# Patient Record
Sex: FEMALE | Race: WHITE | Employment: OTHER | URBAN - METROPOLITAN AREA
[De-identification: names, ages, dates, MRNs, and addresses within clinical notes are randomized per-mention and may not be internally consistent; named-entity substitution may affect disease eponyms.]

---

## 2017-03-10 ENCOUNTER — OFFICE VISIT (OUTPATIENT)
Dept: ORTHOPEDIC SURGERY | Age: 72
End: 2017-03-10

## 2017-03-10 VITALS
SYSTOLIC BLOOD PRESSURE: 158 MMHG | HEART RATE: 80 BPM | HEIGHT: 68 IN | DIASTOLIC BLOOD PRESSURE: 81 MMHG | WEIGHT: 200 LBS | BODY MASS INDEX: 30.31 KG/M2

## 2017-03-10 DIAGNOSIS — M47.816 SPONDYLOSIS OF LUMBAR REGION WITHOUT MYELOPATHY OR RADICULOPATHY: Primary | ICD-10-CM

## 2017-03-10 PROCEDURE — G8400 PT W/DXA NO RESULTS DOC: HCPCS | Performed by: PHYSICAL MEDICINE & REHABILITATION

## 2017-03-10 PROCEDURE — G8484 FLU IMMUNIZE NO ADMIN: HCPCS | Performed by: PHYSICAL MEDICINE & REHABILITATION

## 2017-03-10 PROCEDURE — 3014F SCREEN MAMMO DOC REV: CPT | Performed by: PHYSICAL MEDICINE & REHABILITATION

## 2017-03-10 PROCEDURE — 72100 X-RAY EXAM L-S SPINE 2/3 VWS: CPT | Performed by: PHYSICAL MEDICINE & REHABILITATION

## 2017-03-10 PROCEDURE — G8427 DOCREV CUR MEDS BY ELIG CLIN: HCPCS | Performed by: PHYSICAL MEDICINE & REHABILITATION

## 2017-03-10 PROCEDURE — 4040F PNEUMOC VAC/ADMIN/RCVD: CPT | Performed by: PHYSICAL MEDICINE & REHABILITATION

## 2017-03-10 PROCEDURE — 99214 OFFICE O/P EST MOD 30 MIN: CPT | Performed by: PHYSICAL MEDICINE & REHABILITATION

## 2017-03-10 PROCEDURE — 1090F PRES/ABSN URINE INCON ASSESS: CPT | Performed by: PHYSICAL MEDICINE & REHABILITATION

## 2017-03-10 PROCEDURE — 1036F TOBACCO NON-USER: CPT | Performed by: PHYSICAL MEDICINE & REHABILITATION

## 2017-03-10 PROCEDURE — 3017F COLORECTAL CA SCREEN DOC REV: CPT | Performed by: PHYSICAL MEDICINE & REHABILITATION

## 2017-03-10 PROCEDURE — G8419 CALC BMI OUT NRM PARAM NOF/U: HCPCS | Performed by: PHYSICAL MEDICINE & REHABILITATION

## 2017-03-10 PROCEDURE — 1123F ACP DISCUSS/DSCN MKR DOCD: CPT | Performed by: PHYSICAL MEDICINE & REHABILITATION

## 2017-03-10 RX ORDER — METHYLPREDNISOLONE 4 MG/1
TABLET ORAL
Qty: 1 KIT | Refills: 0 | Status: SHIPPED | OUTPATIENT
Start: 2017-03-10 | End: 2017-03-16

## 2017-04-06 ENCOUNTER — HOSPITAL ENCOUNTER (OUTPATIENT)
Dept: SURGERY | Age: 72
Discharge: HOME | End: 2017-04-06
Attending: OPHTHALMOLOGY | Admitting: OPHTHALMOLOGY

## 2017-04-06 VITALS
HEIGHT: 68 IN | DIASTOLIC BLOOD PRESSURE: 86 MMHG | OXYGEN SATURATION: 95 % | TEMPERATURE: 97.2 F | HEART RATE: 75 BPM | WEIGHT: 200 LBS | BODY MASS INDEX: 30.31 KG/M2 | RESPIRATION RATE: 16 BRPM | SYSTOLIC BLOOD PRESSURE: 162 MMHG

## 2017-04-06 RX ORDER — SODIUM CHLORIDE, SODIUM LACTATE, POTASSIUM CHLORIDE, CALCIUM CHLORIDE 600; 310; 30; 20 MG/100ML; MG/100ML; MG/100ML; MG/100ML
INJECTION, SOLUTION INTRAVENOUS
Status: COMPLETED
Start: 2017-04-06 | End: 2017-04-06

## 2017-04-06 RX ORDER — HYDROMORPHONE HCL 110MG/55ML
0.25 PATIENT CONTROLLED ANALGESIA SYRINGE INTRAVENOUS EVERY 5 MIN PRN
Status: DISCONTINUED | OUTPATIENT
Start: 2017-04-06 | End: 2017-04-07 | Stop reason: HOSPADM

## 2017-04-06 RX ORDER — LIDOCAINE HYDROCHLORIDE 10 MG/ML
1 INJECTION, SOLUTION EPIDURAL; INFILTRATION; INTRACAUDAL; PERINEURAL
Status: COMPLETED | OUTPATIENT
Start: 2017-04-06 | End: 2017-04-06

## 2017-04-06 RX ORDER — PROMETHAZINE HYDROCHLORIDE 25 MG/ML
6.25 INJECTION, SOLUTION INTRAMUSCULAR; INTRAVENOUS
Status: DISCONTINUED | OUTPATIENT
Start: 2017-04-06 | End: 2017-04-07 | Stop reason: HOSPADM

## 2017-04-06 RX ORDER — DIPHENHYDRAMINE HYDROCHLORIDE 50 MG/ML
12.5 INJECTION INTRAMUSCULAR; INTRAVENOUS
Status: ACTIVE | OUTPATIENT
Start: 2017-04-06 | End: 2017-04-06

## 2017-04-06 RX ORDER — LABETALOL HYDROCHLORIDE 5 MG/ML
5 INJECTION, SOLUTION INTRAVENOUS EVERY 10 MIN PRN
Status: DISCONTINUED | OUTPATIENT
Start: 2017-04-06 | End: 2017-04-07 | Stop reason: HOSPADM

## 2017-04-06 RX ORDER — TOBRAMYCIN AND DEXAMETHASONE 3; 1 MG/ML; MG/ML
1 SUSPENSION/ DROPS OPHTHALMIC ONCE
Status: COMPLETED | OUTPATIENT
Start: 2017-04-06 | End: 2017-04-06

## 2017-04-06 RX ORDER — SODIUM CHLORIDE, SODIUM LACTATE, POTASSIUM CHLORIDE, CALCIUM CHLORIDE 600; 310; 30; 20 MG/100ML; MG/100ML; MG/100ML; MG/100ML
INJECTION, SOLUTION INTRAVENOUS CONTINUOUS
Status: DISCONTINUED | OUTPATIENT
Start: 2017-04-06 | End: 2017-04-07 | Stop reason: HOSPADM

## 2017-04-06 RX ORDER — LIDOCAINE HYDROCHLORIDE 10 MG/ML
INJECTION, SOLUTION EPIDURAL; INFILTRATION; INTRACAUDAL; PERINEURAL
Status: COMPLETED
Start: 2017-04-06 | End: 2017-04-06

## 2017-04-06 RX ORDER — SODIUM CHLORIDE 0.9 % (FLUSH) 0.9 %
10 SYRINGE (ML) INJECTION EVERY 12 HOURS SCHEDULED
Status: DISCONTINUED | OUTPATIENT
Start: 2017-04-06 | End: 2017-04-07 | Stop reason: HOSPADM

## 2017-04-06 RX ORDER — ONDANSETRON 2 MG/ML
4 INJECTION INTRAMUSCULAR; INTRAVENOUS PRN
Status: DISCONTINUED | OUTPATIENT
Start: 2017-04-06 | End: 2017-04-07 | Stop reason: HOSPADM

## 2017-04-06 RX ORDER — HYDRALAZINE HYDROCHLORIDE 20 MG/ML
5 INJECTION INTRAMUSCULAR; INTRAVENOUS EVERY 10 MIN PRN
Status: DISCONTINUED | OUTPATIENT
Start: 2017-04-06 | End: 2017-04-07 | Stop reason: HOSPADM

## 2017-04-06 RX ORDER — TOBRAMYCIN 3 MG/ML
1 SOLUTION/ DROPS OPHTHALMIC ONCE
Status: DISCONTINUED | OUTPATIENT
Start: 2017-04-06 | End: 2017-04-06

## 2017-04-06 RX ORDER — SODIUM CHLORIDE 0.9 % (FLUSH) 0.9 %
10 SYRINGE (ML) INJECTION PRN
Status: DISCONTINUED | OUTPATIENT
Start: 2017-04-06 | End: 2017-04-07 | Stop reason: HOSPADM

## 2017-04-06 RX ORDER — HYDROMORPHONE HCL 110MG/55ML
0.5 PATIENT CONTROLLED ANALGESIA SYRINGE INTRAVENOUS EVERY 5 MIN PRN
Status: DISCONTINUED | OUTPATIENT
Start: 2017-04-06 | End: 2017-04-07 | Stop reason: HOSPADM

## 2017-04-06 RX ORDER — MORPHINE SULFATE 4 MG/ML
2 INJECTION, SOLUTION INTRAMUSCULAR; INTRAVENOUS EVERY 5 MIN PRN
Status: DISCONTINUED | OUTPATIENT
Start: 2017-04-06 | End: 2017-04-07 | Stop reason: HOSPADM

## 2017-04-06 RX ORDER — MEPERIDINE HYDROCHLORIDE 25 MG/ML
12.5 INJECTION INTRAMUSCULAR; INTRAVENOUS; SUBCUTANEOUS EVERY 5 MIN PRN
Status: DISCONTINUED | OUTPATIENT
Start: 2017-04-06 | End: 2017-04-07 | Stop reason: HOSPADM

## 2017-04-06 RX ORDER — PREDNISOLONE ACETATE 10 MG/ML
1 SUSPENSION/ DROPS OPHTHALMIC CONTINUOUS
Status: DISCONTINUED | OUTPATIENT
Start: 2017-04-06 | End: 2017-04-07 | Stop reason: HOSPADM

## 2017-04-06 RX ORDER — MORPHINE SULFATE 4 MG/ML
1 INJECTION, SOLUTION INTRAMUSCULAR; INTRAVENOUS EVERY 5 MIN PRN
Status: DISCONTINUED | OUTPATIENT
Start: 2017-04-06 | End: 2017-04-07 | Stop reason: HOSPADM

## 2017-04-06 RX ADMIN — SODIUM CHLORIDE, SODIUM LACTATE, POTASSIUM CHLORIDE, CALCIUM CHLORIDE: 600; 310; 30; 20 INJECTION, SOLUTION INTRAVENOUS at 07:10

## 2017-04-06 RX ADMIN — LIDOCAINE HYDROCHLORIDE 1 ML: 10 INJECTION, SOLUTION EPIDURAL; INFILTRATION; INTRACAUDAL; PERINEURAL at 07:11

## 2017-04-06 RX ADMIN — TOBRAMYCIN AND DEXAMETHASONE 1 DROP: 3; 1 SUSPENSION/ DROPS OPHTHALMIC at 09:11

## 2017-04-06 ASSESSMENT — PAIN SCALES - GENERAL
PAINLEVEL_OUTOF10: 0
PAINLEVEL_OUTOF10: 0

## 2017-04-06 ASSESSMENT — PAIN - FUNCTIONAL ASSESSMENT: PAIN_FUNCTIONAL_ASSESSMENT: 0-10

## 2017-05-04 ENCOUNTER — HOSPITAL ENCOUNTER (OUTPATIENT)
Dept: SURGERY | Age: 72
Discharge: HOME | End: 2017-05-04
Attending: OPHTHALMOLOGY | Admitting: OPHTHALMOLOGY

## 2017-05-04 VITALS
DIASTOLIC BLOOD PRESSURE: 78 MMHG | OXYGEN SATURATION: 94 % | WEIGHT: 200 LBS | SYSTOLIC BLOOD PRESSURE: 157 MMHG | BODY MASS INDEX: 30.31 KG/M2 | TEMPERATURE: 98.4 F | HEART RATE: 71 BPM | RESPIRATION RATE: 16 BRPM | HEIGHT: 68 IN

## 2017-05-04 RX ORDER — LIDOCAINE HYDROCHLORIDE 10 MG/ML
INJECTION, SOLUTION EPIDURAL; INFILTRATION; INTRACAUDAL; PERINEURAL
Status: DISPENSED
Start: 2017-05-04 | End: 2017-05-04

## 2017-05-04 RX ORDER — LIDOCAINE HYDROCHLORIDE 10 MG/ML
2 INJECTION, SOLUTION INFILTRATION; PERINEURAL
Status: COMPLETED | OUTPATIENT
Start: 2017-05-04 | End: 2017-05-04

## 2017-05-04 RX ORDER — TOBRAMYCIN AND DEXAMETHASONE 3; 1 MG/ML; MG/ML
1 SUSPENSION/ DROPS OPHTHALMIC SEE ADMIN INSTRUCTIONS
Status: DISCONTINUED | OUTPATIENT
Start: 2017-05-04 | End: 2017-05-05 | Stop reason: HOSPADM

## 2017-05-04 RX ORDER — PREDNISOLONE ACETATE 10 MG/ML
1 SUSPENSION/ DROPS OPHTHALMIC SEE ADMIN INSTRUCTIONS
Status: DISCONTINUED | OUTPATIENT
Start: 2017-05-04 | End: 2017-05-05 | Stop reason: HOSPADM

## 2017-05-04 RX ORDER — SODIUM CHLORIDE, SODIUM LACTATE, POTASSIUM CHLORIDE, CALCIUM CHLORIDE 600; 310; 30; 20 MG/100ML; MG/100ML; MG/100ML; MG/100ML
INJECTION, SOLUTION INTRAVENOUS CONTINUOUS
Status: DISCONTINUED | OUTPATIENT
Start: 2017-05-04 | End: 2017-05-05 | Stop reason: HOSPADM

## 2017-05-04 RX ADMIN — LIDOCAINE HYDROCHLORIDE 2 ML: 10 INJECTION, SOLUTION INFILTRATION; PERINEURAL at 10:07

## 2017-05-04 RX ADMIN — SODIUM CHLORIDE, SODIUM LACTATE, POTASSIUM CHLORIDE, CALCIUM CHLORIDE: 600; 310; 30; 20 INJECTION, SOLUTION INTRAVENOUS at 10:07

## 2017-05-04 ASSESSMENT — PAIN SCALES - GENERAL
PAINLEVEL_OUTOF10: 0
PAINLEVEL_OUTOF10: 0

## 2018-05-10 ENCOUNTER — OFFICE VISIT (OUTPATIENT)
Dept: ORTHOPEDIC SURGERY | Age: 73
End: 2018-05-10

## 2018-05-10 VITALS
WEIGHT: 199.96 LBS | DIASTOLIC BLOOD PRESSURE: 82 MMHG | HEIGHT: 68 IN | SYSTOLIC BLOOD PRESSURE: 179 MMHG | HEART RATE: 72 BPM | BODY MASS INDEX: 30.31 KG/M2

## 2018-05-10 DIAGNOSIS — R52 PAIN: ICD-10-CM

## 2018-05-10 DIAGNOSIS — M17.11 PRIMARY OSTEOARTHRITIS OF RIGHT KNEE: Primary | ICD-10-CM

## 2018-05-10 PROCEDURE — G8427 DOCREV CUR MEDS BY ELIG CLIN: HCPCS | Performed by: ORTHOPAEDIC SURGERY

## 2018-05-10 PROCEDURE — 20611 DRAIN/INJ JOINT/BURSA W/US: CPT | Performed by: ORTHOPAEDIC SURGERY

## 2018-05-10 PROCEDURE — G8400 PT W/DXA NO RESULTS DOC: HCPCS | Performed by: ORTHOPAEDIC SURGERY

## 2018-05-10 PROCEDURE — 1123F ACP DISCUSS/DSCN MKR DOCD: CPT | Performed by: ORTHOPAEDIC SURGERY

## 2018-05-10 PROCEDURE — 3017F COLORECTAL CA SCREEN DOC REV: CPT | Performed by: ORTHOPAEDIC SURGERY

## 2018-05-10 PROCEDURE — G8417 CALC BMI ABV UP PARAM F/U: HCPCS | Performed by: ORTHOPAEDIC SURGERY

## 2018-05-10 PROCEDURE — 1036F TOBACCO NON-USER: CPT | Performed by: ORTHOPAEDIC SURGERY

## 2018-05-10 PROCEDURE — 1090F PRES/ABSN URINE INCON ASSESS: CPT | Performed by: ORTHOPAEDIC SURGERY

## 2018-05-10 PROCEDURE — 4040F PNEUMOC VAC/ADMIN/RCVD: CPT | Performed by: ORTHOPAEDIC SURGERY

## 2018-05-10 PROCEDURE — 99213 OFFICE O/P EST LOW 20 MIN: CPT | Performed by: ORTHOPAEDIC SURGERY

## 2018-05-10 RX ORDER — HYDROCHLOROTHIAZIDE 12.5 MG/1
TABLET ORAL
Refills: 0 | COMMUNITY
Start: 2018-04-21 | End: 2018-07-04 | Stop reason: ALTCHOICE

## 2018-06-08 ENCOUNTER — HOSPITAL ENCOUNTER (OUTPATIENT)
Dept: OTHER | Age: 73
Discharge: HOME | End: 2018-06-08
Attending: INTERNAL MEDICINE | Admitting: INTERNAL MEDICINE

## 2018-06-08 DIAGNOSIS — M25.50 PAIN IN JOINT, MULTIPLE SITES: ICD-10-CM

## 2018-07-12 ENCOUNTER — OFFICE VISIT (OUTPATIENT)
Dept: ORTHOPEDIC SURGERY | Age: 73
End: 2018-07-12

## 2018-07-12 VITALS — HEIGHT: 68 IN | WEIGHT: 199.96 LBS | BODY MASS INDEX: 30.31 KG/M2

## 2018-07-12 DIAGNOSIS — S63.501A RIGHT WRIST SPRAIN, INITIAL ENCOUNTER: ICD-10-CM

## 2018-07-12 DIAGNOSIS — M25.531 RIGHT WRIST PAIN: Primary | ICD-10-CM

## 2018-07-12 DIAGNOSIS — M18.11 ARTHRITIS OF CARPOMETACARPAL (CMC) JOINT OF RIGHT THUMB: ICD-10-CM

## 2018-07-12 PROCEDURE — 1090F PRES/ABSN URINE INCON ASSESS: CPT | Performed by: ORTHOPAEDIC SURGERY

## 2018-07-12 PROCEDURE — 3017F COLORECTAL CA SCREEN DOC REV: CPT | Performed by: ORTHOPAEDIC SURGERY

## 2018-07-12 PROCEDURE — 99213 OFFICE O/P EST LOW 20 MIN: CPT | Performed by: ORTHOPAEDIC SURGERY

## 2018-07-12 PROCEDURE — G8417 CALC BMI ABV UP PARAM F/U: HCPCS | Performed by: ORTHOPAEDIC SURGERY

## 2018-07-12 PROCEDURE — 4040F PNEUMOC VAC/ADMIN/RCVD: CPT | Performed by: ORTHOPAEDIC SURGERY

## 2018-07-12 PROCEDURE — 1036F TOBACCO NON-USER: CPT | Performed by: ORTHOPAEDIC SURGERY

## 2018-07-12 PROCEDURE — 1123F ACP DISCUSS/DSCN MKR DOCD: CPT | Performed by: ORTHOPAEDIC SURGERY

## 2018-07-12 PROCEDURE — G8400 PT W/DXA NO RESULTS DOC: HCPCS | Performed by: ORTHOPAEDIC SURGERY

## 2018-07-12 PROCEDURE — L3908 WHO COCK-UP NONMOLDE PRE OTS: HCPCS | Performed by: ORTHOPAEDIC SURGERY

## 2018-07-12 PROCEDURE — G8427 DOCREV CUR MEDS BY ELIG CLIN: HCPCS | Performed by: ORTHOPAEDIC SURGERY

## 2018-07-12 PROCEDURE — 1101F PT FALLS ASSESS-DOCD LE1/YR: CPT | Performed by: ORTHOPAEDIC SURGERY

## 2018-07-12 NOTE — PROGRESS NOTES
Chief Complaint  Wrist Pain (RIGHT WRIST INJURY 7/4/18 AFTER FALL)      History of Present Illness:  Alexia Allen is a 74 y.o. female. She presents today for a new hand surgery specialty evaluation regarding her right hand and wrist.  On 7/4/2018 she fell on concrete and had a laceration to her right forehead region but also landed on bilateral outstretched hands. Her original pain was on both wrists but now she has pain relegated to the right wrist area. There is some question on the initial x-rays of a possible small avulsion fracture. She did not find the initial wrist immobilization to be comfortable and took it off. She has been walking with a walker. Medical History:  Patient's medications, allergies, past medical, surgical, social and family histories were reviewed and updated as appropriate. Review of Systems  Pertinent items are noted in HPI  Review of systems reviewed from Patient History Form dated on 5/10/2018 and available in the patient's chart under the Media tab. Vital Signs  There were no vitals filed for this visit. General Exam:   Constitutional: Patient is adequately groomed with no evidence of malnutrition  Mental Status: The patient is oriented to time, place and person. The patient's mood and affect are appropriate. Wrist Examination    Inspection:  There is mild fullness over the right wrist and a small superficial abrasion along the right forearm. There is no sign of cellulitis    Palpation:  There is diffuse tenderness over the radiocarpal joint along the right wrist and both over the base of the thumb and over the triquetrum. There is no palmar tenderness. There is no discomfort at the elbow. Range of Motion:  The patient is able to straight almost a full range of motion of fingers and thumb with mild generalized stiffness.   There is no obvious tendon dysfunction    Strength:  Normal    Special Tests:  Compartments of the hand and wrist remain

## 2018-07-26 ENCOUNTER — OFFICE VISIT (OUTPATIENT)
Dept: ORTHOPEDIC SURGERY | Age: 73
End: 2018-07-26

## 2018-07-26 VITALS — WEIGHT: 199 LBS | HEIGHT: 68 IN | BODY MASS INDEX: 30.16 KG/M2

## 2018-07-26 DIAGNOSIS — S63.501A RIGHT WRIST SPRAIN, INITIAL ENCOUNTER: ICD-10-CM

## 2018-07-26 DIAGNOSIS — M25.531 RIGHT WRIST PAIN: Primary | ICD-10-CM

## 2018-07-26 PROCEDURE — 1123F ACP DISCUSS/DSCN MKR DOCD: CPT | Performed by: ORTHOPAEDIC SURGERY

## 2018-07-26 PROCEDURE — 3017F COLORECTAL CA SCREEN DOC REV: CPT | Performed by: ORTHOPAEDIC SURGERY

## 2018-07-26 PROCEDURE — G8400 PT W/DXA NO RESULTS DOC: HCPCS | Performed by: ORTHOPAEDIC SURGERY

## 2018-07-26 PROCEDURE — 99213 OFFICE O/P EST LOW 20 MIN: CPT | Performed by: ORTHOPAEDIC SURGERY

## 2018-07-26 PROCEDURE — G8427 DOCREV CUR MEDS BY ELIG CLIN: HCPCS | Performed by: ORTHOPAEDIC SURGERY

## 2018-07-26 PROCEDURE — 1090F PRES/ABSN URINE INCON ASSESS: CPT | Performed by: ORTHOPAEDIC SURGERY

## 2018-07-26 PROCEDURE — 1036F TOBACCO NON-USER: CPT | Performed by: ORTHOPAEDIC SURGERY

## 2018-07-26 PROCEDURE — G8417 CALC BMI ABV UP PARAM F/U: HCPCS | Performed by: ORTHOPAEDIC SURGERY

## 2018-07-26 PROCEDURE — 4040F PNEUMOC VAC/ADMIN/RCVD: CPT | Performed by: ORTHOPAEDIC SURGERY

## 2018-07-26 PROCEDURE — 1101F PT FALLS ASSESS-DOCD LE1/YR: CPT | Performed by: ORTHOPAEDIC SURGERY

## 2018-07-26 NOTE — PROGRESS NOTES
fall prevention strategies, and I will be happy to see her back on an as-needed basis moving forward. Please note that this transcription was created using voice recognition software. Any errors are unintentional and may be due to voice recognition transcription.

## 2018-10-10 ENCOUNTER — HOSPITAL ENCOUNTER (OUTPATIENT)
Age: 73
Discharge: HOME | End: 2018-10-10
Payer: MEDICARE

## 2018-10-10 ENCOUNTER — OFFICE VISIT (OUTPATIENT)
Dept: CARDIOLOGY CLINIC | Age: 73
End: 2018-10-10
Payer: MEDICARE

## 2018-10-10 ENCOUNTER — HOSPITAL ENCOUNTER (OUTPATIENT)
Dept: GENERAL RADIOLOGY | Age: 73
Discharge: HOME | End: 2018-10-10
Payer: MEDICARE

## 2018-10-10 VITALS
OXYGEN SATURATION: 97 % | BODY MASS INDEX: 31.52 KG/M2 | DIASTOLIC BLOOD PRESSURE: 86 MMHG | WEIGHT: 208 LBS | SYSTOLIC BLOOD PRESSURE: 190 MMHG | HEIGHT: 68 IN | HEART RATE: 77 BPM

## 2018-10-10 DIAGNOSIS — R07.89 OTHER CHEST PAIN: ICD-10-CM

## 2018-10-10 DIAGNOSIS — R06.02 SHORTNESS OF BREATH: ICD-10-CM

## 2018-10-10 DIAGNOSIS — I10 ESSENTIAL HYPERTENSION: Primary | ICD-10-CM

## 2018-10-10 PROCEDURE — 1036F TOBACCO NON-USER: CPT | Performed by: INTERNAL MEDICINE

## 2018-10-10 PROCEDURE — 71046 X-RAY EXAM CHEST 2 VIEWS: CPT

## 2018-10-10 PROCEDURE — 4040F PNEUMOC VAC/ADMIN/RCVD: CPT | Performed by: INTERNAL MEDICINE

## 2018-10-10 PROCEDURE — 3017F COLORECTAL CA SCREEN DOC REV: CPT | Performed by: INTERNAL MEDICINE

## 2018-10-10 PROCEDURE — 93000 ELECTROCARDIOGRAM COMPLETE: CPT | Performed by: INTERNAL MEDICINE

## 2018-10-10 PROCEDURE — G8427 DOCREV CUR MEDS BY ELIG CLIN: HCPCS | Performed by: INTERNAL MEDICINE

## 2018-10-10 PROCEDURE — 1090F PRES/ABSN URINE INCON ASSESS: CPT | Performed by: INTERNAL MEDICINE

## 2018-10-10 PROCEDURE — G8484 FLU IMMUNIZE NO ADMIN: HCPCS | Performed by: INTERNAL MEDICINE

## 2018-10-10 PROCEDURE — 99204 OFFICE O/P NEW MOD 45 MIN: CPT | Performed by: INTERNAL MEDICINE

## 2018-10-10 PROCEDURE — 1101F PT FALLS ASSESS-DOCD LE1/YR: CPT | Performed by: INTERNAL MEDICINE

## 2018-10-10 PROCEDURE — G8400 PT W/DXA NO RESULTS DOC: HCPCS | Performed by: INTERNAL MEDICINE

## 2018-10-10 PROCEDURE — 1123F ACP DISCUSS/DSCN MKR DOCD: CPT | Performed by: INTERNAL MEDICINE

## 2018-10-10 PROCEDURE — G8417 CALC BMI ABV UP PARAM F/U: HCPCS | Performed by: INTERNAL MEDICINE

## 2018-10-10 RX ORDER — FUROSEMIDE 40 MG/1
40 TABLET ORAL DAILY
Qty: 30 TABLET | Refills: 3 | Status: SHIPPED | OUTPATIENT
Start: 2018-10-10 | End: 2018-12-03 | Stop reason: SDUPTHER

## 2018-10-10 RX ORDER — TRAMADOL HYDROCHLORIDE 50 MG/1
50 TABLET ORAL PRN
COMMUNITY
End: 2021-03-23

## 2018-10-10 NOTE — COMMUNICATION BODY
asymmetry, or inflammation, nasal mucosa normal, septum midline with no perforation or bleeding  Back:  no pain to palpation  Joint:  no active joint inflammation  Musculoskeletal:  negative  Skin:  Warm and dry  Neck:  + JVD and  No Carotid Bruits. Chest:  Clear to auscultation, respiration easy  Cardiovascular:  RRR, S1S2 normal, 3/6 MIRELLA  Transmitted to carotids consistent with aortic stenosis, no diastolic murmur, no rub or thrill. Abdomen:  Soft normal liver and spleen  Extremities:   4+ BLE edema  no clubbing, cyanosis,  Pulses: Femoral and pedal pulses are normal.  Neuro: intact    Medications:   Outpatient Encounter Prescriptions as of 10/10/2018   Medication Sig Dispense Refill    Acetaminophen (TYLENOL PO) Take by mouth as needed      traMADol (ULTRAM) 50 MG tablet Take 50 mg by mouth as needed for Pain. [employer]eds amLODIPine (NORVASC) 5 MG tablet Take 5 mg by mouth daily       meloxicam (MOBIC) 15 MG tablet Take 15 mg by mouth daily       pantoprazole (PROTONIX) 40 MG tablet Take 40 mg by mouth daily       citalopram (CELEXA) 20 MG tablet Take 1 tablet by mouth daily. (Patient taking differently: Take by mouth daily ) 90 tablet 3    Naproxen Sodium (ALEVE PO) Take by mouth       No facility-administered encounter medications on file as of 10/10/2018. Lab Data:  CBC: No results for input(s): WBC, HGB, HCT, MCV, PLT in the last 72 hours. BMP: No results for input(s): NA, K, CL, CO2, PHOS, BUN, CREATININE in the last 72 hours. Invalid input(s): CA  LIVER PROFILE: No results for input(s): AST, ALT, LIPASE, BILIDIR, BILITOT, ALKPHOS in the last 72 hours. Invalid input(s):   AMYLASE,  ALB  LIPID:   Lab Results   Component Value Date    CHOL 133 10/28/2016    CHOL 177 09/03/2013    CHOL 191 04/10/2010     Lab Results   Component Value Date    TRIG 75 10/28/2016    TRIG 145 09/03/2013    TRIG 125 04/10/2010     Lab Results   Component Value Date    HDL 39 (L) 10/28/2016    HDL 45 09/03/2013

## 2018-10-12 ENCOUNTER — TELEPHONE (OUTPATIENT)
Dept: CARDIOLOGY CLINIC | Age: 73
End: 2018-10-12

## 2018-10-19 ENCOUNTER — HOSPITAL ENCOUNTER (OUTPATIENT)
Dept: NON INVASIVE DIAGNOSTICS | Age: 73
Discharge: HOME | End: 2018-10-19
Payer: MEDICARE

## 2018-10-19 ENCOUNTER — TELEPHONE (OUTPATIENT)
Dept: CARDIOLOGY CLINIC | Age: 73
End: 2018-10-19

## 2018-10-19 DIAGNOSIS — R07.89 OTHER CHEST PAIN: ICD-10-CM

## 2018-10-19 DIAGNOSIS — R06.02 SHORTNESS OF BREATH: ICD-10-CM

## 2018-10-19 LAB
LV EF: 58 %
LVEF MODALITY: NORMAL

## 2018-10-19 PROCEDURE — 93306 TTE W/DOPPLER COMPLETE: CPT

## 2018-10-25 LAB
ALBUMIN SERPL-MCNC: 3.6 G/DL
ALP BLD-CCNC: 88 U/L
ALT SERPL-CCNC: 21 U/L
ANION GAP SERPL CALCULATED.3IONS-SCNC: 1.1 MMOL/L
AST SERPL-CCNC: 41 U/L
BASOPHILS ABSOLUTE: 0.1 /ΜL
BASOPHILS RELATIVE PERCENT: 1 %
BILIRUB SERPL-MCNC: 1.4 MG/DL (ref 0.1–1.4)
BUN BLDV-MCNC: 13 MG/DL
CALCIUM SERPL-MCNC: 8.8 MG/DL
CHLORIDE BLD-SCNC: 99 MMOL/L
CHOLESTEROL, TOTAL: 176 MG/DL
CHOLESTEROL/HDL RATIO: NORMAL
CO2: 24 MMOL/L
CREAT SERPL-MCNC: 0.86 MG/DL
EOSINOPHILS ABSOLUTE: 0.2 /ΜL
EOSINOPHILS RELATIVE PERCENT: 3 %
GFR CALCULATED: NORMAL
GLUCOSE BLD-MCNC: 92 MG/DL
HCT VFR BLD CALC: 36.1 % (ref 36–46)
HDLC SERPL-MCNC: 70 MG/DL (ref 35–70)
HEMOGLOBIN: 12.2 G/DL (ref 12–16)
LDL CHOLESTEROL CALCULATED: 90 MG/DL (ref 0–160)
LYMPHOCYTES ABSOLUTE: 1.4 /ΜL
LYMPHOCYTES RELATIVE PERCENT: 27 %
MCH RBC QN AUTO: 30.2 PG
MCHC RBC AUTO-ENTMCNC: 33.8 G/DL
MCV RBC AUTO: 89 FL
MONOCYTES ABSOLUTE: 0.4 /ΜL
MONOCYTES RELATIVE PERCENT: 7 %
NEUTROPHILS ABSOLUTE: 3.4 /ΜL
NEUTROPHILS RELATIVE PERCENT: 62 %
PLATELET # BLD: 142 K/ΜL
PMV BLD AUTO: NORMAL FL
POTASSIUM SERPL-SCNC: 3.9 MMOL/L
RBC # BLD: 4.04 10^6/ΜL
SODIUM BLD-SCNC: 142 MMOL/L
TOTAL PROTEIN: 7
TRIGL SERPL-MCNC: 79 MG/DL
VLDLC SERPL CALC-MCNC: 16 MG/DL
WBC # BLD: 5.4 10^3/ML

## 2018-11-13 PROBLEM — I35.0 NONRHEUMATIC AORTIC VALVE STENOSIS: Status: ACTIVE | Noted: 2018-11-13

## 2018-11-13 PROBLEM — R06.02 SOB (SHORTNESS OF BREATH) ON EXERTION: Status: ACTIVE | Noted: 2018-11-13

## 2018-11-14 ENCOUNTER — OFFICE VISIT (OUTPATIENT)
Dept: CARDIOLOGY CLINIC | Age: 73
End: 2018-11-14
Payer: MEDICARE

## 2018-11-14 VITALS
HEART RATE: 74 BPM | OXYGEN SATURATION: 98 % | HEIGHT: 68 IN | WEIGHT: 198 LBS | BODY MASS INDEX: 30.01 KG/M2 | DIASTOLIC BLOOD PRESSURE: 62 MMHG | SYSTOLIC BLOOD PRESSURE: 154 MMHG

## 2018-11-14 DIAGNOSIS — R06.02 SOB (SHORTNESS OF BREATH) ON EXERTION: ICD-10-CM

## 2018-11-14 DIAGNOSIS — I10 ESSENTIAL HYPERTENSION: ICD-10-CM

## 2018-11-14 DIAGNOSIS — I50.31 ACUTE DIASTOLIC CONGESTIVE HEART FAILURE (HCC): ICD-10-CM

## 2018-11-14 DIAGNOSIS — I35.0 NONRHEUMATIC AORTIC VALVE STENOSIS: Primary | ICD-10-CM

## 2018-11-14 PROCEDURE — 1123F ACP DISCUSS/DSCN MKR DOCD: CPT | Performed by: INTERNAL MEDICINE

## 2018-11-14 PROCEDURE — G8484 FLU IMMUNIZE NO ADMIN: HCPCS | Performed by: INTERNAL MEDICINE

## 2018-11-14 PROCEDURE — 4040F PNEUMOC VAC/ADMIN/RCVD: CPT | Performed by: INTERNAL MEDICINE

## 2018-11-14 PROCEDURE — 1036F TOBACCO NON-USER: CPT | Performed by: INTERNAL MEDICINE

## 2018-11-14 PROCEDURE — G8427 DOCREV CUR MEDS BY ELIG CLIN: HCPCS | Performed by: INTERNAL MEDICINE

## 2018-11-14 PROCEDURE — 99214 OFFICE O/P EST MOD 30 MIN: CPT | Performed by: INTERNAL MEDICINE

## 2018-11-14 PROCEDURE — 1090F PRES/ABSN URINE INCON ASSESS: CPT | Performed by: INTERNAL MEDICINE

## 2018-11-14 PROCEDURE — G8400 PT W/DXA NO RESULTS DOC: HCPCS | Performed by: INTERNAL MEDICINE

## 2018-11-14 PROCEDURE — 3017F COLORECTAL CA SCREEN DOC REV: CPT | Performed by: INTERNAL MEDICINE

## 2018-11-14 PROCEDURE — G8417 CALC BMI ABV UP PARAM F/U: HCPCS | Performed by: INTERNAL MEDICINE

## 2018-11-14 PROCEDURE — 1101F PT FALLS ASSESS-DOCD LE1/YR: CPT | Performed by: INTERNAL MEDICINE

## 2018-11-14 RX ORDER — LISINOPRIL 10 MG/1
10 TABLET ORAL DAILY
Qty: 30 TABLET | Refills: 1 | Status: SHIPPED | OUTPATIENT
Start: 2018-11-14 | End: 2018-11-14 | Stop reason: SDUPTHER

## 2018-11-14 RX ORDER — LISINOPRIL 10 MG/1
10 TABLET ORAL DAILY
Qty: 90 TABLET | Refills: 3 | Status: SHIPPED | OUTPATIENT
Start: 2018-11-14 | End: 2019-03-15 | Stop reason: SDUPTHER

## 2018-11-14 NOTE — COMMUNICATION BODY
[address] Office Note  11/14/2018     Subjective:  Ms. Gonsalo Watson is here for cardiology follow up of Aortic Stenosis, HTN, shortness of breath      Today she reports to feeling a lot better but continues to have BLE edema, SOB on exertion but overall has imporved. She denies any chest pain, palpitations    [place]:  Short of breath last couple of months. Keeps head end raised. Usual activity makes her short of breath. Grocery shopping makes her short of breath. Tightness of chest is there if she thinks about it. She states if she is distracted she doesn't notice it. Tired x 6 months or longer  She states she has fallen 3 times since April. Her knee gives out and she falls. She states she has had swelling in her legs and feet. 2+BLE edema today    Review of Systems:         12 point ROS negative in all areas as listed below except as in [place]  Constitutional, EENT, Cardiovascular, pulmonary, GI, , Musculoskeletal, skin, neurological, hematological, endocrine, Psychiatric    Reviewed past medical history, social, and family history.    Does not smoke  No history of MI   MOM BP high  DAD does not know    Past Medical History:   Diagnosis Date    Acid reflux     Generalized anxiety disorder     Hypertension     Osteoarthritis     of hips, knees, back    Screening mammogram 3/26/96     Past Surgical History:   Procedure Laterality Date    CARPAL TUNNEL RELEASE      CATARACT REMOVAL WITH IMPLANT Left 04/06/2017    Left cataract removal. Dr. Edis Polanco Right 05/04/2017    PHACO EMULSIFICATION OF CATARACT WITH INTRAOCULAR LENS IMPLANT RIGHT EYE    COLONOSCOPY  3/24/10    FOOT SURGERY      GALLBLADDER SURGERY      HYSTERECTOMY      NECK SURGERY         Objective:   BP (!) 154/62   Pulse 74   Ht 5' 8\" (1.727 m)   Wt 198 lb (89.8 kg)   SpO2 98%   BMI 30.11 kg/m²      Wt Readings from Last 3 Encounters:   11/14/18 198 lb (89.8 kg)   10/10/18 208 lb (94.3 kg) ALB  LIPID:   Lab Results   Component Value Date    CHOL 176 10/25/2018    CHOL 133 10/28/2016    CHOL 177 09/03/2013     Lab Results   Component Value Date    TRIG 79 10/25/2018    TRIG 75 10/28/2016    TRIG 145 09/03/2013     Lab Results   Component Value Date    HDL 70 10/25/2018    HDL 39 (L) 10/28/2016    HDL 45 09/03/2013     Lab Results   Component Value Date    LDLCALC 90 10/25/2018    LDLCALC 79 10/28/2016    LDLCALC 103 (H) 09/03/2013     Lab Results   Component Value Date    LABVLDL 15 10/28/2016    LABVLDL 29 09/03/2013    LABVLDL 25 04/10/2010    VLDL 16 10/25/2018     No results found for: CHOLHDLRATIO  PT/INR: No results for input(s): PROTIME, INR in the last 72 hours. A1C: No results found for: LABA1C  BNP:  No results for input(s): BNP in the last 72 hours. IMAGING:   ECHO 10/19/18  Summary   Global left ventricular systolic function is normal with ejection fraction   estimated from 55 % to 60 %.   No regional wall motion abnormalities are noted.   Left ventricular size is moderately increased.   There is mild concentric left ventricular hypertrophy.   Normal left ventricular diastolic filling pressure.   The left atrium is mildly dilated.   The aortic valve is thickened/calcified with decreased leaflet mobility   consistent with mild aortic Doppler exam is c/w mild aortic stenosis. No   aortic regurgitation.   The right atrium is mildly dilated. CXR 10/10/18  There is mild pulmonary vascular congestion. The cardiac silhouette is top-normal in size. There is no pneumothorax or pleural effusion. Status post cholecystectomy and anterior discectomy of the lower cervical spine. EKG 10.10.18  NSR within normal  Assessment:  1. Nonrheumatic aortic valve stenosis    2. Essential hypertension    3. SOB (shortness of breath) on exertion     CHF acute diastolic    labs reviewed in epic satisfactory  She no longer having chest pain so no need for stress test at this time      Plan:  1.  Will add

## 2018-11-14 NOTE — LETTER
taking differently: Take 40 mg by mouth daily ) 90 tablet 3    Naproxen Sodium (ALEVE PO) Take by mouth       No facility-administered encounter medications on file as of 11/14/2018. Lab Data:  CBC: No results for input(s): WBC, HGB, HCT, MCV, PLT in the last 72 hours. BMP: No results for input(s): NA, K, CL, CO2, PHOS, BUN, CREATININE in the last 72 hours. Invalid input(s): CA  LIVER PROFILE: No results for input(s): AST, ALT, LIPASE, BILIDIR, BILITOT, ALKPHOS in the last 72 hours. Invalid input(s): AMYLASE,  ALB  LIPID:   Lab Results   Component Value Date    CHOL 176 10/25/2018    CHOL 133 10/28/2016    CHOL 177 09/03/2013     Lab Results   Component Value Date    TRIG 79 10/25/2018    TRIG 75 10/28/2016    TRIG 145 09/03/2013     Lab Results   Component Value Date    HDL 70 10/25/2018    HDL 39 (L) 10/28/2016    HDL 45 09/03/2013     Lab Results   Component Value Date    LDLCALC 90 10/25/2018    LDLCALC 79 10/28/2016    LDLCALC 103 (H) 09/03/2013     Lab Results   Component Value Date    LABVLDL 15 10/28/2016    LABVLDL 29 09/03/2013    LABVLDL 25 04/10/2010    VLDL 16 10/25/2018     No results found for: CHOLHDLRATIO  PT/INR: No results for input(s): PROTIME, INR in the last 72 hours. A1C: No results found for: LABA1C  BNP:  No results for input(s): BNP in the last 72 hours. IMAGING:   ECHO 10/19/18  Summary   Global left ventricular systolic function is normal with ejection fraction   estimated from 55 % to 60 %.   No regional wall motion abnormalities are noted.   Left ventricular size is moderately increased.   There is mild concentric left ventricular hypertrophy.   Normal left ventricular diastolic filling pressure.   The left atrium is mildly dilated.   The aortic valve is thickened/calcified with decreased leaflet mobility   consistent with mild aortic Doppler exam is c/w mild aortic stenosis. No   aortic regurgitation.   The right atrium is mildly dilated.   CXR 10/10/18

## 2018-11-14 NOTE — PROGRESS NOTES
07/26/18 199 lb (90.3 kg)       Physical Exam:  General: No Respiratory distress, appears well developed and well nourished. Eyes:  Sclera nonicteric  Nose/Sinuses:  negative findings: nose shows no deformity, asymmetry, or inflammation, nasal mucosa normal, septum midline with no perforation or bleeding  Back:  no pain to palpation  Joint:  no active joint inflammation  Musculoskeletal:  negative  Skin:  Warm and dry  Neck:  + JVD and  No Carotid Bruits. Chest:  Clear to auscultation, respiration easy  Cardiovascular:  RRR, S1S2 normal, 3/6 MIRELLA  Transmitted to carotids consistent with aortic stenosis, no diastolic murmur, no rub or thrill. Abdomen:  Soft normal liver and spleen  Extremities:   2+ BLE edema  no clubbing, cyanosis,  Pulses: Femoral and pedal pulses are normal.  Neuro: intact    Medications:   Outpatient Encounter Prescriptions as of 11/14/2018   Medication Sig Dispense Refill    Acetaminophen (TYLENOL PO) Take by mouth as needed      traMADol (ULTRAM) 50 MG tablet Take 50 mg by mouth as needed for Pain. Renetta Southern furosemide (LASIX) 40 MG tablet Take 1 tablet by mouth daily 30 tablet 3    amLODIPine (NORVASC) 5 MG tablet Take 5 mg by mouth daily       meloxicam (MOBIC) 15 MG tablet Take 7.5 mg by mouth daily       pantoprazole (PROTONIX) 40 MG tablet Take 20 mg by mouth daily 20 mg daily      citalopram (CELEXA) 20 MG tablet Take 1 tablet by mouth daily. (Patient taking differently: Take 40 mg by mouth daily ) 90 tablet 3    Naproxen Sodium (ALEVE PO) Take by mouth       No facility-administered encounter medications on file as of 11/14/2018. Lab Data:  CBC: No results for input(s): WBC, HGB, HCT, MCV, PLT in the last 72 hours. BMP: No results for input(s): NA, K, CL, CO2, PHOS, BUN, CREATININE in the last 72 hours. Invalid input(s): CA  LIVER PROFILE: No results for input(s): AST, ALT, LIPASE, BILIDIR, BILITOT, ALKPHOS in the last 72 hours. Invalid input(s):   AMYLASE, Lisinopril 10 mg 1 daily for better BP control  2   meds reviewed and refilled as warranted  3   Follow up in 2 month with me  4. Watch sodium/salt intake (potato chips, salted nuts, pickles, pretzels)   5. Healthy lifestyle education reviewed including nutrition, exercise and activity    QUALITY MEASURES  1. Tobacco Cessation Counseling: NA  2. Retake of BP if >140/90:   Yes  3. Documentation to PCP/referring for new patient:  Sent to PCP at close of office visit  4. CAD patient on anti-platelet: NA  5. CAD patient on STATIN therapy:  NA  6.  Patient with CHF and aFib on anticoagulation:  NA     [address] [place], MD 11/14/2018 2:00 PM

## 2018-12-04 RX ORDER — FUROSEMIDE 40 MG/1
40 TABLET ORAL DAILY
Qty: 90 TABLET | Refills: 3 | Status: SHIPPED | OUTPATIENT
Start: 2018-12-04 | End: 2019-09-25 | Stop reason: SDUPTHER

## 2018-12-05 ENCOUNTER — NURSE ONLY (OUTPATIENT)
Dept: ORTHOPEDIC SURGERY | Age: 73
End: 2018-12-05
Payer: MEDICARE

## 2018-12-05 DIAGNOSIS — M17.11 PRIMARY OSTEOARTHRITIS OF RIGHT KNEE: Primary | ICD-10-CM

## 2018-12-05 PROCEDURE — 99999 PR OFFICE/OUTPT VISIT,PROCEDURE ONLY: CPT | Performed by: PHYSICIAN ASSISTANT

## 2018-12-11 ENCOUNTER — HOSPITAL ENCOUNTER (EMERGENCY)
Age: 73
Discharge: HOME | End: 2018-12-12
Attending: EMERGENCY MEDICINE
Payer: MEDICARE

## 2018-12-11 ENCOUNTER — APPOINTMENT (OUTPATIENT)
Dept: CT IMAGING | Age: 73
End: 2018-12-11
Payer: MEDICARE

## 2018-12-11 ENCOUNTER — APPOINTMENT (OUTPATIENT)
Dept: GENERAL RADIOLOGY | Age: 73
End: 2018-12-11
Payer: MEDICARE

## 2018-12-11 VITALS
HEART RATE: 84 BPM | BODY MASS INDEX: 29.25 KG/M2 | OXYGEN SATURATION: 99 % | DIASTOLIC BLOOD PRESSURE: 62 MMHG | HEIGHT: 68 IN | WEIGHT: 193 LBS | SYSTOLIC BLOOD PRESSURE: 140 MMHG | RESPIRATION RATE: 16 BRPM | TEMPERATURE: 98.6 F
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
## 2018-12-11 DIAGNOSIS — M25.552 PAIN OF LEFT HIP JOINT: Primary | ICD-10-CM

## 2018-12-11 LAB
A/G RATIO: 1 (ref 1.1–2.2)
ALBUMIN SERPL-MCNC: 3.3 G/DL (ref 3.4–5)
ALP BLD-CCNC: 77 U/L (ref 40–129)
ALT SERPL-CCNC: 32 U/L (ref 10–40)
ANION GAP SERPL CALCULATED.3IONS-SCNC: 16 MMOL/L (ref 3–16)
AST SERPL-CCNC: 57 U/L (ref 15–37)
BASOPHILS ABSOLUTE: 0 K/UL (ref 0–0.2)
BASOPHILS RELATIVE PERCENT: 0.4 %
BILIRUB SERPL-MCNC: 1.3 MG/DL (ref 0–1)
BUN BLDV-MCNC: 34 MG/DL (ref 7–20)
CALCIUM SERPL-MCNC: 9.4 MG/DL (ref 8.3–10.6)
CHLORIDE BLD-SCNC: 102 MMOL/L (ref 99–110)
CO2: 24 MMOL/L (ref 21–32)
CREAT SERPL-MCNC: 1.2 MG/DL (ref 0.6–1.2)
EOSINOPHILS ABSOLUTE: 0.1 K/UL (ref 0–0.6)
EOSINOPHILS RELATIVE PERCENT: 0.8 %
GFR AFRICAN AMERICAN: 53
GFR NON-AFRICAN AMERICAN: 44
GLOBULIN: 3.4 G/DL
GLUCOSE BLD-MCNC: 113 MG/DL (ref 70–99)
HCT VFR BLD CALC: 35.7 % (ref 36–48)
HEMOGLOBIN: 12.1 G/DL (ref 12–16)
LYMPHOCYTES ABSOLUTE: 0.7 K/UL (ref 1–5.1)
LYMPHOCYTES RELATIVE PERCENT: 8.7 %
MCH RBC QN AUTO: 30.7 PG (ref 26–34)
MCHC RBC AUTO-ENTMCNC: 33.9 G/DL (ref 31–36)
MCV RBC AUTO: 90.6 FL (ref 80–100)
MONOCYTES ABSOLUTE: 0.5 K/UL (ref 0–1.3)
MONOCYTES RELATIVE PERCENT: 6.9 %
NEUTROPHILS ABSOLUTE: 6.3 K/UL (ref 1.7–7.7)
NEUTROPHILS RELATIVE PERCENT: 83.2 %
PDW BLD-RTO: 14.9 % (ref 12.4–15.4)
PLATELET # BLD: 110 K/UL (ref 135–450)
PMV BLD AUTO: 9.5 FL (ref 5–10.5)
POTASSIUM SERPL-SCNC: 4 MMOL/L (ref 3.5–5.1)
RBC # BLD: 3.94 M/UL (ref 4–5.2)
SODIUM BLD-SCNC: 142 MMOL/L (ref 136–145)
TOTAL PROTEIN: 6.7 G/DL (ref 6.4–8.2)
WBC # BLD: 7.5 K/UL (ref 4–11)

## 2018-12-11 PROCEDURE — 96375 TX/PRO/DX INJ NEW DRUG ADDON: CPT

## 2018-12-11 PROCEDURE — 36415 COLL VENOUS BLD VENIPUNCTURE: CPT

## 2018-12-11 PROCEDURE — 70450 CT HEAD/BRAIN W/O DYE: CPT

## 2018-12-11 PROCEDURE — 93005 ELECTROCARDIOGRAM TRACING: CPT | Performed by: EMERGENCY MEDICINE

## 2018-12-11 PROCEDURE — 6360000002 HC RX W HCPCS: Performed by: EMERGENCY MEDICINE

## 2018-12-11 PROCEDURE — 73502 X-RAY EXAM HIP UNI 2-3 VIEWS: CPT

## 2018-12-11 PROCEDURE — 85025 COMPLETE CBC W/AUTO DIFF WBC: CPT

## 2018-12-11 PROCEDURE — 96374 THER/PROPH/DIAG INJ IV PUSH: CPT

## 2018-12-11 PROCEDURE — 72125 CT NECK SPINE W/O DYE: CPT

## 2018-12-11 PROCEDURE — 99284 EMERGENCY DEPT VISIT MOD MDM: CPT

## 2018-12-11 PROCEDURE — 80053 COMPREHEN METABOLIC PANEL: CPT

## 2018-12-11 RX ORDER — MORPHINE SULFATE 10 MG/ML
INJECTION, SOLUTION INTRAMUSCULAR; INTRAVENOUS
Status: DISCONTINUED
Start: 2018-12-11 | End: 2018-12-12 | Stop reason: HOSPADM

## 2018-12-11 RX ORDER — ONDANSETRON 2 MG/ML
4 INJECTION INTRAMUSCULAR; INTRAVENOUS ONCE
Status: COMPLETED | OUTPATIENT
Start: 2018-12-11 | End: 2018-12-11

## 2018-12-11 RX ORDER — MORPHINE SULFATE 4 MG/ML
4 INJECTION, SOLUTION INTRAMUSCULAR; INTRAVENOUS ONCE
Status: COMPLETED | OUTPATIENT
Start: 2018-12-11 | End: 2018-12-11

## 2018-12-11 RX ADMIN — ONDANSETRON HYDROCHLORIDE 4 MG: 2 INJECTION, SOLUTION INTRAMUSCULAR; INTRAVENOUS at 22:45

## 2018-12-11 RX ADMIN — MORPHINE SULFATE 4 MG: 4 INJECTION, SOLUTION INTRAMUSCULAR; INTRAVENOUS at 22:45

## 2018-12-11 ASSESSMENT — PAIN SCALES - WONG BAKER: WONGBAKER_NUMERICALRESPONSE: 4

## 2018-12-11 ASSESSMENT — PAIN SCALES - GENERAL
PAINLEVEL_OUTOF10: 4
PAINLEVEL_OUTOF10: 4

## 2018-12-12 LAB
EKG ATRIAL RATE: 85 BPM
EKG DIAGNOSIS: NORMAL
EKG P AXIS: 71 DEGREES
EKG P-R INTERVAL: 146 MS
EKG Q-T INTERVAL: 452 MS
EKG QRS DURATION: 152 MS
EKG QTC CALCULATION (BAZETT): 537 MS
EKG R AXIS: -48 DEGREES
EKG T AXIS: 58 DEGREES
EKG VENTRICULAR RATE: 85 BPM

## 2018-12-12 PROCEDURE — 93010 ELECTROCARDIOGRAM REPORT: CPT | Performed by: INTERNAL MEDICINE

## 2018-12-12 RX ORDER — HYDROCODONE BITARTRATE AND ACETAMINOPHEN 5; 325 MG/1; MG/1
1 TABLET ORAL EVERY 6 HOURS PRN
Qty: 12 TABLET | Refills: 0 | Status: SHIPPED | OUTPATIENT
Start: 2018-12-12 | End: 2018-12-17

## 2018-12-12 NOTE — ED PROVIDER NOTES
HEAD: Normocephalic, atraumatic  EYES: Sclera anicteric.no conjunctival injection,   NECK: Supple, no meningismus, mild stiffness but good range of motion without radicular symptoms  HEART: RRR with a 2/6 systolic ejection murmur but no rubs or gallops  LUNGS:  Clear good air movement no wheezing no retraction or accessory muscle use,  ABDOMEN: Soft, non-tender to palpation, no guarding or rebound. , no mass or distention and no hepatosplenomegaly. No evidence of an acute abdomen or peritoneal signs at time of exam     BACK: No pain to palpation of the midline   EXTREMITIES: No acute deformities, no peripheral edema, pain with flexion extension internal/external rotation of the hip but no shortness or external rotation   SKIN: Warm and dry. Normal color, no rash,  capillary refill less than 2 seconds  MENTAL STATUS: Alert, oriented, interactive,   NEUROLOGICAL:  No facial drooping. moves all 4 extremities, sensation intact, no focal findings     Nursing note and vital signs reviewed     I have reviewed and interpreted all of the currently available lab results from this visit (if applicable):       Radiographs (if obtained):  [] Radiologist's Report Reviewed:  CT Cervical Spine WO Contrast   Final Result   No acute intracranial abnormality. No acute cervical spine fracture. Multilevel cervical spondylosis. Postsurgical changes of C6-C7 ACDF. CT Head WO Contrast   Final Result   No acute intracranial abnormality. No acute cervical spine fracture. Multilevel cervical spondylosis. Postsurgical changes of C6-C7 ACDF. XR HIP 2-3 VW W PELVIS LEFT   Final Result   No radiographic evidence of fracture or dislocation identified.              [] Discussed with Radiologist:     [] The following radiograph was interpreted by myself in the absence of a radiologist:     EKG (if obtained): (All EKG's are interpreted by myself in the absence of a cardiologist)  EKG demonstrates a normal sinus rhythm

## 2019-03-15 RX ORDER — LISINOPRIL 10 MG/1
TABLET ORAL
Qty: 90 TABLET | Refills: 3 | Status: SHIPPED | OUTPATIENT
Start: 2019-03-15 | End: 2019-09-25 | Stop reason: SDUPTHER

## 2019-03-20 ENCOUNTER — OFFICE VISIT (OUTPATIENT)
Dept: CARDIOLOGY CLINIC | Age: 74
End: 2019-03-20
Payer: MEDICARE

## 2019-03-20 VITALS
OXYGEN SATURATION: 97 % | WEIGHT: 196.04 LBS | SYSTOLIC BLOOD PRESSURE: 114 MMHG | BODY MASS INDEX: 29.71 KG/M2 | HEART RATE: 81 BPM | HEIGHT: 68 IN | DIASTOLIC BLOOD PRESSURE: 62 MMHG

## 2019-03-20 DIAGNOSIS — I50.31 ACUTE DIASTOLIC CONGESTIVE HEART FAILURE (HCC): ICD-10-CM

## 2019-03-20 DIAGNOSIS — I35.0 NONRHEUMATIC AORTIC VALVE STENOSIS: Primary | ICD-10-CM

## 2019-03-20 DIAGNOSIS — I10 ESSENTIAL HYPERTENSION: ICD-10-CM

## 2019-03-20 DIAGNOSIS — I73.9 PVD (PERIPHERAL VASCULAR DISEASE) (HCC): ICD-10-CM

## 2019-03-20 PROCEDURE — G8400 PT W/DXA NO RESULTS DOC: HCPCS | Performed by: INTERNAL MEDICINE

## 2019-03-20 PROCEDURE — 1036F TOBACCO NON-USER: CPT | Performed by: INTERNAL MEDICINE

## 2019-03-20 PROCEDURE — 1123F ACP DISCUSS/DSCN MKR DOCD: CPT | Performed by: INTERNAL MEDICINE

## 2019-03-20 PROCEDURE — G8427 DOCREV CUR MEDS BY ELIG CLIN: HCPCS | Performed by: INTERNAL MEDICINE

## 2019-03-20 PROCEDURE — 4040F PNEUMOC VAC/ADMIN/RCVD: CPT | Performed by: INTERNAL MEDICINE

## 2019-03-20 PROCEDURE — 99214 OFFICE O/P EST MOD 30 MIN: CPT | Performed by: INTERNAL MEDICINE

## 2019-03-20 PROCEDURE — G8484 FLU IMMUNIZE NO ADMIN: HCPCS | Performed by: INTERNAL MEDICINE

## 2019-03-20 PROCEDURE — 1101F PT FALLS ASSESS-DOCD LE1/YR: CPT | Performed by: INTERNAL MEDICINE

## 2019-03-20 PROCEDURE — 3017F COLORECTAL CA SCREEN DOC REV: CPT | Performed by: INTERNAL MEDICINE

## 2019-03-20 PROCEDURE — G8417 CALC BMI ABV UP PARAM F/U: HCPCS | Performed by: INTERNAL MEDICINE

## 2019-03-20 PROCEDURE — 1090F PRES/ABSN URINE INCON ASSESS: CPT | Performed by: INTERNAL MEDICINE

## 2019-03-20 RX ORDER — AMLODIPINE BESYLATE 5 MG/1
2.5 TABLET ORAL DAILY
Qty: 30 TABLET | Refills: 0 | Status: SHIPPED | OUTPATIENT
Start: 2019-03-20 | End: 2019-09-25 | Stop reason: SDUPTHER

## 2019-04-02 ENCOUNTER — HOSPITAL ENCOUNTER (OUTPATIENT)
Dept: VASCULAR LAB | Age: 74
Discharge: HOME | End: 2019-04-02
Payer: MEDICARE

## 2019-04-02 DIAGNOSIS — I73.9 PVD (PERIPHERAL VASCULAR DISEASE) (HCC): ICD-10-CM

## 2019-04-02 PROCEDURE — 93922 UPR/L XTREMITY ART 2 LEVELS: CPT

## 2019-07-15 ENCOUNTER — NURSE ONLY (OUTPATIENT)
Dept: ORTHOPEDIC SURGERY | Age: 74
End: 2019-07-15
Payer: MEDICARE

## 2019-07-15 DIAGNOSIS — M17.11 PRIMARY OSTEOARTHRITIS OF RIGHT KNEE: Primary | ICD-10-CM

## 2019-07-15 PROCEDURE — 99999 PR OFFICE/OUTPT VISIT,PROCEDURE ONLY: CPT | Performed by: PHYSICIAN ASSISTANT

## 2019-07-15 NOTE — PROGRESS NOTES
Diagnosis: Right knee osteoarthritis. Procedure: Right knee cortisone injection    I discussed in detail the risks, benefits and complications of aninjection which included but are not limited to infection, skin reactions, hot swollen joint, and anaphylaxis with the patient. The patient verbalized understanding and gave informed consent for the right knee injection. The patient's knee was slightly flexed with a bolster underneath the knee and the skin prepped using Betadine or sterile alcohol solution. A sterile 22-gauge needle was inserted into the knee and the mixture of 2ml Beta-Beta, 3 mL of 0.5% bupivacaine was injected under sterile technique. The needle was withdrawn and the puncture site sealed with a Band-Aid. Technique: Under sterile conditions a SonVsnap ultrasound unit with a variable frequency (6.0-15.0 MHz) linear transducer was used to localize the placement of a 22-gauge needle into the knee joint. Findings: Successful needle placement for knee injection. Final images were taken and saved for permanent record. The patient tolerated the injection well. The patient was instructed to call the office immediately if there is any pain, redness, warmth, fever, or chills.

## 2019-07-22 ENCOUNTER — HOSPITAL ENCOUNTER (OUTPATIENT)
Age: 74
Discharge: HOME | End: 2019-07-22
Payer: MEDICARE

## 2019-07-22 ENCOUNTER — HOSPITAL ENCOUNTER (OUTPATIENT)
Dept: GENERAL RADIOLOGY | Age: 74
Discharge: HOME | End: 2019-07-22
Payer: MEDICARE

## 2019-07-22 DIAGNOSIS — R06.02 SOB (SHORTNESS OF BREATH): ICD-10-CM

## 2019-07-22 PROCEDURE — 71046 X-RAY EXAM CHEST 2 VIEWS: CPT

## 2019-09-24 NOTE — PROGRESS NOTES
citalopram (CELEXA) 20 MG tablet Take 1 tablet by mouth daily. (Patient not taking: Reported on 9/25/2019) 90 tablet 3     No facility-administered encounter medications on file as of 9/25/2019. Lab Data:  CBC: No results for input(s): WBC, HGB, HCT, MCV, PLT in the last 72 hours. BMP: No results for input(s): NA, K, CL, CO2, PHOS, BUN, CREATININE in the last 72 hours. Invalid input(s): CA  LIVER PROFILE: No results for input(s): AST, ALT, LIPASE, BILIDIR, BILITOT, ALKPHOS in the last 72 hours. Invalid input(s): AMYLASE,  ALB  LIPID:   Lab Results   Component Value Date    CHOL 176 10/25/2018    CHOL 133 10/28/2016    CHOL 177 09/03/2013     Lab Results   Component Value Date    TRIG 79 10/25/2018    TRIG 75 10/28/2016    TRIG 145 09/03/2013     Lab Results   Component Value Date    HDL 70 10/25/2018    HDL 39 (L) 10/28/2016    HDL 45 09/03/2013     Lab Results   Component Value Date    LDLCALC 90 10/25/2018    LDLCALC 79 10/28/2016    LDLCALC 103 (H) 09/03/2013     Lab Results   Component Value Date    LABVLDL 15 10/28/2016    LABVLDL 29 09/03/2013    LABVLDL 25 04/10/2010    VLDL 16 10/25/2018     No results found for: CHOLHDLRATIO  PT/INR: No results for input(s): PROTIME, INR in the last 72 hours. A1C: No results found for: LABA1C  BNP:  No results for input(s): BNP in the last 72 hours. IMAGING:     CXR 7/11/19  FINDINGS: The lungs are clear. The cardiac silhouette is within normal limits. There is no pneumothorax or pleural effusion. Status post cholecystectomy and anterior discectomy of the lower cervical spine. Bilateral YOKASTA 4/2/19  There is no arterial insufficiency at rest in the lower extremities  bilaterally.      EKG 12/11/18  Normal sinus rhythmRight bundle branch blockleft axisLeft anterior fascicular block Bifascicular block   voltage criteria for LVH, may be normal variantAbnormal ECGNo previous ECGs availableConfirmed by NICOLE Abbasi MD ([id number]) on 12/12/2018 9:20:03

## 2019-09-25 ENCOUNTER — OFFICE VISIT (OUTPATIENT)
Dept: CARDIOLOGY CLINIC | Age: 74
End: 2019-09-25
Payer: MEDICARE

## 2019-09-25 VITALS
WEIGHT: 198 LBS | SYSTOLIC BLOOD PRESSURE: 138 MMHG | DIASTOLIC BLOOD PRESSURE: 70 MMHG | HEIGHT: 68 IN | OXYGEN SATURATION: 97 % | BODY MASS INDEX: 30.01 KG/M2 | HEART RATE: 83 BPM

## 2019-09-25 DIAGNOSIS — R06.09 DOE (DYSPNEA ON EXERTION): Primary | ICD-10-CM

## 2019-09-25 DIAGNOSIS — I50.31 ACUTE DIASTOLIC CONGESTIVE HEART FAILURE (HCC): ICD-10-CM

## 2019-09-25 DIAGNOSIS — I35.0 NONRHEUMATIC AORTIC VALVE STENOSIS: ICD-10-CM

## 2019-09-25 DIAGNOSIS — I10 ESSENTIAL HYPERTENSION: ICD-10-CM

## 2019-09-25 PROCEDURE — G8417 CALC BMI ABV UP PARAM F/U: HCPCS | Performed by: INTERNAL MEDICINE

## 2019-09-25 PROCEDURE — 1123F ACP DISCUSS/DSCN MKR DOCD: CPT | Performed by: INTERNAL MEDICINE

## 2019-09-25 PROCEDURE — 4040F PNEUMOC VAC/ADMIN/RCVD: CPT | Performed by: INTERNAL MEDICINE

## 2019-09-25 PROCEDURE — 1036F TOBACCO NON-USER: CPT | Performed by: INTERNAL MEDICINE

## 2019-09-25 PROCEDURE — G8400 PT W/DXA NO RESULTS DOC: HCPCS | Performed by: INTERNAL MEDICINE

## 2019-09-25 PROCEDURE — G8427 DOCREV CUR MEDS BY ELIG CLIN: HCPCS | Performed by: INTERNAL MEDICINE

## 2019-09-25 PROCEDURE — 99214 OFFICE O/P EST MOD 30 MIN: CPT | Performed by: INTERNAL MEDICINE

## 2019-09-25 PROCEDURE — 1090F PRES/ABSN URINE INCON ASSESS: CPT | Performed by: INTERNAL MEDICINE

## 2019-09-25 RX ORDER — LISINOPRIL 10 MG/1
10 TABLET ORAL DAILY
Qty: 90 TABLET | Refills: 3 | Status: SHIPPED | OUTPATIENT
Start: 2019-09-25 | End: 2020-04-29 | Stop reason: ALTCHOICE

## 2019-09-25 RX ORDER — AMLODIPINE BESYLATE 5 MG/1
2.5 TABLET ORAL DAILY
Qty: 45 TABLET | Refills: 3 | Status: SHIPPED | OUTPATIENT
Start: 2019-09-25 | End: 2020-04-29 | Stop reason: ALTCHOICE

## 2019-09-25 RX ORDER — FUROSEMIDE 40 MG/1
40 TABLET ORAL DAILY
Qty: 90 TABLET | Refills: 3 | Status: SHIPPED | OUTPATIENT
Start: 2019-09-25 | End: 2020-01-23 | Stop reason: SDUPTHER

## 2019-09-25 RX ORDER — ASPIRIN 81 MG/1
81 TABLET ORAL DAILY
Qty: 30 TABLET | Refills: 0 | Status: ON HOLD | COMMUNITY
Start: 2019-09-25 | End: 2021-03-27 | Stop reason: HOSPADM

## 2019-09-25 NOTE — PATIENT INSTRUCTIONS
Plan:  1.   lexiscan stress test  2  No refilled as warranted  3   Follow up in  6 month with me  4.  Healthy lifestyle education reviewed including nutrition, exercise and activity   5. meds refilled as warranted

## 2019-09-25 NOTE — LETTER
I, Dr. Tricia Conte, personally performed the services described in this documentation, as scribed by the above signed scribe in my presence. It is both accurate and complete to my knowledge. I agree with the details independently gathered by the clinical support staff, while the remaining scribed note accurately describes my personal service to the patient.         [facility] [place], MD 9/25/2019 2:26 PM

## 2019-10-04 ENCOUNTER — HOSPITAL ENCOUNTER (OUTPATIENT)
Dept: NUCLEAR MEDICINE | Age: 74
Discharge: HOME | End: 2019-10-04
Payer: MEDICARE

## 2019-10-04 ENCOUNTER — TELEPHONE (OUTPATIENT)
Dept: CARDIOLOGY CLINIC | Age: 74
End: 2019-10-04

## 2019-10-04 ENCOUNTER — HOSPITAL ENCOUNTER (OUTPATIENT)
Dept: NON INVASIVE DIAGNOSTICS | Age: 74
Discharge: HOME | End: 2019-10-04
Payer: MEDICARE

## 2019-10-04 DIAGNOSIS — R06.09 DOE (DYSPNEA ON EXERTION): ICD-10-CM

## 2019-10-04 DIAGNOSIS — I35.0 NONRHEUMATIC AORTIC VALVE STENOSIS: ICD-10-CM

## 2019-10-04 LAB
LV EF: 60 %
LVEF MODALITY: NORMAL

## 2019-10-04 PROCEDURE — 78452 HT MUSCLE IMAGE SPECT MULT: CPT

## 2019-10-04 PROCEDURE — A9502 TC99M TETROFOSMIN: HCPCS | Performed by: INTERNAL MEDICINE

## 2019-10-04 PROCEDURE — 6360000002 HC RX W HCPCS: Performed by: INTERNAL MEDICINE

## 2019-10-04 PROCEDURE — 93017 CV STRESS TEST TRACING ONLY: CPT

## 2019-10-04 PROCEDURE — 3430000000 HC RX DIAGNOSTIC RADIOPHARMACEUTICAL: Performed by: INTERNAL MEDICINE

## 2019-10-04 RX ADMIN — TETROFOSMIN 11 MILLICURIE: 1.38 INJECTION, POWDER, LYOPHILIZED, FOR SOLUTION INTRAVENOUS at 09:15

## 2019-10-04 RX ADMIN — REGADENOSON 0.4 MG: 0.08 INJECTION, SOLUTION INTRAVENOUS at 10:30

## 2019-10-04 RX ADMIN — TETROFOSMIN 32.1 MILLICURIE: 1.38 INJECTION, POWDER, LYOPHILIZED, FOR SOLUTION INTRAVENOUS at 10:30

## 2019-10-10 ENCOUNTER — OFFICE VISIT (OUTPATIENT)
Dept: ORTHOPEDIC SURGERY | Age: 74
End: 2019-10-10
Payer: MEDICARE

## 2019-10-10 VITALS — BODY MASS INDEX: 29.55 KG/M2 | HEIGHT: 68 IN | WEIGHT: 195 LBS

## 2019-10-10 DIAGNOSIS — M70.62 GREATER TROCHANTERIC BURSITIS OF BOTH HIPS: ICD-10-CM

## 2019-10-10 DIAGNOSIS — M70.61 GREATER TROCHANTERIC BURSITIS OF BOTH HIPS: ICD-10-CM

## 2019-10-10 DIAGNOSIS — M25.551 PAIN OF BOTH HIP JOINTS: Primary | ICD-10-CM

## 2019-10-10 DIAGNOSIS — M25.552 PAIN OF BOTH HIP JOINTS: Primary | ICD-10-CM

## 2019-10-10 PROCEDURE — 4040F PNEUMOC VAC/ADMIN/RCVD: CPT | Performed by: ORTHOPAEDIC SURGERY

## 2019-10-10 PROCEDURE — G8484 FLU IMMUNIZE NO ADMIN: HCPCS | Performed by: ORTHOPAEDIC SURGERY

## 2019-10-10 PROCEDURE — G8417 CALC BMI ABV UP PARAM F/U: HCPCS | Performed by: ORTHOPAEDIC SURGERY

## 2019-10-10 PROCEDURE — G8400 PT W/DXA NO RESULTS DOC: HCPCS | Performed by: ORTHOPAEDIC SURGERY

## 2019-10-10 PROCEDURE — 1036F TOBACCO NON-USER: CPT | Performed by: ORTHOPAEDIC SURGERY

## 2019-10-10 PROCEDURE — 99213 OFFICE O/P EST LOW 20 MIN: CPT | Performed by: ORTHOPAEDIC SURGERY

## 2019-10-10 PROCEDURE — 1123F ACP DISCUSS/DSCN MKR DOCD: CPT | Performed by: ORTHOPAEDIC SURGERY

## 2019-10-10 PROCEDURE — 1090F PRES/ABSN URINE INCON ASSESS: CPT | Performed by: ORTHOPAEDIC SURGERY

## 2019-10-10 PROCEDURE — G8427 DOCREV CUR MEDS BY ELIG CLIN: HCPCS | Performed by: ORTHOPAEDIC SURGERY

## 2019-10-10 RX ORDER — BETAMETHASONE SODIUM PHOSPHATE AND BETAMETHASONE ACETATE 3; 3 MG/ML; MG/ML
24 INJECTION, SUSPENSION INTRA-ARTICULAR; INTRALESIONAL; INTRAMUSCULAR; SOFT TISSUE ONCE
Status: COMPLETED | OUTPATIENT
Start: 2019-10-10 | End: 2019-10-10

## 2019-10-10 RX ADMIN — BETAMETHASONE SODIUM PHOSPHATE AND BETAMETHASONE ACETATE 24 MG: 3; 3 INJECTION, SUSPENSION INTRA-ARTICULAR; INTRALESIONAL; INTRAMUSCULAR; SOFT TISSUE at 14:19

## 2019-11-21 ENCOUNTER — OFFICE VISIT (OUTPATIENT)
Dept: ORTHOPEDIC SURGERY | Age: 74
End: 2019-11-21
Payer: MEDICARE

## 2019-11-21 DIAGNOSIS — M70.61 GREATER TROCHANTERIC BURSITIS OF BOTH HIPS: Primary | ICD-10-CM

## 2019-11-21 DIAGNOSIS — M70.62 GREATER TROCHANTERIC BURSITIS OF BOTH HIPS: Primary | ICD-10-CM

## 2019-11-21 PROCEDURE — 1123F ACP DISCUSS/DSCN MKR DOCD: CPT | Performed by: ORTHOPAEDIC SURGERY

## 2019-11-21 PROCEDURE — 1090F PRES/ABSN URINE INCON ASSESS: CPT | Performed by: ORTHOPAEDIC SURGERY

## 2019-11-21 PROCEDURE — 1036F TOBACCO NON-USER: CPT | Performed by: ORTHOPAEDIC SURGERY

## 2019-11-21 PROCEDURE — 99213 OFFICE O/P EST LOW 20 MIN: CPT | Performed by: ORTHOPAEDIC SURGERY

## 2019-11-21 PROCEDURE — G8428 CUR MEDS NOT DOCUMENT: HCPCS | Performed by: ORTHOPAEDIC SURGERY

## 2019-11-21 PROCEDURE — 4040F PNEUMOC VAC/ADMIN/RCVD: CPT | Performed by: ORTHOPAEDIC SURGERY

## 2019-11-21 PROCEDURE — G8400 PT W/DXA NO RESULTS DOC: HCPCS | Performed by: ORTHOPAEDIC SURGERY

## 2019-11-21 PROCEDURE — G8417 CALC BMI ABV UP PARAM F/U: HCPCS | Performed by: ORTHOPAEDIC SURGERY

## 2019-11-21 PROCEDURE — G8484 FLU IMMUNIZE NO ADMIN: HCPCS | Performed by: ORTHOPAEDIC SURGERY

## 2019-12-20 ENCOUNTER — OFFICE VISIT (OUTPATIENT)
Dept: ORTHOPEDIC SURGERY | Age: 74
End: 2019-12-20
Payer: MEDICARE

## 2019-12-20 VITALS — BODY MASS INDEX: 29.57 KG/M2 | WEIGHT: 195.11 LBS | HEIGHT: 68 IN

## 2019-12-20 DIAGNOSIS — M51.36 DDD (DEGENERATIVE DISC DISEASE), LUMBAR: ICD-10-CM

## 2019-12-20 DIAGNOSIS — M54.42 ACUTE LOW BACK PAIN WITH BILATERAL SCIATICA, UNSPECIFIED BACK PAIN LATERALITY: Primary | ICD-10-CM

## 2019-12-20 DIAGNOSIS — M47.816 LUMBAR SPONDYLOSIS: ICD-10-CM

## 2019-12-20 DIAGNOSIS — M54.16 LUMBAR RADICULOPATHY: ICD-10-CM

## 2019-12-20 DIAGNOSIS — M54.41 ACUTE LOW BACK PAIN WITH BILATERAL SCIATICA, UNSPECIFIED BACK PAIN LATERALITY: Primary | ICD-10-CM

## 2019-12-20 PROCEDURE — 1123F ACP DISCUSS/DSCN MKR DOCD: CPT | Performed by: PHYSICAL MEDICINE & REHABILITATION

## 2019-12-20 PROCEDURE — 1090F PRES/ABSN URINE INCON ASSESS: CPT | Performed by: PHYSICAL MEDICINE & REHABILITATION

## 2019-12-20 PROCEDURE — 1036F TOBACCO NON-USER: CPT | Performed by: PHYSICAL MEDICINE & REHABILITATION

## 2019-12-20 PROCEDURE — 99214 OFFICE O/P EST MOD 30 MIN: CPT | Performed by: PHYSICAL MEDICINE & REHABILITATION

## 2019-12-20 PROCEDURE — 4040F PNEUMOC VAC/ADMIN/RCVD: CPT | Performed by: PHYSICAL MEDICINE & REHABILITATION

## 2019-12-20 PROCEDURE — G8484 FLU IMMUNIZE NO ADMIN: HCPCS | Performed by: PHYSICAL MEDICINE & REHABILITATION

## 2019-12-20 PROCEDURE — G8427 DOCREV CUR MEDS BY ELIG CLIN: HCPCS | Performed by: PHYSICAL MEDICINE & REHABILITATION

## 2019-12-20 PROCEDURE — G8417 CALC BMI ABV UP PARAM F/U: HCPCS | Performed by: PHYSICAL MEDICINE & REHABILITATION

## 2019-12-20 PROCEDURE — G8400 PT W/DXA NO RESULTS DOC: HCPCS | Performed by: PHYSICAL MEDICINE & REHABILITATION

## 2019-12-20 RX ORDER — TRIAMCINOLONE ACETONIDE 1 MG/G
CREAM TOPICAL
Refills: 0 | COMMUNITY
Start: 2019-09-30 | End: 2020-10-13

## 2019-12-20 RX ORDER — METHYLPREDNISOLONE 4 MG/1
TABLET ORAL
Qty: 1 KIT | Refills: 0 | Status: SHIPPED | OUTPATIENT
Start: 2019-12-20 | End: 2019-12-26

## 2019-12-20 RX ORDER — CITALOPRAM 20 MG/1
1 TABLET ORAL
COMMUNITY
End: 2020-02-21 | Stop reason: ALTCHOICE

## 2019-12-23 ENCOUNTER — HOSPITAL ENCOUNTER (OUTPATIENT)
Dept: MRI IMAGING | Age: 74
Discharge: HOME | End: 2019-12-23
Payer: MEDICARE

## 2019-12-23 DIAGNOSIS — M54.16 LUMBAR RADICULOPATHY: ICD-10-CM

## 2019-12-23 DIAGNOSIS — M47.816 LUMBAR SPONDYLOSIS: ICD-10-CM

## 2019-12-23 DIAGNOSIS — M54.42 ACUTE LOW BACK PAIN WITH BILATERAL SCIATICA, UNSPECIFIED BACK PAIN LATERALITY: ICD-10-CM

## 2019-12-23 DIAGNOSIS — M51.36 DDD (DEGENERATIVE DISC DISEASE), LUMBAR: ICD-10-CM

## 2019-12-23 DIAGNOSIS — M54.41 ACUTE LOW BACK PAIN WITH BILATERAL SCIATICA, UNSPECIFIED BACK PAIN LATERALITY: ICD-10-CM

## 2019-12-23 PROCEDURE — 72148 MRI LUMBAR SPINE W/O DYE: CPT

## 2020-01-07 ENCOUNTER — TELEPHONE (OUTPATIENT)
Dept: ORTHOPEDIC SURGERY | Age: 75
End: 2020-01-07

## 2020-01-14 ENCOUNTER — TELEPHONE (OUTPATIENT)
Dept: ORTHOPEDIC SURGERY | Age: 75
End: 2020-01-14

## 2020-01-15 ENCOUNTER — TELEPHONE (OUTPATIENT)
Dept: CARDIOLOGY CLINIC | Age: 75
End: 2020-01-15

## 2020-01-20 ENCOUNTER — HOSPITAL ENCOUNTER (OUTPATIENT)
Age: 75
Setting detail: OUTPATIENT SURGERY
Discharge: HOME | End: 2020-01-20
Attending: PHYSICAL MEDICINE & REHABILITATION | Admitting: PHYSICAL MEDICINE & REHABILITATION
Payer: MEDICARE

## 2020-01-20 VITALS
DIASTOLIC BLOOD PRESSURE: 86 MMHG | SYSTOLIC BLOOD PRESSURE: 188 MMHG | BODY MASS INDEX: 29.4 KG/M2 | OXYGEN SATURATION: 97 % | TEMPERATURE: 97 F | WEIGHT: 194 LBS | RESPIRATION RATE: 16 BRPM | HEART RATE: 77 BPM | HEIGHT: 68 IN

## 2020-01-20 PROCEDURE — 2709999900 HC NON-CHARGEABLE SUPPLY: Performed by: PHYSICAL MEDICINE & REHABILITATION

## 2020-01-20 PROCEDURE — 6360000002 HC RX W HCPCS: Performed by: PHYSICAL MEDICINE & REHABILITATION

## 2020-01-20 PROCEDURE — 2500000003 HC RX 250 WO HCPCS: Performed by: PHYSICAL MEDICINE & REHABILITATION

## 2020-01-20 PROCEDURE — 3600000012 HC SURGERY LEVEL 2 ADDTL 15MIN: Performed by: PHYSICAL MEDICINE & REHABILITATION

## 2020-01-20 PROCEDURE — 7100000011 HC PHASE II RECOVERY - ADDTL 15 MIN: Performed by: PHYSICAL MEDICINE & REHABILITATION

## 2020-01-20 PROCEDURE — 7100000010 HC PHASE II RECOVERY - FIRST 15 MIN: Performed by: PHYSICAL MEDICINE & REHABILITATION

## 2020-01-20 PROCEDURE — 6360000004 HC RX CONTRAST MEDICATION: Performed by: PHYSICAL MEDICINE & REHABILITATION

## 2020-01-20 PROCEDURE — 3600000002 HC SURGERY LEVEL 2 BASE: Performed by: PHYSICAL MEDICINE & REHABILITATION

## 2020-01-20 RX ORDER — LIDOCAINE HYDROCHLORIDE 10 MG/ML
INJECTION, SOLUTION EPIDURAL; INFILTRATION; INTRACAUDAL; PERINEURAL PRN
Status: DISCONTINUED | OUTPATIENT
Start: 2020-01-20 | End: 2020-01-20 | Stop reason: ALTCHOICE

## 2020-01-20 ASSESSMENT — PAIN - FUNCTIONAL ASSESSMENT
PAIN_FUNCTIONAL_ASSESSMENT: 0-10
PAIN_FUNCTIONAL_ASSESSMENT: PREVENTS OR INTERFERES SOME ACTIVE ACTIVITIES AND ADLS

## 2020-01-20 ASSESSMENT — PAIN DESCRIPTION - DESCRIPTORS: DESCRIPTORS: ACHING

## 2020-01-20 NOTE — H&P
Katerina Mccray MD   aspirin EC 81 MG EC tablet Take 1 tablet by mouth daily 19   Katerina Mccray MD   Acetaminophen (TYLENOL PO) Take by mouth as needed    Historical Provider, MD   traMADol (ULTRAM) 50 MG tablet Take 50 mg by mouth as needed for Pain. Cyn Calvert Historical Provider, MD   meloxicam (MOBIC) 15 MG tablet Take 7.5 mg by mouth daily  3/2/14   Historical Provider, MD   pantoprazole (PROTONIX) 40 MG tablet Take 20 mg by mouth daily 20 mg daily 3/30/14   Historical Provider, MD       Vitals: noted in chart    PHYSICAL EXAM:  HENT: Airway patent and reviewed  Cardiovascular: Normal rate, regular rhythm, normal heart sounds. Pulmonary/Chest: No wheezes. No rhonchi. No rales. Abdominal: Soft. Bowel sounds are normal. No distension. MALLAMPATI:           []   I. Complete visualization of the soft palate           [x]   II. Complete visualization of the uvula            []   III. Visualization of only the base of the uvula           []   IV. Soft palate is not visible     ASA CLASS:         []   I. Normal, healthy adult           [x]   II.  Mild systemic disease            []   III. Severe systemic disease    Assessment:   1) Neck pain, cervical radiculitis      Plan:   1) Cervical epidural injection with IV sedation      Sedation plan:   [x]  Local              [x]  Minimal                  []  General anesthesia    Patient's condition acceptable for planned procedure/sedation. Post Procedure Plan              Return to same level of care              ______________________                The risks and benefits as well as alternatives to the procedure have been discussed with the patient and or family. The patient and or next of kin understands and agrees to proceed. Angela Posada M.D. HISTORY AND PHYSICAL/PRE-SEDATION ASSESSMENT    Patient:  Baldemar Yanez   :  1945  Medical Record No.:  [id number]   Date:  2020  Physician:  Angela Posada M.D.   Facility: Sedation plan:   [x]  Local              []  Minimal                  []  General anesthesia    Patient's condition acceptable for planned procedure/sedation. Post Procedure Plan   Return to same level of care   ______________________     The risks and benefits as well as alternatives to the procedure have been discussed with the patient and or family. The patient and or next of kin understands and agrees to proceed.     Angela Posada M.D.

## 2020-01-23 RX ORDER — FUROSEMIDE 40 MG/1
40 TABLET ORAL DAILY
Qty: 90 TABLET | Refills: 2 | Status: SHIPPED | OUTPATIENT
Start: 2020-01-23 | End: 2020-04-29 | Stop reason: SINTOL

## 2020-02-07 ENCOUNTER — OFFICE VISIT (OUTPATIENT)
Dept: ORTHOPEDIC SURGERY | Age: 75
End: 2020-02-07
Payer: MEDICARE

## 2020-02-07 VITALS — BODY MASS INDEX: 29.4 KG/M2 | WEIGHT: 194 LBS | HEIGHT: 68 IN

## 2020-02-07 PROCEDURE — G8427 DOCREV CUR MEDS BY ELIG CLIN: HCPCS | Performed by: PHYSICAL MEDICINE & REHABILITATION

## 2020-02-07 PROCEDURE — 4040F PNEUMOC VAC/ADMIN/RCVD: CPT | Performed by: PHYSICAL MEDICINE & REHABILITATION

## 2020-02-07 PROCEDURE — 1036F TOBACCO NON-USER: CPT | Performed by: PHYSICAL MEDICINE & REHABILITATION

## 2020-02-07 PROCEDURE — 1090F PRES/ABSN URINE INCON ASSESS: CPT | Performed by: PHYSICAL MEDICINE & REHABILITATION

## 2020-02-07 PROCEDURE — 1123F ACP DISCUSS/DSCN MKR DOCD: CPT | Performed by: PHYSICAL MEDICINE & REHABILITATION

## 2020-02-07 PROCEDURE — 99212 OFFICE O/P EST SF 10 MIN: CPT | Performed by: PHYSICAL MEDICINE & REHABILITATION

## 2020-02-07 PROCEDURE — G8417 CALC BMI ABV UP PARAM F/U: HCPCS | Performed by: PHYSICAL MEDICINE & REHABILITATION

## 2020-02-07 PROCEDURE — G8484 FLU IMMUNIZE NO ADMIN: HCPCS | Performed by: PHYSICAL MEDICINE & REHABILITATION

## 2020-02-07 PROCEDURE — G8400 PT W/DXA NO RESULTS DOC: HCPCS | Performed by: PHYSICAL MEDICINE & REHABILITATION

## 2020-02-07 NOTE — PROGRESS NOTES
Lumbar follow-up: SPINE    CHIEF COMPLAINT:    Chief Complaint   Patient presents with    Back Pain     F/U LESI #1  90% relief       HISTORY OF PRESENT ILLNESS:                The patient is a 66 y.o. female history of back and leg pain. She reports she was a prior patient of Dr. Albina Bauman and we had met many years ago. She reports aching pain in her back hips numbness in her legs. She had an MRI and followed up after an epidural injection. Her MRI verified lumbar stenosis. She is now 90% improved. She has underlying right knee OA and some pain walks with a cane    Postinjection form is reviewed    Past Medical History:   Diagnosis Date    Acid reflux     Generalized anxiety disorder     Hypertension     Osteoarthritis     of hips, knees, back    Screening mammogram 3/26/96          Pain Assessment  Location of Pain: Back  Severity of Pain: 2  Quality of Pain: Aching  Duration of Pain: Persistent  Frequency of Pain: Intermittent  Aggravating Factors: Standing, Walking, Other (Comment)  Limiting Behavior: Yes  Relieving Factors: Rest  Result of Injury: No  Work-Related Injury: No  Are there other pain locations you wish to document?: No    The pain assessment was noted & reviewed in the medical record today.      Current/Past Treatment:   · Physical Therapy:   · Chiropractic:     · Injection:   Past injections in her back/remote, recent hip inject; midline L5-S1 interlaminar epidural January 20, 2020  Medications:            NSAIDS: Walks a Cam            Muscle relaxer:              Steriods:              Neuropathic medications:              Opioids:            Other:   · Surgery/Consult:    Work Status/Functionality:     Past Medical History: Medical history form was reviewed today & scanned into the media tab  Past Medical History:   Diagnosis Date    Acid reflux     Generalized anxiety disorder     Hypertension     Osteoarthritis     of hips, knees, back    Screening mammogram 3/26/96 Past Surgical History:     Past Surgical History:   Procedure Laterality Date    CARPAL TUNNEL RELEASE      CATARACT REMOVAL WITH IMPLANT Left 04/06/2017    Left cataract removal. Dr. Levi Villegas Right 05/04/2017    PHACO EMULSIFICATION OF CATARACT WITH INTRAOCULAR LENS IMPLANT RIGHT EYE    COLONOSCOPY  3/24/10    FOOT SURGERY      GALLBLADDER SURGERY      HYSTERECTOMY      NECK SURGERY      PAIN MANAGEMENT PROCEDURE N/A 1/20/2020    MIDLINE LUMBAR FIVE SACRAL ONE EPIDURAL STEROID INJECTION SITE CONFIRMED BY FLUOROSCOPY performed by Alicia Suarez MD at [place] [id number]     Current Medications:     Current Outpatient Medications:     furosemide (LASIX) 40 MG tablet, Take 1 tablet by mouth daily, Disp: 90 tablet, Rfl: 2    triamcinolone (KENALOG) 0.1 % cream, APPLY TO AFFECTED AREA EVERY DAY, Disp: , Rfl: 0    citalopram (CELEXA) 20 MG tablet, Take 1 tablet by mouth, Disp: , Rfl:     amLODIPine (NORVASC) 5 MG tablet, Take 0.5 tablets by mouth daily, Disp: 45 tablet, Rfl: 3    lisinopril (PRINIVIL;ZESTRIL) 10 MG tablet, Take 1 tablet by mouth daily, Disp: 90 tablet, Rfl: 3    aspirin EC 81 MG EC tablet, Take 1 tablet by mouth daily, Disp: 30 tablet, Rfl: 0    Acetaminophen (TYLENOL PO), Take by mouth as needed, Disp: , Rfl:     traMADol (ULTRAM) 50 MG tablet, Take 50 mg by mouth as needed for Pain. ., Disp: , Rfl:     meloxicam (MOBIC) 15 MG tablet, Take 7.5 mg by mouth daily , Disp: , Rfl:     pantoprazole (PROTONIX) 40 MG tablet, Take 20 mg by mouth daily 20 mg daily, Disp: , Rfl:   Allergies:  Streptomycin; Sulfa antibiotics; Tylenol [acetaminophen]; and Verapamil  Social History:    reports that she has never smoked. She has never used smokeless tobacco. She reports that she does not drink alcohol or use drugs.   Family History:   Family History   Problem Relation Age of Onset    High Blood Pressure Mother        REVIEW OF SYSTEMS: Full ROS noted & scanned CONSTITUTIONAL: Denies unexplained weight loss, fevers, chills or fatigue  NEUROLOGICAL: Denies unsteady gait or progressive weakness  MUSCULOSKELETAL: Denies joint swelling or redness  PSYCHOLOGICAL: Denies anxiety, depression   SKIN: Denies skin changes, delayed healing, rash, itching   HEMATOLOGIC: Denies easy bleeding or bruising  ENDOCRINE: Denies excessive thirst, urination, heat/cold  RESPIRATORY: Denies current dyspnea, cough  GI: Denies nausea, vomiting, diarrhea   : Denies bowel or bladder issues       PHYSICAL EXAM:    Vitals: Height 5' 7.99\" (1.727 m), weight 194 lb 0.1 oz (88 kg). GENERAL EXAM:  · General Apparence: Patient is adequately groomed with no evidence of malnutrition. · Orientation: The patient is oriented to time, place and person. · Mood & Affect:The patient's mood and affect are appropriate   · Vascular: Examination reveals no swelling tenderness in upper or lower extremities. Good capillary refill  · Lymphatic: The lymphatic examination bilaterally reveals all areas to be without enlargement or induration  · Sensation: Sensation is intact without deficit  · Coordination/Balance: Good coordination     CERVICAL EXAMINATION:  · Inspection: Local inspection shows no step-off or bruising. Cervical alignment is normal.     · Palpation: No evidence of tenderness at the midline, and trapezius. Paraspinal tenderness is present. There is no step-off or paraspinal spasm. · Range of Motion: Loss of flexion extension  · Strength: 5/5 bilateral upper extremities   · Special Tests:    ·   Spurling's, L'Hermitte's & Oates's negative bilaterally. ·   Roland and Impingement tests are negative bilaterally. ·  Cubital and Carpal tunnel Tinel's negative bilaterally. · Skin:There are no rashes, ulcerations or lesions in right & left upper extremities. · Reflexes: Bilaterally triceps, biceps and brachioradialis are trace clonus absent bilaterally at the feet.     · Additional lumbar stenosis L4-5    2018 CBC and comprehensive panels were reviewed; GFR 44    X-rays 12/20/2019 Ap and lat 12/20/19 advanced diffuse discogenic spondylosis without acute fracture    Impression: Local course not improved    Clinical lumbar stenosis with chronic bilateral leg pain   lumbar stenosis        Plan:     Improved.   Call for repeat epidural if needed    Celena Guzman MD

## 2020-02-15 ENCOUNTER — APPOINTMENT (OUTPATIENT)
Dept: GENERAL RADIOLOGY | Age: 75
End: 2020-02-15
Payer: MEDICARE

## 2020-02-15 ENCOUNTER — HOSPITAL ENCOUNTER (EMERGENCY)
Age: 75
Discharge: HOME | End: 2020-02-15
Attending: EMERGENCY MEDICINE
Payer: MEDICARE

## 2020-02-15 VITALS
RESPIRATION RATE: 18 BRPM | BODY MASS INDEX: 29.4 KG/M2 | TEMPERATURE: 98.2 F | SYSTOLIC BLOOD PRESSURE: 156 MMHG | DIASTOLIC BLOOD PRESSURE: 58 MMHG | WEIGHT: 194 LBS | HEIGHT: 68 IN | OXYGEN SATURATION: 96 % | HEART RATE: 76 BPM

## 2020-02-15 LAB
A/G RATIO: 1.1 (ref 1.1–2.2)
ALBUMIN SERPL-MCNC: 3.7 G/DL (ref 3.4–5)
ALP BLD-CCNC: 96 U/L (ref 40–129)
ALT SERPL-CCNC: 19 U/L (ref 10–40)
ANION GAP SERPL CALCULATED.3IONS-SCNC: 10 MMOL/L (ref 3–16)
AST SERPL-CCNC: 36 U/L (ref 15–37)
BASOPHILS ABSOLUTE: 0.1 K/UL (ref 0–0.2)
BASOPHILS RELATIVE PERCENT: 1.3 %
BILIRUB SERPL-MCNC: 0.8 MG/DL (ref 0–1)
BUN BLDV-MCNC: 10 MG/DL (ref 7–20)
CALCIUM SERPL-MCNC: 9.5 MG/DL (ref 8.3–10.6)
CHLORIDE BLD-SCNC: 106 MMOL/L (ref 99–110)
CO2: 27 MMOL/L (ref 21–32)
CREAT SERPL-MCNC: 0.7 MG/DL (ref 0.6–1.2)
EOSINOPHILS ABSOLUTE: 0.3 K/UL (ref 0–0.6)
EOSINOPHILS RELATIVE PERCENT: 6.4 %
GFR AFRICAN AMERICAN: >60
GFR NON-AFRICAN AMERICAN: >60
GLOBULIN: 3.4 G/DL
GLUCOSE BLD-MCNC: 104 MG/DL (ref 70–99)
HCT VFR BLD CALC: 37.3 % (ref 36–48)
HEMOGLOBIN: 12.7 G/DL (ref 12–16)
LYMPHOCYTES ABSOLUTE: 1.3 K/UL (ref 1–5.1)
LYMPHOCYTES RELATIVE PERCENT: 25.1 %
MCH RBC QN AUTO: 30.8 PG (ref 26–34)
MCHC RBC AUTO-ENTMCNC: 34 G/DL (ref 31–36)
MCV RBC AUTO: 90.6 FL (ref 80–100)
MONOCYTES ABSOLUTE: 0.4 K/UL (ref 0–1.3)
MONOCYTES RELATIVE PERCENT: 8.5 %
NEUTROPHILS ABSOLUTE: 2.9 K/UL (ref 1.7–7.7)
NEUTROPHILS RELATIVE PERCENT: 58.7 %
PDW BLD-RTO: 14.8 % (ref 12.4–15.4)
PLATELET # BLD: 121 K/UL (ref 135–450)
PMV BLD AUTO: 9.1 FL (ref 5–10.5)
POTASSIUM REFLEX MAGNESIUM: 3.7 MMOL/L (ref 3.5–5.1)
PRO-BNP: 168 PG/ML (ref 0–449)
RBC # BLD: 4.12 M/UL (ref 4–5.2)
SODIUM BLD-SCNC: 143 MMOL/L (ref 136–145)
TOTAL PROTEIN: 7.1 G/DL (ref 6.4–8.2)
TROPONIN: <0.01 NG/ML
WBC # BLD: 5 K/UL (ref 4–11)

## 2020-02-15 PROCEDURE — 80053 COMPREHEN METABOLIC PANEL: CPT

## 2020-02-15 PROCEDURE — 85025 COMPLETE CBC W/AUTO DIFF WBC: CPT

## 2020-02-15 PROCEDURE — 93005 ELECTROCARDIOGRAM TRACING: CPT | Performed by: EMERGENCY MEDICINE

## 2020-02-15 PROCEDURE — 36415 COLL VENOUS BLD VENIPUNCTURE: CPT

## 2020-02-15 PROCEDURE — 84484 ASSAY OF TROPONIN QUANT: CPT

## 2020-02-15 PROCEDURE — 99285 EMERGENCY DEPT VISIT HI MDM: CPT

## 2020-02-15 PROCEDURE — 71045 X-RAY EXAM CHEST 1 VIEW: CPT

## 2020-02-15 PROCEDURE — 73502 X-RAY EXAM HIP UNI 2-3 VIEWS: CPT

## 2020-02-15 PROCEDURE — 6370000000 HC RX 637 (ALT 250 FOR IP): Performed by: EMERGENCY MEDICINE

## 2020-02-15 PROCEDURE — 83880 ASSAY OF NATRIURETIC PEPTIDE: CPT

## 2020-02-15 RX ORDER — PREDNISONE 20 MG/1
60 TABLET ORAL ONCE
Status: COMPLETED | OUTPATIENT
Start: 2020-02-15 | End: 2020-02-15

## 2020-02-15 RX ORDER — PREDNISONE 50 MG/1
50 TABLET ORAL DAILY
Qty: 5 TABLET | Refills: 0 | Status: SHIPPED | OUTPATIENT
Start: 2020-02-15 | End: 2020-02-20

## 2020-02-15 RX ORDER — AZITHROMYCIN 250 MG/1
500 TABLET, FILM COATED ORAL ONCE
Status: COMPLETED | OUTPATIENT
Start: 2020-02-15 | End: 2020-02-15

## 2020-02-15 RX ORDER — AZITHROMYCIN 250 MG/1
250 TABLET, FILM COATED ORAL DAILY
Qty: 4 TABLET | Refills: 0 | Status: SHIPPED | OUTPATIENT
Start: 2020-02-15 | End: 2020-02-19

## 2020-02-15 RX ORDER — OXYCODONE HYDROCHLORIDE 5 MG/1
5 TABLET ORAL EVERY 4 HOURS PRN
Status: DISCONTINUED | OUTPATIENT
Start: 2020-02-15 | End: 2020-02-16 | Stop reason: HOSPADM

## 2020-02-15 RX ORDER — OXYCODONE HYDROCHLORIDE AND ACETAMINOPHEN 5; 325 MG/1; MG/1
1 TABLET ORAL EVERY 6 HOURS PRN
Qty: 12 TABLET | Refills: 0 | Status: SHIPPED | OUTPATIENT
Start: 2020-02-15 | End: 2020-02-18

## 2020-02-15 RX ORDER — IPRATROPIUM BROMIDE AND ALBUTEROL SULFATE 2.5; .5 MG/3ML; MG/3ML
1 SOLUTION RESPIRATORY (INHALATION) ONCE
Status: COMPLETED | OUTPATIENT
Start: 2020-02-15 | End: 2020-02-15

## 2020-02-15 RX ORDER — ALBUTEROL SULFATE 90 UG/1
2 AEROSOL, METERED RESPIRATORY (INHALATION) 4 TIMES DAILY PRN
Qty: 1 INHALER | Refills: 0 | Status: SHIPPED | OUTPATIENT
Start: 2020-02-15 | End: 2022-07-06 | Stop reason: SDUPTHER

## 2020-02-15 RX ADMIN — PREDNISONE 60 MG: 20 TABLET ORAL at 22:46

## 2020-02-15 RX ADMIN — AZITHROMYCIN MONOHYDRATE 500 MG: 250 TABLET ORAL at 20:21

## 2020-02-15 RX ADMIN — IPRATROPIUM BROMIDE AND ALBUTEROL SULFATE 1 AMPULE: .5; 3 SOLUTION RESPIRATORY (INHALATION) at 21:50

## 2020-02-15 RX ADMIN — OXYCODONE HYDROCHLORIDE 5 MG: 5 TABLET ORAL at 21:51

## 2020-02-15 ASSESSMENT — PAIN DESCRIPTION - LOCATION: LOCATION: HIP

## 2020-02-15 ASSESSMENT — PAIN SCALES - GENERAL
PAINLEVEL_OUTOF10: 4
PAINLEVEL_OUTOF10: 8

## 2020-02-15 ASSESSMENT — PAIN DESCRIPTION - PAIN TYPE: TYPE: ACUTE PAIN

## 2020-02-15 ASSESSMENT — PAIN DESCRIPTION - ORIENTATION: ORIENTATION: RIGHT

## 2020-02-16 LAB
EKG ATRIAL RATE: 76 BPM
EKG DIAGNOSIS: NORMAL
EKG P AXIS: 53 DEGREES
EKG P-R INTERVAL: 154 MS
EKG Q-T INTERVAL: 432 MS
EKG QRS DURATION: 144 MS
EKG QTC CALCULATION (BAZETT): 486 MS
EKG R AXIS: -34 DEGREES
EKG T AXIS: 28 DEGREES
EKG VENTRICULAR RATE: 76 BPM

## 2020-02-16 PROCEDURE — 93010 ELECTROCARDIOGRAM REPORT: CPT | Performed by: INTERNAL MEDICINE

## 2020-02-16 NOTE — ED NOTES
[facility] Radiology per Dr Ramy Aviles.      Teri Ambrosio  02/15/20 1353

## 2020-02-16 NOTE — ED TRIAGE NOTES
Pt states her right hip popped yesterday and since then she has had episodes of SOB. Saw chiropractor, had adjustment with some relief but pain returned, worsening today.

## 2020-02-16 NOTE — ED PROVIDER NOTES
Emergency Department Attending Note    Zelalem Motta MD    Date of ED VIsit: 2/15/2020    CHIEF COMPLAINT  Shortness of Breath      HISTORY OF PRESENT ILLNESS  Sd Madrigal is a 66 y.o. female  With Vital signs of BP (!) 201/76   Pulse 75   Temp 98.2 °F (36.8 °C) (Oral)   Resp 20   Ht 5' 8\" (1.727 m)   Wt 194 lb (88 kg)   SpO2 99%   BMI 29.50 kg/m²  who presents to the ED with a complaint of right hip pain and shortness of breath. Patient seen and evaluated in room 1. Patient states she was seen by a chiropractor earlier this week and he felt that her hip was out and apparently placed it back in but discharged her and I walker which she usually does not use but she was able to walk. It appears as though the pain is made the patient mildly short of breath making her more exertional.  But she also complains of the shortness of breath intermittently for possibly from her CHF history. She states that it is definitely worse with exertion. She denies any chest pain or fever with this. She is afebrile here in the emergency department. She is speaking in 4-5 word sentences so she is mildly short of breath. No other symptoms including any  symptoms no GI symptoms. No neurologic symptoms as well. .  No other complaints, modifying factors or associated symptoms.     Patients Past medical history reviewed and listed below  Past Medical History:   Diagnosis Date    Acid reflux     Generalized anxiety disorder     Hypertension     Osteoarthritis     of hips, knees, back    Screening mammogram 3/26/96     Past Surgical History:   Procedure Laterality Date    CARPAL TUNNEL RELEASE      CATARACT REMOVAL WITH IMPLANT Left 04/06/2017    Left cataract removal. Dr. Fe Ramírez Right 05/04/2017    PHACO EMULSIFICATION OF CATARACT WITH INTRAOCULAR LENS IMPLANT RIGHT EYE    COLONOSCOPY  3/24/10    FOOT SURGERY      GALLBLADDER SURGERY      HYSTERECTOMY      NECK SURGERY      APPLY TO AFFECTED AREA EVERY DAY  0    citalopram (CELEXA) 20 MG tablet Take 1 tablet by mouth      amLODIPine (NORVASC) 5 MG tablet Take 0.5 tablets by mouth daily 45 tablet 3    lisinopril (PRINIVIL;ZESTRIL) 10 MG tablet Take 1 tablet by mouth daily 90 tablet 3    aspirin EC 81 MG EC tablet Take 1 tablet by mouth daily 30 tablet 0    Acetaminophen (TYLENOL PO) Take by mouth as needed      traMADol (ULTRAM) 50 MG tablet Take 50 mg by mouth as needed for Pain. Nando Comber meloxicam (MOBIC) 15 MG tablet Take 7.5 mg by mouth daily       pantoprazole (PROTONIX) 40 MG tablet Take 20 mg by mouth daily 20 mg daily       Allergies   Allergen Reactions    Streptomycin     Sulfa Antibiotics Other (See Comments)     States change in mental status    Tylenol [Acetaminophen]      #3    Verapamil Rash       REVIEW OF SYSTEMS  10 systems reviewed, pertinent positives per HPI otherwise noted to be negative     PHYSICAL EXAM  BP (!) 201/76   Pulse 75   Temp 98.2 °F (36.8 °C) (Oral)   Resp 20   Ht 5' 8\" (1.727 m)   Wt 194 lb (88 kg)   SpO2 99%   BMI 29.50 kg/m²   GENERAL APPEARANCE: Awake and alert. Cooperative. In mild respiratory distress. HEAD: Normocephalic. Atraumatic. EYES: PERRL. EOM's grossly intact. ENT: Mucous membranes are pink and moist.   NECK: Supple. HEART: RRR. No murmurs. LUNGS: Respirations unlabored. CTAB. Good air exchange. ABDOMEN: Soft. Non-distended. Non-tender. No masses. No organomegaly. No guarding or rebound. EXTREMITIES: No peripheral edema. Moves all extremities equally. All extremities neurovascularly intact. Unable to logroll the right leg without any pain in the hip distally she is neurovascularly intact  SKIN: Warm and dry. No acute rashes. NEUROLOGICAL: Alert and oriented. Strength 5/5, sensation intact. Gait normal.   PSYCHIATRIC: Normal mood and affect. No HI or SI expressed to me.     RADIOLOGY    If acquired see below     EKG:     If acquired see below       ED COURSE/MDM    Patient felt better after the DuoNeb treatment. She episodically seems to want to become short of breath and I am unable to write that off to anything since she satting 100%. Her BNP is normal.  It seems to be related to the pain that she is and so I will give her something for pain as well. On top of that she had does have seem to have a potential pneumonia so we will be treating that as well. ED Course as of Feb 15 2242   Sat Feb 15, 2020   1908 EKG: Indication: Shortness of breath, it shows a rhythm of normal sinus rhythm at a rate of 76, with no acute ST-Twave changes to indicate ischemia.   Intervals are normal with the exception of a right bundle branch block and the axis is normal. This  interpreted by me without a cardiologist.         [DL]   2015 Patient's troponin was less than 0.01   Troponin:    Troponin <0.01 [DL]   2015 Comprehensive metabolic panel was normal except for glucose of 104   Comprehensive Metabolic Panel w/ Reflex to MG(!):    Sodium 143   Potassium 3.7   Chloride 106   CO2 27   Anion Gap 10   Glucose 104(!)   BUN 10   Creatinine 0.7   GFR Non- >60   GFR African American >60   Calcium 9.5   Total Protein 7.1   Albumin 3.7   Albumin/Globulin Ratio 1.1   Bilirubin 0.8   Alk Phos 96   ALT 19   AST 36   Globulin 3.4 [DL]   2015 CBC with a differential and was normal with a white count of 5 H&H of 12 and 37 with a platelet count of 543   CBC Auto Differential(!):    WBC 5.0   RBC 4.12   Hemoglobin Quant 12.7   Hematocrit 37.3   MCV 90.6   MCH 30.8   MCHC 34.0   RDW 14.8   Platelet Count 124(!)   MPV 9.1   Neutrophils % 58.7   Lymphocyte % 25.1   Monocytes % 8.5   Eosinophils % 6.4   Basophils % 1.3   Neutrophils Absolute 2.9   Lymphocytes Absolute 1.3   Monocytes Absolute 0.4   Eosinophils Absolute 0.3   Basophils Absolute 0.1 [DL]   2015 Patient's BNP was 168   Pro-BNP: 168 [DL]   2016 IMPRESSION:  Probable subsegmental atelectasis or scarring in the left lung base which is where I heard the adventitious sounds on Matthew Blade treat this atelectasis as a pneumonia with community-acquired antibiotics   XR CHEST PORTABLE [DL]   2110 IMPRESSION:  1. Acute fracture identified. 2. Moderate osteoarthritis of the bilateral hips. 3. Osteopenia. XR HIP RIGHT (2-3 VIEWS) [DL]   2115 I spoke with Dr. Do Tavarez at [place] who admits that there was a discrepancy in the dictation for her hip radiograph. Apparently and the findings necessitates no fracture or acute fracture. And then in the impression it says acute fracture noted so he is going to dictate an addendum to fix that to read no apparent fractures. [DL]      ED Course User Index  [DL] Gini Molina MD       The ED course and plan were reviewed and results discussed with the patient and family    The patient understood and agreed with the Discharge/transfer planning.     CLINICAL IMPRESSION and DISPOSITION    Radha Muñoz was stable and diagnosed with pneumonia and right hip pain    Patient was treated with DuoNeb prednisone oxycodone  \     Gini Molina MD  02/15/20 [id number]

## 2020-02-19 ENCOUNTER — OFFICE VISIT (OUTPATIENT)
Dept: ORTHOPEDIC SURGERY | Age: 75
End: 2020-02-19
Payer: MEDICARE

## 2020-02-19 ENCOUNTER — HOSPITAL ENCOUNTER (OUTPATIENT)
Dept: CT IMAGING | Age: 75
Discharge: HOME | End: 2020-02-19
Payer: MEDICARE

## 2020-02-19 PROCEDURE — 4040F PNEUMOC VAC/ADMIN/RCVD: CPT | Performed by: PHYSICIAN ASSISTANT

## 2020-02-19 PROCEDURE — G8428 CUR MEDS NOT DOCUMENT: HCPCS | Performed by: PHYSICIAN ASSISTANT

## 2020-02-19 PROCEDURE — G8400 PT W/DXA NO RESULTS DOC: HCPCS | Performed by: PHYSICIAN ASSISTANT

## 2020-02-19 PROCEDURE — G8484 FLU IMMUNIZE NO ADMIN: HCPCS | Performed by: PHYSICIAN ASSISTANT

## 2020-02-19 PROCEDURE — 99213 OFFICE O/P EST LOW 20 MIN: CPT | Performed by: PHYSICIAN ASSISTANT

## 2020-02-19 PROCEDURE — G8417 CALC BMI ABV UP PARAM F/U: HCPCS | Performed by: PHYSICIAN ASSISTANT

## 2020-02-19 PROCEDURE — 1123F ACP DISCUSS/DSCN MKR DOCD: CPT | Performed by: PHYSICIAN ASSISTANT

## 2020-02-19 PROCEDURE — 71250 CT THORAX DX C-: CPT

## 2020-02-19 PROCEDURE — 1090F PRES/ABSN URINE INCON ASSESS: CPT | Performed by: PHYSICIAN ASSISTANT

## 2020-02-19 PROCEDURE — 1036F TOBACCO NON-USER: CPT | Performed by: PHYSICIAN ASSISTANT

## 2020-02-19 RX ORDER — BUPIVACAINE HYDROCHLORIDE 5 MG/ML
30 INJECTION, SOLUTION PERINEURAL ONCE
Status: COMPLETED | OUTPATIENT
Start: 2020-02-19 | End: 2020-02-19

## 2020-02-19 RX ORDER — BETAMETHASONE SODIUM PHOSPHATE AND BETAMETHASONE ACETATE 3; 3 MG/ML; MG/ML
12 INJECTION, SUSPENSION INTRA-ARTICULAR; INTRALESIONAL; INTRAMUSCULAR; SOFT TISSUE ONCE
Status: COMPLETED | OUTPATIENT
Start: 2020-02-19 | End: 2020-02-19

## 2020-02-19 RX ADMIN — BUPIVACAINE HYDROCHLORIDE 150 MG: 5 INJECTION, SOLUTION PERINEURAL at 11:52

## 2020-02-19 RX ADMIN — BETAMETHASONE SODIUM PHOSPHATE AND BETAMETHASONE ACETATE 12 MG: 3; 3 INJECTION, SUSPENSION INTRA-ARTICULAR; INTRALESIONAL; INTRAMUSCULAR; SOFT TISSUE at 11:52

## 2020-02-19 NOTE — PROGRESS NOTES
Chief Complaint    Knee Pain (rt knee wants to discuss cortisone injection)      History of Present Illness:  Abdulaziz Vazquez is a 66 y.o. female who comes in today for evaluation and treatment regarding right knee pain. She is having quite a bit of complaints of her pain in her right knee and her right hip. She complains of pain diffusely throughout the right knee and difficulty with standing and walking. She is leaving for [place] in the next week or so and would like to do a cortisone injection before her trip.     Pain Assessment  Location of Pain: Knee  Location Modifiers: Right  Severity of Pain: 5  Frequency of Pain: Intermittent  Aggravating Factors: Standing, Walking  Limiting Behavior: Some  Result of Injury: No  Work-Related Injury: No  Are there other pain locations you wish to document?: No]  Medical History:  Past Medical History:   Diagnosis Date    Acid reflux     Generalized anxiety disorder     Hypertension     Osteoarthritis     of hips, knees, back    Screening mammogram 3/26/96     Patient Active Problem List    Diagnosis Date Noted    Acute diastolic congestive heart failure ([facility] [address] 11/14/2018    Nonrheumatic aortic valve stenosis 11/13/2018    SOB (shortness of breath) on exertion 11/13/2018    Right wrist sprain, initial encounter 07/12/2018    Arthritis of carpometacarpal [place]) joint of right thumb 07/12/2018    Primary osteoarthritis of right knee 06/13/2016    Generalized anxiety disorder 10/03/2011    Acid reflux 10/03/2011    Osteoarthritis hips, knees, back 10/03/2011    HTN (hypertension) 09/19/2011     Current Outpatient Medications on File Prior to Visit   Medication Sig Dispense Refill    azithromycin (ZITHROMAX Z-DESEAN) 250 MG tablet Take 1 tablet by mouth daily for 4 days 4 tablet 0    predniSONE (DELTASONE) 50 MG tablet Take 1 tablet by mouth daily for 5 days 5 tablet 0    albuterol sulfate  (90 Base) MCG/ACT inhaler Inhale 2 puffs into the lungs 4 times line.    Range of Motion:  5 extension to 115 of active flexion. Strength:  4+/5 quadriceps and hamstrings    Special Tests: The knee is stable to varus valgus stressing/anterior posterior drawer. Negative Homans test.                                 Skin: There are no rashes, ulcerations or lesions. Gait: mildly antalgic favoring the right side    Reflex 2+ patellar    Examination of the left knee reveals intact skin. There is no focal tenderness. The patient demonstrates full painless range of motion with regard to flexion and extension. Strength is 5/5 throughout all planes. Ligamentous stability is grossly intact. Radiology:   X-rays obtained and reviewed in office:  Views 4 views including AP weightbearing, PA flexed weightbearing, lateral, and skyline  Location right knee:    Impression:   There is advanced tricompartmental osteoarthritis with sclerosis and osteophyte formation present. No fractures are identified. Impression:  Encounter Diagnosis   Name Primary?  Primary osteoarthritis of right knee Yes       Office Procedures:  Orders Placed This Encounter   Procedures    XR KNEE RIGHT (MIN 4 VIEWS)     Standing Status:   Future     Number of Occurrences:   1     Standing Expiration Date:   2/13/2021    US ARTHR/ASP/INJ MAJOR JNT/BURSA RIGHT     Standing Status:   Future     Number of Occurrences:   1     Standing Expiration Date:   2/19/2021     Procedure: Right knee cortisone injection  I discussed in detail the risks, benefits and complications of aninjection which included but are not limited to infection, skin reactions, hot swollen joint, and anaphylaxis with the patient. The patient verbalized understanding and gave informed consent for the right knee injection. The patient's knee was slightly flexed with a bolster underneath the knee and the skin prepped using Betadine or sterile alcohol solution.  A sterile 22-gauge needle was inserted into the knee and the mixture of 2ml

## 2020-02-21 ENCOUNTER — HOSPITAL ENCOUNTER (OUTPATIENT)
Age: 75
Setting detail: OBSERVATION
Discharge: HOME | End: 2020-02-25
Attending: EMERGENCY MEDICINE | Admitting: INTERNAL MEDICINE
Payer: MEDICARE

## 2020-02-21 ENCOUNTER — APPOINTMENT (OUTPATIENT)
Dept: GENERAL RADIOLOGY | Age: 75
End: 2020-02-21
Payer: MEDICARE

## 2020-02-21 PROBLEM — R06.09 EXERTIONAL DYSPNEA: Status: ACTIVE | Noted: 2020-02-21

## 2020-02-21 LAB
A/G RATIO: 1.1 (ref 1.1–2.2)
ALBUMIN SERPL-MCNC: 3.4 G/DL (ref 3.4–5)
ALP BLD-CCNC: 88 U/L (ref 40–129)
ALT SERPL-CCNC: 42 U/L (ref 10–40)
ANION GAP SERPL CALCULATED.3IONS-SCNC: 12 MMOL/L (ref 3–16)
AST SERPL-CCNC: 53 U/L (ref 15–37)
BASE EXCESS VENOUS: 0.6 MMOL/L (ref -3–3)
BASOPHILS ABSOLUTE: 0 K/UL (ref 0–0.2)
BASOPHILS RELATIVE PERCENT: 0.2 %
BILIRUB SERPL-MCNC: 0.8 MG/DL (ref 0–1)
BUN BLDV-MCNC: 19 MG/DL (ref 7–20)
CALCIUM SERPL-MCNC: 8.7 MG/DL (ref 8.3–10.6)
CARBOXYHEMOGLOBIN: 1.2 % (ref 0–1.5)
CHLORIDE BLD-SCNC: 104 MMOL/L (ref 99–110)
CO2: 25 MMOL/L (ref 21–32)
CREAT SERPL-MCNC: 0.9 MG/DL (ref 0.6–1.2)
EOSINOPHILS ABSOLUTE: 0 K/UL (ref 0–0.6)
EOSINOPHILS RELATIVE PERCENT: 0.1 %
GFR AFRICAN AMERICAN: >60
GFR NON-AFRICAN AMERICAN: >60
GLOBULIN: 3 G/DL
GLUCOSE BLD-MCNC: 114 MG/DL (ref 70–99)
HCO3 VENOUS: 23.9 MMOL/L (ref 23–29)
HCT VFR BLD CALC: 36 % (ref 36–48)
HEMOGLOBIN: 12.1 G/DL (ref 12–16)
LYMPHOCYTES ABSOLUTE: 1.4 K/UL (ref 1–5.1)
LYMPHOCYTES RELATIVE PERCENT: 12.1 %
MCH RBC QN AUTO: 30.2 PG (ref 26–34)
MCHC RBC AUTO-ENTMCNC: 33.6 G/DL (ref 31–36)
MCV RBC AUTO: 89.7 FL (ref 80–100)
METHEMOGLOBIN VENOUS: 0.6 %
MONOCYTES ABSOLUTE: 1.2 K/UL (ref 0–1.3)
MONOCYTES RELATIVE PERCENT: 10.3 %
NEUTROPHILS ABSOLUTE: 9.2 K/UL (ref 1.7–7.7)
NEUTROPHILS RELATIVE PERCENT: 77.3 %
O2 CONTENT, VEN: 17 VOL %
O2 SAT, VEN: 98 %
O2 THERAPY: ABNORMAL
PCO2, VEN: 34.3 MMHG (ref 40–50)
PDW BLD-RTO: 14.7 % (ref 12.4–15.4)
PH VENOUS: 7.46 (ref 7.35–7.45)
PLATELET # BLD: 182 K/UL (ref 135–450)
PMV BLD AUTO: 9 FL (ref 5–10.5)
PO2, VEN: 96.6 MMHG (ref 25–40)
POTASSIUM SERPL-SCNC: 3.5 MMOL/L (ref 3.5–5.1)
PRO-BNP: 225 PG/ML (ref 0–449)
RBC # BLD: 4.01 M/UL (ref 4–5.2)
SODIUM BLD-SCNC: 141 MMOL/L (ref 136–145)
TCO2 CALC VENOUS: 25 MMOL/L
TOTAL PROTEIN: 6.4 G/DL (ref 6.4–8.2)
TROPONIN: <0.01 NG/ML
WBC # BLD: 11.9 K/UL (ref 4–11)

## 2020-02-21 PROCEDURE — 93005 ELECTROCARDIOGRAM TRACING: CPT | Performed by: NURSE PRACTITIONER

## 2020-02-21 PROCEDURE — 84484 ASSAY OF TROPONIN QUANT: CPT

## 2020-02-21 PROCEDURE — G0378 HOSPITAL OBSERVATION PER HR: HCPCS

## 2020-02-21 PROCEDURE — 82803 BLOOD GASES ANY COMBINATION: CPT

## 2020-02-21 PROCEDURE — 6370000000 HC RX 637 (ALT 250 FOR IP): Performed by: NURSE PRACTITIONER

## 2020-02-21 PROCEDURE — 71046 X-RAY EXAM CHEST 2 VIEWS: CPT

## 2020-02-21 PROCEDURE — 96375 TX/PRO/DX INJ NEW DRUG ADDON: CPT

## 2020-02-21 PROCEDURE — 85025 COMPLETE CBC W/AUTO DIFF WBC: CPT

## 2020-02-21 PROCEDURE — 99285 EMERGENCY DEPT VISIT HI MDM: CPT

## 2020-02-21 PROCEDURE — 83880 ASSAY OF NATRIURETIC PEPTIDE: CPT

## 2020-02-21 PROCEDURE — 6360000002 HC RX W HCPCS: Performed by: NURSE PRACTITIONER

## 2020-02-21 PROCEDURE — 96374 THER/PROPH/DIAG INJ IV PUSH: CPT

## 2020-02-21 PROCEDURE — 94640 AIRWAY INHALATION TREATMENT: CPT

## 2020-02-21 PROCEDURE — 80053 COMPREHEN METABOLIC PANEL: CPT

## 2020-02-21 RX ORDER — AMLODIPINE BESYLATE 2.5 MG/1
2.5 TABLET ORAL DAILY
Status: DISCONTINUED | OUTPATIENT
Start: 2020-02-22 | End: 2020-02-22

## 2020-02-21 RX ORDER — ONDANSETRON 4 MG/1
4 TABLET, ORALLY DISINTEGRATING ORAL EVERY 8 HOURS PRN
Status: DISCONTINUED | OUTPATIENT
Start: 2020-02-21 | End: 2020-02-25 | Stop reason: HOSPADM

## 2020-02-21 RX ORDER — TRAMADOL HYDROCHLORIDE 50 MG/1
50 TABLET ORAL 3 TIMES DAILY PRN
Status: DISCONTINUED | OUTPATIENT
Start: 2020-02-21 | End: 2020-02-25 | Stop reason: HOSPADM

## 2020-02-21 RX ORDER — ASPIRIN 81 MG/1
81 TABLET ORAL DAILY
Status: DISCONTINUED | OUTPATIENT
Start: 2020-02-22 | End: 2020-02-25 | Stop reason: HOSPADM

## 2020-02-21 RX ORDER — METHYLPREDNISOLONE SODIUM SUCCINATE 125 MG/2ML
125 INJECTION, POWDER, LYOPHILIZED, FOR SOLUTION INTRAMUSCULAR; INTRAVENOUS ONCE
Status: COMPLETED | OUTPATIENT
Start: 2020-02-21 | End: 2020-02-21

## 2020-02-21 RX ORDER — MAGNESIUM SULFATE 1 G/100ML
1 INJECTION INTRAVENOUS PRN
Status: DISCONTINUED | OUTPATIENT
Start: 2020-02-21 | End: 2020-02-25 | Stop reason: HOSPADM

## 2020-02-21 RX ORDER — POTASSIUM CHLORIDE 20 MEQ/1
40 TABLET, EXTENDED RELEASE ORAL PRN
Status: DISCONTINUED | OUTPATIENT
Start: 2020-02-21 | End: 2020-02-25 | Stop reason: HOSPADM

## 2020-02-21 RX ORDER — POTASSIUM CHLORIDE 7.45 MG/ML
10 INJECTION INTRAVENOUS PRN
Status: DISCONTINUED | OUTPATIENT
Start: 2020-02-21 | End: 2020-02-25 | Stop reason: HOSPADM

## 2020-02-21 RX ORDER — CHOLECALCIFEROL (VITAMIN D3) 125 MCG
5 CAPSULE ORAL NIGHTLY PRN
Status: DISCONTINUED | OUTPATIENT
Start: 2020-02-21 | End: 2020-02-25 | Stop reason: HOSPADM

## 2020-02-21 RX ORDER — PREDNISONE 10 MG/1
60 TABLET ORAL DAILY
Qty: 30 TABLET | Refills: 0 | Status: SHIPPED | OUTPATIENT
Start: 2020-02-21 | End: 2020-02-26

## 2020-02-21 RX ORDER — PANTOPRAZOLE SODIUM 20 MG/1
20 TABLET, DELAYED RELEASE ORAL DAILY
Status: DISCONTINUED | OUTPATIENT
Start: 2020-02-22 | End: 2020-02-25 | Stop reason: HOSPADM

## 2020-02-21 RX ORDER — ACETAMINOPHEN 650 MG/1
650 SUPPOSITORY RECTAL EVERY 6 HOURS PRN
Status: DISCONTINUED | OUTPATIENT
Start: 2020-02-21 | End: 2020-02-25 | Stop reason: HOSPADM

## 2020-02-21 RX ORDER — IPRATROPIUM BROMIDE AND ALBUTEROL SULFATE 2.5; .5 MG/3ML; MG/3ML
1 SOLUTION RESPIRATORY (INHALATION) ONCE
Status: COMPLETED | OUTPATIENT
Start: 2020-02-21 | End: 2020-02-21

## 2020-02-21 RX ORDER — SODIUM CHLORIDE 0.9 % (FLUSH) 0.9 %
10 SYRINGE (ML) INJECTION PRN
Status: DISCONTINUED | OUTPATIENT
Start: 2020-02-21 | End: 2020-02-25 | Stop reason: HOSPADM

## 2020-02-21 RX ORDER — ALBUTEROL SULFATE 90 UG/1
2 AEROSOL, METERED RESPIRATORY (INHALATION) 4 TIMES DAILY PRN
Qty: 1 INHALER | Refills: 0 | Status: SHIPPED | OUTPATIENT
Start: 2020-02-21 | End: 2020-08-25

## 2020-02-21 RX ORDER — ACETAMINOPHEN 325 MG/1
650 TABLET ORAL EVERY 6 HOURS PRN
Status: DISCONTINUED | OUTPATIENT
Start: 2020-02-21 | End: 2020-02-25 | Stop reason: HOSPADM

## 2020-02-21 RX ORDER — ONDANSETRON 2 MG/ML
4 INJECTION INTRAMUSCULAR; INTRAVENOUS EVERY 6 HOURS PRN
Status: DISCONTINUED | OUTPATIENT
Start: 2020-02-21 | End: 2020-02-25 | Stop reason: HOSPADM

## 2020-02-21 RX ADMIN — IPRATROPIUM BROMIDE AND ALBUTEROL SULFATE 1 AMPULE: .5; 3 SOLUTION RESPIRATORY (INHALATION) at 20:14

## 2020-02-21 RX ADMIN — METHYLPREDNISOLONE SODIUM SUCCINATE 125 MG: 125 INJECTION, POWDER, FOR SOLUTION INTRAMUSCULAR; INTRAVENOUS at 20:23

## 2020-02-21 ASSESSMENT — PAIN SCALES - GENERAL
PAINLEVEL_OUTOF10: 1
PAINLEVEL_OUTOF10: 3

## 2020-02-21 ASSESSMENT — PAIN DESCRIPTION - DESCRIPTORS: DESCRIPTORS: DISCOMFORT

## 2020-02-21 ASSESSMENT — PAIN DESCRIPTION - LOCATION
LOCATION: RIB CAGE
LOCATION: HIP

## 2020-02-21 ASSESSMENT — PAIN DESCRIPTION - ORIENTATION: ORIENTATION: ANTERIOR;LOWER

## 2020-02-21 ASSESSMENT — PAIN DESCRIPTION - PAIN TYPE: TYPE: CHRONIC PAIN

## 2020-02-22 ENCOUNTER — APPOINTMENT (OUTPATIENT)
Dept: ULTRASOUND IMAGING | Age: 75
End: 2020-02-22
Payer: MEDICARE

## 2020-02-22 PROBLEM — K74.60 CIRRHOSIS, NONALCOHOLIC (HCC): Status: ACTIVE | Noted: 2020-02-22

## 2020-02-22 PROBLEM — I35.0 MILD AORTIC STENOSIS: Chronic | Status: ACTIVE | Noted: 2018-11-13

## 2020-02-22 LAB
A/G RATIO: 1.1 (ref 1.1–2.2)
ALBUMIN SERPL-MCNC: 3.6 G/DL (ref 3.4–5)
ALP BLD-CCNC: 87 U/L (ref 40–129)
ALT SERPL-CCNC: 44 U/L (ref 10–40)
ANION GAP SERPL CALCULATED.3IONS-SCNC: 17 MMOL/L (ref 3–16)
AST SERPL-CCNC: 52 U/L (ref 15–37)
BASOPHILS ABSOLUTE: 0 K/UL (ref 0–0.2)
BASOPHILS RELATIVE PERCENT: 0.1 %
BILIRUB SERPL-MCNC: 0.8 MG/DL (ref 0–1)
BUN BLDV-MCNC: 17 MG/DL (ref 7–20)
CALCIUM SERPL-MCNC: 9 MG/DL (ref 8.3–10.6)
CHLORIDE BLD-SCNC: 98 MMOL/L (ref 99–110)
CO2: 23 MMOL/L (ref 21–32)
CREAT SERPL-MCNC: 0.9 MG/DL (ref 0.6–1.2)
EKG ATRIAL RATE: 92 BPM
EKG DIAGNOSIS: NORMAL
EKG P AXIS: 42 DEGREES
EKG P-R INTERVAL: 132 MS
EKG Q-T INTERVAL: 420 MS
EKG QRS DURATION: 142 MS
EKG QTC CALCULATION (BAZETT): 519 MS
EKG R AXIS: -36 DEGREES
EKG T AXIS: 21 DEGREES
EKG VENTRICULAR RATE: 92 BPM
EOSINOPHILS ABSOLUTE: 0 K/UL (ref 0–0.6)
EOSINOPHILS RELATIVE PERCENT: 0 %
GFR AFRICAN AMERICAN: >60
GFR NON-AFRICAN AMERICAN: >60
GLOBULIN: 3.4 G/DL
GLUCOSE BLD-MCNC: 251 MG/DL (ref 70–99)
HCT VFR BLD CALC: 40 % (ref 36–48)
HEMOGLOBIN: 13.4 G/DL (ref 12–16)
INR BLD: 1.26 (ref 0.86–1.14)
LYMPHOCYTES ABSOLUTE: 0.7 K/UL (ref 1–5.1)
LYMPHOCYTES RELATIVE PERCENT: 7.5 %
MAGNESIUM: 1.6 MG/DL (ref 1.8–2.4)
MCH RBC QN AUTO: 30.5 PG (ref 26–34)
MCHC RBC AUTO-ENTMCNC: 33.4 G/DL (ref 31–36)
MCV RBC AUTO: 91.3 FL (ref 80–100)
MONOCYTES ABSOLUTE: 0.3 K/UL (ref 0–1.3)
MONOCYTES RELATIVE PERCENT: 3 %
NEUTROPHILS ABSOLUTE: 8.7 K/UL (ref 1.7–7.7)
NEUTROPHILS RELATIVE PERCENT: 89.4 %
PDW BLD-RTO: 15.2 % (ref 12.4–15.4)
PLATELET # BLD: 162 K/UL (ref 135–450)
PMV BLD AUTO: 9.3 FL (ref 5–10.5)
POTASSIUM SERPL-SCNC: 3.6 MMOL/L (ref 3.5–5.1)
PROTHROMBIN TIME: 14.7 SEC (ref 10–13.2)
RBC # BLD: 4.38 M/UL (ref 4–5.2)
SODIUM BLD-SCNC: 138 MMOL/L (ref 136–145)
TOTAL PROTEIN: 7 G/DL (ref 6.4–8.2)
WBC # BLD: 9.8 K/UL (ref 4–11)

## 2020-02-22 PROCEDURE — 76705 ECHO EXAM OF ABDOMEN: CPT

## 2020-02-22 PROCEDURE — 93010 ELECTROCARDIOGRAM REPORT: CPT | Performed by: INTERNAL MEDICINE

## 2020-02-22 PROCEDURE — 6370000000 HC RX 637 (ALT 250 FOR IP): Performed by: INTERNAL MEDICINE

## 2020-02-22 PROCEDURE — 36415 COLL VENOUS BLD VENIPUNCTURE: CPT

## 2020-02-22 PROCEDURE — 83735 ASSAY OF MAGNESIUM: CPT

## 2020-02-22 PROCEDURE — 96365 THER/PROPH/DIAG IV INF INIT: CPT

## 2020-02-22 PROCEDURE — 85025 COMPLETE CBC W/AUTO DIFF WBC: CPT

## 2020-02-22 PROCEDURE — 85610 PROTHROMBIN TIME: CPT

## 2020-02-22 PROCEDURE — 6360000002 HC RX W HCPCS: Performed by: INTERNAL MEDICINE

## 2020-02-22 PROCEDURE — 96372 THER/PROPH/DIAG INJ SC/IM: CPT

## 2020-02-22 PROCEDURE — G0378 HOSPITAL OBSERVATION PER HR: HCPCS

## 2020-02-22 PROCEDURE — 96375 TX/PRO/DX INJ NEW DRUG ADDON: CPT

## 2020-02-22 PROCEDURE — 96366 THER/PROPH/DIAG IV INF ADDON: CPT

## 2020-02-22 PROCEDURE — 80053 COMPREHEN METABOLIC PANEL: CPT

## 2020-02-22 RX ORDER — AMLODIPINE BESYLATE 5 MG/1
5 TABLET ORAL DAILY
Status: DISCONTINUED | OUTPATIENT
Start: 2020-02-22 | End: 2020-02-25 | Stop reason: HOSPADM

## 2020-02-22 RX ORDER — FUROSEMIDE 10 MG/ML
40 INJECTION INTRAMUSCULAR; INTRAVENOUS ONCE
Status: COMPLETED | OUTPATIENT
Start: 2020-02-22 | End: 2020-02-22

## 2020-02-22 RX ADMIN — FUROSEMIDE 40 MG: 10 INJECTION, SOLUTION INTRAMUSCULAR; INTRAVENOUS at 08:49

## 2020-02-22 RX ADMIN — MAGNESIUM SULFATE HEPTAHYDRATE 1 G: 1 INJECTION, SOLUTION INTRAVENOUS at 11:27

## 2020-02-22 RX ADMIN — AMLODIPINE BESYLATE 5 MG: 5 TABLET ORAL at 08:49

## 2020-02-22 RX ADMIN — ASPIRIN 81 MG: 81 TABLET, COATED ORAL at 08:49

## 2020-02-22 RX ADMIN — ACETAMINOPHEN 650 MG: 325 TABLET ORAL at 00:12

## 2020-02-22 RX ADMIN — PANTOPRAZOLE SODIUM 20 MG: 20 TABLET, DELAYED RELEASE ORAL at 08:49

## 2020-02-22 RX ADMIN — ENOXAPARIN SODIUM 40 MG: 40 INJECTION SUBCUTANEOUS at 08:48

## 2020-02-22 RX ADMIN — MAGNESIUM SULFATE HEPTAHYDRATE 1 G: 1 INJECTION, SOLUTION INTRAVENOUS at 10:10

## 2020-02-22 ASSESSMENT — PAIN DESCRIPTION - PAIN TYPE: TYPE: CHRONIC PAIN

## 2020-02-22 ASSESSMENT — PAIN SCALES - GENERAL
PAINLEVEL_OUTOF10: 0
PAINLEVEL_OUTOF10: 1

## 2020-02-22 NOTE — PROGRESS NOTES
Consult has been called to [address] [facility] at [address] [facility] [address]  Tino Brantley  2/22/2020

## 2020-02-22 NOTE — PROGRESS NOTES
[facility] Progress Notes    Patient:  Almita Nation  :   1945  MRN:   [id number]  Date of Service: 20    CHIEF COMPLAINT:  dyspnea    HISTORY OF PRESENT ILLNESS:    Almita Nation is a 66 y.o. female. She presents for dyspnea which has been coming and going but generally worsening over about the past week. She feels ok at rest with the head of her bed elevated, but quickly becomes dyspneic with minimal exertion. She also describes orthopnea and sleeps on an adjustable bed at home that allows her to keep her upper body elevated. She sought care at [place] last Saturday. She was diagnosed with pneumonia and started on a Zpak and prednisone regimen. She followed up with her PCP on Tuesday. CT chest was performed the following day. and ct of chest done on . No evidence of pneumonia was discovered. Findings were most consistent with atelectasis and trace pleural effusions. Incidental findings included cirrhotic liver morphology and splenomegaly. Patient takes furosemide on a prn basis. She bases dosing on symptoms and weight which she checks every few days. She had not taken any most of the week. This morning she checked her weight, found that it had increased by about 3 lbs, and took a 40mg dose of lasix. Subjective:  Pt returned from ultrasound. Family, multiple, present bedside. She has increased respirations, but odd phonation with pressured breathing, in the absence of any wheezes on ausculation. This disappears immediately when she is at rest. No changed in Spo2      Review of Systems:  All pertinent positives and negatives are as noted in the HPI section. All other systems were reviewed and are negative.     Past Medical History:   Diagnosis Date    Acid reflux     CHF (congestive heart failure) (HCC)     Generalized anxiety disorder     Hypertension     Osteoarthritis     of hips, knees, back    Screening mammogram 3/26/96 Historical Provider, MD   meloxicam (MOBIC) 15 MG tablet Take 7.5 mg by mouth daily  3/2/14   Historical Provider, MD   pantoprazole (PROTONIX) 40 MG tablet Take 20 mg by mouth daily 20 mg daily 3/30/14   Historical Provider, MD       Allergies:   Streptomycin; Sulfa antibiotics; Tylenol [acetaminophen]; and Verapamil    Social:   reports that she has never smoked. She has never used smokeless tobacco.   reports no history of alcohol use. Social History     Substance and Sexual Activity   Drug Use No       Family History   Problem Relation Age of Onset    High Blood Pressure Mother        PHYSICAL EXAM:  I performed this physical examination. Vitals:  Patient Vitals for the past 24 hrs:   BP Temp Temp src Pulse Resp SpO2 Height Weight   02/22/20 1235 (!) 161/70 97.7 °F (36.5 °C) Oral 91 -- 95 % -- --   02/22/20 0845 (!) 157/78 97.6 °F (36.4 °C) Oral 87 18 94 % -- --   02/22/20 0422 (!) 147/70 97.7 °F (36.5 °C) Oral 77 16 95 % -- 193 lb 6.4 oz (87.7 kg)   02/22/20 0005 (!) 173/70 97.9 °F (36.6 °C) Oral 80 20 95 % -- --   02/21/20 2252 (!) 169/70 -- -- 84 16 96 % -- --   02/21/20 2220 (!) 207/81 -- -- 101 -- 95 % -- --   02/21/20 2106 (!) 168/69 -- -- 92 16 92 % -- --   02/21/20 2017 -- -- -- -- -- 94 % -- --   02/21/20 1957 (!) 160/69 -- -- 80 16 94 % -- --   02/21/20 1850 (!) 172/82 98 °F (36.7 °C) Oral 93 14 94 % 5' 8\" (1.727 m) 195 lb (88.5 kg)       Intake/Output Summary (Last 24 hours) at 2/22/2020 1525  Last data filed at 2/22/2020 0845  Gross per 24 hour   Intake 240 ml   Output 800 ml   Net -560 ml       Vent Settings:     / / /     GEN:  Appearance:  Pleasant age appropriate female . Level of Consciousness:  Alert . Orientation:  Full  No conversational dyspnea. HEENT: Sclera anicteric.  no conjunctival chemosis. moist mucus membranes. no specific or diagnostic oral lesions. NECK:  no signs of meningismus. Jugular veins are distended. Carotid pulses  Are bounding 3+.   no cervical mediastinal nodes are noted Lungs/pleura: No obstructing endobronchial lesions are seen. Bandlike opacity seen in the lingula, left lower lobe and right lower lobe. .  Trace pleural fluid is seen bilaterally. Denae Silk No pneumothorax. Upper Abdomen: Mild thickening of the adrenal glands is seen. Lobular contour liver is seen, compatible with cirrhosis. There is splenomegaly. No obvious ascites. Soft Tissues/Bones: Spurring is seen in the spine. There is evidence of prior spinal fusion     Trace pleural fluid is seen bilaterally with bandlike opacities at the lung bases. Bandlike morphology favors atelectasis or scarring Cirrhosis and splenomegaly     Xr Chest Portable    Result Date: 2/15/2020  EXAMINATION: ONE XRAY VIEW OF THE CHEST 2/15/2020 8:01 pm COMPARISON: 07/22/2019 HISTORY: ORDERING SYSTEM PROVIDED HISTORY: sob TECHNOLOGIST PROVIDED HISTORY: Reason for exam:->sob Reason for Exam: sob Acuity: Acute Type of Exam: Initial Relevant Medical/Surgical History: htn, osteoarthritis FINDINGS: The cardiomediastinal silhouette is unchanged in appearance. Shallow inflation with probable subsegmental atelectasis or scarring in the left lung base. There is no pneumothorax or evidence of edema. No effusion is appreciated. Status post anterior cervical fusion. The osseous structures are unchanged in appearance. Probable subsegmental atelectasis or scarring in the left lung base. Us Arthr/asp/inj Major Jnt/bursa Right    Result Date: 2/19/2020  Radiology result is complete; follow up with provider / physician office for radiology results     EKG:  New and pertinent prior tracings were directly reviewed.   My interpretation is as follows:  pending    Active Hospital Problems    Diagnosis Date Noted    Cirrhosis, nonalcoholic ([facility] [address] [G88.53] 61/84/2485    Exertional dyspnea [R06.09] 02/21/2020    Mild aortic stenosis [I35.0] 11/13/2018    HTN (hypertension) [I10] 09/19/2011       ASSESSMENT & PLAN  Symptoms seem primarily volume status related which in turn seems most attributable to cirrhosis and portal hypertension.  -Continue Furosemide 40mg IV   -Duplex ultrasound liver / spleen requested to verify cirrhotic liver, portal vein flow direction, and presence of ascites. -Transthoracic echocardiogram also requested primarily to re-evaluate patient's aortic valve. When last studies in 2018 the valve was sclerotic and mildly stenotic.  -Continue amlodipine but increase to 5mg daily. May require further increases. Lisinopril held pending evaluation for cirrhosis. DVT prophylaxis: SCDs, lovenox  Code Status:  Full  Disposition:  Observation. D/C to home anticipated in 1-2 days. Patient lives in a home with her niece.       Eliz Garibay MD

## 2020-02-22 NOTE — ED NOTES
Patient ambulated 15 feet O2 saturation at 90% on room air and HR at 106. Patient having expiratory wheezes when ambulating. [address]  NP aware.      Casey Ruano RN  02/21/20 3327

## 2020-02-22 NOTE — CONSULTS
(PRINIVIL;ZESTRIL) 10 MG tablet Take 1 tablet by mouth daily 9/25/19   Romeo Cardenas MD   aspirin EC 81 MG EC tablet Take 1 tablet by mouth daily 9/25/19   Romeo Cardenas MD   Acetaminophen (TYLENOL PO) Take by mouth as needed    Historical Provider, MD   traMADol (ULTRAM) 50 MG tablet Take 50 mg by mouth as needed for Pain. Fabián Magana Historical Provider, MD   meloxicam (MOBIC) 15 MG tablet Take 7.5 mg by mouth daily  3/2/14   Historical Provider, MD   pantoprazole (PROTONIX) 40 MG tablet Take 20 mg by mouth daily 20 mg daily 3/30/14   Historical Provider, MD      Scheduled Medications:    amLODIPine  5 mg Oral Daily    aspirin EC  81 mg Oral Daily    pantoprazole  20 mg Oral Daily    enoxaparin  40 mg Subcutaneous Daily     Infusions:   PRN Medications: perflutren lipid microspheres, traMADol, sodium chloride flush, potassium chloride **OR** potassium alternative oral replacement **OR** potassium chloride, magnesium sulfate, acetaminophen, acetaminophen, ondansetron, ondansetron, melatonin  Allergies:    Allergies   Allergen Reactions    Streptomycin     Sulfa Antibiotics Other (See Comments)     States change in mental status    Tylenol [Acetaminophen]      #3    Verapamil Rash       Past Medical History:   Diagnosis Date    Acid reflux     CHF (congestive heart failure) (HCC)     Generalized anxiety disorder     Hypertension     Osteoarthritis     of hips, knees, back    Screening mammogram 3/26/96     Past Surgical History:   Procedure Laterality Date    ACHILLES TENDON SURGERY      CARPAL TUNNEL RELEASE      CATARACT REMOVAL WITH IMPLANT Left 04/06/2017    Left cataract removal. Dr. Ryan Murray Right 05/04/2017    PHACO EMULSIFICATION OF CATARACT WITH INTRAOCULAR LENS IMPLANT RIGHT EYE    COLONOSCOPY  3/24/10    FOOT SURGERY      GALLBLADDER SURGERY      HYSTERECTOMY      NECK SURGERY      PAIN MANAGEMENT PROCEDURE N/A 1/20/2020    MIDLINE LUMBAR FIVE SACRAL ONE EPIDURAL STEROID INJECTION SITE CONFIRMED BY FLUOROSCOPY performed by Natasha Kinney MD at [place] [id number]       Social:   Social History     Tobacco Use    Smoking status: Never Smoker    Smokeless tobacco: Never Used   Substance Use Topics    Alcohol use: No     Family:   Family History   Problem Relation Age of Onset    High Blood Pressure Mother        ROS: Pertinent items are noted in HPI.     Objective:   Vital Signs:  Temp (24hrs), Av.8 °F (36.6 °C), Min:97.6 °F (36.4 °C), Max:98 °F (42.7 °C)     Systolic (66RTG), XHD:164 , Min:147 , ZTS:712      Diastolic (77RLK), AOX:88, Min:69, Max:82     Pulse  Av.8  Min: 77  Max: 101  BP (!) 157/78   Pulse 87   Temp 97.6 °F (36.4 °C) (Oral)   Resp 18   Ht 5' 8\" (1.727 m)   Wt 193 lb 6.4 oz (87.7 kg)   SpO2 94%   BMI 29.41 kg/m²      Physical Exam:   BP (!) 157/78   Pulse 87   Temp 97.6 °F (36.4 °C) (Oral)   Resp 18   Ht 5' 8\" (1.727 m)   Wt 193 lb 6.4 oz (87.7 kg)   SpO2 94%   BMI 29.41 kg/m²   General appearance: alert, appears stated age and cooperative  Lungs: clear to auscultation bilaterally  Chest wall: no tenderness  Heart: regular rate and rhythm, S1, S2 normal, no murmur, click, rub or gallop  Abdomen: soft, non-tender; bowel sounds normal; no masses,  no organomegaly  Extremities: extremities normal, atraumatic, no cyanosis or edema  Skin: Skin color, texture, turgor normal. No rashes or lesions  Neurologic: Grossly normal    Lab and Imaging Review   Recent Labs     20  1934 20  0928   WBC 11.9* 9.8   HGB 12.1 13.4   MCV 89.7 91.3    162   INR  --  1.26*    138   K 3.5 3.6    98*   CO2 25 23   BUN 19 17   CREATININE 0.9 0.9   GLUCOSE 114* 251*   CALCIUM 8.7 9.0   PROT 6.4 7.0   LABALBU 3.4 3.6   AST 53* 52*   ALT 42* 44*   ALKPHOS 88 87   BILITOT 0.8 0.8   MG  --  1.60*       Assessment:     Patient Active Problem List    Diagnosis Date Noted    Cirrhosis, nonalcoholic ([facility]    

## 2020-02-22 NOTE — ED PROVIDER NOTES
[facility] Emergency Department    CHIEF COMPLAINT  Shortness of Breath (started last friday, seen at [facility] on sat treated for pneumonia-z-pack and prednisone. saw fmd on tue and ct of chest done on wed. sob continues, was getting better but worse again last couple days)      HISTORY OF PRESENT ILLNESS  Julianna John is a 66 y.o. female who presents to the ED complaining of shortness of breath on exertion. Patient reports that she was seen in the emergency department last Saturday and diagnosed with \"pneumonia\" azithromycin and steroids. Patient followed up with her primary care doctor who performed a CT of the chest without contrast reports that it was \"normal.\"  Patient reports shortness of breath on exertion has worsened. Patient reports that she has a nebulizer and a inhaler that she has been using with no relief. Patient denies fever, chills, body aches, dizziness, hemoptysis, cough, nasal congestion, chest pain, abdominal pain, nausea, vomiting, diarrhea constipation dysuria hematuria, urinary frequency urgency, leg swelling of the Ankle palpitations. Patient has no history of DVT or pulmonary embolism. Patient is not on estrogen therapy. Patient does have a notable history of congestive heart failure and takes her Lasix \"when she needs it. \"  Patient did take 40 mg of Lasix this morning because she states that she had a 3 pound weight gain. Patient had a stress test in October of last year that was \"normal\" her cardiologist Dr. Millicent Mcintosh had low suspicion for blockage. Patient does not smoke cigarettes, but reports her father smoked cigarettes while she was growing up in her home. No other complaints, modifying factors or associated symptoms. Nursing notes reviewed.    Past Medical History:   Diagnosis Date    Acid reflux     CHF (congestive heart failure) (HCC)     Generalized anxiety disorder     Hypertension     Osteoarthritis     of hips, knees, back    Screening mammogram 3/26/96     Past Surgical History:   Procedure Laterality Date    ACHILLES TENDON SURGERY      CARPAL TUNNEL RELEASE      CATARACT REMOVAL WITH IMPLANT Left 04/06/2017    Left cataract removal. Dr. Ericka Mata Right 05/04/2017    PHACO EMULSIFICATION OF CATARACT WITH INTRAOCULAR LENS IMPLANT RIGHT EYE    COLONOSCOPY  3/24/10    FOOT SURGERY      GALLBLADDER SURGERY      HYSTERECTOMY      NECK SURGERY      PAIN MANAGEMENT PROCEDURE N/A 1/20/2020    MIDLINE LUMBAR FIVE SACRAL ONE EPIDURAL STEROID INJECTION SITE CONFIRMED BY FLUOROSCOPY performed by Oliver Fernandes MD at [facility]     Family History   Problem Relation Age of Onset    High Blood Pressure Mother      Social History     Socioeconomic History    Marital status:       Spouse name: Not on file    Number of children: Not on file    Years of education: Not on file    Highest education level: Not on file   Occupational History    Not on file   Social Needs    Financial resource strain: Not on file    Food insecurity:     Worry: Not on file     Inability: Not on file    Transportation needs:     Medical: Not on file     Non-medical: Not on file   Tobacco Use    Smoking status: Never Smoker    Smokeless tobacco: Never Used   Substance and Sexual Activity    Alcohol use: No    Drug use: No    Sexual activity: Yes     Partners: Male   Lifestyle    Physical activity:     Days per week: Not on file     Minutes per session: Not on file    Stress: Not on file   Relationships    Social connections:     Talks on phone: Not on file     Gets together: Not on file     Attends [employer] service: Not on file     Active member of club or organization: Not on file     Attends meetings of clubs or organizations: Not on file     Relationship status: Not on file    Intimate partner violence:     Fear of current or ex partner: Not on file     Emotionally abused: Not on file     Physically abused: Not on file     Forced sexual activity: Not on file   Other Topics Concern    Not on file   Social History Narrative    Not on file     No current facility-administered medications for this encounter. Current Outpatient Medications   Medication Sig Dispense Refill    predniSONE (DELTASONE) 10 MG tablet Take 6 tablets by mouth daily for 5 doses 30 tablet 0    albuterol sulfate  (90 Base) MCG/ACT inhaler Inhale 2 puffs into the lungs 4 times daily as needed for Wheezing 1 Inhaler 0    albuterol sulfate  (90 Base) MCG/ACT inhaler Inhale 2 puffs into the lungs 4 times daily as needed for Wheezing 1 Inhaler 0    Spacer/Aero-Holding Chambers DONYA 1 Device by Does not apply route daily as needed (Shortness of breath) 1 Device 0    furosemide (LASIX) 40 MG tablet Take 1 tablet by mouth daily 90 tablet 2    triamcinolone (KENALOG) 0.1 % cream APPLY TO AFFECTED AREA EVERY DAY  0    amLODIPine (NORVASC) 5 MG tablet Take 0.5 tablets by mouth daily 45 tablet 3    lisinopril (PRINIVIL;ZESTRIL) 10 MG tablet Take 1 tablet by mouth daily 90 tablet 3    aspirin EC 81 MG EC tablet Take 1 tablet by mouth daily 30 tablet 0    Acetaminophen (TYLENOL PO) Take by mouth as needed      traMADol (ULTRAM) 50 MG tablet Take 50 mg by mouth as needed for Pain. Nilesh Fling meloxicam (MOBIC) 15 MG tablet Take 7.5 mg by mouth daily       pantoprazole (PROTONIX) 40 MG tablet Take 20 mg by mouth daily 20 mg daily       Allergies   Allergen Reactions    Streptomycin     Sulfa Antibiotics Other (See Comments)     States change in mental status    Tylenol [Acetaminophen]      #3    Verapamil Rash       REVIEW OF SYSTEMS  10 systems reviewed, pertinent positives per HPI otherwise noted to be negative    PHYSICAL EXAM  BP (!) 168/69   Pulse 92   Temp 98 °F (36.7 °C) (Oral)   Resp 16   Ht 5' 8\" (1.727 m)   Wt 195 lb (88.5 kg)   SpO2 92%   BMI 29.65 kg/m²   GENERAL APPEARANCE: Awake and alert. Cooperative.  No acute distress. Vital signs are stable. Well appearing and non toxic. HEAD: Normocephalic. Atraumatic. EYES: PERRL. EOM's grossly intact. ENT: Mucous membranes are moist.   NECK: Supple. Normal ROM. HEART: RRR. No murmurs. Distal pulses are equal and intact. Cap refill less than 2 seconds. LUNGS: Respirations unlabored. Good air exchange. Speaking comfortably in full sentences. Expiratory wheezing on exam no rhonchi or rales. No stridor. On room air. ABDOMEN: Soft. Non-distended. Non-tender. No guarding or rebound. No masses. No organomegaly. No rigidity. Bowel sounds are present in all 4 quadrants. Negative dobson's. Negative McBurney's point. Negative CVA tenderness. EXTREMITIES: No peripheral edema. Moves all extremities equally. All extremities neurovascularly intact. SKIN: Warm and dry. No acute rashes. NEUROLOGICAL: Alert and oriented. CN's 2-12 intact. No gross facial drooping. Strength 5/5, sensation intact. No dysarthria. No dysmetria. No ataxia. PSYCHIATRIC: Normal mood and affect. SCREENINGS       RADIOLOGY    Xr Chest Standard (2 Vw)    Result Date: 2/21/2020  EXAMINATION: TWO XRAY VIEWS OF THE CHEST 2/21/2020 7:48 pm COMPARISON: February 15, 2020 HISTORY: ORDERING SYSTEM PROVIDED HISTORY: shortness of breath TECHNOLOGIST PROVIDED HISTORY: Reason for exam:->shortness of breath FINDINGS: No evidence of pneumonia, edema or other acute pulmonary process. No evidence of acute process of the cardiac or mediastinal structures. No evidence of pneumothorax or pleural effusion. No evidence of acute cardiopulmonary disease. CONSULTS  IP CONSULT TO HOSPITALIST    ED COURSE/MDM  Patient seen and evaluated. Old records reviewed. Diagnostic testing reviewed and results discussed. I have seen this patient in collaboration with supervising physician Dr. Joyce Bai. We thoroughly discussed the history, physical exam, diagnostic testing and emergency department course.      Lorrie Krabbe Tati Chau presented to the ED today with above noted complaints. CBC and CMP unremarkable no leukocytosis, bandemia, anemia, electrolyte abnormality. Troponin less than 0.01. . ABG shows a pH of 7.4, CO2 34.3, PO2 of 96.6. Chest x-ray shows no evidence of acute cardiopulmonary disease. No pneumothorax or pleural effusion. Initially recommended a CT of the chest to rule out a pulmonary embolism and patient was concerned that her insurance would not cover it given that she had a CT without contrast 2 days ago. Patient declined CT and stated \"I will send you the bill. \"    Expiratory wheezing notable upon examination upon arrival to the emergency department. Patient given a DuoNeb with complete resolution of expiratory wheezing. Patient still has dyspnea on exertion with an oxygen saturation above 90% on room air. Family requesting that the patient admitted to the hospital given the patient lives at home by herself.     While in ED patient received   Medications   ipratropium-albuterol (DUONEB) nebulizer solution 1 ampule (1 ampule Inhalation Given 2/21/20 2014)   methylPREDNISolone sodium (SOLU-MEDROL) injection 125 mg (125 mg Intravenous Given 2/21/20 2023)       Results for orders placed or performed during the hospital encounter of 02/21/20   CBC auto differential   Result Value Ref Range    WBC 11.9 (H) 4.0 - 11.0 K/uL    RBC 4.01 4.00 - 5.20 M/uL    Hemoglobin 12.1 12.0 - 16.0 g/dL    Hematocrit 36.0 36.0 - 48.0 %    MCV 89.7 80.0 - 100.0 fL    MCH 30.2 26.0 - 34.0 pg    MCHC 33.6 31.0 - 36.0 g/dL    RDW 14.7 12.4 - 15.4 %    Platelets 475 324 - 531 K/uL    MPV 9.0 5.0 - 10.5 fL    Neutrophils % 77.3 %    Lymphocytes % 12.1 %    Monocytes % 10.3 %    Eosinophils % 0.1 %    Basophils % 0.2 %    Neutrophils Absolute 9.2 (H) 1.7 - 7.7 K/uL    Lymphocytes Absolute 1.4 1.0 - 5.1 K/uL    Monocytes Absolute 1.2 0.0 - 1.3 K/uL    Eosinophils Absolute 0.0 0.0 - 0.6 K/uL    Basophils Absolute 0.0 0.0 - 0.2 K/uL   Comprehensive metabolic panel   Result Value Ref Range    Sodium 141 136 - 145 mmol/L    Potassium 3.5 3.5 - 5.1 mmol/L    Chloride 104 99 - 110 mmol/L    CO2 25 21 - 32 mmol/L    Anion Gap 12 3 - 16    Glucose 114 (H) 70 - 99 mg/dL    BUN 19 7 - 20 mg/dL    CREATININE 0.9 0.6 - 1.2 mg/dL    GFR Non-African American >60 >60    GFR African American >60 >60    Calcium 8.7 8.3 - 10.6 mg/dL    Total Protein 6.4 6.4 - 8.2 g/dL    Alb 3.4 3.4 - 5.0 g/dL    Albumin/Globulin Ratio 1.1 1.1 - 2.2    Total Bilirubin 0.8 0.0 - 1.0 mg/dL    Alkaline Phosphatase 88 40 - 129 U/L    ALT 42 (H) 10 - 40 U/L    AST 53 (H) 15 - 37 U/L    Globulin 3.0 g/dL   Troponin   Result Value Ref Range    Troponin <0.01 <0.01 ng/mL   Brain Natriuretic Peptide   Result Value Ref Range    Pro- 0 - 449 pg/mL   Blood gas, venous   Result Value Ref Range    pH, Waqas 7.461 (H) 7.350 - 7.450    pCO2, Waqas 34.3 (L) 40.0 - 50.0 mmHg    pO2, Waqas 96.6 (H) 25.0 - 40.0 mmHg    HCO3, Venous 23.9 23.0 - 29.0 mmol/L    Base Excess, Waqas 0.6 -3.0 - 3.0 mmol/L    O2 Sat, Waqas 98 Not Established %    Carboxyhemoglobin 1.2 0.0 - 1.5 %    MetHgb, Waqas 0.6 <1.5 %    TC02 (Calc), Waqas 25 Not Established mmol/L    O2 Content, Waqas 17 Not Established VOL %    O2 Therapy Unknown        I spoke with Dr. Adilene Singleton. We thoroughly discussed the history, physical exam, laboratory and imaging studies, as well as, emergency department course. Based upon that discussion, we've decided to admit Jaswinder Dudley for further observation and evaluation of Rachele Eaton's dyspnea. As I have deemed necessary from their history, physical, and studies, I have considered and evaluated Jaswinder Dudley for the following diagnoses:  ACUTE CORONARY SYNDROME, CHRONIC OBSTRUCTIVE PULMONARY DISEASE, CONGESTIVE HEART FAILURE, PERICARDIAL TAMPONADE, PNEUMONIA, PNEUMOTHORAX, PULMONARY EMBOLISM, SEPSIS, and THORACIC DISSECTION.     FINAL IMPRESSION  1. COPD exacerbation ([facility] [address] Vitals:  Blood pressure (!) 168/69, pulse 92, temperature 98 °F (36.7 °C), temperature source Oral, resp. rate 16, height 5' 8\" (1.727 m), weight 195 lb (88.5 kg), SpO2 92 %. Comment: Please note this report has been produced using speech recognition software and may contain errors related to that system including errors in grammar, punctuation, and spelling, as well as words and phrases that may be inappropriate. If there are any questions or concerns please feel free to contact the dictating provider for clarification.             11[address] Joni Godinez, APRN - CNP  02/21/20 2134       Monica Cullen, APRN - CNP  02/21/20 2141

## 2020-02-22 NOTE — H&P
[facility] Medicine History & Physical      Patient:  Poncho Fierro  :   1945  MRN:   [id number]  Date of Service: 20    CHIEF COMPLAINT:  dyspnea    HISTORY OF PRESENT ILLNESS:    Poncho Fierro is a 66 y.o. female. She presents for dyspnea which has been coming and going but generally worsening over about the past week. She feels ok at rest with the head of her bed elevated, but quickly becomes dyspneic with minimal exertion. She also describes orthopnea and sleeps on an adjustable bed at home that allows her to keep her upper body elevated. She sought care at [place] ER last Saturday. She was diagnosed with pneumonia and started on a Zpak and prednisone regimen. She followed up with her PCP on Tuesday. CT chest was performed the following day. and ct of chest done on . No evidence of pneumonia was discovered. Findings were most consistent with atelectasis and trace pleural effusions. Incidental findings included cirrhotic liver morphology and splenomegaly. Patient takes furosemide on a prn basis. She bases dosing on symptoms and weight which she checks every few days. She had not taken any most of the week. This morning she checked her weight, found that it had increased by about 3 lbs, and took a 40mg dose of lasix. Review of Systems:  All pertinent positives and negatives are as noted in the HPI section. All other systems were reviewed and are negative.     Past Medical History:   Diagnosis Date    Acid reflux     CHF (congestive heart failure) (HCC)     Generalized anxiety disorder     Hypertension     Osteoarthritis     of hips, knees, back    Screening mammogram 3/26/96       Past Surgical History:   Procedure Laterality Date    ACHILLES TENDON SURGERY      CARPAL TUNNEL RELEASE      CATARACT REMOVAL WITH IMPLANT Left 2017    Left cataract removal. Dr. Sonja Salazar Right 2017    PHACO EMULSIFICATION OF CATARACT WITH INTRAOCULAR LENS IMPLANT RIGHT EYE    COLONOSCOPY  3/24/10    FOOT SURGERY      GALLBLADDER SURGERY      HYSTERECTOMY      NECK SURGERY      PAIN MANAGEMENT PROCEDURE N/A 1/20/2020    MIDLINE LUMBAR FIVE SACRAL ONE EPIDURAL STEROID INJECTION SITE CONFIRMED BY FLUOROSCOPY performed by Lenka Pulliam MD at [place] [address]         Prior to Admission medications    Medication Sig Start Date End Date Taking? Authorizing Provider   predniSONE (DELTASONE) 10 MG tablet Take 6 tablets by mouth daily for 5 doses 2/21/20 2/26/20 Yes ALIVIA Lind - CNP   albuterol sulfate  (90 Base) MCG/ACT inhaler Inhale 2 puffs into the lungs 4 times daily as needed for Wheezing 2/21/20  Yes Brianna Clark APRN - CNP   albuterol sulfate  (90 Base) MCG/ACT inhaler Inhale 2 puffs into the lungs 4 times daily as needed for Wheezing 2/15/20   Duy Rogers MD   Spacer/Aero-Holding Mathieu Metro 1 Device by Does not apply route daily as needed (Shortness of breath) 2/15/20   Duy Rogers MD   furosemide (LASIX) 40 MG tablet Take 1 tablet by mouth daily 1/23/20   Dione Escamilla MD   triamcinolone (KENALOG) 0.1 % cream APPLY TO AFFECTED AREA EVERY DAY 9/30/19   Historical Provider, MD   amLODIPine (NORVASC) 5 MG tablet Take 0.5 tablets by mouth daily 9/25/19   Dione Escamilla MD   lisinopril (PRINIVIL;ZESTRIL) 10 MG tablet Take 1 tablet by mouth daily 9/25/19   Dione Escamilla MD   aspirin EC 81 MG EC tablet Take 1 tablet by mouth daily 9/25/19   Dione Escamilla MD   Acetaminophen (TYLENOL PO) Take by mouth as needed    Historical Provider, MD   traMADol (ULTRAM) 50 MG tablet Take 50 mg by mouth as needed for Pain. Tamiko Pandya     Historical Provider, MD   meloxicam (MOBIC) 15 MG tablet Take 7.5 mg by mouth daily  3/2/14   Historical Provider, MD   pantoprazole (PROTONIX) 40 MG tablet Take 20 mg by mouth daily 20 mg daily 3/30/14   Historical Provider, MD       Allergies:   Streptomycin; Sulfa changes of the bilateral hips. Enthesophytes at the bilateral iliac wings and involving the bilateral greater trochanters and pubic bones. Alignment of the hips is anatomic. Surgical clips project over the lower abdomen and pelvis. 1. Acute fracture identified. 2. Moderate osteoarthritis of the bilateral hips. 3. Osteopenia. Xr Knee Right (min 4 Views)    Result Date: 2/19/2020  Radiology exam is complete. No Radiologist dictation. Please follow up with ordering provider. Ct Chest Wo Contrast    Result Date: 2/19/2020  EXAMINATION: CT OF THE CHEST WITHOUT CONTRAST 2/19/2020 4:28 pm TECHNIQUE: CT of the chest was performed without the administration of intravenous contrast. Multiplanar reformatted images are provided for review. Dose modulation, iterative reconstruction, and/or weight based adjustment of the mA/kV was utilized to reduce the radiation dose to as low as reasonably achievable. COMPARISON: None. HISTORY: ORDERING SYSTEM PROVIDED HISTORY: Dyspnea on exertion TECHNOLOGIST PROVIDED HISTORY: Reason for Exam: Dyspnea on exertion Acuity: Unknown Type of Exam: Initial FINDINGS: Mediastinum: Thyroid gland appears normal.  Atherosclerotic change seen in aorta. Aortic valve calcification is seen. Coronary artery calcification is seen. Small hiatal hernia seen. There is nonspecific thickening at the GE junction. Small mediastinal nodes are noted Lungs/pleura: No obstructing endobronchial lesions are seen. Bandlike opacity seen in the lingula, left lower lobe and right lower lobe. .  Trace pleural fluid is seen bilaterally. Darlean Cork No pneumothorax. Upper Abdomen: Mild thickening of the adrenal glands is seen. Lobular contour liver is seen, compatible with cirrhosis. There is splenomegaly. No obvious ascites. Soft Tissues/Bones: Spurring is seen in the spine. There is evidence of prior spinal fusion     Trace pleural fluid is seen bilaterally with bandlike opacities at the lung bases.   Bandlike morphology favors atelectasis or scarring Cirrhosis and splenomegaly     Xr Chest Portable    Result Date: 2/15/2020  EXAMINATION: ONE XRAY VIEW OF THE CHEST 2/15/2020 8:01 pm COMPARISON: 07/22/2019 HISTORY: ORDERING SYSTEM PROVIDED HISTORY: sob TECHNOLOGIST PROVIDED HISTORY: Reason for exam:->sob Reason for Exam: sob Acuity: Acute Type of Exam: Initial Relevant Medical/Surgical History: htn, osteoarthritis FINDINGS: The cardiomediastinal silhouette is unchanged in appearance. Shallow inflation with probable subsegmental atelectasis or scarring in the left lung base. There is no pneumothorax or evidence of edema. No effusion is appreciated. Status post anterior cervical fusion. The osseous structures are unchanged in appearance. Probable subsegmental atelectasis or scarring in the left lung base. Us Arthr/asp/inj Major Jnt/bursa Right    Result Date: 2/19/2020  Radiology result is complete; follow up with provider / physician office for radiology results     EKG:  New and pertinent prior tracings were directly reviewed. My interpretation is as follows:  pending    Active Hospital Problems    Diagnosis Date Noted    Cirrhosis, nonalcoholic ([facility] [address] [W92.57] 39/85/0512    Exertional dyspnea [R06.09] 02/21/2020    Mild aortic stenosis [I35.0] 11/13/2018    HTN (hypertension) [I10] 09/19/2011       ASSESSMENT & PLAN  Symptoms seem primarily volume status related which in turn seems most attributable to cirrhosis and portal hypertension. Furosemide 40mg IV once in the morning for symptomatic relief. Duplex ultrasound liver / spleen requested to verify cirrhotic liver, portal vein flow direction, and presence of ascites. Transthoracic echocardiogram also requested primarily to re-evaluate patient's aortic valve. When last studies in 2018 the valve was sclerotic and mildly stenotic. Continue amlodipine but increase to 5mg daily. May require further increases.   Lisinopril held pending evaluation for

## 2020-02-23 LAB
A/G RATIO: 1.1 (ref 1.1–2.2)
ALBUMIN SERPL-MCNC: 3.4 G/DL (ref 3.4–5)
ALP BLD-CCNC: 85 U/L (ref 40–129)
ALT SERPL-CCNC: 44 U/L (ref 10–40)
ANION GAP SERPL CALCULATED.3IONS-SCNC: 10 MMOL/L (ref 3–16)
AST SERPL-CCNC: 47 U/L (ref 15–37)
BASOPHILS ABSOLUTE: 0 K/UL (ref 0–0.2)
BASOPHILS RELATIVE PERCENT: 0.2 %
BILIRUB SERPL-MCNC: 0.9 MG/DL (ref 0–1)
BUN BLDV-MCNC: 18 MG/DL (ref 7–20)
CALCIUM SERPL-MCNC: 9 MG/DL (ref 8.3–10.6)
CHLORIDE BLD-SCNC: 100 MMOL/L (ref 99–110)
CO2: 30 MMOL/L (ref 21–32)
CREAT SERPL-MCNC: 0.9 MG/DL (ref 0.6–1.2)
EOSINOPHILS ABSOLUTE: 0.1 K/UL (ref 0–0.6)
EOSINOPHILS RELATIVE PERCENT: 0.8 %
FERRITIN: 73.8 NG/ML (ref 15–150)
GFR AFRICAN AMERICAN: >60
GFR NON-AFRICAN AMERICAN: >60
GLOBULIN: 3 G/DL
GLUCOSE BLD-MCNC: 108 MG/DL (ref 70–99)
HAV IGM SER IA-ACNC: NORMAL
HCT VFR BLD CALC: 38.6 % (ref 36–48)
HEMOGLOBIN: 13.1 G/DL (ref 12–16)
HEPATITIS B CORE IGM ANTIBODY: NORMAL
HEPATITIS B SURFACE ANTIGEN INTERPRETATION: NORMAL
HEPATITIS C ANTIBODY INTERPRETATION: NORMAL
INR BLD: 1.26 (ref 0.86–1.14)
IRON SATURATION: 17 % (ref 15–50)
IRON: 51 UG/DL (ref 37–145)
LYMPHOCYTES ABSOLUTE: 1.4 K/UL (ref 1–5.1)
LYMPHOCYTES RELATIVE PERCENT: 16.7 %
MAGNESIUM: 2 MG/DL (ref 1.8–2.4)
MCH RBC QN AUTO: 30.3 PG (ref 26–34)
MCHC RBC AUTO-ENTMCNC: 33.9 G/DL (ref 31–36)
MCV RBC AUTO: 89.4 FL (ref 80–100)
MONOCYTES ABSOLUTE: 0.7 K/UL (ref 0–1.3)
MONOCYTES RELATIVE PERCENT: 8.4 %
NEUTROPHILS ABSOLUTE: 6.3 K/UL (ref 1.7–7.7)
NEUTROPHILS RELATIVE PERCENT: 73.9 %
PDW BLD-RTO: 15.1 % (ref 12.4–15.4)
PLATELET # BLD: 131 K/UL (ref 135–450)
PMV BLD AUTO: 9 FL (ref 5–10.5)
POTASSIUM SERPL-SCNC: 3.4 MMOL/L (ref 3.5–5.1)
PROTHROMBIN TIME: 14.6 SEC (ref 10–13.2)
RBC # BLD: 4.31 M/UL (ref 4–5.2)
SODIUM BLD-SCNC: 140 MMOL/L (ref 136–145)
TOTAL IRON BINDING CAPACITY: 299 UG/DL (ref 260–445)
TOTAL PROTEIN: 6.4 G/DL (ref 6.4–8.2)
WBC # BLD: 8.5 K/UL (ref 4–11)

## 2020-02-23 PROCEDURE — 6370000000 HC RX 637 (ALT 250 FOR IP): Performed by: INTERNAL MEDICINE

## 2020-02-23 PROCEDURE — 83516 IMMUNOASSAY NONANTIBODY: CPT

## 2020-02-23 PROCEDURE — G0378 HOSPITAL OBSERVATION PER HR: HCPCS

## 2020-02-23 PROCEDURE — 85610 PROTHROMBIN TIME: CPT

## 2020-02-23 PROCEDURE — 36415 COLL VENOUS BLD VENIPUNCTURE: CPT

## 2020-02-23 PROCEDURE — 96372 THER/PROPH/DIAG INJ SC/IM: CPT

## 2020-02-23 PROCEDURE — 82104 ALPHA-1-ANTITRYPSIN PHENO: CPT

## 2020-02-23 PROCEDURE — 82390 ASSAY OF CERULOPLASMIN: CPT

## 2020-02-23 PROCEDURE — 6370000000 HC RX 637 (ALT 250 FOR IP): Performed by: NURSE PRACTITIONER

## 2020-02-23 PROCEDURE — 82103 ALPHA-1-ANTITRYPSIN TOTAL: CPT

## 2020-02-23 PROCEDURE — 83735 ASSAY OF MAGNESIUM: CPT

## 2020-02-23 PROCEDURE — 80053 COMPREHEN METABOLIC PANEL: CPT

## 2020-02-23 PROCEDURE — 86376 MICROSOMAL ANTIBODY EACH: CPT

## 2020-02-23 PROCEDURE — 82728 ASSAY OF FERRITIN: CPT

## 2020-02-23 PROCEDURE — 6360000002 HC RX W HCPCS: Performed by: INTERNAL MEDICINE

## 2020-02-23 PROCEDURE — 80074 ACUTE HEPATITIS PANEL: CPT

## 2020-02-23 PROCEDURE — 83540 ASSAY OF IRON: CPT

## 2020-02-23 PROCEDURE — 94761 N-INVAS EAR/PLS OXIMETRY MLT: CPT

## 2020-02-23 PROCEDURE — 85025 COMPLETE CBC W/AUTO DIFF WBC: CPT

## 2020-02-23 PROCEDURE — 83550 IRON BINDING TEST: CPT

## 2020-02-23 PROCEDURE — 2580000003 HC RX 258: Performed by: INTERNAL MEDICINE

## 2020-02-23 PROCEDURE — 82105 ALPHA-FETOPROTEIN SERUM: CPT

## 2020-02-23 PROCEDURE — 94640 AIRWAY INHALATION TREATMENT: CPT

## 2020-02-23 RX ORDER — ALBUTEROL SULFATE 90 UG/1
2 AEROSOL, METERED RESPIRATORY (INHALATION) 4 TIMES DAILY
Status: DISCONTINUED | OUTPATIENT
Start: 2020-02-23 | End: 2020-02-25 | Stop reason: HOSPADM

## 2020-02-23 RX ORDER — ALBUTEROL SULFATE 90 UG/1
2 AEROSOL, METERED RESPIRATORY (INHALATION) EVERY 6 HOURS PRN
Status: DISCONTINUED | OUTPATIENT
Start: 2020-02-23 | End: 2020-02-23

## 2020-02-23 RX ADMIN — MELATONIN TAB 5 MG 5 MG: 5 TAB at 21:52

## 2020-02-23 RX ADMIN — Medication 2 PUFF: at 07:59

## 2020-02-23 RX ADMIN — ACETAMINOPHEN 650 MG: 325 TABLET ORAL at 18:36

## 2020-02-23 RX ADMIN — ASPIRIN 81 MG: 81 TABLET, COATED ORAL at 10:01

## 2020-02-23 RX ADMIN — AMLODIPINE BESYLATE 5 MG: 5 TABLET ORAL at 10:01

## 2020-02-23 RX ADMIN — MELATONIN TAB 5 MG 5 MG: 5 TAB at 00:09

## 2020-02-23 RX ADMIN — Medication 2 PUFF: at 15:50

## 2020-02-23 RX ADMIN — Medication 2 PUFF: at 19:49

## 2020-02-23 RX ADMIN — Medication 10 ML: at 10:01

## 2020-02-23 RX ADMIN — ACETAMINOPHEN 650 MG: 325 TABLET ORAL at 00:09

## 2020-02-23 RX ADMIN — ENOXAPARIN SODIUM 40 MG: 40 INJECTION SUBCUTANEOUS at 10:01

## 2020-02-23 RX ADMIN — Medication 2 PUFF: at 12:04

## 2020-02-23 RX ADMIN — PANTOPRAZOLE SODIUM 20 MG: 20 TABLET, DELAYED RELEASE ORAL at 10:01

## 2020-02-23 ASSESSMENT — PAIN SCALES - GENERAL
PAINLEVEL_OUTOF10: 0
PAINLEVEL_OUTOF10: 3
PAINLEVEL_OUTOF10: 0
PAINLEVEL_OUTOF10: 3
PAINLEVEL_OUTOF10: 3

## 2020-02-23 ASSESSMENT — PAIN DESCRIPTION - PAIN TYPE: TYPE: CHRONIC PAIN

## 2020-02-23 ASSESSMENT — PAIN DESCRIPTION - LOCATION: LOCATION: HIP

## 2020-02-23 ASSESSMENT — PAIN DESCRIPTION - ORIENTATION: ORIENTATION: RIGHT

## 2020-02-23 ASSESSMENT — PAIN DESCRIPTION - PROGRESSION: CLINICAL_PROGRESSION: NOT CHANGED

## 2020-02-23 ASSESSMENT — PAIN DESCRIPTION - FREQUENCY: FREQUENCY: INTERMITTENT

## 2020-02-23 ASSESSMENT — PAIN - FUNCTIONAL ASSESSMENT: PAIN_FUNCTIONAL_ASSESSMENT: ACTIVITIES ARE NOT PREVENTED

## 2020-02-23 ASSESSMENT — PAIN DESCRIPTION - ONSET: ONSET: ON-GOING

## 2020-02-23 ASSESSMENT — PAIN DESCRIPTION - DESCRIPTORS: DESCRIPTORS: ACHING;DISCOMFORT

## 2020-02-23 NOTE — PROGRESS NOTES
[facility] Medicine Progress Notes    Patient:  Jose Rafael Noyola  :   1945  MRN:   [id number]  Date of Service: 20    CHIEF COMPLAINT:  dyspnea    HISTORY OF PRESENT ILLNESS:    Jose Rafael Noyola is a 66 y.o. female. She presents for dyspnea which has been coming and going but generally worsening over about the past week. She feels ok at rest with the head of her bed elevated, but quickly becomes dyspneic with minimal exertion. She also describes orthopnea and sleeps on an adjustable bed at home that allows her to keep her upper body elevated. She sought care at [place] ER last Saturday. She was diagnosed with pneumonia and started on a Zpak and prednisone regimen. She followed up with her PCP on Tuesday. CT chest was performed the following day. and ct of chest done on . No evidence of pneumonia was discovered. Findings were most consistent with atelectasis and trace pleural effusions. Incidental findings included cirrhotic liver morphology and splenomegaly. Patient takes furosemide on a prn basis. She bases dosing on symptoms and weight which she checks every few days. She had not taken any most of the week. This morning she checked her weight, found that it had increased by about 3 lbs, and took a 40mg dose of lasix. Subjective:  Pt sitting in bed. Pending EGD in AM      Review of Systems:  All pertinent positives and negatives are as noted in the HPI section. All other systems were reviewed and are negative.     Past Medical History:   Diagnosis Date    Acid reflux     CHF (congestive heart failure) (HCC)     Generalized anxiety disorder     Hypertension     Osteoarthritis     of hips, knees, back    Screening mammogram 3/26/96       Past Surgical History:   Procedure Laterality Date    ACHILLES TENDON SURGERY      CARPAL TUNNEL RELEASE      CATARACT REMOVAL WITH IMPLANT Left 2017    Left cataract removal. Dr. Paula Sharif IMPLANT Right 05/04/2017    PHACO EMULSIFICATION OF CATARACT WITH INTRAOCULAR LENS IMPLANT RIGHT EYE    COLONOSCOPY  3/24/10    FOOT SURGERY      GALLBLADDER SURGERY      HYSTERECTOMY      NECK SURGERY      PAIN MANAGEMENT PROCEDURE N/A 1/20/2020    MIDLINE LUMBAR FIVE SACRAL ONE EPIDURAL STEROID INJECTION SITE CONFIRMED BY FLUOROSCOPY performed by Bouchra Bush MD at [place] [address]         Prior to Admission medications    Medication Sig Start Date End Date Taking? Authorizing Provider   predniSONE (DELTASONE) 10 MG tablet Take 6 tablets by mouth daily for 5 doses 2/21/20 2/26/20 Yes ALIVIA Lind CNP   albuterol sulfate  (90 Base) MCG/ACT inhaler Inhale 2 puffs into the lungs 4 times daily as needed for Wheezing 2/21/20  Yes ALIVIA Lind CNP   albuterol sulfate  (90 Base) MCG/ACT inhaler Inhale 2 puffs into the lungs 4 times daily as needed for Wheezing 2/15/20   Stefan Patel MD   Spacer/Aero-Holding Estephania Force 1 Device by Does not apply route daily as needed (Shortness of breath) 2/15/20   Stefan Patel MD   furosemide (LASIX) 40 MG tablet Take 1 tablet by mouth daily 1/23/20   Abel Alvarado MD   triamcinolone (KENALOG) 0.1 % cream APPLY TO AFFECTED AREA EVERY DAY 9/30/19   Historical Provider, MD   amLODIPine (NORVASC) 5 MG tablet Take 0.5 tablets by mouth daily 9/25/19   Abel Alvarado MD   lisinopril (PRINIVIL;ZESTRIL) 10 MG tablet Take 1 tablet by mouth daily 9/25/19   Abel Alvarado MD   aspirin EC 81 MG EC tablet Take 1 tablet by mouth daily 9/25/19   Abel Alvarado MD   Acetaminophen (TYLENOL PO) Take by mouth as needed    Historical Provider, MD   traMADol (ULTRAM) 50 MG tablet Take 50 mg by mouth as needed for Pain. [facility]     Historical Provider, MD   meloxicam (MOBIC) 15 MG tablet Take 7.5 mg by mouth daily  3/2/14   Historical Provider, MD   pantoprazole (PROTONIX) 40 MG tablet Take 20 mg by mouth daily 20 mg daily 3/30/14   Historical Provider, present. no involuntary guarding. no rebound guarding. EXTR:  Skin is warm. Capillary refill brisk. no specific or pathognomic rash. no clubbing. no pitting edema. no active wound or ulcer. NEURO: No asterixis    LABS:  Lab Results   Component Value Date    WBC 8.5 02/23/2020    HGB 13.1 02/23/2020    HCT 38.6 02/23/2020    MCV 89.4 02/23/2020     (L) 02/23/2020     Lab Results   Component Value Date    CREATININE 0.9 02/23/2020    BUN 18 02/23/2020     02/23/2020    K 3.4 (L) 02/23/2020     02/23/2020    CO2 30 02/23/2020     Lab Results   Component Value Date    ALT 44 (H) 02/23/2020    AST 47 (H) 02/23/2020    ALKPHOS 85 02/23/2020    BILITOT 0.9 02/23/2020     Lab Results   Component Value Date    TROPONINI <0.01 02/21/2020     No results for input(s): PHART, HYE3ISI, PO2ART in the last 72 hours. IMAGING:  Xr Chest Standard (2 Vw)    Result Date: 2/21/2020  EXAMINATION: TWO XRAY VIEWS OF THE CHEST 2/21/2020 7:48 pm COMPARISON: February 15, 2020 HISTORY: ORDERING SYSTEM PROVIDED HISTORY: shortness of breath TECHNOLOGIST PROVIDED HISTORY: Reason for exam:->shortness of breath FINDINGS: No evidence of pneumonia, edema or other acute pulmonary process. No evidence of acute process of the cardiac or mediastinal structures. No evidence of pneumothorax or pleural effusion. No evidence of acute cardiopulmonary disease. Xr Hip Right (2-3 Views)    Addendum Date: 2/15/2020    ADDENDUM: The initial impression incorrectly states an acute fracture identified. NO acute fracture is evident.      Result Date: 2/15/2020  EXAMINATION: TWO XRAY VIEWS OF THE RIGHT HIP 2/15/2020 8:01 pm COMPARISON: Pelvis and bilateral hip radiograph October 10, 2019 HISTORY: ORDERING SYSTEM PROVIDED HISTORY: pain TECHNOLOGIST PROVIDED HISTORY: Reason for exam:->pain Reason for Exam: rt hip pain Acuity: Acute Type of Exam: Initial Relevant Medical/Surgical History: osteoarthritis FINDINGS: Two views evidence of prior spinal fusion     Trace pleural fluid is seen bilaterally with bandlike opacities at the lung bases. Bandlike morphology favors atelectasis or scarring Cirrhosis and splenomegaly     Xr Chest Portable    Result Date: 2/15/2020  EXAMINATION: ONE XRAY VIEW OF THE CHEST 2/15/2020 8:01 pm COMPARISON: 07/22/2019 HISTORY: ORDERING SYSTEM PROVIDED HISTORY: sob TECHNOLOGIST PROVIDED HISTORY: Reason for exam:->sob Reason for Exam: sob Acuity: Acute Type of Exam: Initial Relevant Medical/Surgical History: htn, osteoarthritis FINDINGS: The cardiomediastinal silhouette is unchanged in appearance. Shallow inflation with probable subsegmental atelectasis or scarring in the left lung base. There is no pneumothorax or evidence of edema. No effusion is appreciated. Status post anterior cervical fusion. The osseous structures are unchanged in appearance. Probable subsegmental atelectasis or scarring in the left lung base. Us Arthr/asp/inj Major Jnt/bursa Right    Result Date: 2/19/2020  Radiology result is complete; follow up with provider / physician office for radiology results     EKG:  New and pertinent prior tracings were directly reviewed. My interpretation is as follows:  pending    Active Hospital Problems    Diagnosis Date Noted    Cirrhosis, nonalcoholic ([facility] [address] [D71.69] 65/71/0837    Exertional dyspnea [R06.09] 02/21/2020    Mild aortic stenosis [I35.0] 11/13/2018    HTN (hypertension) [I10] 09/19/2011       ASSESSMENT & PLAN  Symptoms seem primarily volume status related which in turn seems most attributable to cirrhosis and portal hypertension.  -Continue Furosemide 40mg IV   -Duplex ultrasound liver / spleen requested to verify cirrhotic liver, portal vein flow direction, and presence of ascites. -Transthoracic echocardiogram also requested primarily to re-evaluate patient's aortic valve.   When last studies in 2018 the valve was sclerotic and mildly stenotic.  -Continue amlodipine but

## 2020-02-23 NOTE — PROGRESS NOTES
Per pt, uses albuterol inhaler at home up to 6x a day as educated by PCP. Pt requesting. Paged cross cover for order. SPO2 currently 94% on RA.

## 2020-02-23 NOTE — PROGRESS NOTES
Patient has home inhaler in room. RN aware. Patient knows not to use inhaler and to call for treatment.

## 2020-02-23 NOTE — PROGRESS NOTES
is not using inhaled anti-inflammatory medication at home, and lacks wheezing by examination or by history for at least 24 hours, contact physician for possible discontinuation.

## 2020-02-23 NOTE — PROGRESS NOTES
Jaswinder Dudley is a 66 y.o. female patient.     Current Facility-Administered Medications   Medication Dose Route Frequency Provider Last Rate Last Dose    albuterol sulfate  (90 Base) MCG/ACT inhaler 2 puff  2 puff Inhalation 4x daily Kathy Watts MD   2 puff at 02/23/20 0759    perflutren lipid microspheres (DEFINITY) injection 1.65 mg  1.5 mL Intravenous ONCE PRN Lore Brower MD        amLODIPine (NORVASC) tablet 5 mg  5 mg Oral Daily Lore Brower MD   5 mg at 02/23/20 1001    aspirin EC tablet 81 mg  81 mg Oral Daily Lore Brower MD   81 mg at 02/23/20 1001    pantoprazole (PROTONIX) tablet 20 mg  20 mg Oral Daily Lore Brower MD   20 mg at 02/23/20 1001    traMADol (ULTRAM) tablet 50 mg  50 mg Oral TID PRN Lore Brower MD        sodium chloride flush 0.9 % injection 10 mL  10 mL Intravenous PRN Lore Brower MD   10 mL at 02/23/20 1001    potassium chloride (KLOR-CON M) extended release tablet 40 mEq  40 mEq Oral PRN Lore Brower MD        Or    potassium bicarb-citric acid (EFFER-K) effervescent tablet 40 mEq  40 mEq Oral PRN Lore Brower MD        Or    potassium chloride 10 mEq/100 mL IVPB (Peripheral Line)  10 mEq Intravenous PRN Lore Brower MD        magnesium sulfate 1 g in dextrose 5% 100 mL IVPB  1 g Intravenous PRN Lore Brower MD   Stopped at 02/22/20 1527    acetaminophen (TYLENOL) tablet 650 mg  650 mg Oral Q6H PRN Lore Brower MD   650 mg at 02/23/20 0009    acetaminophen (TYLENOL) suppository 650 mg  650 mg Rectal Q6H PRN Lore Brower MD        ondansetron (ZOFRAN-ODT) disintegrating tablet 4 mg  4 mg Oral Q8H PRN Lore Brower MD        ondansetron [facility]) injection 4 mg  4 mg Intravenous Q6H PRN Lore Brower MD        enoxaparin (LOVENOX) injection 40 mg  40 mg Subcutaneous Daily Lore Brower MD   40 mg at 02/23/20 1001    melatonin tablet 5 mg  5 mg Oral Nightly PRN Angela Pan, APRN - CNP 5 mg at 02/23/20 0009     Allergies   Allergen Reactions    Streptomycin     Sulfa Antibiotics Other (See Comments)     States change in mental status    Tylenol [Acetaminophen]      #3    Verapamil Rash     Active Problems:    HTN (hypertension)    Mild aortic stenosis    Exertional dyspnea    Cirrhosis, nonalcoholic (HCC)  Resolved Problems:    * No resolved hospital problems. *    Blood pressure (!) 179/87, pulse 83, temperature 97.8 °F (36.6 °C), temperature source Axillary, resp. rate 20, height 5' 8\" (1.727 m), weight 193 lb 6.4 oz (87.7 kg), SpO2 95 %. Subjective:  Symptoms:  Stable. Pain:  She reports no pain. Objective:  General Appearance: In no acute distress and not in pain. Vital signs: (most recent): Blood pressure (!) 179/87, pulse 83, temperature 97.8 °F (36.6 °C), temperature source Axillary, resp. rate 20, height 5' 8\" (1.727 m), weight 193 lb 6.4 oz (87.7 kg), SpO2 95 %. Heart: Normal rate. Regular rhythm. S1 normal and S2 normal.    Abdomen: Abdomen is soft. Bowel sounds are normal.   There is no abdominal tenderness. Assessment & Plan  65 yo w CHF, HTN admitted w SOB/LR. CTand USG revealed mild splenomegaly and lobular liver suggestive of cirrhosis. AST/ALT 52/44, Plt 160, INR 1.26. She denies ETOH. Has GERD on Protonix and last colonoscopy was reportedly yrs ago.     Plan:   1. Awaiting Hepatitis profile, GALEN, Fe studies, AAT, Ceruloplasmin and AFP  2. Needs outpatient Fibroscan  3. Will also need an EGD in am and possible outpatient colonoscopy  4.  Will follow     Jonnathan Valderrama MD       O) [phone]    Jonnathan Valderrama MD  2/23/2020

## 2020-02-24 LAB
A/G RATIO: 1 (ref 1.1–2.2)
ALBUMIN SERPL-MCNC: 3.1 G/DL (ref 3.4–5)
ALP BLD-CCNC: 86 U/L (ref 40–129)
ALT SERPL-CCNC: 39 U/L (ref 10–40)
ANION GAP SERPL CALCULATED.3IONS-SCNC: 13 MMOL/L (ref 3–16)
ANION GAP SERPL CALCULATED.3IONS-SCNC: 14 MMOL/L (ref 3–16)
ANION GAP SERPL CALCULATED.3IONS-SCNC: 9 MMOL/L (ref 3–16)
AST SERPL-CCNC: 43 U/L (ref 15–37)
BASOPHILS ABSOLUTE: 0 K/UL (ref 0–0.2)
BASOPHILS RELATIVE PERCENT: 0.1 %
BILIRUB SERPL-MCNC: 1.1 MG/DL (ref 0–1)
BUN BLDV-MCNC: 14 MG/DL (ref 7–20)
BUN BLDV-MCNC: 16 MG/DL (ref 7–20)
BUN BLDV-MCNC: 16 MG/DL (ref 7–20)
CALCIUM SERPL-MCNC: 8.5 MG/DL (ref 8.3–10.6)
CALCIUM SERPL-MCNC: 8.6 MG/DL (ref 8.3–10.6)
CALCIUM SERPL-MCNC: 8.7 MG/DL (ref 8.3–10.6)
CHLORIDE BLD-SCNC: 100 MMOL/L (ref 99–110)
CHLORIDE BLD-SCNC: 101 MMOL/L (ref 99–110)
CHLORIDE BLD-SCNC: 95 MMOL/L (ref 99–110)
CO2: 26 MMOL/L (ref 21–32)
CO2: 27 MMOL/L (ref 21–32)
CO2: 28 MMOL/L (ref 21–32)
CREAT SERPL-MCNC: 0.8 MG/DL (ref 0.6–1.2)
CREAT SERPL-MCNC: 0.8 MG/DL (ref 0.6–1.2)
CREAT SERPL-MCNC: 0.9 MG/DL (ref 0.6–1.2)
EOSINOPHILS ABSOLUTE: 0.1 K/UL (ref 0–0.6)
EOSINOPHILS RELATIVE PERCENT: 1.5 %
GFR AFRICAN AMERICAN: >60
GFR NON-AFRICAN AMERICAN: >60
GLOBULIN: 3.2 G/DL
GLUCOSE BLD-MCNC: 104 MG/DL (ref 70–99)
GLUCOSE BLD-MCNC: 108 MG/DL (ref 70–99)
GLUCOSE BLD-MCNC: 158 MG/DL (ref 70–99)
HCT VFR BLD CALC: 38.6 % (ref 36–48)
HEMOGLOBIN: 13 G/DL (ref 12–16)
INR BLD: 1.27 (ref 0.86–1.14)
LV EF: 65 %
LVEF MODALITY: NORMAL
LYMPHOCYTES ABSOLUTE: 1.5 K/UL (ref 1–5.1)
LYMPHOCYTES RELATIVE PERCENT: 17.5 %
MAGNESIUM: 1.8 MG/DL (ref 1.8–2.4)
MCH RBC QN AUTO: 30.4 PG (ref 26–34)
MCHC RBC AUTO-ENTMCNC: 33.6 G/DL (ref 31–36)
MCV RBC AUTO: 90.4 FL (ref 80–100)
MONOCYTES ABSOLUTE: 0.6 K/UL (ref 0–1.3)
MONOCYTES RELATIVE PERCENT: 7.1 %
NEUTROPHILS ABSOLUTE: 6.3 K/UL (ref 1.7–7.7)
NEUTROPHILS RELATIVE PERCENT: 73.8 %
PDW BLD-RTO: 14.9 % (ref 12.4–15.4)
PLATELET # BLD: 141 K/UL (ref 135–450)
PMV BLD AUTO: 9.9 FL (ref 5–10.5)
POTASSIUM SERPL-SCNC: 2.3 MMOL/L (ref 3.5–5.1)
POTASSIUM SERPL-SCNC: 3.2 MMOL/L (ref 3.5–5.1)
POTASSIUM SERPL-SCNC: 4.3 MMOL/L (ref 3.5–5.1)
PROTHROMBIN TIME: 14.8 SEC (ref 10–13.2)
RBC # BLD: 4.27 M/UL (ref 4–5.2)
SODIUM BLD-SCNC: 136 MMOL/L (ref 136–145)
SODIUM BLD-SCNC: 138 MMOL/L (ref 136–145)
SODIUM BLD-SCNC: 139 MMOL/L (ref 136–145)
TOTAL PROTEIN: 6.3 G/DL (ref 6.4–8.2)
WBC # BLD: 8.5 K/UL (ref 4–11)

## 2020-02-24 PROCEDURE — 2709999900 HC NON-CHARGEABLE SUPPLY: Performed by: INTERNAL MEDICINE

## 2020-02-24 PROCEDURE — 99152 MOD SED SAME PHYS/QHP 5/>YRS: CPT | Performed by: INTERNAL MEDICINE

## 2020-02-24 PROCEDURE — 7100000010 HC PHASE II RECOVERY - FIRST 15 MIN: Performed by: INTERNAL MEDICINE

## 2020-02-24 PROCEDURE — 3609017100 HC EGD: Performed by: INTERNAL MEDICINE

## 2020-02-24 PROCEDURE — 6370000000 HC RX 637 (ALT 250 FOR IP): Performed by: INTERNAL MEDICINE

## 2020-02-24 PROCEDURE — 80053 COMPREHEN METABOLIC PANEL: CPT

## 2020-02-24 PROCEDURE — G0378 HOSPITAL OBSERVATION PER HR: HCPCS

## 2020-02-24 PROCEDURE — 6360000002 HC RX W HCPCS: Performed by: INTERNAL MEDICINE

## 2020-02-24 PROCEDURE — 7100000011 HC PHASE II RECOVERY - ADDTL 15 MIN: Performed by: INTERNAL MEDICINE

## 2020-02-24 PROCEDURE — 36415 COLL VENOUS BLD VENIPUNCTURE: CPT

## 2020-02-24 PROCEDURE — 6370000000 HC RX 637 (ALT 250 FOR IP): Performed by: NURSE PRACTITIONER

## 2020-02-24 PROCEDURE — 6360000002 HC RX W HCPCS: Performed by: NURSE PRACTITIONER

## 2020-02-24 PROCEDURE — 85025 COMPLETE CBC W/AUTO DIFF WBC: CPT

## 2020-02-24 PROCEDURE — 96372 THER/PROPH/DIAG INJ SC/IM: CPT

## 2020-02-24 PROCEDURE — 2580000003 HC RX 258: Performed by: INTERNAL MEDICINE

## 2020-02-24 PROCEDURE — 93306 TTE W/DOPPLER COMPLETE: CPT

## 2020-02-24 PROCEDURE — 85610 PROTHROMBIN TIME: CPT

## 2020-02-24 PROCEDURE — 83735 ASSAY OF MAGNESIUM: CPT

## 2020-02-24 PROCEDURE — 94640 AIRWAY INHALATION TREATMENT: CPT

## 2020-02-24 RX ORDER — POTASSIUM CHLORIDE 7.45 MG/ML
10 INJECTION INTRAVENOUS
Status: COMPLETED | OUTPATIENT
Start: 2020-02-24 | End: 2020-02-24

## 2020-02-24 RX ORDER — SODIUM CHLORIDE, SODIUM LACTATE, POTASSIUM CHLORIDE, CALCIUM CHLORIDE 600; 310; 30; 20 MG/100ML; MG/100ML; MG/100ML; MG/100ML
INJECTION, SOLUTION INTRAVENOUS CONTINUOUS
Status: DISCONTINUED | OUTPATIENT
Start: 2020-02-24 | End: 2020-02-25 | Stop reason: HOSPADM

## 2020-02-24 RX ORDER — POTASSIUM CHLORIDE 20 MEQ/1
40 TABLET, EXTENDED RELEASE ORAL 3 TIMES DAILY
Status: COMPLETED | OUTPATIENT
Start: 2020-02-24 | End: 2020-02-25

## 2020-02-24 RX ORDER — FENTANYL CITRATE 50 UG/ML
INJECTION, SOLUTION INTRAMUSCULAR; INTRAVENOUS PRN
Status: DISCONTINUED | OUTPATIENT
Start: 2020-02-24 | End: 2020-02-24 | Stop reason: ALTCHOICE

## 2020-02-24 RX ORDER — MIDAZOLAM HYDROCHLORIDE 5 MG/ML
INJECTION INTRAMUSCULAR; INTRAVENOUS PRN
Status: DISCONTINUED | OUTPATIENT
Start: 2020-02-24 | End: 2020-02-24 | Stop reason: ALTCHOICE

## 2020-02-24 RX ADMIN — POTASSIUM CHLORIDE 40 MEQ: 20 TABLET, EXTENDED RELEASE ORAL at 21:57

## 2020-02-24 RX ADMIN — POTASSIUM CHLORIDE 10 MEQ: 7.46 INJECTION, SOLUTION INTRAVENOUS at 16:39

## 2020-02-24 RX ADMIN — Medication 2 PUFF: at 15:21

## 2020-02-24 RX ADMIN — Medication 10 ML: at 21:57

## 2020-02-24 RX ADMIN — ENOXAPARIN SODIUM 40 MG: 40 INJECTION SUBCUTANEOUS at 08:38

## 2020-02-24 RX ADMIN — Medication 2 PUFF: at 09:08

## 2020-02-24 RX ADMIN — SODIUM CHLORIDE, POTASSIUM CHLORIDE, SODIUM LACTATE AND CALCIUM CHLORIDE: 600; 310; 30; 20 INJECTION, SOLUTION INTRAVENOUS at 07:15

## 2020-02-24 RX ADMIN — Medication 2 PUFF: at 11:30

## 2020-02-24 RX ADMIN — PANTOPRAZOLE SODIUM 20 MG: 20 TABLET, DELAYED RELEASE ORAL at 08:38

## 2020-02-24 RX ADMIN — Medication 2 PUFF: at 19:52

## 2020-02-24 RX ADMIN — POTASSIUM CHLORIDE 10 MEQ: 7.46 INJECTION, SOLUTION INTRAVENOUS at 12:14

## 2020-02-24 RX ADMIN — POTASSIUM CHLORIDE 10 MEQ: 7.46 INJECTION, SOLUTION INTRAVENOUS at 14:44

## 2020-02-24 RX ADMIN — ASPIRIN 81 MG: 81 TABLET, COATED ORAL at 08:38

## 2020-02-24 RX ADMIN — POTASSIUM CHLORIDE 40 MEQ: 20 TABLET, EXTENDED RELEASE ORAL at 13:43

## 2020-02-24 RX ADMIN — POTASSIUM CHLORIDE 10 MEQ: 7.46 INJECTION, SOLUTION INTRAVENOUS at 13:43

## 2020-02-24 RX ADMIN — Medication 10 ML: at 08:38

## 2020-02-24 RX ADMIN — AMLODIPINE BESYLATE 5 MG: 5 TABLET ORAL at 08:38

## 2020-02-24 ASSESSMENT — PAIN SCALES - GENERAL
PAINLEVEL_OUTOF10: 0

## 2020-02-24 ASSESSMENT — PAIN - FUNCTIONAL ASSESSMENT: PAIN_FUNCTIONAL_ASSESSMENT: 0-10

## 2020-02-24 NOTE — PROGRESS NOTES
[facility] Medicine Progress Notes    Patient:  Agus Preston  :   1945  MRN:   [id number]  Date of Service: 20    CHIEF COMPLAINT:  dyspnea    HISTORY OF PRESENT ILLNESS:    Agus Preston is a 66 y.o. female. She presents for dyspnea which has been coming and going but generally worsening over about the past week. She feels ok at rest with the head of her bed elevated, but quickly becomes dyspneic with minimal exertion. She also describes orthopnea and sleeps on an adjustable bed at home that allows her to keep her upper body elevated. She sought care at [address] ER last Saturday. She was diagnosed with pneumonia and started on a Zpak and prednisone regimen. She followed up with her PCP on Tuesday. CT chest was performed the following day. and ct of chest done on . No evidence of pneumonia was discovered. Findings were most consistent with atelectasis and trace pleural effusions. Incidental findings included cirrhotic liver morphology and splenomegaly. Patient takes furosemide on a prn basis. She bases dosing on symptoms and weight which she checks every few days. She had not taken any most of the week. This morning she checked her weight, found that it had increased by about 3 lbs, and took a 40mg dose of lasix. Subjective:  S/P EGD  K is low, 2.3      Review of Systems:  All pertinent positives and negatives are as noted in the HPI section. All other systems were reviewed and are negative.     Past Medical History:   Diagnosis Date    Acid reflux     CHF (congestive heart failure) (HCC)     Generalized anxiety disorder     Hypertension     Osteoarthritis     of hips, knees, back    Screening mammogram 3/26/96       Past Surgical History:   Procedure Laterality Date    ACHILLES TENDON SURGERY      CARPAL TUNNEL RELEASE      CATARACT REMOVAL WITH IMPLANT Left 2017    Left cataract removal. Dr. Bhavana Ambrocio Right 3/30/14   Historical Provider, MD       Allergies:   Streptomycin; Sulfa antibiotics; Tylenol [acetaminophen]; and Verapamil    Social:   reports that she has never smoked. She has never used smokeless tobacco.   reports no history of alcohol use. Social History     Substance and Sexual Activity   Drug Use No       Family History   Problem Relation Age of Onset    High Blood Pressure Mother        PHYSICAL EXAM:  I performed this physical examination. Vitals:  Patient Vitals for the past 24 hrs:   BP Temp Temp src Pulse Resp SpO2 Weight   02/24/20 1153 (!) 148/69 98.2 °F (36.8 °C) Oral 100 16 98 % --   02/24/20 1131 -- -- -- -- -- 92 % --   02/24/20 0909 -- -- -- -- -- 95 % --   02/24/20 0836 (!) 149/74 97.6 °F (36.4 °C) Axillary 82 16 94 % --   02/24/20 0803 (!) 134/54 -- -- 69 18 92 % --   02/24/20 0748 (!) 142/61 -- -- 74 18 93 % --   02/24/20 0732 (!) 145/61 -- -- 71 18 92 % --   02/24/20 0729 (!) 146/60 -- -- 72 -- 98 % --   02/24/20 0728 -- -- -- 80 -- 99 % --   02/24/20 0727 -- -- -- 80 -- 98 % --   02/24/20 0726 (!) 159/64 -- -- 80 -- 98 % --   02/24/20 0725 -- -- -- 77 -- 100 % --   02/24/20 0724 -- -- -- 77 -- 100 % --   02/24/20 0723 (!) 167/68 -- -- 77 -- 100 % --   02/24/20 0722 -- -- -- -- -- 97 % --   02/24/20 0721 -- -- -- -- -- 96 % --   02/24/20 0720 -- -- -- -- -- 95 % --   02/24/20 0719 -- -- -- -- -- 94 % --   02/24/20 0459 (!) 164/77 98.1 °F (36.7 °C) Oral 73 19 95 % --   02/24/20 0432 -- -- -- -- -- -- 188 lb 12.8 oz (85.6 kg)   02/23/20 2145 (!) 149/71 97.3 °F (36.3 °C) Oral 85 16 95 % --   02/23/20 1700 (!) 145/62 97.5 °F (36.4 °C) Oral 84 18 94 % --   02/23/20 1328 (!) 141/58 97.7 °F (36.5 °C) Oral 83 -- 98 % --       Intake/Output Summary (Last 24 hours) at 2/24/2020 1304  Last data filed at 2/24/2020 6959  Gross per 24 hour   Intake 240 ml   Output 1000 ml   Net -760 ml       Vent Settings:     / / /     GEN:  Appearance:  Pleasant age appropriate female .     Level of Consciousness: pulmonary process. No evidence of acute process of the cardiac or mediastinal structures. No evidence of pneumothorax or pleural effusion. No evidence of acute cardiopulmonary disease. Xr Hip Right (2-3 Views)    Addendum Date: 2/15/2020    ADDENDUM: The initial impression incorrectly states an acute fracture identified. NO acute fracture is evident. Result Date: 2/15/2020  EXAMINATION: TWO XRAY VIEWS OF THE RIGHT HIP 2/15/2020 8:01 pm COMPARISON: Pelvis and bilateral hip radiograph October 10, 2019 HISTORY: ORDERING SYSTEM PROVIDED HISTORY: pain TECHNOLOGIST PROVIDED HISTORY: Reason for exam:->pain Reason for Exam: rt hip pain Acuity: Acute Type of Exam: Initial Relevant Medical/Surgical History: osteoarthritis FINDINGS: Two views of the pelvis and right hip were reviewed. The bones are osteopenic. No acute fracture identified. Moderate osteoarthritic changes of the bilateral hips. Enthesophytes at the bilateral iliac wings and involving the bilateral greater trochanters and pubic bones. Alignment of the hips is anatomic. Surgical clips project over the lower abdomen and pelvis. 1. Acute fracture identified. 2. Moderate osteoarthritis of the bilateral hips. 3. Osteopenia. Xr Knee Right (min 4 Views)    Result Date: 2/19/2020  Radiology exam is complete. No Radiologist dictation. Please follow up with ordering provider. Ct Chest Wo Contrast    Result Date: 2/19/2020  EXAMINATION: CT OF THE CHEST WITHOUT CONTRAST 2/19/2020 4:28 pm TECHNIQUE: CT of the chest was performed without the administration of intravenous contrast. Multiplanar reformatted images are provided for review. Dose modulation, iterative reconstruction, and/or weight based adjustment of the mA/kV was utilized to reduce the radiation dose to as low as reasonably achievable. COMPARISON: None.  HISTORY: ORDERING SYSTEM PROVIDED HISTORY: Dyspnea on exertion TECHNOLOGIST PROVIDED HISTORY: Reason for Exam: Dyspnea on exertion as follows:  pending    Active Hospital Problems    Diagnosis Date Noted    Cirrhosis, nonalcoholic ([facility] [address] [J41.95] 05/89/7435    Exertional dyspnea [R06.09] 02/21/2020    Mild aortic stenosis [I35.0] 11/13/2018    HTN (hypertension) [I10] 09/19/2011       ASSESSMENT & PLAN  Symptoms seem primarily volume status related which in turn seems most attributable to cirrhosis and portal hypertension.  -Laix stopped, IV x 1 in ED. -Duplex ultrasound liver / spleen requested to verify cirrhotic liver, portal vein flow direction, and presence of ascites. -Transthoracic echocardiogram also requested primarily to re-evaluate patient's aortic valve. When last studies in 2018 the valve was sclerotic and mildly stenotic.  -Continue amlodipine but increase to 5mg daily. May require further increases. Lisinopril held pending evaluation for cirrhosis. -EGD this AM Was WNL, she will need outpt colonoscopy and fibroscan    Hypokalemia  -2.3, replete today. Repeat BMP at 1700  -place tele monitor. DVT prophylaxis: SCDs, lovenox  Code Status:  Full  Disposition:  Observation. D/C to home anticipated later today or tomorrow. Patient lives in a home with her niece.       Eliz Garibay CNP

## 2020-02-24 NOTE — PLAN OF CARE
Problem: Pain:  Goal: Pain level will decrease  Description  Pain level will decrease  2/24/2020 0120 by Tomasa Dee RN  Outcome: Ongoing  2/23/2020 1821 by Jyothi Singh RN  Outcome: Met This Shift  Goal: Control of acute pain  Description  Control of acute pain  2/24/2020 0120 by Tomasa Dee RN  Outcome: Ongoing  2/23/2020 1821 by Jyothi Singh RN  Outcome: Met This Shift  Goal: Control of chronic pain  Description  Control of chronic pain  2/24/2020 0120 by Tomasa Dee RN  Outcome: Ongoing  2/23/2020 1821 by Jyothi Singh RN  Outcome: Met This Shift

## 2020-02-24 NOTE — H&P
Pre-sedation Assessment    History and Physical / Pre-Sedation Assessment  Patient:  Tanvi Damon   :   1945     Intended Procedure: EGD      HPI: 65 yo w CHF, HTN admitted w SOB/LR. CTand USG revealed mild splenomegaly and lobular liver suggestive of cirrhosis. AST/ALT 52/44, Plt 160, INR 1.26. She denies ETOH. Has GERD on Protonix and last colonoscopy was reportedly yrs ago.     Plan:   1. Awaiting Hepatitis profile, GALEN, Fe studies, AAT, Ceruloplasmin and AFP  2. Needs outpatient Fibroscan  3.  Will also need an EGD in am and possible outpatient colonoscopy    Current Facility-Administered Medications   Medication Dose Route Frequency Provider Last Rate Last Dose    lactated ringers infusion   Intravenous Continuous Sabrina Handy  mL/hr at 20 0715      albuterol sulfate  (90 Base) MCG/ACT inhaler 2 puff  2 puff Inhalation 4x daily Audra Jaramillo MD   2 puff at 20    perflutren lipid microspheres (DEFINITY) injection 1.65 mg  1.5 mL Intravenous ONCE PRN Indu Minor MD        amLODIPine (NORVASC) tablet 5 mg  5 mg Oral Daily Indu Minor MD   5 mg at 20 1001    aspirin EC tablet 81 mg  81 mg Oral Daily Indu Minor MD   81 mg at 20 1001    pantoprazole (PROTONIX) tablet 20 mg  20 mg Oral Daily Indu Minor MD   20 mg at 20 1001    traMADol (ULTRAM) tablet 50 mg  50 mg Oral TID PRN Indu Minor MD        sodium chloride flush 0.9 % injection 10 mL  10 mL Intravenous PRN Indu Minor MD   10 mL at 20 1001    potassium chloride (KLOR-CON M) extended release tablet 40 mEq  40 mEq Oral PRN Indu Minor MD        Or    potassium bicarb-citric acid (EFFER-K) effervescent tablet 40 mEq  40 mEq Oral PRN Indu Minor MD        Or    potassium chloride 10 mEq/100 mL IVPB (Peripheral Line)  10 mEq Intravenous PRN Indu Minor MD        magnesium sulfate 1 g in dextrose 5% 100 mL IVPB  1 g Intravenous PRN Izzy Martinez MD   Stopped at 02/22/20 1527    acetaminophen (TYLENOL) tablet 650 mg  650 mg Oral Q6H PRN Izzy Martinez MD   650 mg at 02/23/20 1836    acetaminophen (TYLENOL) suppository 650 mg  650 mg Rectal Q6H PRN Izzy Martinez MD        ondansetron (ZOFRAN-ODT) disintegrating tablet 4 mg  4 mg Oral Q8H PRN Izzy Martinez MD        ondansetron [facility]) injection 4 mg  4 mg Intravenous Q6H PRN Izzy Martinez MD        enoxaparin (LOVENOX) injection 40 mg  40 mg Subcutaneous Daily Izzy Martinez MD   40 mg at 02/23/20 1001    melatonin tablet 5 mg  5 mg Oral Nightly PRN ALIVIA Leavitt - CNP   5 mg at 02/23/20 2152     Past Medical History:   Diagnosis Date    Acid reflux     [facility]     Generalized anxiety disorder     Hypertension     Osteoarthritis     of hips, knees, back    Screening mammogram 3/26/96     Past Surgical History:   Procedure Laterality Date    ACHILLES TENDON SURGERY      CARPAL TUNNEL RELEASE      CATARACT REMOVAL WITH IMPLANT Left 04/06/2017    Left cataract removal. Dr. Brandon Pappas Right 05/04/2017    PHACO EMULSIFICATION OF CATARACT WITH INTRAOCULAR LENS IMPLANT RIGHT EYE    COLONOSCOPY  3/24/10    FOOT SURGERY      GALLBLADDER SURGERY      HYSTERECTOMY      NECK SURGERY      PAIN MANAGEMENT PROCEDURE N/A 1/20/2020    MIDLINE LUMBAR FIVE SACRAL ONE EPIDURAL STEROID INJECTION SITE CONFIRMED BY FLUOROSCOPY performed by Dory Sullivan MD at [address]         Nurses notes reviewed and agreed. Medications reviewed  Allergies:    Allergies   Allergen Reactions    Streptomycin     Sulfa Antibiotics Other (See Comments)     States change in mental status    Tylenol [Acetaminophen]      #3    Verapamil Rash           Physical Exam:  Vital Signs: BP (!) 164/77   Pulse 73   Temp 98.1 °F (36.7 °C) (Oral)   Resp 19   Ht 5' 8\" (1.727 m)   Wt 188

## 2020-02-25 VITALS
OXYGEN SATURATION: 93 % | HEART RATE: 85 BPM | BODY MASS INDEX: 28.76 KG/M2 | HEIGHT: 68 IN | DIASTOLIC BLOOD PRESSURE: 71 MMHG | TEMPERATURE: 98.7 F | RESPIRATION RATE: 16 BRPM | WEIGHT: 189.8 LBS | SYSTOLIC BLOOD PRESSURE: 153 MMHG

## 2020-02-25 LAB
A/G RATIO: 0.9 (ref 1.1–2.2)
AFP: 3.8 UG/L
ALBUMIN SERPL-MCNC: 3 G/DL (ref 3.4–5)
ALP BLD-CCNC: 84 U/L (ref 40–129)
ALPHA-1 ANTITRYPSIN PHENOTYPE: NORMAL
ALPHA-1 ANTITRYPSIN: 135 MG/DL (ref 90–200)
ALT SERPL-CCNC: 36 U/L (ref 10–40)
ANION GAP SERPL CALCULATED.3IONS-SCNC: 9 MMOL/L (ref 3–16)
AST SERPL-CCNC: 35 U/L (ref 15–37)
BASOPHILS ABSOLUTE: 0 K/UL (ref 0–0.2)
BASOPHILS RELATIVE PERCENT: 0.3 %
BILIRUB SERPL-MCNC: 1.2 MG/DL (ref 0–1)
BUN BLDV-MCNC: 13 MG/DL (ref 7–20)
CALCIUM SERPL-MCNC: 8.7 MG/DL (ref 8.3–10.6)
CERULOPLASMIN: 20 MG/DL (ref 16–45)
CHLORIDE BLD-SCNC: 97 MMOL/L (ref 99–110)
CO2: 29 MMOL/L (ref 21–32)
CREAT SERPL-MCNC: 0.8 MG/DL (ref 0.6–1.2)
EOSINOPHILS ABSOLUTE: 0.2 K/UL (ref 0–0.6)
EOSINOPHILS RELATIVE PERCENT: 2.8 %
F-ACTIN AB IGG: 10 UNITS (ref 0–19)
GFR AFRICAN AMERICAN: >60
GFR NON-AFRICAN AMERICAN: >60
GLOBULIN: 3.2 G/DL
GLUCOSE BLD-MCNC: 100 MG/DL (ref 70–99)
HCT VFR BLD CALC: 36.6 % (ref 36–48)
HEMOGLOBIN: 12.7 G/DL (ref 12–16)
INR BLD: 1.25 (ref 0.86–1.14)
LIVER-KIDNEY MICROSOME-1 AB IGG: 0.8 U (ref 0–24.9)
LYMPHOCYTES ABSOLUTE: 1.5 K/UL (ref 1–5.1)
LYMPHOCYTES RELATIVE PERCENT: 21.9 %
MAGNESIUM: 1.9 MG/DL (ref 1.8–2.4)
MCH RBC QN AUTO: 31 PG (ref 26–34)
MCHC RBC AUTO-ENTMCNC: 34.6 G/DL (ref 31–36)
MCV RBC AUTO: 89.5 FL (ref 80–100)
MONOCYTES ABSOLUTE: 0.7 K/UL (ref 0–1.3)
MONOCYTES RELATIVE PERCENT: 10.1 %
NEUTROPHILS ABSOLUTE: 4.4 K/UL (ref 1.7–7.7)
NEUTROPHILS RELATIVE PERCENT: 64.9 %
PDW BLD-RTO: 14.9 % (ref 12.4–15.4)
PLATELET # BLD: 124 K/UL (ref 135–450)
PMV BLD AUTO: 9.4 FL (ref 5–10.5)
POTASSIUM SERPL-SCNC: 3.9 MMOL/L (ref 3.5–5.1)
PROTHROMBIN TIME: 14.5 SEC (ref 10–13.2)
RBC # BLD: 4.09 M/UL (ref 4–5.2)
SODIUM BLD-SCNC: 135 MMOL/L (ref 136–145)
TOTAL PROTEIN: 6.2 G/DL (ref 6.4–8.2)
WBC # BLD: 6.8 K/UL (ref 4–11)

## 2020-02-25 PROCEDURE — 6370000000 HC RX 637 (ALT 250 FOR IP): Performed by: INTERNAL MEDICINE

## 2020-02-25 PROCEDURE — 94761 N-INVAS EAR/PLS OXIMETRY MLT: CPT

## 2020-02-25 PROCEDURE — G0378 HOSPITAL OBSERVATION PER HR: HCPCS

## 2020-02-25 PROCEDURE — 6370000000 HC RX 637 (ALT 250 FOR IP): Performed by: NURSE PRACTITIONER

## 2020-02-25 PROCEDURE — 83735 ASSAY OF MAGNESIUM: CPT

## 2020-02-25 PROCEDURE — 85610 PROTHROMBIN TIME: CPT

## 2020-02-25 PROCEDURE — 36415 COLL VENOUS BLD VENIPUNCTURE: CPT

## 2020-02-25 PROCEDURE — 85025 COMPLETE CBC W/AUTO DIFF WBC: CPT

## 2020-02-25 PROCEDURE — 80053 COMPREHEN METABOLIC PANEL: CPT

## 2020-02-25 PROCEDURE — 94640 AIRWAY INHALATION TREATMENT: CPT

## 2020-02-25 PROCEDURE — 99214 OFFICE O/P EST MOD 30 MIN: CPT | Performed by: INTERNAL MEDICINE

## 2020-02-25 RX ADMIN — Medication 2 PUFF: at 09:15

## 2020-02-25 RX ADMIN — POTASSIUM CHLORIDE 40 MEQ: 20 TABLET, EXTENDED RELEASE ORAL at 08:45

## 2020-02-25 RX ADMIN — ASPIRIN 81 MG: 81 TABLET, COATED ORAL at 08:45

## 2020-02-25 RX ADMIN — AMLODIPINE BESYLATE 5 MG: 5 TABLET ORAL at 08:45

## 2020-02-25 RX ADMIN — PANTOPRAZOLE SODIUM 20 MG: 20 TABLET, DELAYED RELEASE ORAL at 08:46

## 2020-02-25 RX ADMIN — Medication 2 PUFF: at 11:33

## 2020-02-25 ASSESSMENT — PAIN SCALES - GENERAL: PAINLEVEL_OUTOF10: 0

## 2020-02-25 NOTE — PLAN OF CARE
Patient will remain free from falls this shift. Call light within reach, bedside table within reach, bed in lowest position, bed alarm on, 2/4 rails raised, bed in locked position, phone within patient's reach, non-skid socks on, fall risk wristband applied. Patient instructed to call out if she needs to get up or needs any assistance, RN instructed patient not to ambulate without assistance. Patient verbalized understanding. RN will continue to monitor.

## 2020-02-25 NOTE — PROGRESS NOTES
Christie Patel is a 66 y.o. female patient.     Current Facility-Administered Medications   Medication Dose Route Frequency Provider Last Rate Last Dose    lactated ringers infusion   Intravenous Continuous Godwin Pan  mL/hr at 02/24/20 0715      albuterol sulfate  (90 Base) MCG/ACT inhaler 2 puff  2 puff Inhalation 4x daily Godwin Pan MD   2 puff at 02/24/20 1952    perflutren lipid microspheres (DEFINITY) injection 1.65 mg  1.5 mL Intravenous ONCE PRN Godwin Pan MD        amLODIPine (NORVASC) tablet 5 mg  5 mg Oral Daily Godwin Pan MD   5 mg at 02/25/20 0845    aspirin EC tablet 81 mg  81 mg Oral Daily Godwin Pan MD   81 mg at 02/25/20 0845    pantoprazole (PROTONIX) tablet 20 mg  20 mg Oral Daily Godwin Pan MD   20 mg at 02/25/20 0846    traMADol (ULTRAM) tablet 50 mg  50 mg Oral TID PRN Godwin Pan MD        sodium chloride flush 0.9 % injection 10 mL  10 mL Intravenous PRN Godwin Pan MD   10 mL at 02/24/20 2157    potassium chloride (KLOR-CON M) extended release tablet 40 mEq  40 mEq Oral PRN Godwin Pan MD        Or    potassium bicarb-citric acid (EFFER-K) effervescent tablet 40 mEq  40 mEq Oral PRN Godwin Pan MD        Or    potassium chloride 10 mEq/100 mL IVPB (Peripheral Line)  10 mEq Intravenous PRN Godwin Pan  mL/hr at 02/24/20 1639 10 mEq at 02/24/20 1639    magnesium sulfate 1 g in dextrose 5% 100 mL IVPB  1 g Intravenous PRN Godwin Pan MD   Stopped at 02/22/20 1527    acetaminophen (TYLENOL) tablet 650 mg  650 mg Oral Q6H PRN Godwin Pan MD   650 mg at 02/23/20 1836    acetaminophen (TYLENOL) suppository 650 mg  650 mg Rectal Q6H PRN Godwin Pan MD        ondansetron (ZOFRAN-ODT) disintegrating tablet 4 mg  4 mg Oral Q8H PRN Godwin Pan MD        ondansetron [facility]) injection 4 mg  4 mg Intravenous Q6H PRN Godwin Pan MD        enoxaparin (LOVENOX) injection 40 mg  40 mg Subcutaneous Daily Godwin Pan MD   40 mg at 02/24/20 5479   

## 2020-02-25 NOTE — PROGRESS NOTES
RN paged MD Darren Estrada with Cardiology signed off on patient. Discharge? :) Will watch for orders. Thank you!

## 2020-02-25 NOTE — CONSULTS
on prescribed medical therapy for mild nonrheumatic aortic valve stenosis, chronic diastolic heart failure, chronic essential hypertension. Past Medical History:   has a past medical history of Acid reflux, CHF (congestive heart failure) ([address] Generalized anxiety disorder, Hypertension, Osteoarthritis, and Screening mammogram.    Surgical History:   has a past surgical history that includes Colonoscopy (3/24/10); Hysterectomy; Gallbladder surgery; Carpal tunnel release; Foot surgery; Neck surgery; Cataract removal with implant (Left, 04/06/2017); Cataract removal with implant (Right, 05/04/2017); Pain management procedure (N/A, 1/20/2020); Achilles tendon surgery; Upper gastrointestinal endoscopy; and Upper gastrointestinal endoscopy (N/A, 2/24/2020). Social History:   reports that she has never smoked. She has never used smokeless tobacco. She reports that she does not drink alcohol or use drugs. Family History:  family history includes High Blood Pressure in her mother. Home Medications:  Were reviewed and are listed in nursing record and/or below  Prior to Admission medications    Medication Sig Start Date End Date Taking?  Authorizing Provider   predniSONE (DELTASONE) 10 MG tablet Take 6 tablets by mouth daily for 5 doses 2/21/20 2/26/20 Yes ALIVIA Lind CNP   albuterol sulfate  (90 Base) MCG/ACT inhaler Inhale 2 puffs into the lungs 4 times daily as needed for Wheezing 2/21/20  Yes ALIVIA Lind CNP   albuterol sulfate  (90 Base) MCG/ACT inhaler Inhale 2 puffs into the lungs 4 times daily as needed for Wheezing 2/15/20   Marsha Lugo MD   Spacer/Aero-Holding Chambers DONYA 1 Device by Does not apply route daily as needed (Shortness of breath) 2/15/20   Marsha Lugo MD   furosemide (LASIX) 40 MG tablet Take 1 tablet by mouth daily 1/23/20   Argelia Dudley MD   triamcinolone (KENALOG) 0.1 % cream APPLY TO AFFECTED AREA EVERY DAY 9/30/19   Historical sinus rhythm, left anterior fascicular block, right bundle branch block, similar to prior EKG    Echo:    February 4, 2020:     Summary   There is hyperdynamic LV systolic function with an estimated ejection   fraction of >65%. Mild concentric left ventricular hypertrophy. No regional wall motion abnormalities are seen. Grade I diastolic dysfunction with normal filing pressure. There is a late peaking gradient recorded across the LV outflow tract   consistent with dynamic obstruction. LVOT pressure gradients are elevated at   rest at 8 mmHg and with valsalva of 36mmHg. Aortic valve appears sclerotic and appears to open well. The aortic valve area is calculated at 1.76 cm2 with max velocity of 2.5   m/sec, a maximum pressure gradient of 25 mmHg and a mean pressure gradient   of 13 mmHg. This is c/w mild aortic stenosis (pressure gradients could be   elevated due hyperdynamic LV). There is no evidence of aortic regurgitation. Stress Test:    October 2019:    Conclusions        Summary    Normal LV function.   Arva Blunt is normal isotope uptake at stress and rest. There is no evidence of    myocardial ischemia or scar.    Normal myocardial perfusion study. Cath:    Studies:       I have reviewed labs and imaging/xray/diagnostic testing in this note.     Assessment and Plan          Patient Active Problem List   Diagnosis    HTN (hypertension)    Generalized anxiety disorder    Acid reflux    Osteoarthritis hips, knees, back    Primary osteoarthritis of right knee    Right wrist sprain, initial encounter    Arthritis of carpometacarpal (CMC) joint of right thumb    Mild aortic stenosis    SOB (shortness of breath) on exertion    Acute diastolic congestive heart failure (HCC)    Exertional dyspnea    Cirrhosis, nonalcoholic (HCC)       Dyspnea, likely related to volume overload, responding to diuretic therapy, continue that with regard to plans and management for work-up and

## 2020-02-29 ENCOUNTER — APPOINTMENT (OUTPATIENT)
Dept: CT IMAGING | Age: 75
DRG: 065 | End: 2020-02-29
Payer: MEDICARE

## 2020-02-29 ENCOUNTER — APPOINTMENT (OUTPATIENT)
Dept: GENERAL RADIOLOGY | Age: 75
DRG: 065 | End: 2020-02-29
Payer: MEDICARE

## 2020-02-29 ENCOUNTER — HOSPITAL ENCOUNTER (INPATIENT)
Age: 75
LOS: 4 days | Discharge: HOME HEALTH | DRG: 065 | End: 2020-03-04
Attending: EMERGENCY MEDICINE | Admitting: INTERNAL MEDICINE
Payer: MEDICARE

## 2020-02-29 PROBLEM — N17.9 AKI (ACUTE KIDNEY INJURY) (HCC): Status: ACTIVE | Noted: 2020-02-29

## 2020-02-29 PROBLEM — R09.89 SUSPECTED CEREBROVASCULAR ACCIDENT (CVA): Status: ACTIVE | Noted: 2020-02-29

## 2020-02-29 LAB
A/G RATIO: 1 (ref 1.1–2.2)
ABO/RH: NORMAL
ALBUMIN SERPL-MCNC: 3.5 G/DL (ref 3.4–5)
ALP BLD-CCNC: 89 U/L (ref 40–129)
ALT SERPL-CCNC: 37 U/L (ref 10–40)
ANION GAP SERPL CALCULATED.3IONS-SCNC: 15 MMOL/L (ref 3–16)
ANTIBODY SCREEN: NORMAL
AST SERPL-CCNC: 37 U/L (ref 15–37)
BASOPHILS ABSOLUTE: 0 K/UL (ref 0–0.2)
BASOPHILS RELATIVE PERCENT: 0.2 %
BILIRUB SERPL-MCNC: 0.9 MG/DL (ref 0–1)
BUN BLDV-MCNC: 38 MG/DL (ref 7–20)
CALCIUM SERPL-MCNC: 9.2 MG/DL (ref 8.3–10.6)
CHLORIDE BLD-SCNC: 101 MMOL/L (ref 99–110)
CO2: 24 MMOL/L (ref 21–32)
CREAT SERPL-MCNC: 1.4 MG/DL (ref 0.6–1.2)
EOSINOPHILS ABSOLUTE: 0 K/UL (ref 0–0.6)
EOSINOPHILS RELATIVE PERCENT: 0.2 %
ETHANOL: NORMAL MG/DL (ref 0–0.08)
GFR AFRICAN AMERICAN: 44
GFR NON-AFRICAN AMERICAN: 36
GLOBULIN: 3.4 G/DL
GLUCOSE BLD-MCNC: 133 MG/DL
GLUCOSE BLD-MCNC: 133 MG/DL (ref 70–99)
GLUCOSE BLD-MCNC: 152 MG/DL (ref 70–99)
HCT VFR BLD CALC: 41.6 % (ref 36–48)
HEMOGLOBIN: 13.7 G/DL (ref 12–16)
INR BLD: 1.14 (ref 0.86–1.14)
LYMPHOCYTES ABSOLUTE: 2.8 K/UL (ref 1–5.1)
LYMPHOCYTES RELATIVE PERCENT: 16 %
MCH RBC QN AUTO: 29.9 PG (ref 26–34)
MCHC RBC AUTO-ENTMCNC: 33 G/DL (ref 31–36)
MCV RBC AUTO: 90.8 FL (ref 80–100)
MONOCYTES ABSOLUTE: 1.2 K/UL (ref 0–1.3)
MONOCYTES RELATIVE PERCENT: 6.9 %
NEUTROPHILS ABSOLUTE: 13.5 K/UL (ref 1.7–7.7)
NEUTROPHILS RELATIVE PERCENT: 76.7 %
PDW BLD-RTO: 15.3 % (ref 12.4–15.4)
PERFORMED ON: ABNORMAL
PLATELET # BLD: 191 K/UL (ref 135–450)
PMV BLD AUTO: 9.9 FL (ref 5–10.5)
POTASSIUM SERPL-SCNC: 3.4 MMOL/L (ref 3.5–5.1)
PROTHROMBIN TIME: 13.3 SEC (ref 10–13.2)
RAPID INFLUENZA  B AGN: NEGATIVE
RAPID INFLUENZA A AGN: NEGATIVE
RBC # BLD: 4.58 M/UL (ref 4–5.2)
SODIUM BLD-SCNC: 140 MMOL/L (ref 136–145)
TOTAL PROTEIN: 6.9 G/DL (ref 6.4–8.2)
TROPONIN: <0.01 NG/ML
WBC # BLD: 17.6 K/UL (ref 4–11)

## 2020-02-29 PROCEDURE — 87804 INFLUENZA ASSAY W/OPTIC: CPT

## 2020-02-29 PROCEDURE — 85610 PROTHROMBIN TIME: CPT

## 2020-02-29 PROCEDURE — 70450 CT HEAD/BRAIN W/O DYE: CPT

## 2020-02-29 PROCEDURE — 80053 COMPREHEN METABOLIC PANEL: CPT

## 2020-02-29 PROCEDURE — 86850 RBC ANTIBODY SCREEN: CPT

## 2020-02-29 PROCEDURE — 70496 CT ANGIOGRAPHY HEAD: CPT

## 2020-02-29 PROCEDURE — 87077 CULTURE AEROBIC IDENTIFY: CPT

## 2020-02-29 PROCEDURE — 99285 EMERGENCY DEPT VISIT HI MDM: CPT

## 2020-02-29 PROCEDURE — G0480 DRUG TEST DEF 1-7 CLASSES: HCPCS

## 2020-02-29 PROCEDURE — 71046 X-RAY EXAM CHEST 2 VIEWS: CPT

## 2020-02-29 PROCEDURE — 81001 URINALYSIS AUTO W/SCOPE: CPT

## 2020-02-29 PROCEDURE — 87186 SC STD MICRODIL/AGAR DIL: CPT

## 2020-02-29 PROCEDURE — 84484 ASSAY OF TROPONIN QUANT: CPT

## 2020-02-29 PROCEDURE — 6370000000 HC RX 637 (ALT 250 FOR IP): Performed by: EMERGENCY MEDICINE

## 2020-02-29 PROCEDURE — 6360000004 HC RX CONTRAST MEDICATION: Performed by: EMERGENCY MEDICINE

## 2020-02-29 PROCEDURE — 85025 COMPLETE CBC W/AUTO DIFF WBC: CPT

## 2020-02-29 PROCEDURE — 93005 ELECTROCARDIOGRAM TRACING: CPT | Performed by: EMERGENCY MEDICINE

## 2020-02-29 PROCEDURE — 6370000000 HC RX 637 (ALT 250 FOR IP): Performed by: INTERNAL MEDICINE

## 2020-02-29 PROCEDURE — 86900 BLOOD TYPING SEROLOGIC ABO: CPT

## 2020-02-29 PROCEDURE — 86901 BLOOD TYPING SEROLOGIC RH(D): CPT

## 2020-02-29 PROCEDURE — 2700000000 HC OXYGEN THERAPY PER DAY

## 2020-02-29 PROCEDURE — 2580000003 HC RX 258: Performed by: EMERGENCY MEDICINE

## 2020-02-29 PROCEDURE — 87086 URINE CULTURE/COLONY COUNT: CPT

## 2020-02-29 PROCEDURE — 1200000000 HC SEMI PRIVATE

## 2020-02-29 PROCEDURE — 2580000003 HC RX 258: Performed by: INTERNAL MEDICINE

## 2020-02-29 RX ORDER — ALBUTEROL SULFATE 90 UG/1
2 AEROSOL, METERED RESPIRATORY (INHALATION) 4 TIMES DAILY PRN
Status: DISCONTINUED | OUTPATIENT
Start: 2020-02-29 | End: 2020-03-01

## 2020-02-29 RX ORDER — SODIUM CHLORIDE 0.9 % (FLUSH) 0.9 %
10 SYRINGE (ML) INJECTION EVERY 12 HOURS SCHEDULED
Status: DISCONTINUED | OUTPATIENT
Start: 2020-02-29 | End: 2020-03-04 | Stop reason: HOSPADM

## 2020-02-29 RX ORDER — ONDANSETRON 2 MG/ML
4 INJECTION INTRAMUSCULAR; INTRAVENOUS EVERY 6 HOURS PRN
Status: DISCONTINUED | OUTPATIENT
Start: 2020-02-29 | End: 2020-03-01

## 2020-02-29 RX ORDER — PROMETHAZINE HYDROCHLORIDE 25 MG/1
12.5 TABLET ORAL EVERY 6 HOURS PRN
Status: DISCONTINUED | OUTPATIENT
Start: 2020-02-29 | End: 2020-03-04 | Stop reason: HOSPADM

## 2020-02-29 RX ORDER — PANTOPRAZOLE SODIUM 20 MG/1
20 TABLET, DELAYED RELEASE ORAL DAILY
Status: DISCONTINUED | OUTPATIENT
Start: 2020-03-01 | End: 2020-03-04 | Stop reason: HOSPADM

## 2020-02-29 RX ORDER — ACETAMINOPHEN 325 MG/1
650 TABLET ORAL EVERY 6 HOURS PRN
Status: DISCONTINUED | OUTPATIENT
Start: 2020-02-29 | End: 2020-03-04 | Stop reason: HOSPADM

## 2020-02-29 RX ORDER — 0.9 % SODIUM CHLORIDE 0.9 %
1000 INTRAVENOUS SOLUTION INTRAVENOUS ONCE
Status: COMPLETED | OUTPATIENT
Start: 2020-02-29 | End: 2020-02-29

## 2020-02-29 RX ORDER — POLYETHYLENE GLYCOL 3350 17 G/17G
17 POWDER, FOR SOLUTION ORAL DAILY PRN
Status: DISCONTINUED | OUTPATIENT
Start: 2020-02-29 | End: 2020-03-04 | Stop reason: HOSPADM

## 2020-02-29 RX ORDER — AMLODIPINE BESYLATE 2.5 MG/1
2.5 TABLET ORAL DAILY
Status: DISCONTINUED | OUTPATIENT
Start: 2020-03-01 | End: 2020-03-04 | Stop reason: HOSPADM

## 2020-02-29 RX ORDER — ALBUTEROL SULFATE 90 UG/1
2 AEROSOL, METERED RESPIRATORY (INHALATION) 4 TIMES DAILY PRN
Status: DISCONTINUED | OUTPATIENT
Start: 2020-02-29 | End: 2020-03-04 | Stop reason: HOSPADM

## 2020-02-29 RX ORDER — ASPIRIN 81 MG/1
81 TABLET ORAL DAILY
Status: DISCONTINUED | OUTPATIENT
Start: 2020-03-01 | End: 2020-03-04 | Stop reason: HOSPADM

## 2020-02-29 RX ORDER — SODIUM CHLORIDE 9 MG/ML
INJECTION, SOLUTION INTRAVENOUS CONTINUOUS
Status: DISCONTINUED | OUTPATIENT
Start: 2020-02-29 | End: 2020-03-01

## 2020-02-29 RX ORDER — SODIUM CHLORIDE 0.9 % (FLUSH) 0.9 %
10 SYRINGE (ML) INJECTION PRN
Status: DISCONTINUED | OUTPATIENT
Start: 2020-02-29 | End: 2020-03-04 | Stop reason: HOSPADM

## 2020-02-29 RX ORDER — TRAMADOL HYDROCHLORIDE 50 MG/1
50 TABLET ORAL EVERY 6 HOURS PRN
Status: DISCONTINUED | OUTPATIENT
Start: 2020-02-29 | End: 2020-03-04 | Stop reason: HOSPADM

## 2020-02-29 RX ORDER — ACETAMINOPHEN 650 MG/1
650 SUPPOSITORY RECTAL EVERY 6 HOURS PRN
Status: DISCONTINUED | OUTPATIENT
Start: 2020-02-29 | End: 2020-03-04 | Stop reason: HOSPADM

## 2020-02-29 RX ORDER — ASPIRIN 81 MG/1
324 TABLET, CHEWABLE ORAL ONCE
Status: COMPLETED | OUTPATIENT
Start: 2020-02-29 | End: 2020-02-29

## 2020-02-29 RX ADMIN — IOPAMIDOL 75 ML: 755 INJECTION, SOLUTION INTRAVENOUS at 20:40

## 2020-02-29 RX ADMIN — SODIUM CHLORIDE 1000 ML: 9 INJECTION, SOLUTION INTRAVENOUS at 21:30

## 2020-02-29 RX ADMIN — SODIUM CHLORIDE: 9 INJECTION, SOLUTION INTRAVENOUS at 23:41

## 2020-02-29 RX ADMIN — ASPIRIN 81 MG 324 MG: 81 TABLET ORAL at 21:29

## 2020-02-29 ASSESSMENT — PAIN SCALES - GENERAL
PAINLEVEL_OUTOF10: 0
PAINLEVEL_OUTOF10: 0

## 2020-03-01 ENCOUNTER — APPOINTMENT (OUTPATIENT)
Dept: MRI IMAGING | Age: 75
DRG: 065 | End: 2020-03-01
Payer: MEDICARE

## 2020-03-01 LAB
AMMONIA: 50 UMOL/L (ref 11–51)
ANION GAP SERPL CALCULATED.3IONS-SCNC: 11 MMOL/L (ref 3–16)
BACTERIA: ABNORMAL /HPF
BASOPHILS ABSOLUTE: 0 K/UL (ref 0–0.2)
BASOPHILS RELATIVE PERCENT: 0.3 %
BILIRUBIN URINE: NEGATIVE
BLOOD, URINE: ABNORMAL
BUN BLDV-MCNC: 34 MG/DL (ref 7–20)
CALCIUM SERPL-MCNC: 8.3 MG/DL (ref 8.3–10.6)
CHLORIDE BLD-SCNC: 101 MMOL/L (ref 99–110)
CLARITY: CLEAR
CO2: 25 MMOL/L (ref 21–32)
COLOR: YELLOW
CREAT SERPL-MCNC: 1.1 MG/DL (ref 0.6–1.2)
EKG ATRIAL RATE: 68 BPM
EKG DIAGNOSIS: NORMAL
EKG P AXIS: 59 DEGREES
EKG P-R INTERVAL: 154 MS
EKG Q-T INTERVAL: 444 MS
EKG QRS DURATION: 150 MS
EKG QTC CALCULATION (BAZETT): 472 MS
EKG R AXIS: -32 DEGREES
EKG T AXIS: 30 DEGREES
EKG VENTRICULAR RATE: 68 BPM
EOSINOPHILS ABSOLUTE: 0.1 K/UL (ref 0–0.6)
EOSINOPHILS RELATIVE PERCENT: 1.3 %
EPITHELIAL CELLS, UA: ABNORMAL /HPF (ref 0–5)
GFR AFRICAN AMERICAN: 58
GFR NON-AFRICAN AMERICAN: 48
GLUCOSE BLD-MCNC: 112 MG/DL (ref 70–99)
GLUCOSE URINE: NEGATIVE MG/DL
HCT VFR BLD CALC: 36.8 % (ref 36–48)
HEMOGLOBIN: 12.3 G/DL (ref 12–16)
KETONES, URINE: NEGATIVE MG/DL
LEUKOCYTE ESTERASE, URINE: ABNORMAL
LYMPHOCYTES ABSOLUTE: 1.8 K/UL (ref 1–5.1)
LYMPHOCYTES RELATIVE PERCENT: 20.2 %
MCH RBC QN AUTO: 30.5 PG (ref 26–34)
MCHC RBC AUTO-ENTMCNC: 33.4 G/DL (ref 31–36)
MCV RBC AUTO: 91.1 FL (ref 80–100)
MICROSCOPIC EXAMINATION: YES
MONOCYTES ABSOLUTE: 0.9 K/UL (ref 0–1.3)
MONOCYTES RELATIVE PERCENT: 10.1 %
NEUTROPHILS ABSOLUTE: 6.1 K/UL (ref 1.7–7.7)
NEUTROPHILS RELATIVE PERCENT: 68.1 %
NITRITE, URINE: POSITIVE
PDW BLD-RTO: 15.3 % (ref 12.4–15.4)
PH UA: 5.5 (ref 5–8)
PLATELET # BLD: 116 K/UL (ref 135–450)
PMV BLD AUTO: 10.2 FL (ref 5–10.5)
POTASSIUM REFLEX MAGNESIUM: 3.8 MMOL/L (ref 3.5–5.1)
PROTEIN UA: NEGATIVE MG/DL
RBC # BLD: 4.04 M/UL (ref 4–5.2)
RBC UA: ABNORMAL /HPF (ref 0–4)
SODIUM BLD-SCNC: 137 MMOL/L (ref 136–145)
SPECIFIC GRAVITY UA: 1.01 (ref 1–1.03)
URINE REFLEX TO CULTURE: YES
URINE TYPE: ABNORMAL
UROBILINOGEN, URINE: 0.2 E.U./DL
WBC # BLD: 9 K/UL (ref 4–11)
WBC UA: >100 /HPF (ref 0–5)

## 2020-03-01 PROCEDURE — 6360000002 HC RX W HCPCS: Performed by: INTERNAL MEDICINE

## 2020-03-01 PROCEDURE — 6370000000 HC RX 637 (ALT 250 FOR IP): Performed by: INTERNAL MEDICINE

## 2020-03-01 PROCEDURE — 1200000000 HC SEMI PRIVATE

## 2020-03-01 PROCEDURE — 85025 COMPLETE CBC W/AUTO DIFF WBC: CPT

## 2020-03-01 PROCEDURE — 2580000003 HC RX 258: Performed by: INTERNAL MEDICINE

## 2020-03-01 PROCEDURE — 97530 THERAPEUTIC ACTIVITIES: CPT

## 2020-03-01 PROCEDURE — 80048 BASIC METABOLIC PNL TOTAL CA: CPT

## 2020-03-01 PROCEDURE — 97165 OT EVAL LOW COMPLEX 30 MIN: CPT

## 2020-03-01 PROCEDURE — 36415 COLL VENOUS BLD VENIPUNCTURE: CPT

## 2020-03-01 PROCEDURE — 97116 GAIT TRAINING THERAPY: CPT

## 2020-03-01 PROCEDURE — 82140 ASSAY OF AMMONIA: CPT

## 2020-03-01 PROCEDURE — 97162 PT EVAL MOD COMPLEX 30 MIN: CPT

## 2020-03-01 PROCEDURE — 97535 SELF CARE MNGMENT TRAINING: CPT

## 2020-03-01 PROCEDURE — 93010 ELECTROCARDIOGRAM REPORT: CPT | Performed by: INTERNAL MEDICINE

## 2020-03-01 PROCEDURE — 6370000000 HC RX 637 (ALT 250 FOR IP): Performed by: NURSE PRACTITIONER

## 2020-03-01 RX ORDER — GUAIFENESIN 600 MG/1
600 TABLET, EXTENDED RELEASE ORAL 2 TIMES DAILY
Status: DISCONTINUED | OUTPATIENT
Start: 2020-03-01 | End: 2020-03-04 | Stop reason: HOSPADM

## 2020-03-01 RX ADMIN — ACETAMINOPHEN 650 MG: 325 TABLET ORAL at 23:28

## 2020-03-01 RX ADMIN — PANTOPRAZOLE SODIUM 20 MG: 20 TABLET, DELAYED RELEASE ORAL at 08:13

## 2020-03-01 RX ADMIN — METOPROLOL TARTRATE 25 MG: 25 TABLET, FILM COATED ORAL at 23:28

## 2020-03-01 RX ADMIN — ASPIRIN 81 MG: 81 TABLET, COATED ORAL at 08:13

## 2020-03-01 RX ADMIN — Medication 10 ML: at 23:29

## 2020-03-01 RX ADMIN — METOPROLOL TARTRATE 25 MG: 25 TABLET, FILM COATED ORAL at 08:13

## 2020-03-01 RX ADMIN — ENOXAPARIN SODIUM 40 MG: 40 INJECTION SUBCUTANEOUS at 08:13

## 2020-03-01 RX ADMIN — GUAIFENESIN 600 MG: 600 TABLET, EXTENDED RELEASE ORAL at 23:28

## 2020-03-01 RX ADMIN — CEFTRIAXONE SODIUM 1 G: 1 INJECTION, POWDER, FOR SOLUTION INTRAMUSCULAR; INTRAVENOUS at 06:31

## 2020-03-01 RX ADMIN — GUAIFENESIN 600 MG: 600 TABLET, EXTENDED RELEASE ORAL at 14:21

## 2020-03-01 RX ADMIN — AMLODIPINE BESYLATE 2.5 MG: 2.5 TABLET ORAL at 08:13

## 2020-03-01 ASSESSMENT — PAIN SCALES - GENERAL
PAINLEVEL_OUTOF10: 0

## 2020-03-01 NOTE — PROGRESS NOTES
Physical Therapy    Facility/Department: [facility] B3 - MED SURG  Initial Assessment    NAME: Angela Euceda  : 1945  MRN: [id number]    Date of Service: 3/1/2020    Discharge Recommendations:  Home with assist PRN, Home with Home health PT   PT Equipment Recommendations  Equipment Needed: No    Assessment   Body structures, Functions, Activity limitations: Decreased functional mobility ; Decreased endurance;Decreased balance;Decreased strength  Assessment: Pt is functioning below baseline at this time requiring CGA at  and note decreased fatigue/endurance with short distance ambulation. Pt was living alone and independent with ambulation with intermittent use of cane/walker as needed. Recommend d/c home with HHPT if pt is able to return to mod I level of function prior to discharge. No DME recommendations at this time. Prognosis: Good  Decision Making: Medium Complexity  PT Education: Goals;Plan of Care;Equipment;Gait Training;Functional Mobility Training;PT Role;Transfer Training;General Safety; Family Education; Adaptive Device Training;Disease Specific Education  Patient Education: pt verbalized understanding  REQUIRES PT FOLLOW UP: Yes  Activity Tolerance  Activity Tolerance: Patient Tolerated treatment well; Other  Activity Tolerance: Note some SOB following ambulation into bathroom. Post ambulation to bathroom and standing at sink, pt returned to sitting rest: spO2: 97-98% on RA; HR mid 50s bpm.       Patient Diagnosis(es): The primary encounter diagnosis was Facial droop. Diagnoses of Dysarthria-clumsy hand syndrome and LOU (acute kidney injury) ([facility] [address] were also pertinent to this visit. has a past medical history of Acid reflux, CHF (congestive heart failure) ([address] Generalized anxiety disorder, Hypertension, Osteoarthritis, and Screening mammogram.   has a past surgical history that includes Colonoscopy (3/24/10); Hysterectomy; Gallbladder surgery; Carpal tunnel release; Foot surgery;  Neck surgery; Cataract removal with implant (Left, 04/06/2017); Cataract removal with implant (Right, 05/04/2017); Pain management procedure (N/A, 1/20/2020); Achilles tendon surgery; Upper gastrointestinal endoscopy; and Upper gastrointestinal endoscopy (N/A, 2/24/2020). Restrictions  Restrictions/Precautions  Restrictions/Precautions: Up as Tolerated, Fall Risk, General Precautions  Position Activity Restriction  Other position/activity restrictions: telemetry, IV line  Vision/Hearing  Vision: Impaired  Vision Exceptions: Wears glasses for reading  Hearing: Exceptions to [facility]  Hearing Exceptions: Hard of hearing/hearing concerns     Subjective  General  Chart Reviewed: Yes  Patient assessed for rehabilitation services?: Yes  Response To Previous Treatment: Not applicable  Family / Caregiver Present: Yes(2nd dtr present first half of eval)  Referring Practitioner: Carol Crisostomo MD  Referral Date : 02/29/20  General Comment  Comments: RN cleared for therapy evals. Subjective  Subjective: Pt resting in bed upon arrival; agreeable to therapy evals.   Pain Screening  Patient Currently in Pain: Denies  Vital Signs  Patient Currently in Pain: Denies       Orientation  Orientation  Overall Orientation Status: Within Functional Limits  Social/Functional History  Social/Functional History  Lives With: Alone  Type of Home: Apartment(senior apt)  Home Layout: One level  Home Access: Level entry  Bathroom Shower/Tub: Walk-in shower  Bathroom Toilet: Handicap height  Bathroom Equipment: Shower chair  Home Equipment: Cane, Wheelchair-manual, Rolling walker, 4 wheeled walker, Alert Button(adjustable bed)  Receives Help From: Family  ADL Assistance: Independent  Homemaking Assistance: Independent  Homemaking Responsibilities: Yes(family assists with heavier cleaning/laundry)  Ambulation Assistance: Independent(with cane or walker at times; use of electric scooter for shopping)  Transfer Assistance: Independent  Active : Training, Transfer Training, Endurance Training, Gait Training, Neuromuscular Re-education, Home Exercise Program, Patient/Caregiver Education & Training, Equipment Evaluation, Education, & procurement, Safety Education & Training, Functional Mobility Training  Safety Devices  Type of devices: All fall risk precautions in place, Chair alarm in place, Patient at risk for falls, Call light within reach, Left in chair, Nurse notified, Gait belt  Restraints  Initially in place: No      AM-PAC Score     AM-PAC Inpatient Mobility without Stair Climbing Raw Score : 16 (03/01/20 1344)  AM-PAC Inpatient without Stair Climbing T-Scale Score : 45.54 (03/01/20 1344)  Mobility Inpatient CMS 0-100% Score: 40.64 (03/01/20 1344)  Mobility Inpatient without Stair CMS G-Code Modifier : CK (03/01/20 1344)       Goals  Short term goals  Time Frame for Short term goals: 3/8/20 unless otherwise noted  Short term goal 1: Pt to perform sit to/from stand transfers at [address] [place] [address] with mod I. Short term goal 2: Pt to ambulate 50 ft w/ LRAD with mod I for household distances. Short term goal 3: Pt to perform bed mobility with mod I from flat bed. Short term goal 4: To be met 3/4/20: Pt to perform 15-20 reps LE HEP to target strength and ROM.   Patient Goals   Patient goals : to return home       Therapy Time   Individual Concurrent Group Co-treatment   Time In       1143   Time Out       1227   Minutes       44   Timed Code Treatment Minutes: 34 Minutes(10 min eval)       Christianne Lechuga, PT

## 2020-03-01 NOTE — PROGRESS NOTES
RESPIRATORY THERAPY ASSESSMENT    Name:  Cat Pierce. Record Number:  [id number]  Age: 66 y.o. Gender: female  : 1945  Today's Date:  3/1/2020  Room:  0365/0365-01    Assessment     Is the patient being admitted for a COPD or Asthma exacerbation? No   (If yes the patient will be seen every 4 hours for the first 24 hours and then reassessed)    Patient Admission Diagnosis      Allergies  Allergies   Allergen Reactions    Streptomycin     Sulfa Antibiotics Other (See Comments)     States change in mental status    Tylenol With Codeine #3 [Acetaminophen-Codeine] Other (See Comments)     Per pt, made pt \"immobile. \" Can tolerate cough syrup w/ codeine    Verapamil Rash       Minimum Predicted Vital Capacity:     n/a          Actual Vital Capacity:      Pt sleeping, unable to assess              Pulmonary History:CHF/Pulmonary Edema  Home Oxygen Therapy:  unknown  Home Respiratory Therapy: Albuterol MDI PRN   Current Respiratory Therapy:  Albuterol MDI PRN          Respiratory Severity Index(RSI)   Patients with orders for inhalation medications, oxygen, or any therapeutic treatment modality will be placed on Respiratory Protocol. They will be assessed with the first treatment and at least every 72 hours thereafter. The following severity scale will be used to determine frequency of treatment intervention.     Smoking History: No Smoking History = 0    Social History  Social History     Tobacco Use    Smoking status: Never Smoker    Smokeless tobacco: Never Used   Substance Use Topics    Alcohol use: No    Drug use: No       Recent Surgical History: None = 0  Past Surgical History  Past Surgical History:   Procedure Laterality Date    ACHILLES TENDON SURGERY      CARPAL TUNNEL RELEASE      CATARACT REMOVAL WITH IMPLANT Left 2017    Left cataract removal. Dr. Ericka Mata Right 2017    PHACO EMULSIFICATION OF CATARACT WITH INTRAOCULAR LENS IMPLANT RIGHT EYE    COLONOSCOPY  3/24/10    FOOT SURGERY      GALLBLADDER SURGERY      HYSTERECTOMY      NECK SURGERY      PAIN MANAGEMENT PROCEDURE N/A 1/20/2020    MIDLINE LUMBAR FIVE SACRAL ONE EPIDURAL STEROID INJECTION SITE CONFIRMED BY FLUOROSCOPY performed by Salvatore Max MD at [place] [id number] UPPER GASTROINTESTINAL ENDOSCOPY N/A 2/24/2020    EGD DIAGNOSTIC ONLY performed by Sabrina Handy MD at [address] [place] [address] ENDOSCOPY       Level of Consciousness: Alert, Oriented, and Cooperative = 0    Level of Activity: Walking with assistance = 1    Respiratory Pattern: Regular Pattern; RR 8-20 = 0    Breath Sounds: Clear = 0    Sputum   ,  ,    Cough: Strong, spontaneous, non-productive = 0    Vital Signs   BP (!) 144/64   Pulse 62   Temp 98.3 °F (36.8 °C) (Oral)   Resp 16   Ht 5' 8\" (1.727 m)   Wt 180 lb 12.4 oz (82 kg)   SpO2 97%   BMI 27.49 kg/m²   SPO2 (COPD values may differ): Greater than or equal to 92% on room air = 0    Peak Flow (asthma only): not applicable = 0    RSI: 0-4 = See once and convert to home regimen or discontinue        Plan       Goals: medication delivery, mobilize retained secretions, volume expansion and improve oxygenation    Patient/caregiver was educated on the proper method of use for Respiratory Care Devices:  No:       Level of patient/caregiver understanding able to:   ? Verbalize understanding   ? Demonstrate understanding       ? Teach back        ? Needs reinforcement       ? No available caregiver               ? Other:     Response to education:       Is patient being placed on Home Treatment Regimen? Yes     Does the patient have everything they need prior to discharge? NA     Comments: Pt meds reviewed, pt sleeping at this time. Continue home regimen. Plan of Care: Albuterol MDI PRN    Electronically signed by Parvin Lal RCP on 3/1/2020 at 1:32 AM    Respiratory Protocol Guidelines     1.  Assessment and treatment by Respiratory Therapy will be initiated for medication and therapeutic interventions upon initiation of aerosolized medication. 2. Physician will be contacted for respiratory rate (RR) greater than 35 breaths per minute. Therapy will be held for heart rate (HR) greater than 140 beats per minute, pending direction from physician. 3. Bronchodilators will be administered via Metered Dose Inhaler (MDI) with spacer when the following criteria are met:  a. Alert and cooperative     b. HR < 140 bpm  c. RR < 30 bpm                d. Can demonstrate a 2-3 second inspiratory hold  4. Bronchodilators will be administered via Hand Held Nebulizer [facility]) to patients when ANY of the following criteria are met  a. Incognizant or uncooperative          b. Patients treated with HHN at Home        c. Unable to demonstrate proper use of MDI with spacer     d. RR > 30 bpm   5. Bronchodilators will be delivered via Metered Dose Inhaler (MDI), HHN, Aerogen to intubated patients on mechanical ventilation. 6. Inhalation medication orders will be delivered and/or substituted as outlined below. Aerosolized Medications Ordering and Administration Guidelines:    1. All Medications will be ordered by a physician, and their frequency and/or modality will be adjusted as defined by the patients Respiratory Severity Index (RSI) score. 2. If the patient does not have documented COPD, consider discontinuing anticholinergics when RSI is less than 9.  3. If the bronchospasm worsens (increased RSI), then the bronchodilator frequency can be increased to a maximum of every 4 hours. If greater than every 4 hours is required, the physician will be contacted. 4. If the bronchospasm improves, the frequency of the bronchodilator can be decreased, based on the patient's RSI, but not less than home treatment regimen frequency.   5. Bronchodilator(s) will be discontinued if patient has a RSI less than 9 and has received no scheduled or as needed treatment for 72  Hrs. Patients Ordered on a Mucolytic Agent:    1. Must always be administered with a bronchodilator. 2. Discontinue if patient experiences worsened bronchospasm, or secretions have lessened to the point that the patient is able to clear them with a cough. Anti-inflammatory and Combination Medications:    1. If the patient lacks prior history of lung disease, is not using inhaled anti-inflammatory medication at home, and lacks wheezing by examination or by history for at least 24 hours, contact physician for possible discontinuation.

## 2020-03-01 NOTE — CONSULTS
Pharmacy Consult    The pharmacist will review this patient's medication profile to evaluate IV medications and change all base solutions to 0.9% sodium chloride if possible based on compatibility and product availability. This profile review will include an assessment of total IV fluids being administered to the patient. If a current order exists for continuous infusion of non-hypertonic plain IV fluid, the pharmacist will contact the prescriber to recommend the discontinuation of the IV fluid order.     Currently not on a fluid  Berlin WoodD  2/29/2020 at 8:42 PM

## 2020-03-01 NOTE — PROGRESS NOTES
Occupational Therapy   Occupational Therapy Initial Assessment and Treatment Note  Date: 3/1/2020   Patient Name: Navin Devi  MRN: [id number]     : 1945    Date of Service: 3/1/2020    Discharge Recommendations:  Home with assist PRN, Home with Home health OT  OT Equipment Recommendations  Equipment Needed: No    Assessment   Performance deficits / Impairments: Decreased balance;Decreased functional mobility ; Decreased ADL status; Decreased endurance;Decreased high-level IADLs;Decreased coordination;Decreased fine motor control  Assessment: Pt reports typically independent with ADL/IADL, functional mobility/transfers, presenting grossly at CGA-Min A in these areas at time of eval. Pt presents with coordination deficits, CTA vision and coordination with OT tx. Anticipate pt will progress toward established goals and be able to return home, but could benefit from further therapy to further address endurance, coordination and safety/independence with ADL/IADL, mobility/transfers. Prognosis: Good  Decision Making: Low Complexity  OT Education: OT Role;ADL Adaptive Strategies; Plan of Care;Transfer Training;Family Education; Low Vision Education;IADL Safety;Equipment  Patient Education: importance of mobility and engaging RUE  REQUIRES OT FOLLOW UP: Yes  Activity Tolerance  Activity Tolerance: Patient Tolerated treatment well;Patient limited by fatigue  Activity Tolerance: Pt with significant overt SOB in bathroom with ADL tasks, Spo2 98% on RA, with pt reporting gets SOB often at [address] [place] in place: Yes  Type of devices: Left in chair;Chair alarm in place;Call light within reach;Gait belt;Nurse notified           Patient Diagnosis(es): The primary encounter diagnosis was Facial droop. Diagnoses of Dysarthria-clumsy hand syndrome and LOU (acute kidney injury) ([facility] [address] were also pertinent to this visit.      has a past medical history of Acid reflux, CHF (congestive heart failure) Appears intact  Memory: Appears intact  Safety Judgement: Decreased awareness of need for assistance  Insights: Decreased awareness of deficits  Initiation: Does not require cues  Sequencing: Does not require cues  Perception  Overall Perceptual Status: WFL     Sensation  Overall Sensation Status: WFL        LUE AROM (degrees)  LUE AROM : WFL  Left Hand AROM (degrees)  Left Hand AROM: WFL  RUE AROM (degrees)  RUE AROM : WFL  Right Hand AROM (degrees)  Right Hand AROM: WFL  LUE Strength  Gross LUE Strength: WFL  L Hand General: 4/5  RUE Strength  R Hand General: 4-/5  RUE Strength Comment: 4-/5 at shoulder, 4/5 at elbow      Hand Dominance  Hand Dominance: Right             Plan   Plan  Times per week: 3-5x/week   Current Treatment Recommendations: Strengthening, ROM, Neuromuscular Re-education, Endurance Training, Balance Training, Functional Mobility Training, Gait Training, Patient/Caregiver Education & Training, Equipment Evaluation, Education, & procurement, Self-Care / ADL, Safety Education & Training, Home Management Training    AM-PAC Score        AM-PAC Inpatient Daily Activity Raw Score: 18 (03/01/20 1521)  AM-PAC Inpatient ADL T-Scale Score : 38.66 (03/01/20 1521)  ADL Inpatient CMS 0-100% Score: 46.65 (03/01/20 1521)  ADL Inpatient CMS G-Code Modifier : CK (03/01/20 1521)    Goals  Short term goals  Time Frame for Short term goals: 1 week (by 3/8/20)  Short term goal 1: Pt will complete functional transfers with Mod I  Short term goal 2: Pt will complete LE dressing with setup  Short term goal 3: Pt will perform 3-5 minutes dynamic standing activity with Supervision by 3/6  Patient Goals   Patient goals : \"to go home\"       Therapy Time   Individual Concurrent Group Co-treatment   Time In 1145(10 minutes for eval)         Time Out 1223         Minutes 38         Timed Code Treatment Minutes: 28 Minutes     This note to serve as d/c summary should pt d/c prior to next session.     Jahaira Pacheco, OTR/L

## 2020-03-01 NOTE — ED PROVIDER NOTES
performed by Adrien Kaufman MD at [address]        Family History:    Family History   Problem Relation Age of Onset    High Blood Pressure Mother        Social History     Socioeconomic History    Marital status:      Spouse name: Not on file    Number of children: Not on file    Years of education: Not on file    Highest education level: Not on file   Occupational History    Not on file   Social Needs    Financial resource strain: Not on file    Food insecurity:     Worry: Not on file     Inability: Not on file    Transportation needs:     Medical: Not on file     Non-medical: Not on file   Tobacco Use    Smoking status: Never Smoker    Smokeless tobacco: Never Used   Substance and Sexual Activity    Alcohol use: No    Drug use: No    Sexual activity: Yes     Partners: Male   Lifestyle    Physical activity:     Days per week: Not on file     Minutes per session: Not on file    Stress: Not on file   Relationships    Social connections:     Talks on phone: Not on file     Gets together: Not on file     Attends Islam service: Not on file     Active member of club or organization: Not on file     Attends meetings of clubs or organizations: Not on file     Relationship status: Not on file    Intimate partner violence:     Fear of current or ex partner: Not on file     Emotionally abused: Not on file     Physically abused: Not on file     Forced sexual activity: Not on file   Other Topics Concern    Not on file   Social History Narrative    Not on file       Nursing notes reviewed. ED Triage Vitals   Enc Vitals Group      BP 02/29/20 2012 (!) 172/69      Pulse 02/29/20 2012 58      Resp 02/29/20 2012 18      Temp 02/29/20 2016 97.7 °F (36.5 °C)      Temp Source 02/29/20 2016 Oral      SpO2 02/29/20 2012 98 %      Weight 02/29/20 2016 180 lb (81.6 kg)      Height --       Head Circumference --       Peak Flow --       Pain Score --       Pain Loc --       Pain Edu? --       Excl. in GC? --        GENERAL:  Awake, alert. Well developed, well nourished with no apparent distress. HENT:  Normocephalic, Atraumatic, moist mucous membranes. EYES:  Pupils equal round and reactive to light, Conjunctiva normal, extraocular movements normal.  NECK:  No meningeal signs, Supple. CHEST:  Regular rate and rhythm, chest wall non-tender. LUNGS:  Clear to auscultation bilaterally. ABDOMEN:  Soft, non-tender, no rebound, rigidity or guarding, non-distended, normal bowel sounds. No costovertebral angle tenderness to palpation. BACK:  No tenderness. EXTREMITIES:  Normal range of motion, no edema, no bony tenderness, no deformity, distal pulses present. SKIN: Warm, dry and intact. NEUROLOGIC: Estiven Tafoya NIH Stroke Scale is:  Level of Consciousness:  0 - alert; keenly responsive  LOC Questions:  0 - answers both questions correctly  LOC Commands:  0 - performs both tasks correctly  Best Gaze:  0 - normal  Visual Fields:  0 - no visual loss  Facial Palsy:  1 - minor paralysis (flattened nasolabial fold, asymmetric on smiling)  Motor-Arm-Left:  0 - no drift, limb holds 90 (or 45) degrees for full 10 seconds  Motor-Leg-Left:  0 - no drift; leg holds 30 degree position for full 5 seconds  Motor-Arm-Right:  0 - no drift, limb holds 90 (or 45) degrees for full 10 seconds  Motor-Leg-Right:  0 - no drift; leg holds 30 degree position for full 5 seconds  Limb Ataxia:  0 - absent  Sensory:  0 - normal; no sensory loss  Best Language:  0 - no aphasia, normal  Dysarthria:  1 - mild to moderate, patient slurs at least some words and at worst, can be understood with some difficulty  Extinction and Inattention:  0 - no abnormality    LABS and DIAGNOSTIC RESULTS  EKG  The Ekg interpreted by me shows  normal sinus rhythm with a rate of 68  Axis is   Left axis deviation  QTc is  normal  RBBB LVH  ST Segments: no acute change  Delta waves, Brugada Syndrome, and Short WI are not present.   No significant change from prior EKG dated 21 feb 2020    RADIOLOGY  X-RAYS:  I have reviewed radiologic plain film image(s). ALL OTHER NON-PLAIN FILM IMAGES SUCH AS CT, ULTRASOUND AND MRI HAVE BEEN READ BY THE RADIOLOGIST. CTA HEAD NECK W CONTRAST   Final Result   No focal significant arterial narrowing in the neck. Multifocal moderate distal vertebral artery narrowing on the left is noted. Otherwise, no focal significant arterial narrowing in the head. There is mild irregular contour of the small MCA branches distally on the   right. This may be artifact or due to mild narrowing         CT HEAD WO CONTRAST   Final Result   No findings diagnostic of an acute infarct at this time      Critical results were called by Dr. Claudette Moorman to [facility] [address] on   2/29/2020 at 20:41.               LABS  Labs Reviewed   CBC WITH AUTO DIFFERENTIAL - Abnormal; Notable for the following components:       Result Value    WBC 17.6 (*)     Neutrophils Absolute 13.5 (*)     All other components within normal limits    Narrative:     Performed at:  [place] [address] [employer]   Phone [phone]   COMPREHENSIVE METABOLIC PANEL - Abnormal; Notable for the following components:    Potassium 3.4 (*)     Glucose 152 (*)     BUN 38 (*)     CREATININE 1.4 (*)     GFR Non- 36 (*)     GFR African American 44 (*)     Albumin/Globulin Ratio 1.0 (*)     All other components within normal limits    Narrative:     Performed at:  [address], [facility] [employer]   Phone [phone] - Abnormal; Notable for the following components:    Protime 13.3 (*)     All other components within normal limits    Narrative:     Performed at:  [address], [facility] [employer]   Phone [phone]   URINE RT REFLEX TO CULTURE - Abnormal; Notable for the following components: Blood, Urine TRACE-INTACT (*)     Nitrite, Urine POSITIVE (*)     Leukocyte Esterase, Urine SMALL (*)     All other components within normal limits    Narrative:     Performed at:  [address], [facility] [employer]   Phone [phone]   POCT GLUCOSE - Abnormal; Notable for the following components:    POC Glucose 133 (*)     All other components within normal limits    Narrative:     Performed at:  [place] [address] [employer]   Phone [phone]   POCT GLUCOSE - Normal   RAPID INFLUENZA A/B ANTIGENS    Narrative:     Performed at:  [place] [address] [employer]   Phone [phone]   CULTURE, URINE   TROPONIN    Narrative:     Performed at:  [place] [address] [employer]   Phone [phone]   ETHANOL    Narrative:     Performed at:  [place] [address] [employer]   Phone [phone]   BASIC METABOLIC PANEL W/ REFLEX TO MG FOR LOW K   CBC WITH AUTO DIFFERENTIAL   AMMONIA   MICROSCOPIC URINALYSIS   TYPE AND SCREEN    Narrative:     Performed at:  [place] [address] [employer]   Phone [phone] time is 40 minutes which excludes separately billable procedures. The critical care was concerning evaluation of acute stroke and management and consideration of possible TPA administration. This time is exclusive of any time documented by any other providers. Patient symptoms are most consistent with small acute CVA. Spoke with stroke team shortly after patient's arrival and given the mild severity the patient symptoms as well as the timeline they agree that the patient is not a good candidate for TPA.   I advised patient that I believe the fact that she recently restarted taking Lasix daily is probably related to her new acute kidney injury. We will give her some IV fluids and I am admitting her to the hospital for further treatment and evaluation of her stroke syndrome. I spoke with Dr. Rimma Mondragon. We thoroughly discussed the history, physical exam, laboratory and imaging studies, as well as, emergency department course. Based upon that discussion, we've decided to admit Hyacinth Alicea for further observation and evaluation of Rachele Eaton's CVA-like symptoms. As I have deemed necessary from their history, physical and studies, I have considered and evaluated Hyacinth Alicea for the following diagnoses:  DIABETES, INTRACRANIAL HEMORRHAGE, MENINGITIS, SUBARACHNOID HEMORRHAGE, SUBDURAL HEMATOMA, & STROKE. FINAL IMPRESSION  1. Facial droop    2. Dysarthria-clumsy hand syndrome    3. LOU (acute kidney injury) ([facility] [address]        Vitals:  Blood pressure (!) 144/64, pulse 62, temperature 98.3 °F (36.8 °C), temperature source Oral, resp. rate 16, height 5' 8\" (1.727 m), weight 180 lb 12.4 oz (82 kg), SpO2 97 %. Patient was given scripts for the following medications. I counseled patient how to take these medications. New Prescriptions    No medications on file       Disposition  Pt is in stable condition upon Admit to telemetry. This chart was generated using the [address] [place] [address] dictation system. I created this record but it may contain dictation errors.           Kandice Dumont MD  03/01/20 3948

## 2020-03-01 NOTE — ED NOTES
Patient to CT at this time via stretcher.      [facility]  02/29/20 4906

## 2020-03-01 NOTE — H&P
panel in a.m., hold nephrotoxic medications    UTI  Leukocytosis -likely due to UTI, IV Rocephin started. Follow-up urine culture      Suspect cirrhosis -noted initial work-up done on previous admission including imaging suspicious for cirrhosis and portal hypertension. Continue to follow-up with GI outpatient. Will hold Lasix for now    Hypertension-controlled, resume home medication regimen except Lasix, lisinopril      DVT Prophylaxis: Lovenox  Diet: Diet NPO Effective Now  Code Status: Full code  PT/OT Eval Status: Elliott Opitz stay [employer], MD    Thank you Sen Kay MD for the opportunity to be involved in this patient's care. If you have any questions or concerns please feel free to contact me at [phone].

## 2020-03-01 NOTE — ED NOTES
[facility] @ 2127   Re: facial droop, LOU, leukocytosis  Apryl Godinez resposned @ 2139     Bee Pride  02/29/20 [phone]

## 2020-03-01 NOTE — PROGRESS NOTES
Patient admitted to room 365 from ER. Report bedside from [facility]. Patient oriented to room, call light, bed rails, phone, lights and bathroom. Patient instructed about the schedule of the day including: vital sign frequency, lab draws, possible tests, frequency of MD and staff rounds, including RN/MD rounding together at bedside, daily weights, and I &O's. Patient instructed about prescribed diet, how to contact dietary, and television. Telemetry box in place, patient aware of placement and reason. Bed locked, in lowest position, side rails up 2/4, call light within reach. Will continue to monitor.

## 2020-03-02 ENCOUNTER — APPOINTMENT (OUTPATIENT)
Dept: MRI IMAGING | Age: 75
DRG: 065 | End: 2020-03-02
Payer: MEDICARE

## 2020-03-02 LAB
ANION GAP SERPL CALCULATED.3IONS-SCNC: 9 MMOL/L (ref 3–16)
BUN BLDV-MCNC: 24 MG/DL (ref 7–20)
CALCIUM SERPL-MCNC: 8.6 MG/DL (ref 8.3–10.6)
CHLORIDE BLD-SCNC: 107 MMOL/L (ref 99–110)
CO2: 27 MMOL/L (ref 21–32)
CREAT SERPL-MCNC: 1 MG/DL (ref 0.6–1.2)
GFR AFRICAN AMERICAN: >60
GFR NON-AFRICAN AMERICAN: 54
GLUCOSE BLD-MCNC: 97 MG/DL (ref 70–99)
POTASSIUM SERPL-SCNC: 4.1 MMOL/L (ref 3.5–5.1)
SODIUM BLD-SCNC: 143 MMOL/L (ref 136–145)

## 2020-03-02 PROCEDURE — 6370000000 HC RX 637 (ALT 250 FOR IP): Performed by: NURSE PRACTITIONER

## 2020-03-02 PROCEDURE — 1200000000 HC SEMI PRIVATE

## 2020-03-02 PROCEDURE — 2580000003 HC RX 258

## 2020-03-02 PROCEDURE — 6370000000 HC RX 637 (ALT 250 FOR IP): Performed by: INTERNAL MEDICINE

## 2020-03-02 PROCEDURE — 99223 1ST HOSP IP/OBS HIGH 75: CPT | Performed by: PSYCHIATRY & NEUROLOGY

## 2020-03-02 PROCEDURE — 2580000003 HC RX 258: Performed by: INTERNAL MEDICINE

## 2020-03-02 PROCEDURE — 80048 BASIC METABOLIC PNL TOTAL CA: CPT

## 2020-03-02 PROCEDURE — 36415 COLL VENOUS BLD VENIPUNCTURE: CPT

## 2020-03-02 PROCEDURE — 6360000002 HC RX W HCPCS: Performed by: INTERNAL MEDICINE

## 2020-03-02 RX ORDER — SODIUM CHLORIDE 9 MG/ML
INJECTION, SOLUTION INTRAVENOUS
Status: COMPLETED
Start: 2020-03-02 | End: 2020-03-02

## 2020-03-02 RX ADMIN — CEFTRIAXONE SODIUM 1 G: 1 INJECTION, POWDER, FOR SOLUTION INTRAMUSCULAR; INTRAVENOUS at 04:48

## 2020-03-02 RX ADMIN — Medication 10 ML: at 08:37

## 2020-03-02 RX ADMIN — GUAIFENESIN 600 MG: 600 TABLET, EXTENDED RELEASE ORAL at 08:40

## 2020-03-02 RX ADMIN — SODIUM CHLORIDE 250 ML: 9 INJECTION, SOLUTION INTRAVENOUS at 04:48

## 2020-03-02 RX ADMIN — Medication 10 ML: at 20:28

## 2020-03-02 RX ADMIN — GUAIFENESIN 600 MG: 600 TABLET, EXTENDED RELEASE ORAL at 20:28

## 2020-03-02 RX ADMIN — PANTOPRAZOLE SODIUM 20 MG: 20 TABLET, DELAYED RELEASE ORAL at 08:40

## 2020-03-02 RX ADMIN — ASPIRIN 81 MG: 81 TABLET, COATED ORAL at 08:35

## 2020-03-02 RX ADMIN — AMLODIPINE BESYLATE 2.5 MG: 2.5 TABLET ORAL at 08:40

## 2020-03-02 ASSESSMENT — PAIN SCALES - GENERAL
PAINLEVEL_OUTOF10: 0

## 2020-03-02 NOTE — PROGRESS NOTES
cerebrovascular accident (CVA) [R09.89]    LOU (acute kidney injury) ([facility] [address] [N17.9]    Cirrhosis, nonalcoholic ([facility] [address] [F55.70]    Generalized anxiety disorder [F41.1]    HTN (hypertension) [I10]     Suspected cerebrovascular accident (CVA)  Presented with right facial droop, right hand clumsiness and slurring of speech as reported by family  - Initial CT head, CTA head and neck negative for any acute abnormality  - MRI brain - unable to complete 3/1 due to congestion - added mucinex and discussed with patient to try to complete again 3/2. She is agreeable. - Neurochecks per protocol  - Already on aspirin 81, continue  - recent TTE during last admission showed hyperdynamic LV with possible outflow tract obstruction - cardiology was consulted and recommended outpatient follow-up.     LOU - most likely prerenal due to hypovolemia, likely related to diuresis on previous admission a few days back also patient takes Lasix at home. - Hold Lasix for now. Change to PRN use only.     UTI  - IV Rocephin. Follow-up urine culture     Suspect cirrhosis - noted initial work-up done on previous admission including imaging suspicious for cirrhosis and portal hypertension. Continue to follow-up with GI outpatient.   Will hold Lasix for now     Hypertension - controlled, resume home medication regimen except Lasix, lisinopril.          DVT Prophylaxis: SCD  Diet: DIET GENERAL;  Code Status: Full Code    PT/OT Eval Status: ordered    Dispo - pending MRI findings    Roselia Woody, ALIVIA - CNP

## 2020-03-02 NOTE — PROGRESS NOTES
Hospitalist Progress Note      PCP: Amy Denis MD    Date of Admission: 2/29/2020    Chief Complaint: Right sided facial droop    Hospital Course: 66year old female, who presented to [facility] with right facial droop. Subjective: Continues to complain of right hand clumsiness. Unable to complete MRI due to \"drainage in her throat\". Medications:  Reviewed    Infusion Medications   Scheduled Medications    cefTRIAXone (ROCEPHIN) IV  1 g Intravenous Q24H    guaiFENesin  600 mg Oral BID    amLODIPine  2.5 mg Oral Daily    aspirin EC  81 mg Oral Daily    metoprolol tartrate  25 mg Oral BID    pantoprazole  20 mg Oral Daily    sodium chloride flush  10 mL Intravenous 2 times per day    enoxaparin  40 mg Subcutaneous Daily     PRN Meds: albuterol sulfate HFA, traMADol, sodium chloride flush, acetaminophen **OR** acetaminophen, polyethylene glycol, promethazine **OR** [DISCONTINUED] ondansetron      Intake/Output Summary (Last 24 hours) at 3/1/2020 2011  Last data filed at 3/1/2020 0701  Gross per 24 hour   Intake 480 ml   Output 300 ml   Net 180 ml       Physical Exam Performed:    BP (!) 149/61   Pulse 68   Temp 97.7 °F (36.5 °C) (Oral)   Resp 20   Ht 5' 8\" (1.727 m)   Wt 180 lb 12.4 oz (82 kg)   SpO2 94%   BMI 27.49 kg/m²     General appearance: No apparent distress, appears stated age and cooperative. HEENT: Pupils equal, round, and reactive to light. Conjunctivae/corneas clear. Neck: Supple, with full range of motion. No jugular venous distention. Trachea midline. Respiratory:  Normal respiratory effort. Clear to auscultation, bilaterally without Rales/Wheezes/Rhonchi. Cardiovascular: Regular rate and rhythm with normal S1/S2 without murmurs, rubs or gallops. Abdomen: Soft, non-tender, non-distended with normal bowel sounds. Musculoskeletal: No clubbing, cyanosis or edema bilaterally. Full range of motion without deformity.   Skin: Skin color, texture, turgor normal.  No rashes or injury) ([facility] [address] [N17.9]    Cirrhosis, nonalcoholic ([facility] [address] [Z05.25]    Generalized anxiety disorder [F41.1]    HTN (hypertension) [I10]     Suspected cerebrovascular accident (CVA)  Presented with right facial droop, right hand clumsiness and slurring of speech as reported by family  - Initial CT head, CTA head and neck negative for any acute abnormality  - MRI brain - unable to complete 3/1 due to congestion - added mucinex and discussed with patient to try to complete again 3/2. She is agreeable. - Neurochecks per protocol  - Already on aspirin 81, continue  - recent TTE during last admission showed hyperdynamic LV with possible outflow tract obstruction - cardiology was consulted and recommended outpatient follow-up.     LOU - most likely prerenal due to hypovolemia, likely related to diuresis on previous admission a few days back also patient takes Lasix at home. - Hold Lasix for now. Change to PRN use only.     UTI  - IV Rocephin. Follow-up urine culture     Suspect cirrhosis - noted initial work-up done on previous admission including imaging suspicious for cirrhosis and portal hypertension. Continue to follow-up with GI outpatient.   Will hold Lasix for now     Hypertension - controlled, resume home medication regimen except Lasix, lisinopril.          DVT Prophylaxis: SCD  Diet: DIET GENERAL;  Code Status: Full Code    PT/OT Eval Status: ordered    Dispo - pending MRI findings    Jaswinder January, APRN - MARGARET

## 2020-03-02 NOTE — PROGRESS NOTES
AM dose of Lovenox held due to low platelets and Metoprolol held for bradycardia. RN to d/w MD during rounds.

## 2020-03-02 NOTE — CONSULTS
Intravenous 2 times per day Estiven Chaves MD   10 mL at 03/02/20 3903    sodium chloride flush 0.9 % injection 10 mL  10 mL Intravenous PRN Estiven Chaves MD        acetaminophen (TYLENOL) tablet 650 mg  650 mg Oral Q6H PRN Estiven Chaves MD   650 mg at 03/01/20 2328    Or    acetaminophen (TYLENOL) suppository 650 mg  650 mg Rectal Q6H PRN Estiven Chaves MD        polyethylene glycol (GLYCOLAX) packet 17 g  17 g Oral Daily PRN Estiven Chaves MD        promethazine (PHENERGAN) tablet 12.5 mg  12.5 mg Oral Q6H PRN Estiven Chaves MD        enoxaparin (LOVENOX) injection 40 mg  40 mg Subcutaneous Daily Estiven Chaves MD   40 mg at 03/01/20 0813       ROS : A 10-14 system review of constitutional, cardiovascular, respiratory, eyes, musculoskeletal, endocrine, GI, ENT, skin, hematological, genitourinary, psychiatric and neurologic systems was obtained and updated today and is unremarkable except as mentioned in my HPI      Exam:     Constitutional:   Vitals:    03/01/20 2315 03/02/20 0447 03/02/20 0823 03/02/20 1144   BP: 138/69 (!) 153/79 (!) 161/68 (!) 162/66   Pulse: 67 56 60 65   Resp: 16 16 14    Temp: 97.8 °F (36.6 °C) 97.6 °F (36.4 °C) 97.8 °F (36.6 °C) 98 °F (36.7 °C)   TempSrc: Axillary Oral Oral Oral   SpO2: 95% 95% 97% 98%   Weight:       Height:           General appearance:  Normal development and appear in no acute distress. Eye: No icterus. Fundus: No blurring of optic disc. Neck: supple  Cardiovascular: No carotid bruit. No lower leg edema with good pulsation. Mental Status:   Oriented to person, place, problem, and time. Memory: Aware of recent and remote event. Good immediate recall. Intact remote memory  Normal attention span and concentration. Language: intact naming, repeating and fluency   Good fund of Knowledge. Aware of current events and vocabulary   Cranial Nerves:   II: Visual fields: Full to confrontation and nl [facility].   Pupils: equal, round, reactive to light  III,IV,VI: Extra Ocular Movements are intact. No nystagmus  V: Facial sensation is intact to pin prick and light touch  VII: Facial strength and movements: intact and symmetric  VIII: Hearing: Intact to finger rub bilaterally  IX: Palate elevation is symmetric  XI: Shoulder shrug is intact  XII: Tongue movements are normal  Musculoskeletal: 5/5 in left side and mild right-sided weakness. Tone: Normal tone. Reflexes: Bilateral biceps 2/4, triceps 2/4, brachial radialis 2/4, knee 2/4 and ankle 2/4. Planters: flexor bilaterally. Coordination: no pronator drift, no dysmetria with FNF in upper extremities. Normal REM. Sensation: normal to all modalities in both arms and legs. Gait/Posture: Unsteady  Data:  LABS:   Lab Results   Component Value Date     03/02/2020    K 4.1 03/02/2020    K 3.8 03/01/2020     03/02/2020    CO2 27 03/02/2020    BUN 24 03/02/2020    CREATININE 1.0 03/02/2020    GFRAA >60 03/02/2020    GFRAA >60 03/30/2011    LABGLOM 54 03/02/2020    GLUCOSE 97 03/02/2020    MG 1.90 02/25/2020    CALCIUM 8.6 03/02/2020     Lab Results   Component Value Date    WBC 9.0 03/01/2020    RBC 4.04 03/01/2020    HGB 12.3 03/01/2020    HCT 36.8 03/01/2020    MCV 91.1 03/01/2020    RDW 15.3 03/01/2020     03/01/2020     Lab Results   Component Value Date    INR 1.14 02/29/2020    PROTIME 13.3 (H) 02/29/2020       Neuroimaging were independently reviewed by myself and discussed results with the patient   Reviewed notes from different physicians  Reviewed lab and blood testing    Impression:  Acute right-sided facial droop and right-sided weakness. Possible TIA versus CVA.   Hypertension, not controlled  Hyperlipidemia  Depression  UTI  Acute kidney injury    Recommendation:  MRI brain  Speech and swallow evaluation  PT and OT  Telemetry  DVT and GI prophylaxis  Lipid panel  Hydration and follow renal panel  Antibiotics  Aspirin  Statin  Continue home blood pressure medications  Stroke prevention was discussed with the patient  DC planning when medically stable        Thank you for referring such patient. If you have any questions regarding my consult note, please don't hesitate to call me. Renetta Bolanos MD  [phone]    This dictation was generated by voice recognition computer software.  Although all attempts are made to edit the dictation for accuracy, there may be errors in the  transcription that are not intended

## 2020-03-02 NOTE — DISCHARGE INSTR - COC
Continuity of Care Form    Patient Name: Franci Gunter   :  1945  MRN:  [id number]    Admit date:  2020  Discharge date:  3/4/20    Code Status Order: Full Code   Advance Directives:   [facility] Documentation     Date/Time Healthcare Directive Type of Healthcare Directive Copy in [facility] [address] Agent's Name Healthcare Agent's Phone Number    20 2317  No, patient does not have an advance directive for healthcare treatment -- -- -- -- --          Admitting Physician:  Annabelle Castro MD  PCP: Cletis Apgar, MD    Discharging Nurse: Health system Unit/Room#: 1680/6268-39  Discharging Unit Phone Number: [phone]    Emergency Contact:   Extended Emergency Contact Information  Primary Emergency Contact: Morgan Calvert [address] Phone: [phone]  Relation: Child  Secondary Emergency Contact: Le Camacho  Address: [facility] Phone: [phone]  Work Phone: [phone]  Relation: Child    Past Surgical History:  Past Surgical History:   Procedure Laterality Date    ACHILLES TENDON SURGERY      CARPAL TUNNEL RELEASE      CATARACT REMOVAL WITH IMPLANT Left 2017    Left cataract removal. Dr. Gerhardt Medal Right 2017    PHACO EMULSIFICATION OF CATARACT WITH INTRAOCULAR LENS IMPLANT RIGHT EYE    COLONOSCOPY  3/24/10    FOOT SURGERY      GALLBLADDER SURGERY      HYSTERECTOMY      NECK SURGERY      PAIN MANAGEMENT PROCEDURE N/A 2020    MIDLINE LUMBAR FIVE SACRAL ONE EPIDURAL STEROID INJECTION SITE CONFIRMED BY FLUOROSCOPY performed by Loki Hall MD at [address] [place] [address] N/A 2020    EGD DIAGNOSTIC ONLY performed by Hazel Madison MD at [address] [place] [address]       Immunization History:   Immunization History   Administered Date(s) Administered    Tdap (Boostrix, Adacel) 07/04/2018       Active Problems:  Patient Active Problem List   Diagnosis Code    HTN (hypertension) I10    Generalized anxiety disorder F41.1    Acid reflux K21.9    Osteoarthritis hips, knees, back M15.9    Primary osteoarthritis of right knee M17.11    Right wrist sprain, initial encounter S63.501A    Arthritis of carpometacarpal (CMC) joint of right thumb M18.11    Mild aortic stenosis I35.0    SOB (shortness of breath) on exertion R06.02    Acute diastolic congestive heart failure (HCC) I50.31    Exertional dyspnea R06.09    Cirrhosis, nonalcoholic (HCC) B59.23    Suspected cerebrovascular accident (CVA) R09.89    LOU (acute kidney injury) ([address] N17.9       Isolation/Infection:   Isolation          No Isolation        Patient Infection Status     None to display          Nurse Assessment:  Last Vital Signs: BP (!) 161/68   Pulse 60   Temp 97.8 °F (36.6 °C) (Oral)   Resp 14   Ht 5' 8\" (1.727 m)   Wt 180 lb 12.4 oz (82 kg)   SpO2 97%   BMI 27.49 kg/m²     Last documented pain score (0-10 scale): Pain Level: 0  Last Weight:   Wt Readings from Last 1 Encounters:   02/29/20 180 lb 12.4 oz (82 kg)     Mental Status:  oriented and alert    IV Access:  - None    Nursing Mobility/ADLs:  Walking   Independent  Transfer  Independent  Bathing  Independent  Dressing  Independent  Toileting  Independent  Feeding  Independent  Med Admin  Independent  Med Delivery   whole    Wound Care Documentation and Therapy:        Elimination:  Continence:   · Bowel: Yes  · Bladder: Yes  Urinary Catheter: None   Colostomy/Ileostomy/Ileal Conduit: No       Date of Last BM: 3/4/20    Intake/Output Summary (Last 24 hours) at 3/2/2020 1126  Last data filed at 3/2/2020 0953  Gross per 24 hour   Intake 790 ml   Output 0 ml   Net 790 ml     I/O last 3 completed shifts: In: 56 [P.O.:600;  I.V.:10]  Out: 0     Safety Concerns:     Aspiration Risk    Impairments/Disabilities:      None    Nutrition Therapy:  Current Nutrition Unplanned Readmission:        11           Discharging to Facility/ Agency   Name:  [facility] care    Address: [address] [place] [address]., [facility] [address]  Phone: [phone]  Fax: [phone]    / signature: Electronically signed by Anuel Portillo RN on 3/5/20 at 10:27 AM    PHYSICIAN SECTION    Prognosis: {Prognosis:1275432164}    Condition at Discharge: [facility] Patient Condition:241604083}    Rehab Potential (if transferring to Rehab): {Prognosis:6227209666}    Recommended Labs or Other Treatments After Discharge: ***    Physician Certification: I certify the above information and transfer of Kirti Mohan  is necessary for the continuing treatment of the diagnosis listed and that she requires {Admit to Appropriate Level of Care:35608} for {GREATER/LESS:385319906} 30 days.      Update Admission H&P: {CHP DME Changes in WBJYZ:514058290}    PHYSICIAN SIGNATURE:  Telephone order - Dr Gretel Marcos RN  03/05/20 10:28 AM

## 2020-03-03 LAB
ORGANISM: ABNORMAL
URINE CULTURE, ROUTINE: ABNORMAL

## 2020-03-03 PROCEDURE — 6370000000 HC RX 637 (ALT 250 FOR IP): Performed by: NURSE PRACTITIONER

## 2020-03-03 PROCEDURE — 6370000000 HC RX 637 (ALT 250 FOR IP): Performed by: INTERNAL MEDICINE

## 2020-03-03 PROCEDURE — 6360000002 HC RX W HCPCS: Performed by: INTERNAL MEDICINE

## 2020-03-03 PROCEDURE — 92526 ORAL FUNCTION THERAPY: CPT

## 2020-03-03 PROCEDURE — 97116 GAIT TRAINING THERAPY: CPT

## 2020-03-03 PROCEDURE — 97110 THERAPEUTIC EXERCISES: CPT

## 2020-03-03 PROCEDURE — 2580000003 HC RX 258: Performed by: INTERNAL MEDICINE

## 2020-03-03 PROCEDURE — 99232 SBSQ HOSP IP/OBS MODERATE 35: CPT | Performed by: PSYCHIATRY & NEUROLOGY

## 2020-03-03 PROCEDURE — 92610 EVALUATE SWALLOWING FUNCTION: CPT

## 2020-03-03 PROCEDURE — 1200000000 HC SEMI PRIVATE

## 2020-03-03 RX ORDER — ATORVASTATIN CALCIUM 40 MG/1
40 TABLET, FILM COATED ORAL NIGHTLY
Status: DISCONTINUED | OUTPATIENT
Start: 2020-03-03 | End: 2020-03-04 | Stop reason: HOSPADM

## 2020-03-03 RX ADMIN — GUAIFENESIN 600 MG: 600 TABLET, EXTENDED RELEASE ORAL at 08:40

## 2020-03-03 RX ADMIN — AMLODIPINE BESYLATE 2.5 MG: 2.5 TABLET ORAL at 08:40

## 2020-03-03 RX ADMIN — ACETAMINOPHEN 650 MG: 325 TABLET ORAL at 08:44

## 2020-03-03 RX ADMIN — PANTOPRAZOLE SODIUM 20 MG: 20 TABLET, DELAYED RELEASE ORAL at 08:40

## 2020-03-03 RX ADMIN — Medication 10 ML: at 08:40

## 2020-03-03 RX ADMIN — Medication 10 ML: at 20:20

## 2020-03-03 RX ADMIN — GUAIFENESIN 600 MG: 600 TABLET, EXTENDED RELEASE ORAL at 20:20

## 2020-03-03 RX ADMIN — CEFTRIAXONE SODIUM 1 G: 1 INJECTION, POWDER, FOR SOLUTION INTRAMUSCULAR; INTRAVENOUS at 05:51

## 2020-03-03 RX ADMIN — METOPROLOL TARTRATE 25 MG: 25 TABLET, FILM COATED ORAL at 20:20

## 2020-03-03 RX ADMIN — ASPIRIN 81 MG: 81 TABLET, COATED ORAL at 08:40

## 2020-03-03 RX ADMIN — ATORVASTATIN CALCIUM 40 MG: 40 TABLET, FILM COATED ORAL at 20:20

## 2020-03-03 RX ADMIN — METOPROLOL TARTRATE 25 MG: 25 TABLET, FILM COATED ORAL at 08:40

## 2020-03-03 ASSESSMENT — PAIN SCALES - GENERAL
PAINLEVEL_OUTOF10: 3
PAINLEVEL_OUTOF10: 0
PAINLEVEL_OUTOF10: 0
PAINLEVEL_OUTOF10: 4
PAINLEVEL_OUTOF10: 0
PAINLEVEL_OUTOF10: 0

## 2020-03-03 ASSESSMENT — PAIN DESCRIPTION - LOCATION: LOCATION: HEAD

## 2020-03-03 NOTE — PLAN OF CARE
4 Eyes Skin Assessment     The patient is being assess for  Admission    I agree that 2 RN's have performed a thorough Head to Toe Skin Assessment on the patient. ALL assessment sites listed below have been assessed. Areas assessed by both nurses: Yessy Henry RN and Karime Prieto RN  [x]   Head, Face, and Ears   [x]   Shoulders, Back, and Chest  [x]   Arms, Elbows, and Hands   [x]   Coccyx, Sacrum, and Ischum  [x]   Legs, Feet, and Heels        Does the Patient have Skin Breakdown?   No         Tomi Prevention initiated:  No   Wound Care Orders initiated:  No      [facility] nurse consulted for Pressure Injury (Stage 3,4, Unstageable, DTI, NWPT, and Complex wounds):  No      Nurse 1 eSignature: Electronically signed by Gabriela Ley RN on 2/29/20 at 11:49 PM    **SHARE this note so that the co-signing nurse is able to place an eSignature**    Nurse 2 eSignature: Kevin Randolph RN
Increase function to baseline.
Increase patients ADLs/functional status to baseline.
Problem: HEMODYNAMIC STATUS  Goal: Patient has stable vital signs and fluid balance  Outcome: Ongoing     Problem: ACTIVITY INTOLERANCE/IMPAIRED MOBILITY  Goal: Mobility/activity is maintained at optimum level for patient  Outcome: Ongoing     Problem: COMMUNICATION IMPAIRMENT  Goal: Ability to express needs and understand communication  Outcome: Ongoing
([address] Generalized anxiety disorder, Hypertension, Osteoarthritis, and Screening mammogram. Comorbidities and risk factors for stroke reviewed and education provided as appropriate. Patient and/or family's stated goal of care: Achievement of self care     Reviewed the Following Education with Patient and/or Family:   Signs and Symptoms of Stroke-Reviewed FAST   Facial Drooping   Arm Weakness   Speech Difficulty   Time to Call 911 and how to activate EMS (911)   Importance of Follow Up Appointment at Discharge   Importance of Compliance with Medications Prescribed at Discharge     Pt verbalized understanding.      Family Present during Education: N/A     Stroke Education Booklet at Bedside: yes     Total Education Time: 10 Minutes            :

## 2020-03-03 NOTE — PROGRESS NOTES
Samara Pierce  Neurology Follow-up  [facility] Neurology    Date of Service: 3/3/2020    Subjective:   CC: Follow up today regarding: Right facial droop and possible new stroke. Events noted. Chart and lab reviewed. The patient denies any new symptoms today. She feels back to her baseline. She was unable to have the MRI despite second attempt. She denies any headache, double vision or blurred vision, dysphagia or dysarthria. Other review of system was unremarkable. ROS : A 10-12 system review obtained and updated today and is unremarkable except as mentioned  in my interval history. family history includes High Blood Pressure in her mother.     Past Medical History:   Diagnosis Date    Acid reflux     CHF (congestive heart failure) (HCC)     Generalized anxiety disorder     Hypertension     Osteoarthritis     of hips, knees, back    Screening mammogram 3/26/96     Current Facility-Administered Medications   Medication Dose Route Frequency Provider Last Rate Last Dose    atorvastatin (LIPITOR) tablet 40 mg  40 mg Oral Nightly Patricia Walker APRN - CNP        cefTRIAXone (ROCEPHIN) 1 g IVPB in 50 mL D5W minibag  1 g Intravenous Q24H Joanna Rosales MD   Stopped at 03/03/20 0631    guaiFENesin (MUCINEX) extended release tablet 600 mg  600 mg Oral BID ALIVIA Seymour - CNP   600 mg at 03/03/20 0840    albuterol sulfate  (90 Base) MCG/ACT inhaler 2 puff  2 puff Inhalation 4x Daily PRN Joanna Rosales MD        amLODIPine (NORVASC) tablet 2.5 mg  2.5 mg Oral Daily Joanna Rosales MD   2.5 mg at 03/03/20 0840    aspirin EC tablet 81 mg  81 mg Oral Daily Joanna Rosales MD   81 mg at 03/03/20 0840    metoprolol tartrate (LOPRESSOR) tablet 25 mg  25 mg Oral BID Joanna Rosales MD   25 mg at 03/03/20 0840    pantoprazole (PROTONIX) tablet 20 mg  20 mg Oral Daily Joanna Rosales MD   20 mg at 03/03/20 0840    traMADol (ULTRAM) tablet 50 mg  50 repeating and fluency   Good fund of Knowledge. Cranial Nerves:   II: Visual fields: Full. Pupils: equal, round, reactive to light  III,IV,VI: Extra Ocular Movements are intact. No nystagmus  V: Facial sensation is intact  VII: Facial strength and movements: intact and symmetric  IX: Palate elevation is symmetric  XI: Shoulder shrug is intact  XII: Tongue movements are normal  Musculoskeletal: 5/5 in all 4 extremities. Tone: Normal tone. Reflexes: Bilateral biceps 2/4, triceps 2/4, brachial radialis 2/4, knee 2/4 and ankle 2/4. Planters: flexor bilaterally. Coordination: no pronator drift, no dysmetria with FNF. Normal REM. Sensation: normal to all modalities in both arms and legs. Gait/Posture: steady gait        Data:  LABS:   Lab Results   Component Value Date     03/02/2020    K 4.1 03/02/2020    K 3.8 03/01/2020     03/02/2020    CO2 27 03/02/2020    BUN 24 03/02/2020    CREATININE 1.0 03/02/2020    GFRAA >60 03/02/2020    GFRAA >60 03/30/2011    LABGLOM 54 03/02/2020    GLUCOSE 97 03/02/2020    MG 1.90 02/25/2020    CALCIUM 8.6 03/02/2020     Lab Results   Component Value Date    WBC 9.0 03/01/2020    RBC 4.04 03/01/2020    HGB 12.3 03/01/2020    HCT 36.8 03/01/2020    MCV 91.1 03/01/2020    RDW 15.3 03/01/2020     03/01/2020     Lab Results   Component Value Date    INR 1.14 02/29/2020    PROTIME 13.3 (H) 02/29/2020       Neuroimaging was independently reviewed by me and discussed results with the patient. I reviewed blood testing and other test results and discussed results with the patient. Impression:  Acute right-sided facial droop and right-sided weakness. Likely TIA.   Hypertension, not controlled  Hyperlipidemia  Depression  UTI  Acute kidney injury, improved      Recommendation    Continue current supportive care  Aspirin  Statin  Hydration  Continue antibiotics  Continue home blood pressure medications  Monitor blood pressure at home  Follow renal

## 2020-03-03 NOTE — PROGRESS NOTES
Speech Language Pathology  Facility/Department: [facility] B3 - MED SURG   CLINICAL BEDSIDE SWALLOW EVALUATION      Recommended Diet and Intervention  Diet Solids Recommendation: NPO(Pending MBSS)  Liquid Consistency Recommendation: NPO(Pending MBSS)  Recommended Form of Meds: Via alternative means of nutrition(Pending MBSS)  *Recommend instrumental swallow assessment via MBSS to correlate clinical findings. Pt with significant coughing fit and audible wheezing post-swallow with thin liquid trials during BSE, suspected aspiration. NAME: Agus Preston  : 1945  MRN: [id number]    ADMISSION DATE: 2020  ADMITTING DIAGNOSIS: has HTN (hypertension); Generalized anxiety disorder; Acid reflux; Osteoarthritis hips, knees, back; Primary osteoarthritis of right knee; Right wrist sprain, initial encounter; Arthritis of carpometacarpal (CMC) joint of right thumb; Mild aortic stenosis; SOB (shortness of breath) on exertion; Acute diastolic congestive heart failure ([address] Exertional dyspnea; Cirrhosis, nonalcoholic ([address] Suspected cerebrovascular accident (CVA); LOU (acute kidney injury) [facility]); TIA involving left internal carotid artery; and Facial droop on their problem list.  ONSET DATE: Pt admitted to [facility] on 20    Recent Chest Xray/CT of Chest (20): Impression   1. Findings typical of bronchitis or reactive airways disease. 2. No focal infiltrate is evident.        Date of Eval: 3/3/2020  Evaluating Therapist: Julien Cabrales    Current Diet level:  Current Diet : Regular  Current Liquid Diet : Thin    Primary Complaint  Patient Complaint: Pt reported occasional dysphagia, new onset    Pain:  Pain Assessment: No c/o pain    Reason for Referral  Agus Preston was referred for a bedside swallow evaluation to assess the efficiency of her swallow function, identify signs and symptoms of aspiration and make recommendations regarding safe dietary consistencies, effective compensatory strategies, and safe eating environment. Impression  Dysphagia Diagnosis: Suspected needs further assessment  Dysphagia Outcome Severity Scale: Level 1: Severe dysphagia- NPO. Unable to tolerate any PO safely(Needs further assessment; pending MBSS results / recs)   Pt seen upright in bed, alert and agreeable to evaluation, RN OK'd SLP entry and evaluation. Pt currently on RA. Reduced buccal strength noted with OM exam.  Pt endorsed dysarthria at times. Pt's speech 100% intelligible in conversation as judged by SLP. SLP briefly reviewed compensatory speech strategies and OM exercises with pt, handouts provided. Pt able to complete timely volitional swallow and strong volitional cough. Pt observed with thin liquid trials via cup and straw, as well as, regular solid trials during BSE. Pt with occasional double swallow, particularly with thin liquid trials. Immediate cough noted post-swallow with TL via straw x1 and significant coughing fit and audible wheezing post-swallow with TL via cup with pt expectorating clear-white sputum. Pt's RR 20/min prior to PO trials and increased to 28/min after TL trials. PO trials discontinued at this time. *Recommend downgrade to NPO pending instrumental swallow assessment via MBSS to correlate clinical findings. Pt with significant coughing fit and audible wheezing post-swallow with thin liquid trials during BSE, suspected aspiration. RN aware of recs. ST to continue to follow. Treatment Plan  Requires SLP Intervention: Yes  Duration/Frequency of Treatment: 3-5x/week for LOS pending MBSS results / recs  D/C Recommendations:  To be determined  Recommendations: Modified barium swallow study;NPO;Dysphagia treatment  Therapeutic Interventions: Patient/Family education;Diet tolerance monitoring    Compensatory Swallowing Strategies  NPO  Oral care  Upright positioning    Treatment/Goals  Short-term Goals  Timeframe for Short-term Goals: 2 days (3/5/20)  Long-term Goals  Timeframe for Long-term Goals: 3 days (3/6/20)  Goal 1: The pt will tolerate safest and least restrictive diet without s/s of aspiration. Dysphagia Goals: The patient will tolerate recommended diet without observed clinical signs of aspiration; The patient will tolerate instrumental swallowing procedure; The patient/caregiver will demonstrate understanding of compensatory strategies for improved swallowing safety. General  Chart Reviewed: Yes  Comments: Pt admitted d/t facial droop, dysarthria. Pt has hx of CHF and reflux per chart. Behavior/Cognition: Alert; Cooperative;Pleasant mood  Respiratory Status: Room air  O2 Device: None (Room air)  Communication Observation: Functional(Pt endorses ongoing dysarthria at times; speech 100% intelligible in conversation as judged by SLP)  Follows Directions: Simple  Dentition: Adequate  Patient Positioning: Upright in bed  Baseline Vocal Quality: Normal  Volitional Cough: Strong  Prior Dysphagia History: No hx of dysphagia per chart review. Consistencies Administered: Reg solid; Thin - cup; Thin - straw    Vision/Hearing  Vision  Vision: Impaired  Vision Exceptions: Wears glasses for reading  Hearing  Hearing: Exceptions to [facility]  Hearing Exceptions: Hard of hearing/hearing concerns    Oral Motor Deficits  Oral/Motor  Oral Motor: Within functional limits(Mildly reduced buccal strength)    Oral Phase Dysfunction  Oral Phase  Oral Phase: Exceptions  Oral Phase Dysfunction  Suspected Premature Bolus Loss: All     Indicators of Pharyngeal Phase Dysfunction   Pharyngeal Phase  Pharyngeal Phase: Exceptions  Indicators of Pharyngeal Phase Dysfunction  Cough - Immediate: Thin - cup; Thin - straw  Pharyngeal Phase   Pharyngeal: Pt with occasional double swallow, particularly with thin liquid trials. Immediate cough noted post-swallow with TL via straw x1 and significant coughing fit and audible wheezing post-swallow with TL via cup with pt expectorating clear-white sputum.   Pt's RR 20/min prior to PO trials and increased to 28/min after TL trials. PO trials discontinued at this time. Prognosis  Prognosis  Prognosis for safe diet advancement: good  Individuals consulted  Consulted and agree with results and recommendations: Patient    Education  Patient Education: Pt educated on reason for referral, role of ST, assessment results and recommendations. Patient Education Response: Verbalizes understanding  Safety Devices in place: Yes  Type of devices: Call light within reach; Left in bed;Nurse notified       Therapy Time  SLP Individual Minutes  Time In: 2917  Time Out: 8742  Minutes: 23 minutes; dysphagia eval and tx    Kris Morris M.S. [id number] [facility], [place]  Speech-language pathologist  [phone]

## 2020-03-03 NOTE — PROGRESS NOTES
Physical Therapy  Facility/Department: [facility] B3 - MED SURG  Daily Treatment Note  NAME: Scotty Oates  : 1945  MRN: [id number]    Date of Service: 3/3/2020    Discharge Recommendations:  Home with assist PRN, Home with Home health PT   PT Equipment Recommendations  Equipment Needed: No    Assessment   Body structures, Functions, Activity limitations: Decreased functional mobility ; Decreased endurance;Decreased balance;Decreased strength  Assessment: Patient is progressing well with her therapy goals. Patient said that she felt like her right sided weakness has resolved since admission. On manual muscle exam patient's strength was 4+/5 for her bilateral lower extremities. Today the patient was able to ambulate 50 feet with a rolling walker and supervision. Patient continued to present with the therapy deficits listed above. Recommend home PT to address these deficits. Patient is safe for home, when medically stable, with continued PRN supervision/assistance and continued use of a rolling walker. PT to continue to follow. Treatment Diagnosis: decreased independence with mobility. Prognosis: Good  Decision Making: Low Complexity  PT Education: Goals;Plan of Care;Equipment;Gait Training;Functional Mobility Training;PT Role;Transfer Training;General Safety; Family Education; Adaptive Device Training;Disease Specific Education; Injury Prevention  Patient Education: Patient verbalized understanding. Barriers to Learning: none  REQUIRES PT FOLLOW UP: Yes  Activity Tolerance  Activity Tolerance: Patient Tolerated treatment well  Activity Tolerance: Vitals: Pre activity: 157/73 63 BPM 94% room air. Post activity: 173/79 68 BPM 95% on room air. Patient Diagnosis(es): The primary encounter diagnosis was Facial droop. Diagnoses of Dysarthria-clumsy hand syndrome and LOU (acute kidney injury) ([facility] were also pertinent to this visit.      has a past medical history of Acid reflux, CHF (congestive heart failure)

## 2020-03-03 NOTE — CARE COORDINATION
[facility]  Spoke with patient regarding homecare services. Demographics verified, and patient is agreeable to home care services. Will continue to follow patient for needs. I will fax all docs on DC to [facility]. ... However if patient DC's over the weekend please fax all docs [facility], [address], [place], and H&P to [phone]. Home care to see patient by: 24-48 hr post hosp DC.       May Schultz RN, BSN CTN  [facility] [phone]

## 2020-03-03 NOTE — PROGRESS NOTES
Occupational Therapy  Failed Attempt:     Pt currently working with PT. Will hold OT session at this time and follow up later this date as pt is appropriate and schedule allows.     Kimberly San, OTR/L [id number]

## 2020-03-04 ENCOUNTER — APPOINTMENT (OUTPATIENT)
Dept: GENERAL RADIOLOGY | Age: 75
DRG: 065 | End: 2020-03-04
Payer: MEDICARE

## 2020-03-04 VITALS
HEART RATE: 63 BPM | OXYGEN SATURATION: 94 % | RESPIRATION RATE: 16 BRPM | TEMPERATURE: 98.2 F | WEIGHT: 184.8 LBS | DIASTOLIC BLOOD PRESSURE: 78 MMHG | HEIGHT: 68 IN | BODY MASS INDEX: 28.01 KG/M2 | SYSTOLIC BLOOD PRESSURE: 163 MMHG

## 2020-03-04 PROCEDURE — 97530 THERAPEUTIC ACTIVITIES: CPT

## 2020-03-04 PROCEDURE — 2580000003 HC RX 258: Performed by: INTERNAL MEDICINE

## 2020-03-04 PROCEDURE — 74230 X-RAY XM SWLNG FUNCJ C+: CPT

## 2020-03-04 PROCEDURE — 6370000000 HC RX 637 (ALT 250 FOR IP): Performed by: NURSE PRACTITIONER

## 2020-03-04 PROCEDURE — 6370000000 HC RX 637 (ALT 250 FOR IP): Performed by: INTERNAL MEDICINE

## 2020-03-04 PROCEDURE — 92526 ORAL FUNCTION THERAPY: CPT

## 2020-03-04 PROCEDURE — 92611 MOTION FLUOROSCOPY/SWALLOW: CPT

## 2020-03-04 PROCEDURE — 2580000003 HC RX 258

## 2020-03-04 PROCEDURE — 6360000002 HC RX W HCPCS: Performed by: INTERNAL MEDICINE

## 2020-03-04 RX ORDER — ATORVASTATIN CALCIUM 40 MG/1
40 TABLET, FILM COATED ORAL NIGHTLY
Qty: 30 TABLET | Refills: 3 | Status: SHIPPED | OUTPATIENT
Start: 2020-03-04 | End: 2020-05-06 | Stop reason: DRUGHIGH

## 2020-03-04 RX ORDER — SODIUM CHLORIDE 9 MG/ML
INJECTION, SOLUTION INTRAVENOUS
Status: COMPLETED
Start: 2020-03-04 | End: 2020-03-04

## 2020-03-04 RX ADMIN — CEFTRIAXONE SODIUM 1 G: 1 INJECTION, POWDER, FOR SOLUTION INTRAMUSCULAR; INTRAVENOUS at 05:59

## 2020-03-04 RX ADMIN — Medication 10 ML: at 09:08

## 2020-03-04 RX ADMIN — AMLODIPINE BESYLATE 2.5 MG: 2.5 TABLET ORAL at 09:08

## 2020-03-04 RX ADMIN — GUAIFENESIN 600 MG: 600 TABLET, EXTENDED RELEASE ORAL at 09:08

## 2020-03-04 RX ADMIN — ASPIRIN 81 MG: 81 TABLET, COATED ORAL at 09:08

## 2020-03-04 RX ADMIN — METOPROLOL TARTRATE 25 MG: 25 TABLET, FILM COATED ORAL at 09:08

## 2020-03-04 RX ADMIN — PANTOPRAZOLE SODIUM 20 MG: 20 TABLET, DELAYED RELEASE ORAL at 09:08

## 2020-03-04 RX ADMIN — SODIUM CHLORIDE 250 ML: 9 INJECTION, SOLUTION INTRAVENOUS at 05:59

## 2020-03-04 ASSESSMENT — PAIN SCALES - GENERAL
PAINLEVEL_OUTOF10: 0
PAINLEVEL_OUTOF10: 0

## 2020-03-04 NOTE — PROGRESS NOTES
Discharge instructions reviewed with patient. Patient stated understanding. Discharge paperwork signed with copy put in patient's chart. All of patient's possessions gathered and with patient. Patient taken via wheelchair to patient's car.  Electronically signed by Judith Ahumada, RN on 3/4/2020 at 3:51 PM

## 2020-03-04 NOTE — CARE COORDINATION
Call received from Alon Islas with [facility]. States she is adding speech therapy to patient's home care, because pt cannot receive OUTPT speech AND home care. Primary nurse, Loni Valencia, notified.     Johnathan Purdy RN CM

## 2020-03-04 NOTE — PROGRESS NOTES
Hospitalist Progress Note      PCP: Natalia Howard MD    Date of Admission: 2/29/2020    Chief Complaint: Right sided facial droop    Hospital Course: 66year old female, who presented to [facility] with right facial droop. Subjective:   C/O difficulty swallowing    Medications:  Reviewed    Infusion Medications   Scheduled Medications    atorvastatin  40 mg Oral Nightly    cefTRIAXone (ROCEPHIN) IV  1 g Intravenous Q24H    guaiFENesin  600 mg Oral BID    amLODIPine  2.5 mg Oral Daily    aspirin EC  81 mg Oral Daily    metoprolol tartrate  25 mg Oral BID    pantoprazole  20 mg Oral Daily    sodium chloride flush  10 mL Intravenous 2 times per day    enoxaparin  40 mg Subcutaneous Daily     PRN Meds: albuterol sulfate HFA, traMADol, sodium chloride flush, acetaminophen **OR** acetaminophen, polyethylene glycol, promethazine **OR** [DISCONTINUED] ondansetron      Intake/Output Summary (Last 24 hours) at 3/3/2020 2007  Last data filed at 3/3/2020 1321  Gross per 24 hour   Intake 360 ml   Output --   Net 360 ml       Physical Exam Performed:    BP (!) 158/73   Pulse 67   Temp 98.1 °F (36.7 °C) (Oral)   Resp 16   Ht 5' 8\" (1.727 m)   Wt 186 lb 9.6 oz (84.6 kg)   SpO2 96%   BMI 28.37 kg/m²     General appearance: No apparent distress, appears stated age and cooperative. HEENT: Pupils equal, round, and reactive to light. Conjunctivae/corneas clear. Neck: Supple, with full range of motion. No jugular venous distention. Trachea midline. Respiratory:  Normal respiratory effort. Clear to auscultation, bilaterally without Rales/Wheezes/Rhonchi. Cardiovascular: Regular rate and rhythm with normal S1/S2 without murmurs, rubs or gallops. Abdomen: Soft, non-tender, non-distended with normal bowel sounds. Musculoskeletal: No clubbing, cyanosis or edema bilaterally. Full range of motion without deformity. Skin: Skin color, texture, turgor normal.  No rashes or lesions.   Neurologic:  Neurovascularly intact without any focal sensory/motor deficits. Cranial nerves: II-XII intact, grossly non-focal.  Psychiatric: Alert and oriented, thought content appropriate, normal insight  Capillary Refill: Brisk,< 3 seconds   Peripheral Pulses: +2 palpable, equal bilaterally       Labs:   Recent Labs     02/29/20 2033 03/01/20  0716   WBC 17.6* 9.0   HGB 13.7 12.3   HCT 41.6 36.8    116*     Recent Labs     02/29/20 2033 03/01/20  0716 03/02/20  0800    137 143   K 3.4* 3.8 4.1    101 107   CO2 24 25 27   BUN 38* 34* 24*   CREATININE 1.4* 1.1 1.0   CALCIUM 9.2 8.3 8.6     Recent Labs     02/29/20 2033   AST 37   ALT 37   BILITOT 0.9   ALKPHOS 89     Recent Labs     02/29/20 2033   INR 1.14     Recent Labs     02/29/20 2033   Kandice Spell <0.01       Urinalysis:      Lab Results   Component Value Date    NITRU POSITIVE 02/29/2020    WBCUA >100 02/29/2020    BACTERIA 3+ 02/29/2020    RBCUA 3-4 02/29/2020    BLOODU TRACE-INTACT 02/29/2020    SPECGRAV 1.010 02/29/2020    GLUCOSEU Negative 02/29/2020       Radiology:  XR CHEST STANDARD (2 VW)   Final Result   1. Findings typical of bronchitis or reactive airways disease. 2. No focal infiltrate is evident. CTA HEAD NECK W CONTRAST   Final Result   No focal significant arterial narrowing in the neck. Multifocal moderate distal vertebral artery narrowing on the left is noted. Otherwise, no focal significant arterial narrowing in the head. There is mild irregular contour of the small MCA branches distally on the   right. This may be artifact or due to mild narrowing         CT HEAD WO CONTRAST   Final Result   No findings diagnostic of an acute infarct at this time      Critical results were called by Dr. Nayla Ray to [address] [facility] [address] on   2/29/2020 at 20:41.          FL MODIFIED BARIUM SWALLOW W VIDEO    (Results Pending)           Assessment/Plan:    Active Hospital Problems    Diagnosis    TIA involving left internal carotid artery [G45.1]  Facial droop [R29.810]    Suspected cerebrovascular accident (CVA) [R09.89]    LOU (acute kidney injury) ([facility] [N17.9]    Cirrhosis, nonalcoholic ([facility] [B78.26]    Generalized anxiety disorder [F41.1]    HTN (hypertension) [I10]     Suspected cerebrovascular accident (CVA)  Presented with right facial droop, right hand clumsiness and slurring of speech as reported by family  - Initial CT head, CTA head and neck negative for any acute abnormality  - MRI brain - unable to complete 3/1 due to congestion - added mucinex and discussed with patient to tried to complete again 3/2. She was unable  - Neurochecks per protocol  - Already on aspirin 81, continue  - recent TTE during last admission showed hyperdynamic LV with possible outflow tract obstruction - cardiology was consulted and recommended outpatient follow-up.     LOU - most likely prerenal due to hypovolemia, likely related to diuresis on previous admission a few days back also patient takes Lasix at home. - Hold Lasix for now. Change to PRN use only.     UTI  - IV Rocephin. Follow-up urine culture DAY 3/5     Suspect cirrhosis - noted initial work-up done on previous admission including imaging suspicious for cirrhosis and portal hypertension. Continue to follow-up with GI outpatient.   Will hold Lasix for now     Hypertension - controlled, resume home medication regimen except Lasix, lisinopril.          DVT Prophylaxis: SCD  Diet: DIET GENERAL;  Code Status: Full Code    PT/OT Eval Status: ordered    Dispo - pending MRI findings    ALIVIA Arteaga - CNP

## 2020-03-04 NOTE — DISCHARGE SUMMARY
cirrhotic changes.  - TTE completed - EF > 65% with grade I DD. LV outflow tract consistent with dynamic obstruction. - cardiology consulted. - Continue amlodipine but increase to 5mg daily. Likely needs addition of BB as outpatient. Cards to follow. - EGD was WNL, she will need outpt colonoscopy and fibroscan. COPD exacerbation - continue albuterol and Prednisone burst.     Hypokalemia  - replaced.       Physical Exam Performed:     BP (!) 153/71   Pulse 85   Temp 98.7 °F (37.1 °C) (Oral)   Resp 16   Ht 5' 8\" (1.727 m)   Wt 189 lb 12.8 oz (86.1 kg)   SpO2 93%   BMI 28.86 kg/m²       General appearance:  No apparent distress, appears stated age and cooperative. HEENT:  Normal cephalic, atraumatic without obvious deformity. Pupils equal, round, and reactive to light. Extra ocular muscles intact. Conjunctivae/corneas clear. Neck: Supple, with full range of motion. No jugular venous distention. Trachea midline. Respiratory:  Normal respiratory effort. Clear to auscultation, bilaterally without Rales/Wheezes/Rhonchi. Cardiovascular:  Regular rate and rhythm with normal S1/S2 without murmurs, rubs or gallops. Abdomen: Soft, non-tender, non-distended with normal bowel sounds. Musculoskeletal:  No clubbing, cyanosis or edema bilaterally. Full range of motion without deformity. Skin: Skin color, texture, turgor normal.  No rashes or lesions. Neurologic:  Neurovascularly intact without any focal sensory/motor deficits. Cranial nerves: II-XII intact, grossly non-focal.  Psychiatric:  Alert and oriented, thought content appropriate, normal insight  Capillary Refill: Brisk,< 3 seconds   Peripheral Pulses: +2 palpable, equal bilaterally       Labs:  For convenience and continuity at follow-up the following most recent labs are provided:      CBC:    Lab Results   Component Value Date    WBC 9.0 03/01/2020    HGB 12.3 03/01/2020    HCT 36.8 03/01/2020     03/01/2020       Renal:    Lab Results tablet by mouth daily, Disp-30 tablet, R-0OTC      Acetaminophen (TYLENOL PO) Take by mouth as neededHistorical Med      traMADol (ULTRAM) 50 MG tablet Take 50 mg by mouth as needed for Pain. [place] Historical Med      meloxicam (MOBIC) 15 MG tablet Take 7.5 mg by mouth daily Historical Med      pantoprazole (PROTONIX) 40 MG tablet Take 20 mg by mouth daily 20 mg dailyHistorical Med       !! - Potential duplicate medications found. Please discuss with provider. Time Spent on discharge is more than 30 minutes in the examination, evaluation, counseling and review of medications and discharge plan. Signed:    Hari Prater, ALIVIA - CNP   3/4/2020      Thank you Emelina Knox MD for the opportunity to be involved in this patient's care. If you have any questions or concerns please feel free to contact me at [phone].

## 2020-03-04 NOTE — PROGRESS NOTES
Score: 23 (03/04/20 1159)  AM-PAC Inpatient ADL T-Scale Score : 51.12 (03/04/20 1159)  ADL Inpatient CMS 0-100% Score: 15.86 (03/04/20 1159)  ADL Inpatient CMS G-Code Modifier : CI (03/04/20 1159)    Goals  Short term goals  Time Frame for Short term goals: 1 week (by 3/8/20)  Short term goal 1: Pt will complete functional transfers with Mod I goal met 3/04  Short term goal 2: Pt will complete LE dressing with setup goal met 3/04  Short term goal 3: Pt will perform 3-5 minutes dynamic standing activity with Supervision by 3/6 goal met 3/04  Patient Goals   Patient goals : \"to go home\"       Therapy Time   Individual Concurrent Group Co-treatment   Time In 2273         Time Out 1057         Minutes 16         Timed Code Treatment Minutes: [address] [place] [address], OTR/L

## 2020-03-04 NOTE — PROCEDURES
-- DO NOT REPLY / DO NOT REPLY ALL --  -- Message is from the [facility]--    General Patient Message      Reason for Call: Patient had 24 hr disposition for high bp but not able to get him in until 1/24.  Only wants to see you     Caller Information       Type Contact Phone    12/27/2019 11:21 AM Phone (Incoming) Leonard Arana (Self) [phone] (H)          Alternative phone number: [phone]    Turnaround time given to caller:   \"This message will be sent to [state Provider's name]. The clinical team will fulfill your request as soon as they review your message.\"}     INSTRUMENTAL SWALLOW REPORT  MODIFIED BARIUM SWALLOW    NAME: Vanessa Bowen   : 1945  MRN: [id number]       Date of Eval: 3/4/2020        Radiologist: Dr. Kathya Mittal     Referring Diagnosis(es): Referring Diagnosis: oropharyngeal dysphagia (R13.12)    Past Medical History:  has a past medical history of Acid reflux, CHF (congestive heart failure) ([address] Generalized anxiety disorder, Hypertension, Osteoarthritis, and Screening mammogram.  Past Surgical History:  has a past surgical history that includes Colonoscopy (3/24/10); Hysterectomy; Gallbladder surgery; Carpal tunnel release; Foot surgery; Neck surgery; Cataract removal with implant (Left, 2017); Cataract removal with implant (Right, 2017); Pain management procedure (N/A, 2020); Achilles tendon surgery; Upper gastrointestinal endoscopy; and Upper gastrointestinal endoscopy (N/A, 2020). Current Diet Solid Consistency: NPO  Current Diet Liquid Consistency: NPO     Type of Study: Initial MBS     Patient Complaints/Reason for Referral:  Vanessa Bowen was referred for a MBS to assess the efficiency of his/her swallow function, assess for aspiration, and to make recommendations regarding safe dietary consistencies, effective compensatory strategies, and safe eating environment. Patient complaints: Difficulty swallowing and clearing mucous    Subjective  Subjective: Mildly lethargic but able to fully participate    Behavior/Cognition/Vision/Hearing:  Behavior/Cognition: Alert; Cooperative;Pleasant mood  Vision: Impaired  Vision Exceptions: Wears glasses for reading  Hearing: Exceptions to [facility]  Hearing Exceptions: Hard of hearing/hearing concerns    Impressions: The pt demonstrates moderate-severe oropharyngeal dysphagia characterized by significantly reduced pharyngeal clearance with mod-max residue in the valleculae to the pyriforms with various PO consistencies.  With multiple swallows the pt was able to largely (Honey)  Liquid administration via: Cup;Spoon    Medication administration: Meds in puree    Safe Swallow Protocol:  Supervision: Close  Compensatory Swallowing Strategies: Alternate solids and liquids;Small bites/sips;Eat/Feed slowly; Swallow 2 times per bite/sip; No straws;Remain upright for 30-45 minutes after meals;Upright as possible for all oral intake; Other (comments)(Effortful swallows)    Recommendations/Treatment  Requires SLP Intervention: Yes  Recommendations: F/U MBS     D/C Recommendations: Outpatient  Postural Changes and/or Swallow Maneuvers: Upright 30 min after meal;Upright 90 degrees    Recommended Exercises: Pt would benefit from NMES/Vital Stim after D/C via placement 2b. She would benefit from [place] C, Jutting jaw forward and hold, pressing tongue to roof of mouth and hold, lowing jaw against resistance and Falsetto 'EE'. Education:    Patient Education: [facility] results were reviewed at length re: results, recommendations, recommended diet, treatment POC and compensatory strategies  Patient Education Response: Verbalizes understanding    Prognosis  Prognosis for safe diet advancement: good  Barriers to reach goals: severity of dysphagia  Duration/Frequency of Treatment  Duration/Frequency of Treatment: 3-5x/week for LOS   Safety Devices  Safety Devices in place: Not Applicable    Goals:    Long Term:   Timeframe for Long-term Goals: 3 days (3/7/20)  Goal 1: The pt will tolerate safest and least restrictive diet without s/s of aspiration. Short Term:  Dysphagia Goals: The patient will tolerate recommended diet without observed clinical signs of aspiration; The patient will recall and perform compensatory strategies, with no cues.     Oral Preparation / Oral Phase  Oral Phase: Impaired  Oral Phase - Major Contributing Deficits  Weak Lingual Manipulation: All  Reduced Posterior Propulsion: All    Pharyngeal Phase  Pharyngeal Phase: Impaired  Pharyngeal Phase - Major Contributing

## 2020-03-05 ENCOUNTER — CARE COORDINATION (OUTPATIENT)
Dept: CASE MANAGEMENT | Age: 75
End: 2020-03-05

## 2020-03-05 NOTE — CARE COORDINATION
Follow Up  Future Appointments   Date Time Provider Victoriano Newby   3/9/2020  2:30 PM Sreedhar Manriquez MD AND [facility]   3/18/2020  1:15 PM MD August Louis RN

## 2020-03-06 ENCOUNTER — CARE COORDINATION (OUTPATIENT)
Dept: CASE MANAGEMENT | Age: 75
End: 2020-03-06

## 2020-03-06 NOTE — DISCHARGE SUMMARY
lisinopril. Physical Exam Performed:     BP (!) 163/78   Pulse 63   Temp 98.2 °F (36.8 °C) (Oral)   Resp 16   Ht 5' 8\" (1.727 m)   Wt 184 lb 12.8 oz (83.8 kg)   SpO2 94%   BMI 28.10 kg/m²       General appearance:  No apparent distress, appears stated age and cooperative. HEENT:  Normal cephalic, atraumatic without obvious deformity. Pupils equal, round, and reactive to light. Extra ocular muscles intact. Conjunctivae/corneas clear. Neck: Supple, with full range of motion. No jugular venous distention. Trachea midline. Respiratory:  Normal respiratory effort. Clear to auscultation, bilaterally without Rales/Wheezes/Rhonchi. Cardiovascular:  Regular rate and rhythm with normal S1/S2 without murmurs, rubs or gallops. Abdomen: Soft, non-tender, non-distended with normal bowel sounds. Musculoskeletal:  No clubbing, cyanosis or edema bilaterally. Full range of motion without deformity. Skin: Skin color, texture, turgor normal.  No rashes or lesions. Neurologic:  Neurovascularly intact without any focal sensory/motor deficits. Cranial nerves: II-XII intact, grossly non-focal.  Psychiatric:  Alert and oriented, thought content appropriate, normal insight  Capillary Refill: Brisk,< 3 seconds   Peripheral Pulses: +2 palpable, equal bilaterally       Labs: For convenience and continuity at follow-up the following most recent labs are provided:      CBC:    Lab Results   Component Value Date    WBC 9.0 03/01/2020    HGB 12.3 03/01/2020    HCT 36.8 03/01/2020     03/01/2020       Renal:    Lab Results   Component Value Date     03/02/2020    K 4.1 03/02/2020    K 3.8 03/01/2020     03/02/2020    CO2 27 03/02/2020    BUN 24 03/02/2020    CREATININE 1.0 03/02/2020    CALCIUM 8.6 03/02/2020         Significant Diagnostic Studies    Radiology:   FL MODIFIED BARIUM SWALLOW W VIDEO   Final Result   No tamara aspiration. Moderate residual with administered consistencies.       Please see separate speech pathology report for full discussion of findings   and recommendations. XR CHEST STANDARD (2 VW)   Final Result   1. Findings typical of bronchitis or reactive airways disease. 2. No focal infiltrate is evident. CTA HEAD NECK W CONTRAST   Final Result   No focal significant arterial narrowing in the neck. Multifocal moderate distal vertebral artery narrowing on the left is noted. Otherwise, no focal significant arterial narrowing in the head. There is mild irregular contour of the small MCA branches distally on the   right. This may be artifact or due to mild narrowing         CT HEAD WO CONTRAST   Final Result   No findings diagnostic of an acute infarct at this time      Critical results were called by Dr. Andre Mccarthy to [facility] [address] on   2/29/2020 at 20:41.                 Consults:     IP CONSULT TO STROKE TEAM  IP CONSULT TO PHARMACY  IP CONSULT TO HOSPITALIST  IP CONSULT TO NEUROLOGY  IP CONSULT TO HOME CARE NEEDS    Disposition:  home     Condition at Discharge: Stable    Discharge Instructions/Follow-up:    Future Appointments   Date Time Provider Victoriano Newby   3/9/2020  2:30 PM Sreedhar Manriquez MD AND [facility]ULM [address] [place] [address]   3/12/2020  2:10 PM MD LOLY Burciaga AND [address] [place] [address]   3/18/2020  1:15 PM Sarah Aburto MD August [facility]         Code Status:  FC    Activity: activity as tolerated    Diet: cardiac diet      Discharge Medications:     Discharge Medication List as of 3/4/2020  2:37 PM           Details   atorvastatin (LIPITOR) 40 MG tablet Take 1 tablet by mouth nightly, Disp-30 tablet, R-3Normal              Details   metoprolol tartrate (LOPRESSOR) 25 MG tablet Take 1 tablet by mouth 2 times daily, Disp-180 tablet, R-1Normal      !! albuterol sulfate  (90 Base) MCG/ACT inhaler Inhale 2 puffs into the lungs 4 times daily as needed for Wheezing, Disp-1 Inhaler, R-0Print      !! albuterol sulfate  (90 Base) MCG/ACT inhaler Inhale 2 puffs into the

## 2020-03-09 ENCOUNTER — OFFICE VISIT (OUTPATIENT)
Dept: PULMONOLOGY | Age: 75
End: 2020-03-09
Payer: MEDICARE

## 2020-03-09 VITALS
BODY MASS INDEX: 27.34 KG/M2 | DIASTOLIC BLOOD PRESSURE: 61 MMHG | TEMPERATURE: 98.1 F | WEIGHT: 180.4 LBS | RESPIRATION RATE: 18 BRPM | OXYGEN SATURATION: 97 % | HEART RATE: 58 BPM | SYSTOLIC BLOOD PRESSURE: 114 MMHG | HEIGHT: 68 IN

## 2020-03-09 PROCEDURE — G8417 CALC BMI ABV UP PARAM F/U: HCPCS | Performed by: INTERNAL MEDICINE

## 2020-03-09 PROCEDURE — 99204 OFFICE O/P NEW MOD 45 MIN: CPT | Performed by: INTERNAL MEDICINE

## 2020-03-09 PROCEDURE — G8427 DOCREV CUR MEDS BY ELIG CLIN: HCPCS | Performed by: INTERNAL MEDICINE

## 2020-03-09 PROCEDURE — G8484 FLU IMMUNIZE NO ADMIN: HCPCS | Performed by: INTERNAL MEDICINE

## 2020-03-09 PROCEDURE — 1090F PRES/ABSN URINE INCON ASSESS: CPT | Performed by: INTERNAL MEDICINE

## 2020-03-10 ASSESSMENT — ENCOUNTER SYMPTOMS
VOICE CHANGE: 0
ABDOMINAL PAIN: 0
SORE THROAT: 0
EYE ITCHING: 0
EYE DISCHARGE: 0
EYE PAIN: 0
CONSTIPATION: 0
STRIDOR: 0
CHOKING: 0
DIARRHEA: 0
SHORTNESS OF BREATH: 1
CHEST TIGHTNESS: 0

## 2020-03-10 NOTE — PROGRESS NOTES
Family History   Problem Relation Age of Onset    High Blood Pressure Mother          Current Outpatient Medications:     atorvastatin (LIPITOR) 40 MG tablet, Take 1 tablet by mouth nightly, Disp: 30 tablet, Rfl: 3    metoprolol tartrate (LOPRESSOR) 25 MG tablet, Take 1 tablet by mouth 2 times daily, Disp: 180 tablet, Rfl: 1    albuterol sulfate  (90 Base) MCG/ACT inhaler, Inhale 2 puffs into the lungs 4 times daily as needed for Wheezing, Disp: 1 Inhaler, Rfl: 0    albuterol sulfate  (90 Base) MCG/ACT inhaler, Inhale 2 puffs into the lungs 4 times daily as needed for Wheezing, Disp: 1 Inhaler, Rfl: 0    Spacer/Aero-Holding Chambers DONYA, 1 Device by Does not apply route daily as needed (Shortness of breath), Disp: 1 Device, Rfl: 0    furosemide (LASIX) 40 MG tablet, Take 1 tablet by mouth daily, Disp: 90 tablet, Rfl: 2    triamcinolone (KENALOG) 0.1 % cream, APPLY TO AFFECTED AREA EVERY DAY, Disp: , Rfl: 0    amLODIPine (NORVASC) 5 MG tablet, Take 0.5 tablets by mouth daily, Disp: 45 tablet, Rfl: 3    lisinopril (PRINIVIL;ZESTRIL) 10 MG tablet, Take 1 tablet by mouth daily, Disp: 90 tablet, Rfl: 3    aspirin EC 81 MG EC tablet, Take 1 tablet by mouth daily, Disp: 30 tablet, Rfl: 0    Acetaminophen (TYLENOL PO), Take by mouth as needed, Disp: , Rfl:     traMADol (ULTRAM) 50 MG tablet, Take 50 mg by mouth as needed for Pain. ., Disp: , Rfl:     pantoprazole (PROTONIX) 40 MG tablet, Take 20 mg by mouth daily 20 mg daily, Disp: , Rfl:     Streptomycin; Sulfa antibiotics; Tylenol with codeine #3 [acetaminophen-codeine]; and Verapamil    Vitals:    03/09/20 1444   BP: 114/61   Pulse: 58   Resp: 18   Temp: 98.1 °F (36.7 °C)   TempSrc: Oral   SpO2: 97%   Weight: 180 lb 6.4 oz (81.8 kg)   Height: 5' 8\" (1.727 m)       Review of Systems   Constitutional: Negative for chills, fever and unexpected weight change. HENT: Negative for mouth sores, sore throat and voice change.     Eyes: obstructive impairment with PFTs  -Has pleural effusions and atelectasis noted on prior chest imaging. Plan to repeat when more medically stable from a cardiac standpoint, discussed this with the patient  -Endorses symptoms of KIERAN, offered to eval with a sleep study but she declined.  Counseled on sleep hygiene and encouraged her to notify me if she changes her mind.   -Return to clinic after testing    Orders Placed This Encounter   Procedures    6 Minute Walk Test    Full PFT Study With Bronchodilator       Tonia Rodríguez MD

## 2020-03-12 ENCOUNTER — OFFICE VISIT (OUTPATIENT)
Dept: ORTHOPEDIC SURGERY | Age: 75
End: 2020-03-12
Payer: MEDICARE

## 2020-03-12 ENCOUNTER — HOSPITAL ENCOUNTER (OUTPATIENT)
Dept: MRI IMAGING | Age: 75
Discharge: HOME | End: 2020-03-12
Payer: MEDICARE

## 2020-03-12 VITALS — BODY MASS INDEX: 27.28 KG/M2 | WEIGHT: 180 LBS | HEIGHT: 68 IN

## 2020-03-12 PROCEDURE — 1036F TOBACCO NON-USER: CPT | Performed by: ORTHOPAEDIC SURGERY

## 2020-03-12 PROCEDURE — 1090F PRES/ABSN URINE INCON ASSESS: CPT | Performed by: ORTHOPAEDIC SURGERY

## 2020-03-12 PROCEDURE — 99213 OFFICE O/P EST LOW 20 MIN: CPT | Performed by: ORTHOPAEDIC SURGERY

## 2020-03-12 PROCEDURE — 73721 MRI JNT OF LWR EXTRE W/O DYE: CPT

## 2020-03-12 PROCEDURE — G8427 DOCREV CUR MEDS BY ELIG CLIN: HCPCS | Performed by: ORTHOPAEDIC SURGERY

## 2020-03-12 PROCEDURE — G8484 FLU IMMUNIZE NO ADMIN: HCPCS | Performed by: ORTHOPAEDIC SURGERY

## 2020-03-12 PROCEDURE — 1111F DSCHRG MED/CURRENT MED MERGE: CPT | Performed by: ORTHOPAEDIC SURGERY

## 2020-03-12 PROCEDURE — 4040F PNEUMOC VAC/ADMIN/RCVD: CPT | Performed by: ORTHOPAEDIC SURGERY

## 2020-03-12 PROCEDURE — G8417 CALC BMI ABV UP PARAM F/U: HCPCS | Performed by: ORTHOPAEDIC SURGERY

## 2020-03-12 PROCEDURE — G8400 PT W/DXA NO RESULTS DOC: HCPCS | Performed by: ORTHOPAEDIC SURGERY

## 2020-03-12 PROCEDURE — 1123F ACP DISCUSS/DSCN MKR DOCD: CPT | Performed by: ORTHOPAEDIC SURGERY

## 2020-03-12 NOTE — PROGRESS NOTES
hip    Strength:  4+/5 hip flexion and abduction and adduction    Special Tests: Negative Stinchfield test.  Negative logroll. Positive Trendelenburg test.    Skin: There are no rashes, ulcerations or lesions. Gait: Palgic, Trendelenburg to right    Additional Comments:       Additional Examinations:         Contralateral Exam: Grossly normal left hip examination. No tenderness palpation over the greater trochanter. No pain with logroll. Neurovascular intact distally. Radiology:     X-rays obtained and reviewed in office: 2/15/2020  Views 2, AP and lateral right hip  Impression moderate joint space narrowing of bilateral hips with present supra-acetabular osteophyte. There is also some osteophytes on the greater trochanter. Assessment : Right hip bursitis cannot rule out abductor tear versus stress fracture    Impression:  Encounter Diagnosis   Name Primary?  Pain of right hip joint Yes       Office Procedures:  Orders Placed This Encounter   Procedures    MRI HIP RIGHT WO CONTRAST     Standing Status:   Future     Number of Occurrences:   1     Standing Expiration Date:   3/12/2021     Scheduling Instructions:      [id number] [facility] [phone]      MRI RT HIP W/O CONTRAST      PAIN R/O FX            SCHED: 3/12/20 @3PM       Treatment Plan: We reviewed the diagnosis and treatment options. Given her recent snap in her right hip, I am concerned that she either has an abductor tear or stress fracture. She did not have any pain on weightbearing today however had a positive Trendelenburg test.  She also has significant osteoarthritis in that hip. We will go ahead and get an MRI of the right hip and see her back when she has had that completed. We discussed with her that if she has a significant abductor tear she may require surgical intervention. That may also include a total hip given her symptomatic osteoarthritis. Questions were answered.

## 2020-03-17 ENCOUNTER — HOSPITAL ENCOUNTER (OUTPATIENT)
Dept: PULMONOLOGY | Age: 75
Discharge: HOME | End: 2020-03-17
Payer: MEDICARE

## 2020-03-17 VITALS — OXYGEN SATURATION: 99 %

## 2020-03-17 LAB
DLCO %PRED: 63 %
DLCO PRED: NORMAL
DLCO/VA %PRED: NORMAL
DLCO/VA PRED: NORMAL
DLCO/VA: NORMAL
DLCO: NORMAL
EXPIRATORY TIME-POST: NORMAL
EXPIRATORY TIME: NORMAL
FEF 25-75% %CHNG: NORMAL
FEF 25-75% %PRED-POST: NORMAL
FEF 25-75% %PRED-PRE: NORMAL
FEF 25-75% PRED: NORMAL
FEF 25-75%-POST: NORMAL
FEF 25-75%-PRE: NORMAL
FEV1 %PRED-POST: 66 %
FEV1 %PRED-PRE: 72 %
FEV1 PRED: NORMAL
FEV1-POST: NORMAL
FEV1-PRE: NORMAL
FEV1/FVC %PRED-POST: NORMAL
FEV1/FVC %PRED-PRE: NORMAL
FEV1/FVC PRED: NORMAL
FEV1/FVC-POST: 77 %
FEV1/FVC-PRE: 76 %
FVC %PRED-POST: NORMAL
FVC %PRED-PRE: NORMAL
FVC PRED: NORMAL
FVC-POST: NORMAL
FVC-PRE: NORMAL
GAW %PRED: NORMAL
GAW PRED: NORMAL
GAW: NORMAL
IC %PRED: NORMAL
IC PRED: NORMAL
IC: NORMAL
MEP: NORMAL
MIP: NORMAL
MVV %PRED-PRE: NORMAL
MVV PRED: NORMAL
MVV-PRE: NORMAL
PEF %PRED-POST: NORMAL
PEF %PRED-PRE: NORMAL
PEF PRED: NORMAL
PEF%CHNG: NORMAL
PEF-POST: NORMAL
PEF-PRE: NORMAL
RAW %PRED: NORMAL
RAW PRED: NORMAL
RAW: NORMAL
RV %PRED: NORMAL
RV PRED: NORMAL
RV: NORMAL
SVC %PRED: NORMAL
SVC PRED: NORMAL
SVC: NORMAL
TLC %PRED: 83 %
TLC PRED: NORMAL
TLC: NORMAL
VA %PRED: NORMAL
VA PRED: NORMAL
VA: NORMAL
VTG %PRED: NORMAL
VTG PRED: NORMAL
VTG: NORMAL

## 2020-03-17 PROCEDURE — 94760 N-INVAS EAR/PLS OXIMETRY 1: CPT

## 2020-03-17 PROCEDURE — 6370000000 HC RX 637 (ALT 250 FOR IP): Performed by: INTERNAL MEDICINE

## 2020-03-17 PROCEDURE — 94060 EVALUATION OF WHEEZING: CPT

## 2020-03-17 PROCEDURE — 94729 DIFFUSING CAPACITY: CPT

## 2020-03-17 PROCEDURE — 94726 PLETHYSMOGRAPHY LUNG VOLUMES: CPT

## 2020-03-17 RX ORDER — ALBUTEROL SULFATE 90 UG/1
4 AEROSOL, METERED RESPIRATORY (INHALATION) ONCE
Status: COMPLETED | OUTPATIENT
Start: 2020-03-17 | End: 2020-03-17

## 2020-03-17 RX ADMIN — Medication 4 PUFF: at 13:25

## 2020-03-17 ASSESSMENT — PULMONARY FUNCTION TESTS
FEV1_PERCENT_PREDICTED_PRE: 72
FEV1/FVC_POST: 77
FEV1_PERCENT_PREDICTED_POST: 66
FEV1/FVC_PRE: 76

## 2020-03-19 ENCOUNTER — TELEPHONE (OUTPATIENT)
Dept: ORTHOPEDIC SURGERY | Age: 75
End: 2020-03-19

## 2020-03-31 ENCOUNTER — TELEPHONE (OUTPATIENT)
Dept: PULMONOLOGY | Age: 75
End: 2020-03-31

## 2020-04-02 ENCOUNTER — TELEPHONE (OUTPATIENT)
Dept: PULMONOLOGY | Age: 75
End: 2020-04-02

## 2020-04-02 ENCOUNTER — VIRTUAL VISIT (OUTPATIENT)
Dept: PULMONOLOGY | Age: 75
End: 2020-04-02
Payer: MEDICARE

## 2020-04-02 PROCEDURE — 1123F ACP DISCUSS/DSCN MKR DOCD: CPT | Performed by: INTERNAL MEDICINE

## 2020-04-02 PROCEDURE — 99214 OFFICE O/P EST MOD 30 MIN: CPT | Performed by: INTERNAL MEDICINE

## 2020-04-02 PROCEDURE — 4040F PNEUMOC VAC/ADMIN/RCVD: CPT | Performed by: INTERNAL MEDICINE

## 2020-04-02 PROCEDURE — 1111F DSCHRG MED/CURRENT MED MERGE: CPT | Performed by: INTERNAL MEDICINE

## 2020-04-02 PROCEDURE — G8400 PT W/DXA NO RESULTS DOC: HCPCS | Performed by: INTERNAL MEDICINE

## 2020-04-02 PROCEDURE — G8428 CUR MEDS NOT DOCUMENT: HCPCS | Performed by: INTERNAL MEDICINE

## 2020-04-02 PROCEDURE — 1090F PRES/ABSN URINE INCON ASSESS: CPT | Performed by: INTERNAL MEDICINE

## 2020-04-02 ASSESSMENT — ENCOUNTER SYMPTOMS
SHORTNESS OF BREATH: 1
ABDOMINAL PAIN: 0
CHOKING: 0
DIARRHEA: 0
EYE DISCHARGE: 0
EYE PAIN: 0
CONSTIPATION: 0
STRIDOR: 0
VOICE CHANGE: 0
CHEST TIGHTNESS: 0
EYE ITCHING: 0
SORE THROAT: 0

## 2020-04-02 NOTE — TELEPHONE ENCOUNTER
----- Message from Sacha Velasquez MD sent at 4/2/2020  2:35 PM EDT -----  Follow up in 3-4 months   Tried to schedule fu but VM was full will try again tomorrow

## 2020-04-02 NOTE — PROGRESS NOTES
for pain, discharge and itching. Respiratory: Positive for shortness of breath. Negative for choking, chest tightness and stridor. Cardiovascular: Negative for chest pain, palpitations and leg swelling. Gastrointestinal: Negative for abdominal pain, constipation and diarrhea. Endocrine: Negative for cold intolerance, heat intolerance and polydipsia. Genitourinary: Negative for dysuria, frequency and hematuria. Musculoskeletal: Negative for gait problem, joint swelling and neck stiffness. Neurological: Negative for dizziness, numbness and headaches. Psychiatric/Behavioral: Negative for agitation, confusion and hallucinations. Physical Exam    CT chest personally reviewed, trace pleural effusions. ASSESSMENT:    1. Simple chronic bronchitis ([address]    2. Excessive somnolence disorder      PLAN:    -Symptom improvement with bronchodilators and slight impairement in FEV1 is consistent with mild asthma/chronic bronchitis. Discussed diagnosis with her.   -At present continue bronchodilators with PRN albuterol  -Aggressive CHF management and maintain euvolemia, she checked daily weights. Is limited on amount of diuretics she can receive due to CKD issues  -Encouraged maintaining regular activity as much as possible  -Offered sleep apnea testing, she declined  -If respiratory symptoms worsen in the future but she is not in decompensated CHF will escalate inhaler regimen with long acting agent  -Return to clinic in 3 months         Joanna Taborcasey Hernandez is a 66 y.o. female being evaluated by a Virtual Visit (video visit) encounter to address concerns as mentioned above. A caregiver was present when appropriate. Due to this being a TeleHealth encounter (During [facility]-12 public health emergency), evaluation of the following organ systems was limited: Vitals/Constitutional/EENT/Resp/CV/GI//MS/Neuro/Skin/Heme-Lymph-Imm.   Pursuant to the emergency declaration under the [address] and the National Emergencies Act, [facility] waiver authority and the [employer] and [employer] Act, this Virtual Visit was conducted with patient's (and/or legal guardian's) consent, to reduce the patient's risk of exposure to COVID-19 and provide necessary medical care. The patient (and/or legal guardian) has also been advised to contact this office for worsening conditions or problems, and seek emergency medical treatment and/or call 911 if deemed necessary. Services were provided through a video synchronous discussion virtually to substitute for in-person clinic visit. Patient and provider were located at their individual homes. --Joe Pelletier MD on 4/2/2020 at 2:32 PM    An electronic signature was used to authenticate this note.

## 2020-04-07 ENCOUNTER — TELEPHONE (OUTPATIENT)
Dept: CARDIOLOGY CLINIC | Age: 75
End: 2020-04-07

## 2020-04-18 ENCOUNTER — HOSPITAL ENCOUNTER (OUTPATIENT)
Dept: GENERAL RADIOLOGY | Age: 75
Discharge: HOME | End: 2020-04-18
Payer: MEDICARE

## 2020-04-18 ENCOUNTER — HOSPITAL ENCOUNTER (OUTPATIENT)
Age: 75
Discharge: HOME | End: 2020-04-18
Payer: MEDICARE

## 2020-04-18 ENCOUNTER — APPOINTMENT (OUTPATIENT)
Dept: CT IMAGING | Age: 75
End: 2020-04-18
Payer: MEDICARE

## 2020-04-18 ENCOUNTER — HOSPITAL ENCOUNTER (EMERGENCY)
Age: 75
Discharge: HOME | End: 2020-04-18
Attending: EMERGENCY MEDICINE
Payer: MEDICARE

## 2020-04-18 ENCOUNTER — APPOINTMENT (OUTPATIENT)
Dept: GENERAL RADIOLOGY | Age: 75
End: 2020-04-18
Payer: MEDICARE

## 2020-04-18 VITALS
WEIGHT: 170 LBS | BODY MASS INDEX: 25.76 KG/M2 | DIASTOLIC BLOOD PRESSURE: 59 MMHG | HEIGHT: 68 IN | OXYGEN SATURATION: 98 % | TEMPERATURE: 97.7 F | RESPIRATION RATE: 20 BRPM | HEART RATE: 105 BPM | SYSTOLIC BLOOD PRESSURE: 137 MMHG

## 2020-04-18 LAB
A/G RATIO: 1.1 (ref 1.1–2.2)
ALBUMIN SERPL-MCNC: 3.7 G/DL (ref 3.4–5)
ALP BLD-CCNC: 92 U/L (ref 40–129)
ALT SERPL-CCNC: 28 U/L (ref 10–40)
ANION GAP SERPL CALCULATED.3IONS-SCNC: 21 MMOL/L (ref 3–16)
ANION GAP SERPL CALCULATED.3IONS-SCNC: 23 MMOL/L (ref 3–16)
APTT: 35.9 SEC (ref 24.2–36.2)
AST SERPL-CCNC: 62 U/L (ref 15–37)
BASOPHILS ABSOLUTE: 0.1 K/UL (ref 0–0.2)
BASOPHILS RELATIVE PERCENT: 1.2 %
BILIRUB SERPL-MCNC: 1.4 MG/DL (ref 0–1)
BUN BLDV-MCNC: 19 MG/DL (ref 7–20)
BUN BLDV-MCNC: 20 MG/DL (ref 7–20)
CALCIUM SERPL-MCNC: 9 MG/DL (ref 8.3–10.6)
CALCIUM SERPL-MCNC: 9.6 MG/DL (ref 8.3–10.6)
CHLORIDE BLD-SCNC: 100 MMOL/L (ref 99–110)
CHLORIDE BLD-SCNC: 102 MMOL/L (ref 99–110)
CO2: 18 MMOL/L (ref 21–32)
CO2: 19 MMOL/L (ref 21–32)
CREAT SERPL-MCNC: 1.3 MG/DL (ref 0.6–1.2)
CREAT SERPL-MCNC: 1.4 MG/DL (ref 0.6–1.2)
EOSINOPHILS ABSOLUTE: 0.1 K/UL (ref 0–0.6)
EOSINOPHILS RELATIVE PERCENT: 1.8 %
GFR AFRICAN AMERICAN: 44
GFR AFRICAN AMERICAN: 48
GFR NON-AFRICAN AMERICAN: 36
GFR NON-AFRICAN AMERICAN: 40
GLOBULIN: 3.3 G/DL
GLUCOSE BLD-MCNC: 131 MG/DL (ref 70–99)
GLUCOSE BLD-MCNC: 157 MG/DL (ref 70–99)
HCT VFR BLD CALC: 41.2 % (ref 36–48)
HEMOGLOBIN: 13.9 G/DL (ref 12–16)
INR BLD: 1.43 (ref 0.86–1.14)
LYMPHOCYTES ABSOLUTE: 1.5 K/UL (ref 1–5.1)
LYMPHOCYTES RELATIVE PERCENT: 20.7 %
MAGNESIUM: 1.4 MG/DL (ref 1.8–2.4)
MCH RBC QN AUTO: 30.8 PG (ref 26–34)
MCHC RBC AUTO-ENTMCNC: 33.8 G/DL (ref 31–36)
MCV RBC AUTO: 91.2 FL (ref 80–100)
MONOCYTES ABSOLUTE: 0.7 K/UL (ref 0–1.3)
MONOCYTES RELATIVE PERCENT: 9.3 %
NEUTROPHILS ABSOLUTE: 4.7 K/UL (ref 1.7–7.7)
NEUTROPHILS RELATIVE PERCENT: 67 %
PDW BLD-RTO: 16.5 % (ref 12.4–15.4)
PLATELET # BLD: 133 K/UL (ref 135–450)
PMV BLD AUTO: 11.4 FL (ref 5–10.5)
POTASSIUM REFLEX MAGNESIUM: 2.6 MMOL/L (ref 3.5–5.1)
POTASSIUM SERPL-SCNC: 3.5 MMOL/L (ref 3.5–5.1)
PRO-BNP: 329 PG/ML (ref 0–449)
PROTHROMBIN TIME: 16.4 SEC (ref 10–13.2)
RBC # BLD: 4.52 M/UL (ref 4–5.2)
SODIUM BLD-SCNC: 141 MMOL/L (ref 136–145)
SODIUM BLD-SCNC: 142 MMOL/L (ref 136–145)
TOTAL PROTEIN: 7 G/DL (ref 6.4–8.2)
TROPONIN: 0.01 NG/ML
WBC # BLD: 7.1 K/UL (ref 4–11)

## 2020-04-18 PROCEDURE — 84484 ASSAY OF TROPONIN QUANT: CPT

## 2020-04-18 PROCEDURE — 83735 ASSAY OF MAGNESIUM: CPT

## 2020-04-18 PROCEDURE — 83880 ASSAY OF NATRIURETIC PEPTIDE: CPT

## 2020-04-18 PROCEDURE — 71045 X-RAY EXAM CHEST 1 VIEW: CPT

## 2020-04-18 PROCEDURE — 85025 COMPLETE CBC W/AUTO DIFF WBC: CPT

## 2020-04-18 PROCEDURE — 6370000000 HC RX 637 (ALT 250 FOR IP): Performed by: PHYSICIAN ASSISTANT

## 2020-04-18 PROCEDURE — 6360000004 HC RX CONTRAST MEDICATION: Performed by: EMERGENCY MEDICINE

## 2020-04-18 PROCEDURE — 80053 COMPREHEN METABOLIC PANEL: CPT

## 2020-04-18 PROCEDURE — 96365 THER/PROPH/DIAG IV INF INIT: CPT

## 2020-04-18 PROCEDURE — 72040 X-RAY EXAM NECK SPINE 2-3 VW: CPT

## 2020-04-18 PROCEDURE — 6370000000 HC RX 637 (ALT 250 FOR IP): Performed by: EMERGENCY MEDICINE

## 2020-04-18 PROCEDURE — 2580000003 HC RX 258: Performed by: EMERGENCY MEDICINE

## 2020-04-18 PROCEDURE — 36415 COLL VENOUS BLD VENIPUNCTURE: CPT

## 2020-04-18 PROCEDURE — 93005 ELECTROCARDIOGRAM TRACING: CPT | Performed by: EMERGENCY MEDICINE

## 2020-04-18 PROCEDURE — 85610 PROTHROMBIN TIME: CPT

## 2020-04-18 PROCEDURE — 85730 THROMBOPLASTIN TIME PARTIAL: CPT

## 2020-04-18 PROCEDURE — 6360000002 HC RX W HCPCS: Performed by: EMERGENCY MEDICINE

## 2020-04-18 PROCEDURE — 99285 EMERGENCY DEPT VISIT HI MDM: CPT

## 2020-04-18 PROCEDURE — 70498 CT ANGIOGRAPHY NECK: CPT

## 2020-04-18 RX ORDER — METHYLPREDNISOLONE 4 MG/1
4 TABLET ORAL SEE ADMIN INSTRUCTIONS
Qty: 1 KIT | Refills: 0 | Status: SHIPPED | OUTPATIENT
Start: 2020-04-18 | End: 2020-04-24

## 2020-04-18 RX ORDER — 0.9 % SODIUM CHLORIDE 0.9 %
500 INTRAVENOUS SOLUTION INTRAVENOUS ONCE
Status: COMPLETED | OUTPATIENT
Start: 2020-04-18 | End: 2020-04-18

## 2020-04-18 RX ORDER — POTASSIUM CHLORIDE 750 MG/1
40 TABLET, EXTENDED RELEASE ORAL ONCE
Status: COMPLETED | OUTPATIENT
Start: 2020-04-18 | End: 2020-04-18

## 2020-04-18 RX ORDER — LANOLIN ALCOHOL/MO/W.PET/CERES
400 CREAM (GRAM) TOPICAL DAILY
Qty: 5 TABLET | Refills: 0 | Status: SHIPPED | OUTPATIENT
Start: 2020-04-18 | End: 2020-08-25

## 2020-04-18 RX ORDER — POTASSIUM CHLORIDE 7.45 MG/ML
10 INJECTION INTRAVENOUS
Status: COMPLETED | OUTPATIENT
Start: 2020-04-18 | End: 2020-04-18

## 2020-04-18 RX ORDER — POTASSIUM CHLORIDE 750 MG/1
10 TABLET, EXTENDED RELEASE ORAL DAILY
Qty: 5 TABLET | Refills: 0 | Status: SHIPPED | OUTPATIENT
Start: 2020-04-18 | End: 2020-04-29 | Stop reason: ALTCHOICE

## 2020-04-18 RX ORDER — LIDOCAINE 4 G/G
1 PATCH TOPICAL DAILY
Status: DISCONTINUED | OUTPATIENT
Start: 2020-04-18 | End: 2020-04-18 | Stop reason: HOSPADM

## 2020-04-18 RX ORDER — MAGNESIUM SULFATE 1 G/100ML
1 INJECTION INTRAVENOUS ONCE
Status: COMPLETED | OUTPATIENT
Start: 2020-04-18 | End: 2020-04-18

## 2020-04-18 RX ADMIN — MAGNESIUM SULFATE 1 G: 1 INJECTION INTRAVENOUS at 18:26

## 2020-04-18 RX ADMIN — SODIUM CHLORIDE 500 ML: 9 INJECTION, SOLUTION INTRAVENOUS at 16:29

## 2020-04-18 RX ADMIN — POTASSIUM CHLORIDE 40 MEQ: 750 TABLET, EXTENDED RELEASE ORAL at 16:28

## 2020-04-18 RX ADMIN — POTASSIUM CHLORIDE 10 MEQ: 7.46 INJECTION, SOLUTION INTRAVENOUS at 16:29

## 2020-04-18 RX ADMIN — IOPAMIDOL 75 ML: 755 INJECTION, SOLUTION INTRAVENOUS at 16:16

## 2020-04-18 RX ADMIN — POTASSIUM CHLORIDE 10 MEQ: 7.46 INJECTION, SOLUTION INTRAVENOUS at 17:19

## 2020-04-18 ASSESSMENT — PAIN DESCRIPTION - LOCATION: LOCATION: NECK

## 2020-04-18 ASSESSMENT — ENCOUNTER SYMPTOMS
VOMITING: 0
VISUAL CHANGE: 0
SHORTNESS OF BREATH: 1
COUGH: 1
ABDOMINAL PAIN: 0

## 2020-04-18 ASSESSMENT — PAIN DESCRIPTION - DESCRIPTORS: DESCRIPTORS: ACHING

## 2020-04-18 ASSESSMENT — PAIN SCALES - GENERAL: PAINLEVEL_OUTOF10: 2

## 2020-04-18 NOTE — ED PROVIDER NOTES
arthropathy is suggested. The prevertebral soft tissues and open-mouth odontoid view are unremarkable. No acute osseous abnormality of the cervical spine. Anterior cervical fusion at C6-7 with no hardware complication. Moderate degenerative disc disease at C3-4 through C5-6. Xr Chest Portable    Result Date: 4/18/2020  EXAMINATION: ONE XRAY VIEW OF THE CHEST 4/18/2020 3:57 pm COMPARISON: February 29, 2020 HISTORY: ORDERING SYSTEM PROVIDED HISTORY: sob TECHNOLOGIST PROVIDED HISTORY: Reason for exam:->sob Reason for Exam: SOB X 3 days ago with wheezing Acuity: Acute Type of Exam: Initial FINDINGS: The cardiomediastinal silhouette is stable. There are increased lung markings bilaterally, may be related to bronchitis versus minimal pulmonary vascular congestion. There is discoid atelectasis at the left lung base. There is no pleural effusion. There is no pneumothorax. There is no acute osseous abnormality. Increased lung markings bilaterally, may be related to bronchitis versus minimal pulmonary vascular congestion. Mild discoid atelectasis at the left lung base. Cta Neck W Contrast    Result Date: 4/18/2020  EXAMINATION: CTA OF THE NECK 4/18/2020 4:14 pm TECHNIQUE: CTA of the neck was performed with the administration of intravenous contrast. Multiplanar reformatted images are provided for review. MIP images are provided for review. Stenosis of the internal carotid arteries measured using NASCET criteria. Dose modulation, iterative reconstruction, and/or weight based adjustment of the mA/kV was utilized to reduce the radiation dose to as low as reasonably achievable. COMPARISON: None. HISTORY: ORDERING SYSTEM PROVIDED HISTORY: neck pain, near syncope TECHNOLOGIST PROVIDED HISTORY: Reason for exam:->neck pain, near syncope Has a \"code stroke\" or \"stroke alert\" been called? ->No Reason for Exam: RT sided neck pain X 3 days, no injury Acuity: Acute Type of Exam: Initial Relevant Medical/Surgical History: cervical fusion FINDINGS: AORTIC ARCH/ARCH VESSELS: No dissection or arterial injury. No significant stenosis of the brachiocephalic or subclavian arteries. CAROTID ARTERIES: No dissection, arterial injury, or hemodynamically significant stenosis by NASCET criteria. VERTEBRAL ARTERIES: There is right dominance of the vertebral arteries. The distal intracranial portion of the left vertebral artery is hypoplastic. There is no acute abnormality or flow-limiting stenosis in the remainder of the bilateral vertebral arteries. SOFT TISSUES: The lung apices are clear. No cervical or superior mediastinal lymphadenopathy. The larynx and pharynx are unremarkable. No acute abnormality of the salivary and thyroid glands. BONES: No acute osseous abnormality. There are degenerative changes and postoperative changes in the cervical spine. There is likely mild to moderate spinal canal narrowing at C3-4, C4-5 and C5-6. Right dominance of the vertebral arteries, with hypoplastic distal intracranial portion of the left vertebral artery. Otherwise, no acute abnormality or flow-limiting stenosis of the major arteries of the neck. Degenerative disc disease in the cervical spine. ED course: Patient presenting for evaluation neck discomfort which I feel is more likely to be muscular in nature. However, given history we did obtain a CTA to ensure no evidence of carotid dissection. This was unremarkable other than right sided dominance of the vertebral arteries. No acute finding. She is also somewhat short of breath and I suspect likely acute bronchitis/reactive airway type process as she is been treated previously for COPD type symptoms and responded well. Will discharge with steroids. She was hypomagnesemic and hypokalemic which was repleted orally and intravenously and repeat potassium much improved.   Will discharge with several day supplement magnesium and potassium and have her follow-up within the next

## 2020-04-18 NOTE — ED PROVIDER NOTES
[facility]        Pt Name: Matt Figueroa  MRN: [id number]  [place] 1945  Date of evaluation: 4/18/2020  Provider: Bailey Rosales PA-C  PCP: Marco Brown MD    Shared Visit or Autonomous Visit:  I have seen and evaluated this patient with my supervising physician Namrata Brown MD.    [address] [place] [address]       Chief Complaint   Patient presents with    Shortness of Breath     started coughing and wheezing a couple days ago. using inhaler that she was rx a month ago after admission        HISTORY OF PRESENT ILLNESS   (Location/Symptom, Timing/Onset, Context/Setting, Quality, Duration, Modifying Factors, Severity)  Note limiting factors. Matt Figueroa is a 66 y.o. female presenting to ER with complaint of neck pain and shortness of breath. Symptoms started Thursday 2 days ago was bent over getting in her dryer and her neck started hurting worse when bends over and with standing states sometimes when standing feels like she might black out. Has not passed out. No headache. Denies feeling dizzy. States her neck feels like its pushed down in her body and felt better when chiropractor lifted up on her head and better with soft collar her daughter got her. Hx neck fusion. Daughter took her chiropractor today and they were concerned about a possible neck fracture sent her to ER for neck xray but while in xray department was short of breath and signed in ER for evaluation. States has been short of breath off and on for the past couple of months back again the past 2 days. Mild cough, nonproductive. No fever. No leg swelling. States has been told she has COPD and CHF. No leg swelling. Shortness of breath worse with activity. Improves with albuterol inhaler. No chest pain. Recent admission 2 months ago for TIA affected right arm, her speech and difficulty swallowing states therapy has been seeing her. Did not take her medicines today.   The history [employer] 44 (A) >60    Calcium 9.6 8.3 - 10.6 mg/dL    Total Protein 7.0 6.4 - 8.2 g/dL    Alb 3.7 3.4 - 5.0 g/dL    Albumin/Globulin Ratio 1.1 1.1 - 2.2    Total Bilirubin 1.4 (H) 0.0 - 1.0 mg/dL    Alkaline Phosphatase 92 40 - 129 U/L    ALT 28 10 - 40 U/L    AST 62 (H) 15 - 37 U/L    Globulin 3.3 g/dL   Troponin   Result Value Ref Range    Troponin 0.01 <0.01 ng/mL   Protime-INR   Result Value Ref Range    Protime 16.4 (H) 10.0 - 13.2 sec    INR 1.43 (H) 0.86 - 1.14   APTT   Result Value Ref Range    aPTT 35.9 24.2 - 36.2 sec   Magnesium   Result Value Ref Range    Magnesium 1.40 (L) 1.80 - 2.40 mg/dL   EKG 12 Lead   Result Value Ref Range    Ventricular Rate 110 BPM    Atrial Rate 110 BPM    P-R Interval 148 ms    QRS Duration 148 ms    Q-T Interval 380 ms    QTc Calculation (Bazett) 514 ms    P Axis 48 degrees    R Axis -47 degrees    T Axis 34 degrees    Diagnosis       Sinus tachycardia with occasional Premature ventricular complexesRight bundle branch blockLeft anterior fascicular block Bifascicular block Moderate voltage criteria for LVH, may be normal variantAbnormal ECGWhen compared with ECG of 29-FEB-2020 20:47,Premature ventricular complexes are now PresentVent. rate has increased BY  42 BPMLeft anterior fascicular block is now Present       All other labs were within normal range or not returned as of this dictation. EKG: All EKG's are interpreted by the Emergency Department Physician in the absence of a cardiologist.  Please see their note for interpretation of EKG.       RADIOLOGY:   Non-plain film images such as CT, Ultrasound and MRI are read by the radiologist. Aleks Fine radiographic images are visualized andpreliminarily interpreted by the  ED Provider with the below findings:        Interpretation perthe Radiologist below, if available at the time of this note:    CTA NECK W CONTRAST   Final Result   Right dominance of the vertebral arteries, with hypoplastic distal   intracranial portion of the

## 2020-04-19 LAB
EKG ATRIAL RATE: 110 BPM
EKG DIAGNOSIS: NORMAL
EKG P AXIS: 48 DEGREES
EKG P-R INTERVAL: 148 MS
EKG Q-T INTERVAL: 380 MS
EKG QRS DURATION: 148 MS
EKG QTC CALCULATION (BAZETT): 514 MS
EKG R AXIS: -47 DEGREES
EKG T AXIS: 34 DEGREES
EKG VENTRICULAR RATE: 110 BPM

## 2020-04-19 PROCEDURE — 93010 ELECTROCARDIOGRAM REPORT: CPT | Performed by: INTERNAL MEDICINE

## 2020-04-20 ENCOUNTER — CARE COORDINATION (OUTPATIENT)
Dept: CARE COORDINATION | Age: 75
End: 2020-04-20

## 2020-04-28 PROBLEM — I50.30 DIASTOLIC CONGESTIVE HEART FAILURE (HCC): Status: ACTIVE | Noted: 2018-11-14

## 2020-04-28 NOTE — PROGRESS NOTES
[address] Office Note  4/29/2020     Subjective:  Ms. Doni Dan presents today for cardiology follow up aortic stenosis, HTN, HLD   She c/o feeling weak hard to stay awake. No chest pain or SOB. She is being treated for bronchitis and is on prednisone round two. She also was put on zanaflex yesterday. She has trouble swallowing since her stroke in February 2020. She has no cough fever dysuria NVD. [place]:  Admitted 2/21/20 for dyspnea. Work up raised concern for cirrhosis. Liver ultrasound did confirm a cirrhotic appearance of the liver with mild splenomegaly. This was though to be cause of dyspnea and volume overload- diuresed with lasix. She had readmission 2/29/20 for acute ride side facial droop and right side weakness. CT  negative for acute findings. Pt unable to tolerate MRI. Pt status returned to baseline symptoms presumed likely TIA. She presents to office today in wheelchair. She reports terrible headache, feels weak, and trouble staying awake. She has poor appetite. She was recently prescribed Zanaflex took 2mg this morning. Denies chest pain, shortness of breath, edema, dizziness, palpitations and syncope. PMH:  Ms. Doni Dan has PMH: of Aortic Stenosis, HTN, shortness of breath . She has had allergies which causes her to feel SOB easily with activity. This is unchanged from previous visit. She takes an allergy pill and symptoms improve    [place]:  Short of breath last couple of months. Keeps head end raised. Usual activity makes her short of breath. Grocery shopping makes her short of breath. Tightness of chest is there if she thinks about it. She states if she is distracted she doesn't notice it. Tired x 6 months or longer  She states she has fallen 3 times since April. Her knee gives out and she falls. She states she has had swelling in her legs and feet.   2+BLE edema today    Review of Systems:         12 point ROS negative in all areas as listed below except as in 0     No facility-administered encounter medications on file as of 4/29/2020. Lab Data:  CBC: No results for input(s): WBC, HGB, HCT, MCV, PLT in the last 72 hours. BMP: No results for input(s): NA, K, CL, CO2, PHOS, BUN, CREATININE in the last 72 hours. Invalid input(s): CA  LIVER PROFILE: No results for input(s): AST, ALT, LIPASE, BILIDIR, BILITOT, ALKPHOS in the last 72 hours. Invalid input(s): AMYLASE,  ALB  LIPID:   Lab Results   Component Value Date    CHOL 176 10/25/2018    CHOL 133 10/28/2016    CHOL 177 09/03/2013     Lab Results   Component Value Date    TRIG 79 10/25/2018    TRIG 75 10/28/2016    TRIG 145 09/03/2013     Lab Results   Component Value Date    HDL 70 10/25/2018    HDL 39 (L) 10/28/2016    HDL 45 09/03/2013     Lab Results   Component Value Date    LDLCALC 90 10/25/2018    LDLCALC 79 10/28/2016    LDLCALC 103 (H) 09/03/2013     Lab Results   Component Value Date    LABVLDL 15 10/28/2016    LABVLDL 29 09/03/2013    LABVLDL 25 04/10/2010    VLDL 16 10/25/2018     No results found for: CHOLHDLRATIO  PT/INR: No results for input(s): PROTIME, INR in the last 72 hours. A1C: No results found for: LABA1C  BNP:  No results for input(s): BNP in the last 72 hours. IMAGING:  EKG 4.29.20  Sinus bradycardia  RBBB LAFB  EKG 4/18/20   Sinus tachycardia with occasional Premature ventricular complexesRight bundle branch blockLeft anterior fascicular block Bifascicular block Moderate voltage criteria for LVH, may be normal variantAbnormal ECGWhen compared with ECG of 29-FEB-2020 20:47,Premature ventricular complexes are now PresentVent. rate has increased BY  42 BPMConfirmed by KAYE HOWE MD ([id number]) on 4/19/2020 11:57:41 AM       CXR 4/18/20   Increased lung markings bilaterally, may be related to bronchitis versus minimal pulmonary vascular congestion. Mild discoid atelectasis at the left lung base.        CTA neck 4/18/20   Right dominance of the vertebral arteries, with hypoplastic distal intracranial portion of the left vertebral artery. Otherwise, no acute abnormality or flow-limiting stenosis of the major arteries of the neck. CXR 2/29/20   1. Findings typical of bronchitis or reactive airways disease. 2. No focal infiltrate is evident. ECHO 2/21/20    Summary   There is hyperdynamic LV systolic function with an estimated ejection   fraction of >65%. Mild concentric left ventricular hypertrophy. No regional wall motion abnormalities are seen. Grade I diastolic dysfunction with normal filing pressure. There is a late peaking gradient recorded across the LV outflow tract   consistent with dynamic obstruction. LVOT pressure gradients are elevated at   rest at 8 mmHg and with valsalva of 36mmHg. Aortic valve appears sclerotic and appears to open well. The aortic valve area is calculated at 1.76 cm2 with max velocity of 2.5   m/sec, a maximum pressure gradient of 25 mmHg and a mean pressure gradient   of 13 mmHg. This is c/w mild aortic stenosis (pressure gradients could be   elevated due hyperdynamic LV). There is no evidence of aortic regurgitation. US LIVER 2/22/20   Cirrhotic ultrasound appearance of the liver with mild splenomegaly. Normal directional flow noted in the main portal vein       10/4/19 lexiscan myoview   Summary  Normal LV function. There is normal isotope uptake at stress and rest. There is no evidence of  myocardial ischemia or scar. Normal myocardial perfusion study. CXR 7/11/19  FINDINGS: The lungs are clear. The cardiac silhouette is within normal limits. There is no pneumothorax or pleural effusion. Status post cholecystectomy and anterior discectomy of the lower cervical spine. Bilateral YOKASTA 4/2/19  There is no arterial insufficiency at rest in the lower extremities  bilaterally.      EKG 12/11/18  Normal sinus rhythmRight bundle branch blockleft axisLeft anterior fascicular block Bifascicular block   voltage criteria for LVH, may be normal variantAbnormal ECGNo previous ECGs availableConfirmed by Gena May MD, NICOLE ([id number]) on 12/12/2018 9:20:03 AM    ECHO 10/19/18  Summary   Global left ventricular systolic function is normal with ejection fraction   estimated from 55 % to 60 %.   No regional wall motion abnormalities are noted.   Left ventricular size is moderately increased.   There is mild concentric left ventricular hypertrophy.   Normal left ventricular diastolic filling pressure.   The left atrium is mildly dilated.   The aortic valve is thickened/calcified with decreased leaflet mobility   consistent with mild aortic Doppler exam is c/w mild aortic stenosis. No   aortic regurgitation.   The right atrium is mildly dilated. CXR 10/10/18  There is mild pulmonary vascular congestion. The cardiac silhouette is top-normal in size. There is no pneumothorax or pleural effusion. Status post cholecystectomy and anterior discectomy of the lower cervical spine. EKG 10.10.18  NSR within normal    Assessment:  Hypotension due to dehydration       Plan:  1. Meds reviewed  2. H/O diastolic CHF  Will stop lasix amlodipine lisinopril potassium and metoprolol zanaflex  Send her to ER for one L of IV saline over two hrs. CMP CBC UA   Return in about a week call sooner if no better    This note was scribed in the presence of Laura Duran MD by Ijeoma Crain, RN    QUALITY MEASURES  1. Tobacco Cessation Counseling: NA  2. Retake of BP if >140/90: NA  3. Documentation to PCP/referring for new patient:  Sent to PCP at close of office visit  4. CAD patient on anti-platelet: asa  5. CAD patient on STATIN therapy:  NA  6. Patient with CHF and aFib on anticoagulation:  NA       I, Dr. Infante Nicely, personally performed the services described in this documentation, as scribed by the above signed scribe in my presence. It is both accurate and complete to my knowledge.  I agree with the details independently gathered by the clinical support

## 2020-04-29 ENCOUNTER — OFFICE VISIT (OUTPATIENT)
Dept: CARDIOLOGY CLINIC | Age: 75
End: 2020-04-29
Payer: MEDICARE

## 2020-04-29 ENCOUNTER — HOSPITAL ENCOUNTER (EMERGENCY)
Age: 75
Discharge: HOME | End: 2020-04-29
Attending: EMERGENCY MEDICINE
Payer: MEDICARE

## 2020-04-29 VITALS
HEART RATE: 54 BPM | OXYGEN SATURATION: 99 % | SYSTOLIC BLOOD PRESSURE: 68 MMHG | TEMPERATURE: 97.8 F | BODY MASS INDEX: 25.76 KG/M2 | DIASTOLIC BLOOD PRESSURE: 42 MMHG | WEIGHT: 170 LBS | HEIGHT: 68 IN

## 2020-04-29 VITALS
WEIGHT: 175 LBS | RESPIRATION RATE: 15 BRPM | HEART RATE: 51 BPM | TEMPERATURE: 97.4 F | HEIGHT: 68 IN | SYSTOLIC BLOOD PRESSURE: 100 MMHG | OXYGEN SATURATION: 96 % | BODY MASS INDEX: 26.52 KG/M2 | DIASTOLIC BLOOD PRESSURE: 66 MMHG

## 2020-04-29 LAB
A/G RATIO: 1.1 (ref 1.1–2.2)
ALBUMIN SERPL-MCNC: 3.1 G/DL (ref 3.4–5)
ALP BLD-CCNC: 72 U/L (ref 40–129)
ALT SERPL-CCNC: 65 U/L (ref 10–40)
ANION GAP SERPL CALCULATED.3IONS-SCNC: 14 MMOL/L (ref 3–16)
AST SERPL-CCNC: 68 U/L (ref 15–37)
BACTERIA: ABNORMAL /HPF
BASOPHILS ABSOLUTE: 0 K/UL (ref 0–0.2)
BASOPHILS RELATIVE PERCENT: 0.1 %
BILIRUB SERPL-MCNC: 1.8 MG/DL (ref 0–1)
BILIRUBIN URINE: NEGATIVE
BLOOD, URINE: ABNORMAL
BUN BLDV-MCNC: 17 MG/DL (ref 7–20)
CALCIUM SERPL-MCNC: 8.6 MG/DL (ref 8.3–10.6)
CHLORIDE BLD-SCNC: 98 MMOL/L (ref 99–110)
CLARITY: ABNORMAL
CO2: 25 MMOL/L (ref 21–32)
COLOR: ABNORMAL
CREAT SERPL-MCNC: 1.2 MG/DL (ref 0.6–1.2)
EOSINOPHILS ABSOLUTE: 0 K/UL (ref 0–0.6)
EOSINOPHILS RELATIVE PERCENT: 0.2 %
EPITHELIAL CELLS, UA: ABNORMAL /HPF (ref 0–5)
GFR AFRICAN AMERICAN: 53
GFR NON-AFRICAN AMERICAN: 44
GLOBULIN: 2.8 G/DL
GLUCOSE BLD-MCNC: 156 MG/DL (ref 70–99)
GLUCOSE URINE: NEGATIVE MG/DL
HCT VFR BLD CALC: 37.9 % (ref 36–48)
HEMOGLOBIN: 12.6 G/DL (ref 12–16)
KETONES, URINE: NEGATIVE MG/DL
LEUKOCYTE ESTERASE, URINE: ABNORMAL
LYMPHOCYTES ABSOLUTE: 0.5 K/UL (ref 1–5.1)
LYMPHOCYTES RELATIVE PERCENT: 6.7 %
MCH RBC QN AUTO: 31.1 PG (ref 26–34)
MCHC RBC AUTO-ENTMCNC: 33.1 G/DL (ref 31–36)
MCV RBC AUTO: 93.8 FL (ref 80–100)
MICROSCOPIC EXAMINATION: YES
MONOCYTES ABSOLUTE: 0.4 K/UL (ref 0–1.3)
MONOCYTES RELATIVE PERCENT: 4.7 %
NEUTROPHILS ABSOLUTE: 7.1 K/UL (ref 1.7–7.7)
NEUTROPHILS RELATIVE PERCENT: 88.3 %
NITRITE, URINE: NEGATIVE
PDW BLD-RTO: 17.1 % (ref 12.4–15.4)
PH UA: 6 (ref 5–8)
PLATELET # BLD: 93 K/UL (ref 135–450)
PLATELET SLIDE REVIEW: ABNORMAL
PMV BLD AUTO: 10.1 FL (ref 5–10.5)
POTASSIUM REFLEX MAGNESIUM: 3.7 MMOL/L (ref 3.5–5.1)
PRO-BNP: 712 PG/ML (ref 0–449)
PROTEIN UA: 30 MG/DL
RBC # BLD: 4.04 M/UL (ref 4–5.2)
RBC UA: ABNORMAL /HPF (ref 0–4)
RENAL EPITHELIAL, UA: ABNORMAL /HPF (ref 0–1)
SLIDE REVIEW: ABNORMAL
SODIUM BLD-SCNC: 137 MMOL/L (ref 136–145)
SPECIFIC GRAVITY UA: 1.01 (ref 1–1.03)
TOTAL PROTEIN: 5.9 G/DL (ref 6.4–8.2)
TROPONIN: <0.01 NG/ML
URINE REFLEX TO CULTURE: YES
URINE TYPE: ABNORMAL
UROBILINOGEN, URINE: 0.2 E.U./DL
WBC # BLD: 8 K/UL (ref 4–11)
WBC UA: ABNORMAL /HPF (ref 0–5)

## 2020-04-29 PROCEDURE — 99214 OFFICE O/P EST MOD 30 MIN: CPT | Performed by: INTERNAL MEDICINE

## 2020-04-29 PROCEDURE — G8427 DOCREV CUR MEDS BY ELIG CLIN: HCPCS | Performed by: INTERNAL MEDICINE

## 2020-04-29 PROCEDURE — 93000 ELECTROCARDIOGRAM COMPLETE: CPT | Performed by: INTERNAL MEDICINE

## 2020-04-29 PROCEDURE — G8417 CALC BMI ABV UP PARAM F/U: HCPCS | Performed by: INTERNAL MEDICINE

## 2020-04-29 PROCEDURE — 4040F PNEUMOC VAC/ADMIN/RCVD: CPT | Performed by: INTERNAL MEDICINE

## 2020-04-29 PROCEDURE — G8400 PT W/DXA NO RESULTS DOC: HCPCS | Performed by: INTERNAL MEDICINE

## 2020-04-29 PROCEDURE — 83880 ASSAY OF NATRIURETIC PEPTIDE: CPT

## 2020-04-29 PROCEDURE — 80053 COMPREHEN METABOLIC PANEL: CPT

## 2020-04-29 PROCEDURE — 6360000002 HC RX W HCPCS: Performed by: EMERGENCY MEDICINE

## 2020-04-29 PROCEDURE — 81001 URINALYSIS AUTO W/SCOPE: CPT

## 2020-04-29 PROCEDURE — 93005 ELECTROCARDIOGRAM TRACING: CPT | Performed by: EMERGENCY MEDICINE

## 2020-04-29 PROCEDURE — 85025 COMPLETE CBC W/AUTO DIFF WBC: CPT

## 2020-04-29 PROCEDURE — 1036F TOBACCO NON-USER: CPT | Performed by: INTERNAL MEDICINE

## 2020-04-29 PROCEDURE — 96365 THER/PROPH/DIAG IV INF INIT: CPT

## 2020-04-29 PROCEDURE — 1090F PRES/ABSN URINE INCON ASSESS: CPT | Performed by: INTERNAL MEDICINE

## 2020-04-29 PROCEDURE — 1123F ACP DISCUSS/DSCN MKR DOCD: CPT | Performed by: INTERNAL MEDICINE

## 2020-04-29 PROCEDURE — 87086 URINE CULTURE/COLONY COUNT: CPT

## 2020-04-29 PROCEDURE — 87186 SC STD MICRODIL/AGAR DIL: CPT

## 2020-04-29 PROCEDURE — 84484 ASSAY OF TROPONIN QUANT: CPT

## 2020-04-29 PROCEDURE — 87077 CULTURE AEROBIC IDENTIFY: CPT

## 2020-04-29 PROCEDURE — 99284 EMERGENCY DEPT VISIT MOD MDM: CPT

## 2020-04-29 PROCEDURE — 2580000003 HC RX 258: Performed by: EMERGENCY MEDICINE

## 2020-04-29 RX ORDER — SODIUM CHLORIDE 9 MG/ML
1000 INJECTION, SOLUTION INTRAVENOUS ONCE
Status: COMPLETED | OUTPATIENT
Start: 2020-04-29 | End: 2020-04-29

## 2020-04-29 RX ORDER — TIZANIDINE 4 MG/1
TABLET ORAL
COMMUNITY
Start: 2020-04-27 | End: 2020-04-29 | Stop reason: ALTCHOICE

## 2020-04-29 RX ORDER — CEPHALEXIN 500 MG/1
500 CAPSULE ORAL 2 TIMES DAILY
Qty: 14 CAPSULE | Refills: 0 | Status: SHIPPED | OUTPATIENT
Start: 2020-04-29 | End: 2020-05-06

## 2020-04-29 RX ORDER — PREDNISONE 20 MG/1
TABLET ORAL
COMMUNITY
Start: 2020-04-23 | End: 2020-05-06 | Stop reason: ALTCHOICE

## 2020-04-29 RX ADMIN — SODIUM CHLORIDE 1000 ML: 9 INJECTION, SOLUTION INTRAVENOUS at 16:08

## 2020-04-29 RX ADMIN — CEFTRIAXONE SODIUM 1 G: 1 INJECTION, POWDER, FOR SOLUTION INTRAMUSCULAR; INTRAVENOUS at 17:29

## 2020-04-29 ASSESSMENT — ENCOUNTER SYMPTOMS
VOMITING: 0
ABDOMINAL PAIN: 0
CONSTIPATION: 0
SORE THROAT: 0
DIARRHEA: 0
COUGH: 0
BACK PAIN: 0
NAUSEA: 0
SHORTNESS OF BREATH: 0

## 2020-04-29 ASSESSMENT — PAIN DESCRIPTION - DESCRIPTORS: DESCRIPTORS: ACHING

## 2020-04-29 ASSESSMENT — PAIN DESCRIPTION - PAIN TYPE: TYPE: ACUTE PAIN

## 2020-04-29 ASSESSMENT — PAIN DESCRIPTION - LOCATION: LOCATION: HEAD

## 2020-04-29 ASSESSMENT — PAIN SCALES - GENERAL: PAINLEVEL_OUTOF10: 7

## 2020-04-29 NOTE — LETTER
[id number]  [address] [address]  Phone: [phone]  Fax: [phone]     Kimberly Juan MD     5/1/2020     Henrry Lauren MD   [address] [place] [id number]     Patient: Indy Montero   MR Number: [id number]   YOB: 1945   Date of Visit: 4/29/2020     Dear Dr. Henrry Lauren     Today I saw our mutual patient named above. Below are the relevant portions of my assessment and plan of care. If you have questions, please do not hesitate to call me. I look forward to following Rachele along with you. [facility] Office Note  4/29/2020     Subjective:  Ms. Paty Benoit presents today for cardiology follow up aortic stenosis, HTN, HLD   She c/o feeling weak hard to stay awake. No chest pain or SOB. She is being treated for bronchitis and is on prednisone round two. She also was put on zanaflex yesterday. She has trouble swallowing since her stroke in February 2020. She has no cough fever dysuria NVD. [place]:  Admitted 2/21/20 for dyspnea. Work up raised concern for cirrhosis. Liver ultrasound did confirm a cirrhotic appearance of the liver with mild splenomegaly. This was though to be cause of dyspnea and volume overload- diuresed with lasix. She had readmission 2/29/20 for acute ride side facial droop and right side weakness. CT  negative for acute findings. Pt unable to tolerate MRI. Pt status returned to baseline symptoms presumed likely TIA. She presents to office today in wheelchair. She reports terrible headache, feels weak, and trouble staying awake. She has poor appetite. She was recently prescribed Zanaflex took 2mg this morning. Denies chest pain, shortness of breath, edema, dizziness, palpitations and syncope. PMH:  Ms. Paty Benoit has PMH: of Aortic Stenosis, HTN, shortness of breath . She has had allergies which causes her to feel SOB easily with activity.  This EKG 4/18/20   Sinus tachycardia with occasional Premature ventricular complexesRight bundle branch blockLeft anterior fascicular block Bifascicular block Moderate voltage criteria for LVH, may be normal variantAbnormal ECGWhen compared with ECG of 29-FEB-2020 20:47,Premature ventricular complexes are now PresentVent. rate has increased BY  42 BPMConfirmed by KAYE HOWE MD ([id number]) on 4/19/2020 11:57:41 AM       CXR 4/18/20   Increased lung markings bilaterally, may be related to bronchitis versus minimal pulmonary vascular congestion. Mild discoid atelectasis at the left lung base. CTA neck 4/18/20   Right dominance of the vertebral arteries, with hypoplastic distal intracranial portion of the left vertebral artery. Otherwise, no acute abnormality or flow-limiting stenosis of the major arteries of the neck. CXR 2/29/20   1. Findings typical of bronchitis or reactive airways disease. 2. No focal infiltrate is evident. ECHO 2/21/20    Summary   There is hyperdynamic LV systolic function with an estimated ejection   fraction of >65%. Mild concentric left ventricular hypertrophy. No regional wall motion abnormalities are seen. Grade I diastolic dysfunction with normal filing pressure. There is a late peaking gradient recorded across the LV outflow tract   consistent with dynamic obstruction. LVOT pressure gradients are elevated at   rest at 8 mmHg and with valsalva of 36mmHg. Aortic valve appears sclerotic and appears to open well. The aortic valve area is calculated at 1.76 cm2 with max velocity of 2.5   m/sec, a maximum pressure gradient of 25 mmHg and a mean pressure gradient   of 13 mmHg. This is c/w mild aortic stenosis (pressure gradients could be   elevated due hyperdynamic LV). There is no evidence of aortic regurgitation. US LIVER 2/22/20   Cirrhotic ultrasound appearance of the liver with mild splenomegaly.   Normal directional flow noted in the main portal vein

## 2020-04-29 NOTE — ED PROVIDER NOTES
Negative for visual disturbance. Respiratory: Negative for cough and shortness of breath. Cardiovascular: Negative for chest pain. Gastrointestinal: Negative for abdominal pain, constipation, diarrhea, nausea and vomiting. Genitourinary: Negative for dysuria and hematuria. Musculoskeletal: Negative for back pain and neck pain. Skin: Negative for pallor and rash. Neurological: Positive for dizziness, light-headedness and headaches. All other systems reviewed and are negative. Except as noted above the remainder of the review of systems was reviewed and negative.        PAST MEDICAL HISTORY     Past Medical History:   Diagnosis Date    Acid reflux     CHF (congestive heart failure) (HCC)     Generalized anxiety disorder     Hypertension     Osteoarthritis     of hips, knees, back    Screening mammogram 3/26/96         SURGICAL HISTORY       Past Surgical History:   Procedure Laterality Date    ACHILLES TENDON SURGERY      CARPAL TUNNEL RELEASE      CATARACT REMOVAL WITH IMPLANT Left 04/06/2017    Left cataract removal. Dr. Sherman Rodriguez Right 05/04/2017    PHACO EMULSIFICATION OF CATARACT WITH INTRAOCULAR LENS IMPLANT RIGHT EYE    COLONOSCOPY  3/24/10    FOOT SURGERY      GALLBLADDER SURGERY      HYSTERECTOMY      NECK SURGERY      PAIN MANAGEMENT PROCEDURE N/A 1/20/2020    MIDLINE LUMBAR FIVE SACRAL ONE EPIDURAL STEROID INJECTION SITE CONFIRMED BY FLUOROSCOPY performed by Gurwinder Mcfadden MD at [address] GASTROINTESTINAL ENDOSCOPY N/A 2/24/2020    EGD DIAGNOSTIC ONLY performed by Ramírez Redding MD at [facility]       Discharge Medication List as of 4/29/2020  6:48 PM      CONTINUE these medications which have NOT CHANGED    Details   predniSONE (DELTASONE) 20 MG tablet Historical Med      magnesium oxide (MAG-OX) 400 (240 Mg) MG tablet Take 1 tablet by mouth daily for 5 days, Disp-5 tablet, R-0Print      atorvastatin (LIPITOR) 40 MG tablet Take 1 tablet by mouth nightly, Disp-30 tablet, R-3Normal      !! albuterol sulfate  (90 Base) MCG/ACT inhaler Inhale 2 puffs into the lungs 4 times daily as needed for Wheezing, Disp-1 Inhaler, R-0Print      !! albuterol sulfate  (90 Base) MCG/ACT inhaler Inhale 2 puffs into the lungs 4 times daily as needed for Wheezing, Disp-1 Inhaler, R-0Print      Spacer/Aero-Holding Chambers DONYA DAILY PRN Starting Sat 2/15/2020, Disp-1 Device, R-0, Print      triamcinolone (KENALOG) 0.1 % cream APPLY TO AFFECTED AREA EVERY DAY, R-0, Historical Med      aspirin EC 81 MG EC tablet Take 1 tablet by mouth daily, Disp-30 tablet, R-0OTC      Acetaminophen (TYLENOL PO) Take by mouth as neededHistorical Med      traMADol (ULTRAM) 50 MG tablet Take 50 mg by mouth as needed for Pain. Angelina Espinoza Historical Med      pantoprazole (PROTONIX) 40 MG tablet Take 20 mg by mouth daily 20 mg dailyHistorical Med       !! - Potential duplicate medications found. Please discuss with provider. ALLERGIES     Streptomycin; Sulfa antibiotics; Tylenol with codeine #3 [acetaminophen-codeine]; and Verapamil    FAMILY HISTORY       Family History   Problem Relation Age of Onset    High Blood Pressure Mother           SOCIAL HISTORY       Social History     Socioeconomic History    Marital status:       Spouse name: None    Number of children: 4    Years of education: None    Highest education level: None   Occupational History    None   Social Needs    Financial resource strain: None    Food insecurity     Worry: None     Inability: None    Transportation needs     Medical: None     Non-medical: None   Tobacco Use    Smoking status: Never Smoker    Smokeless tobacco: Never Used   Substance and Sexual Activity    Alcohol use: No    Drug use: No    Sexual activity: Not Currently     Partners: Male   Lifestyle    Physical activity     Days per week: unspecified hypotension type    2. Acute cystitis without hematuria          DISPOSITION/PLAN   DISPOSITION        PATIENT REFERRED TO:  Loretta Miller MD  [address] [phone]    Schedule an appointment as soon as possible for a visit       Susana Ojeda MD  [address] [place] [address], [address] [place] [address] [place]  [phone]    Call in 1 day  to discuss your ED visit      DISCHARGE MEDICATIONS:  Discharge Medication List as of 4/29/2020  6:48 PM      START taking these medications    Details   cephALEXin (KEFLEX) 500 MG capsule Take 1 capsule by mouth 2 times daily for 7 days, Disp-14 capsule, R-0Print           Controlled Substances Monitoring:     No flowsheet data found.     (Please note that portions of this note were completed with a voice recognition program.  Efforts were made to edit the dictations but occasionally words are mis-transcribed.)    Juan Pablo Torres MD (electronically signed)  Attending Emergency Physician            Kali Santoyo MD  04/29/20 [id number]

## 2020-04-29 NOTE — PATIENT INSTRUCTIONS
1. Stop taking lasix, amlodipine, lisinopril. Potassium, metoprolol, Zanaflex  2. Send to ER for 1-L of IV saline over 2 hours   3.  Follow up in 1 week call sooner if not better

## 2020-04-30 ENCOUNTER — TELEPHONE (OUTPATIENT)
Dept: CARDIOLOGY CLINIC | Age: 75
End: 2020-04-30

## 2020-04-30 ENCOUNTER — CARE COORDINATION (OUTPATIENT)
Dept: CARE COORDINATION | Age: 75
End: 2020-04-30

## 2020-04-30 LAB
EKG ATRIAL RATE: 51 BPM
EKG DIAGNOSIS: NORMAL
EKG P AXIS: 52 DEGREES
EKG P-R INTERVAL: 148 MS
EKG Q-T INTERVAL: 532 MS
EKG QRS DURATION: 152 MS
EKG QTC CALCULATION (BAZETT): 490 MS
EKG R AXIS: -24 DEGREES
EKG T AXIS: 25 DEGREES
EKG VENTRICULAR RATE: 51 BPM

## 2020-04-30 PROCEDURE — 93010 ELECTROCARDIOGRAM REPORT: CPT | Performed by: INTERNAL MEDICINE

## 2020-05-01 LAB
ORGANISM: ABNORMAL
URINE CULTURE, ROUTINE: ABNORMAL

## 2020-05-06 ENCOUNTER — OFFICE VISIT (OUTPATIENT)
Dept: CARDIOLOGY CLINIC | Age: 75
End: 2020-05-06
Payer: MEDICARE

## 2020-05-06 VITALS
HEART RATE: 90 BPM | DIASTOLIC BLOOD PRESSURE: 68 MMHG | OXYGEN SATURATION: 99 % | HEIGHT: 68 IN | BODY MASS INDEX: 26.52 KG/M2 | WEIGHT: 175 LBS | SYSTOLIC BLOOD PRESSURE: 132 MMHG

## 2020-05-06 PROCEDURE — 1090F PRES/ABSN URINE INCON ASSESS: CPT | Performed by: INTERNAL MEDICINE

## 2020-05-06 PROCEDURE — G8427 DOCREV CUR MEDS BY ELIG CLIN: HCPCS | Performed by: INTERNAL MEDICINE

## 2020-05-06 PROCEDURE — G8400 PT W/DXA NO RESULTS DOC: HCPCS | Performed by: INTERNAL MEDICINE

## 2020-05-06 PROCEDURE — 99214 OFFICE O/P EST MOD 30 MIN: CPT | Performed by: INTERNAL MEDICINE

## 2020-05-06 PROCEDURE — 1123F ACP DISCUSS/DSCN MKR DOCD: CPT | Performed by: INTERNAL MEDICINE

## 2020-05-06 PROCEDURE — 1036F TOBACCO NON-USER: CPT | Performed by: INTERNAL MEDICINE

## 2020-05-06 PROCEDURE — 4040F PNEUMOC VAC/ADMIN/RCVD: CPT | Performed by: INTERNAL MEDICINE

## 2020-05-06 PROCEDURE — G8417 CALC BMI ABV UP PARAM F/U: HCPCS | Performed by: INTERNAL MEDICINE

## 2020-05-06 RX ORDER — ATORVASTATIN CALCIUM 20 MG/1
20 TABLET, FILM COATED ORAL NIGHTLY
Qty: 90 TABLET | Refills: 1 | Status: SHIPPED
Start: 2020-05-06 | End: 2020-08-31 | Stop reason: SDUPTHER

## 2020-05-06 RX ORDER — AMLODIPINE BESYLATE 5 MG/1
5 TABLET ORAL DAILY
COMMUNITY
End: 2020-08-11

## 2020-05-06 NOTE — LETTER
compared with ECG of 29-FEB-2020 20:47,Premature ventricular complexes are now PresentVent. rate has increased BY  42 BPMConfirmed by KAYE HOWE MD ([id number]) on 4/19/2020 11:57:41 AM       CXR 4/18/20   Increased lung markings bilaterally, may be related to bronchitis versus minimal pulmonary vascular congestion. Mild discoid atelectasis at the left lung base. CTA neck 4/18/20   Right dominance of the vertebral arteries, with hypoplastic distal intracranial portion of the left vertebral artery. Otherwise, no acute abnormality or flow-limiting stenosis of the major arteries of the neck. CXR 2/29/20   1. Findings typical of bronchitis or reactive airways disease. 2. No focal infiltrate is evident. ECHO 2/21/20    Summary   There is hyperdynamic LV systolic function with an estimated ejection   fraction of >65%. Mild concentric left ventricular hypertrophy. No regional wall motion abnormalities are seen. Grade I diastolic dysfunction with normal filing pressure. There is a late peaking gradient recorded across the LV outflow tract   consistent with dynamic obstruction. LVOT pressure gradients are elevated at   rest at 8 mmHg and with valsalva of 36mmHg. Aortic valve appears sclerotic and appears to open well. The aortic valve area is calculated at 1.76 cm2 with max velocity of 2.5   m/sec, a maximum pressure gradient of 25 mmHg and a mean pressure gradient   of 13 mmHg. This is c/w mild aortic stenosis (pressure gradients could be   elevated due hyperdynamic LV). There is no evidence of aortic regurgitation. US LIVER 2/22/20   Cirrhotic ultrasound appearance of the liver with mild splenomegaly. Normal directional flow noted in the main portal vein       10/4/19 lexiscan myoview   Summary  Normal LV function. There is normal isotope uptake at stress and rest. There is no evidence of  myocardial ischemia or scar. Normal myocardial perfusion study.         CXR 7/11/19 FINDINGS: The lungs are clear. The cardiac silhouette is within normal limits. There is no pneumothorax or pleural effusion. Status post cholecystectomy and anterior discectomy of the lower cervical spine. Bilateral YOKASTA 4/2/19  There is no arterial insufficiency at rest in the lower extremities  bilaterally. EKG 12/11/18  Normal sinus rhythmRight bundle branch blockleft axisLeft anterior fascicular block Bifascicular block   voltage criteria for LVH, may be normal variantAbnormal ECGNo previous ECGs availableConfirmed by NICOLE Lin MD ([id number]) on 12/12/2018 9:20:03 AM    ECHO 10/19/18  Summary   Global left ventricular systolic function is normal with ejection fraction   estimated from 55 % to 60 %.   No regional wall motion abnormalities are noted.   Left ventricular size is moderately increased.   There is mild concentric left ventricular hypertrophy.   Normal left ventricular diastolic filling pressure.   The left atrium is mildly dilated.   The aortic valve is thickened/calcified with decreased leaflet mobility   consistent with mild aortic Doppler exam is c/w mild aortic stenosis. No   aortic regurgitation.   The right atrium is mildly dilated. CXR 10/10/18  There is mild pulmonary vascular congestion. The cardiac silhouette is top-normal in size. There is no pneumothorax or pleural effusion. Status post cholecystectomy and anterior discectomy of the lower cervical spine. EKG 10.10.18  NSR within normal    Assessment:  Encounter Diagnoses   Name Primary?  Mild aortic stenosis Yes    Essential hypertension     TIA (transient ischemic attack)        Hypotension due to dehydration in presence of UTI resolved  She is on lipitor due to history of TIA         Plan:  1. Meds reviewed  2. Recommend resuming lipitor 20 mg daily in view of recent TIA  3.  Follow up with me in Nov. 2020       This note was scribed in the presence of Robinson Vo MD by Juli Hunt RN

## 2020-05-06 NOTE — PROGRESS NOTES
[facility]  5/6/2020     Subjective:  Ms. Tiffani Dumas presents today for 1 week follow up for hypotension. PMH:  aortic stenosis, HTN, HLD       [place]:  Last office visit 4/29/20. She was weak, lightheaded/dizzy when standing,  BP 68/42. I stop lasix, potassium,  amlodipine, lisinopril, metoprolol, and zanaflex. Sent her to ED for 1-L NS infusion. BP and symptoms improved. In ED noted to have UTI dicharged home on Keflex x 7 days. Today she reports feeling much. She has resumed amlodipine 5mg daily. She is not taking lipitor PCP stopped. Denies chest pain, shortness of breath, edema, dizziness, palpitations and syncope. PMH:  Ms. Tiffani Dumas has PMH: of Aortic Stenosis, HTN, shortness of breath . She has had allergies which causes her to feel SOB easily with activity. This is unchanged from previous visit. She takes an allergy pill and symptoms improve  Admitted 2/21/20 for dyspnea. Work up raised concern for cirrhosis. Liver ultrasound did confirm a cirrhotic appearance of the liver with mild splenomegaly. This was though to be cause of dyspnea and volume overload- diuresed with lasix. She had readmission 2/29/20 for acute ride side facial droop and right side weakness. CT  negative for acute findings. Pt unable to tolerate MRI. Pt status returned to baseline symptoms presumed likely TIA. [place]:  Short of breath last couple of months. Keeps head end raised. Usual activity makes her short of breath. Grocery shopping makes her short of breath. Tightness of chest is there if she thinks about it. She states if she is distracted she doesn't notice it. Tired x 6 months or longer  She states she has fallen 3 times since April. Her knee gives out and she falls. She states she has had swelling in her legs and feet.   2+BLE edema today    Review of Systems:         12 point ROS negative in all areas as listed below except as in [address], EENT, Cardiovascular, pulmonary, GI, , Musculoskeletal, skin, neurological, hematological, endocrine, Psychiatric    Reviewed past medical history, social, and family history. 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 Dr. Arslan Garcia Right 05/04/2017    PHACO EMULSIFICATION OF CATARACT WITH INTRAOCULAR LENS IMPLANT RIGHT EYE    COLONOSCOPY  3/24/10    FOOT SURGERY      GALLBLADDER SURGERY      HYSTERECTOMY      NECK SURGERY      PAIN MANAGEMENT PROCEDURE N/A 1/20/2020    MIDLINE LUMBAR FIVE SACRAL ONE EPIDURAL STEROID INJECTION SITE CONFIRMED BY FLUOROSCOPY performed by Magalys Benitez MD at [address] ENDOSCOPY N/A 2/24/2020    EGD DIAGNOSTIC ONLY performed by Raegan Elizabeth MD at [address]       Objective:   /68   Pulse 90   Ht 5' 8\" (1.727 m)   Wt 175 lb (79.4 kg)   SpO2 99%   BMI 26.61 kg/m²     Wt Readings from Last 3 Encounters:   05/06/20 175 lb (79.4 kg)   04/29/20 175 lb (79.4 kg)   04/29/20 170 lb (77.1 kg)       Physical Exam:  General: No Respiratory distress, appears well developed and well nourished.    Eyes:  Sclera nonicteric  Nose/Sinuses:  negative findings: nose shows no deformity, asymmetry, or inflammation, nasal mucosa normal, septum midline with no perforation or bleeding  Back:  no pain to palpation  Joint:  no active joint inflammation  Musculoskeletal:  negative  Skin:  Warm and dry  Neck: + JVD and  No Carotid Bruits. Chest:  Clear to auscultation, respiration easy  Cardiovascular:  RRR, S1S2 normal, 1/6 MIRELLA  Transmitted to carotids consistent with aortic stenosis, no diastolic murmur, no rub or thrill. Abdomen:  Soft normal liver and spleen  Extremities: no edema  no clubbing, cyanosis,  Neuro: intact    Medications:   Outpatient Encounter Medications as of 5/6/2020   Medication Sig Dispense Refill    amLODIPine (NORVASC) 5 MG tablet Take 5 mg by mouth daily      atorvastatin (LIPITOR) 20 MG tablet Take 1 tablet by mouth nightly 90 tablet 1    cephALEXin (KEFLEX) 500 MG capsule Take 1 capsule by mouth 2 times daily for 7 days 14 capsule 0    albuterol sulfate  (90 Base) MCG/ACT inhaler Inhale 2 puffs into the lungs 4 times daily as needed for Wheezing 1 Inhaler 0    albuterol sulfate  (90 Base) MCG/ACT inhaler Inhale 2 puffs into the lungs 4 times daily as needed for Wheezing 1 Inhaler 0    Spacer/Aero-Holding Chambers DONYA 1 Device by Does not apply route daily as needed (Shortness of breath) 1 Device 0    triamcinolone (KENALOG) 0.1 % cream APPLY TO AFFECTED AREA EVERY DAY  0    aspirin EC 81 MG EC tablet Take 1 tablet by mouth daily 30 tablet 0    Acetaminophen (TYLENOL PO) Take by mouth as needed      pantoprazole (PROTONIX) 40 MG tablet Take 20 mg by mouth daily 20 mg daily      [DISCONTINUED] predniSONE (DELTASONE) 20 MG tablet       magnesium oxide (MAG-OX) 400 (240 Mg) MG tablet Take 1 tablet by mouth daily for 5 days 5 tablet 0    [DISCONTINUED] atorvastatin (LIPITOR) 40 MG tablet Take 1 tablet by mouth nightly 30 tablet 3    traMADol (ULTRAM) 50 MG tablet Take 50 mg by mouth as needed for Pain. Roxana Storm No facility-administered encounter medications on file as of 5/6/2020. Lab Data:  CBC: No results for input(s): WBC, HGB, HCT, MCV, PLT in the last 72 hours.   BMP: No results for input(s): NA, K, CL, CO2, PHOS, BUN, CREATININE in the last 72 hours.    Invalid input(s): CA  LIVER PROFILE: No results for input(s): AST, ALT, LIPASE, BILIDIR, BILITOT, ALKPHOS in the last 72 hours. Invalid input(s): AMYLASE,  ALB  LIPID:   Lab Results   Component Value Date    CHOL 176 10/25/2018    CHOL 133 10/28/2016    CHOL 177 09/03/2013     Lab Results   Component Value Date    TRIG 79 10/25/2018    TRIG 75 10/28/2016    TRIG 145 09/03/2013     Lab Results   Component Value Date    HDL 70 10/25/2018    HDL 39 (L) 10/28/2016    HDL 45 09/03/2013     Lab Results   Component Value Date    LDLCALC 90 10/25/2018    LDLCALC 79 10/28/2016    LDLCALC 103 (H) 09/03/2013     Lab Results   Component Value Date    LABVLDL 15 10/28/2016    LABVLDL 29 09/03/2013    LABVLDL 25 04/10/2010    VLDL 16 10/25/2018     No results found for: CHOLHDLRATIO  PT/INR: No results for input(s): PROTIME, INR in the last 72 hours. A1C: No results found for: LABA1C  BNP:  No results for input(s): BNP in the last 72 hours. IMAGING:  EKG 4.29.20  Sinus bradycardia  RBBB LAFB  EKG 4/18/20   Sinus tachycardia with occasional Premature ventricular complexesRight bundle branch blockLeft anterior fascicular block Bifascicular block Moderate voltage criteria for LVH, may be normal variantAbnormal ECGWhen compared with ECG of 29-FEB-2020 20:47,Premature ventricular complexes are now PresentVent. rate has increased BY  42 BPMConfirmed by KAYE HOWE MD ([id number]) on 4/19/2020 11:57:41 AM       CXR 4/18/20   Increased lung markings bilaterally, may be related to bronchitis versus minimal pulmonary vascular congestion. Mild discoid atelectasis at the left lung base. CTA neck 4/18/20   Right dominance of the vertebral arteries, with hypoplastic distal intracranial portion of the left vertebral artery. Otherwise, no acute abnormality or flow-limiting stenosis of the major arteries of the neck. CXR 2/29/20   1. Findings typical of bronchitis or reactive airways disease.  2. No focal infiltrate is %.   No regional wall motion abnormalities are noted.   Left ventricular size is moderately increased.   There is mild concentric left ventricular hypertrophy.   Normal left ventricular diastolic filling pressure.   The left atrium is mildly dilated.   The aortic valve is thickened/calcified with decreased leaflet mobility   consistent with mild aortic Doppler exam is c/w mild aortic stenosis. No   aortic regurgitation.   The right atrium is mildly dilated. CXR 10/10/18  There is mild pulmonary vascular congestion. The cardiac silhouette is top-normal in size. There is no pneumothorax or pleural effusion. Status post cholecystectomy and anterior discectomy of the lower cervical spine. EKG 10.10.18  NSR within normal    Assessment:  Encounter Diagnoses   Name Primary?  Mild aortic stenosis Yes    Essential hypertension     TIA (transient ischemic attack)        Hypotension due to dehydration in presence of UTI resolved  She is on lipitor due to history of TIA         Plan:  1. Meds reviewed  2. Recommend resuming lipitor 20 mg daily in view of recent TIA  3. Follow up with me in Nov. 2020       This note was scribed in the presence of Kelton Collazo MD by Renae Kinney RN        QUALITY MEASURES  1. Tobacco Cessation Counseling: NA  2. Retake of BP if >140/90: NA  3. Documentation to PCP/referring for new patient:  Sent to PCP at close of office visit  4. CAD patient on anti-platelet: asa  5. CAD patient on STATIN therapy:   Yes   6. Patient with CHF and aFib on anticoagulation:  NA       I, Dr. Thomas Conrad, personally performed the services described in this documentation, as scribed by the above signed scribe in my presence. It is both accurate and complete to my knowledge. I agree with the details independently gathered by the clinical support staff, while the remaining scribed note accurately describes my personal service to the patient.               [address] [place], MD 5/6/2020 3:25 PM

## 2020-05-06 NOTE — PATIENT INSTRUCTIONS
Plan:  1. Meds reviewed  2. Recommend resuming lipitor 20 mg daily in view of recent TIA  3.  Follow up with me in Nov. 2020

## 2020-06-16 ENCOUNTER — HOSPITAL ENCOUNTER (OUTPATIENT)
Age: 75
Discharge: HOME | End: 2020-06-16
Payer: MEDICARE

## 2020-06-16 ENCOUNTER — HOSPITAL ENCOUNTER (OUTPATIENT)
Dept: ULTRASOUND IMAGING | Age: 75
Discharge: HOME | End: 2020-06-16
Payer: MEDICARE

## 2020-06-16 LAB
A/G RATIO: 1 (ref 1.1–2.2)
ALBUMIN SERPL-MCNC: 3 G/DL (ref 3.4–5)
ALP BLD-CCNC: 88 U/L (ref 40–129)
ALT SERPL-CCNC: 24 U/L (ref 10–40)
ANION GAP SERPL CALCULATED.3IONS-SCNC: 10 MMOL/L (ref 3–16)
AST SERPL-CCNC: 56 U/L (ref 15–37)
BASOPHILS ABSOLUTE: 0.1 K/UL (ref 0–0.2)
BASOPHILS RELATIVE PERCENT: 1.4 %
BILIRUB SERPL-MCNC: 1.4 MG/DL (ref 0–1)
BUN BLDV-MCNC: 9 MG/DL (ref 7–20)
CALCIUM SERPL-MCNC: 8.6 MG/DL (ref 8.3–10.6)
CHLORIDE BLD-SCNC: 105 MMOL/L (ref 99–110)
CO2: 28 MMOL/L (ref 21–32)
CREAT SERPL-MCNC: 0.8 MG/DL (ref 0.6–1.2)
EOSINOPHILS ABSOLUTE: 0.4 K/UL (ref 0–0.6)
EOSINOPHILS RELATIVE PERCENT: 9.2 %
GFR AFRICAN AMERICAN: >60
GFR NON-AFRICAN AMERICAN: >60
GLOBULIN: 3.1 G/DL
GLUCOSE BLD-MCNC: 116 MG/DL (ref 70–99)
HAV IGM SER IA-ACNC: NORMAL
HBV SURFACE AB TITR SER: 147.5 MIU/ML
HCT VFR BLD CALC: 37.8 % (ref 36–48)
HEMOGLOBIN: 12.7 G/DL (ref 12–16)
IGA: 653 MG/DL (ref 70–400)
INR BLD: 1.26 (ref 0.86–1.14)
LYMPHOCYTES ABSOLUTE: 1 K/UL (ref 1–5.1)
LYMPHOCYTES RELATIVE PERCENT: 22.3 %
MCH RBC QN AUTO: 31.7 PG (ref 26–34)
MCHC RBC AUTO-ENTMCNC: 33.5 G/DL (ref 31–36)
MCV RBC AUTO: 94.6 FL (ref 80–100)
MONOCYTES ABSOLUTE: 0.4 K/UL (ref 0–1.3)
MONOCYTES RELATIVE PERCENT: 9 %
NEUTROPHILS ABSOLUTE: 2.5 K/UL (ref 1.7–7.7)
NEUTROPHILS RELATIVE PERCENT: 58.1 %
PDW BLD-RTO: 15 % (ref 12.4–15.4)
PLATELET # BLD: 106 K/UL (ref 135–450)
PMV BLD AUTO: 11 FL (ref 5–10.5)
POTASSIUM SERPL-SCNC: 3.6 MMOL/L (ref 3.5–5.1)
PROTHROMBIN TIME: 14.6 SEC (ref 10–13.2)
RBC # BLD: 4 M/UL (ref 4–5.2)
SODIUM BLD-SCNC: 143 MMOL/L (ref 136–145)
TOTAL PROTEIN: 6.1 G/DL (ref 6.4–8.2)
WBC # BLD: 4.3 K/UL (ref 4–11)

## 2020-06-16 PROCEDURE — 86709 HEPATITIS A IGM ANTIBODY: CPT

## 2020-06-16 PROCEDURE — 82104 ALPHA-1-ANTITRYPSIN PHENO: CPT

## 2020-06-16 PROCEDURE — 76700 US EXAM ABDOM COMPLETE: CPT

## 2020-06-16 PROCEDURE — 86706 HEP B SURFACE ANTIBODY: CPT

## 2020-06-16 PROCEDURE — 82103 ALPHA-1-ANTITRYPSIN TOTAL: CPT

## 2020-06-16 PROCEDURE — 80053 COMPREHEN METABOLIC PANEL: CPT

## 2020-06-16 PROCEDURE — 82390 ASSAY OF CERULOPLASMIN: CPT

## 2020-06-16 PROCEDURE — 36415 COLL VENOUS BLD VENIPUNCTURE: CPT

## 2020-06-16 PROCEDURE — 85610 PROTHROMBIN TIME: CPT

## 2020-06-16 PROCEDURE — 83516 IMMUNOASSAY NONANTIBODY: CPT

## 2020-06-16 PROCEDURE — 86708 HEPATITIS A ANTIBODY: CPT

## 2020-06-16 PROCEDURE — 82784 ASSAY IGA/IGD/IGG/IGM EACH: CPT

## 2020-06-16 PROCEDURE — 85025 COMPLETE CBC W/AUTO DIFF WBC: CPT

## 2020-06-16 PROCEDURE — 82105 ALPHA-FETOPROTEIN SERUM: CPT

## 2020-06-17 LAB — TISSUE TRANSGLUTAMINASE IGA: 2 U/ML (ref 0–14)

## 2020-06-18 LAB
AFP: 4.4 UG/L
ALPHA-1 ANTITRYPSIN: 144 MG/DL (ref 90–200)
CERULOPLASMIN: 16 MG/DL (ref 16–45)
HAV AB SERPL IA-ACNC: POSITIVE
MITOCHONDRIAL M2 AB, IGG: 5.9 UNITS (ref 0–20)

## 2020-06-19 LAB
ALPHA-1 ANTITRYPSIN PHENOTYPE: NORMAL
ALPHA-1 ANTITRYPSIN: 152 MG/DL (ref 90–200)

## 2020-07-09 ENCOUNTER — HOSPITAL ENCOUNTER (OUTPATIENT)
Dept: MRI IMAGING | Age: 75
Discharge: HOME | End: 2020-07-09
Payer: MEDICARE

## 2020-07-09 PROCEDURE — 74181 MRI ABDOMEN W/O CONTRAST: CPT

## 2020-07-20 ENCOUNTER — TELEPHONE (OUTPATIENT)
Dept: PULMONOLOGY | Age: 75
End: 2020-07-20

## 2020-07-20 NOTE — TELEPHONE ENCOUNTER
Within this Telehealth Consent, the terms you and yours refer to the person using the Telehealth Service (Service), or in the case of a use of the Service by or on behalf of a minor, you and yours refer to and include (i) the parent or legal guardian who provides consent to the use of the Service by such minor or uses the Service on behalf of such minor, and (ii) the minor for whom consent is being provided or on whose behalf the Service is being utilized. When using Service, you will be consulting with your health care providers via the use of Telehealth.   Telehealth involves the delivery of healthcare services using electronic communications, information technology or other means between a healthcare provider and a patient who are not in the same physical location. Telehealth may be used for diagnosis, treatment, follow-up and/or patient education, and may include, but is not limited to, one or more of the following:    Electronic transmission of medical records, photo images, personal health information or other data between a patient and a healthcare provider    Interactions between a patient and healthcare provider via audio, video and/or data communications    Use of output data from medical devices, sound and video files    Anticipated Benefits   The use of Telehealth by your Provider(s) through the Service may have the following possible benefits:    Making it easier and more efficient for you to access medical care and treatment for the conditions treated by such Provider(s) utilizing the Service    Allowing you to obtain medical care and treatment by Provider(s) at times that are convenient for you    Enabling you to interact with Provider(s) without the necessity of an in-office appointment     Possible Risks   While the use of Telehealth can provide potential benefits for you, there are also potential risks associated with the use of Telehealth.  These risks include, but may not be limited to the following:    Your Provider(s) may not able to provide medical treatment for your particular condition and you may be required to seek alternative healthcare or emergency care services.  The electronic systems or other security protocols or safeguards used in the Service could fail, causing a breach of privacy of your medical or other information.  Given regulatory requirements in certain jurisdictions, your Provider(s) diagnosis and/or treatment options, especially pertaining to certain prescriptions, may be limited. Acceptance   1. You understand that Services will be provided via Telehealth. This process involves the use of HIPAA compliant and secure, real-time audio-visual interfacing with a qualified and appropriately trained provider located at [facility]. 2. You understand that, under no circumstances, will this session be recorded. 3. You understand that the Provider(s) at [facility] and other clinical participants will be party to the information obtained during the Telehealth session in accordance with best medical practices. 4. You understand that the information obtained during the Telehealth session will be used to help determine the most appropriate treatment options. 5. You understand that You have the right to revoke this consent at any point in time. 6. You understand that Telehealth is voluntary, and that continued treatment is not dependent upon consent. 7. You understand that, in the event of non-consent to Telehealth services and/or technical difficulties, you will obtain services as typically provided in the absence of Telehealth technology. 8. You understand that this consent will be kept in Your medical record. 9. No potential benefits from the use of Telehealth or specific results can be guaranteed. Your condition may not be cured or improved and, in some cases, may get worse.    10. There are limitations in the provision of medical care and treatment via Telehealth and the Service and you may not be able to receive diagnosis and/or treatment through the Service for every condition for which you seek diagnosis and/or treatment. 11. There are potential risks to the use of Telehealth, including but not limited to the risks described in this Telehealth Consent. 12. Your Provider(s) have discussed the use of Telehealth and the Service with you, including the benefits and risks of such and you have provided oral consent to your Provider(s) for the use of Telehealth and the Service. 15. You understand that it is your duty to provide your Provider(s) truthful, accurate and complete information, including all relevant information regarding care that you may have received or may be receiving from other healthcare providers outside of the Service. 14. You understand that each of your Provider(s) may determine in his or sole discretion that your condition is not suitable for diagnosis and/or treatment using the Service, and that you may need to seek medical care and treatment a specialist or other healthcare provider, outside of the Service. 15. You understand that you are fully responsible for payment for all services provided by Provider(s) or through use of the Service and that you may not be able to use third-party insurance. 16. You represent that (a) you have read this Telehealth Consent carefully, (b) you understand the risks and benefits of the Service and the use of Telehealth in the medical care and treatment provided to you by Provider(s) using the Service, and (c) you have the legal capacity and authority to provide this consent for yourself and/or the minor for which you are consenting under applicable federal and state laws, including laws relating to the age of [de-identified] and/or parental/guardian consent.    17. You give your informed consent to the use of Telehealth by Provider(s) using the Service under the terms described in the Terms of Service and this Telehealth Consent. The patient was read the following statement and has consented to the visit as of 7/20/20. The patient has been scheduled for their first telehealth visit on 8/25 with Lugenia Flatness.

## 2020-08-11 RX ORDER — AMLODIPINE BESYLATE 5 MG/1
TABLET ORAL
Qty: 45 TABLET | Refills: 1 | Status: SHIPPED | OUTPATIENT
Start: 2020-08-11 | End: 2020-09-28 | Stop reason: SDUPTHER

## 2020-08-11 NOTE — TELEPHONE ENCOUNTER
Called pt and she sts that Dr. Dayana De Leon has started her back on the Amlodipine 5mg QD shortly after you had stopped her on it on 4/29/20. Would you like to refill the Amlodipine or would you like Dr. Dayana De Leon to refill since he started her back on the med?  Please advise, thank you

## 2020-08-25 ENCOUNTER — TELEPHONE (OUTPATIENT)
Dept: PULMONOLOGY | Age: 75
End: 2020-08-25

## 2020-08-25 ENCOUNTER — VIRTUAL VISIT (OUTPATIENT)
Dept: PULMONOLOGY | Age: 75
End: 2020-08-25
Payer: MEDICARE

## 2020-08-25 PROCEDURE — 99213 OFFICE O/P EST LOW 20 MIN: CPT | Performed by: INTERNAL MEDICINE

## 2020-08-25 PROCEDURE — G8400 PT W/DXA NO RESULTS DOC: HCPCS | Performed by: INTERNAL MEDICINE

## 2020-08-25 PROCEDURE — G8428 CUR MEDS NOT DOCUMENT: HCPCS | Performed by: INTERNAL MEDICINE

## 2020-08-25 PROCEDURE — 4040F PNEUMOC VAC/ADMIN/RCVD: CPT | Performed by: INTERNAL MEDICINE

## 2020-08-25 PROCEDURE — 1123F ACP DISCUSS/DSCN MKR DOCD: CPT | Performed by: INTERNAL MEDICINE

## 2020-08-25 PROCEDURE — 1090F PRES/ABSN URINE INCON ASSESS: CPT | Performed by: INTERNAL MEDICINE

## 2020-08-25 RX ORDER — FUROSEMIDE 40 MG/1
40 TABLET ORAL DAILY
COMMUNITY
End: 2020-10-13 | Stop reason: SDUPTHER

## 2020-08-25 RX ORDER — BUDESONIDE AND FORMOTEROL FUMARATE DIHYDRATE 160; 4.5 UG/1; UG/1
2 AEROSOL RESPIRATORY (INHALATION) 2 TIMES DAILY
Qty: 1 INHALER | Refills: 3 | Status: SHIPPED | OUTPATIENT
Start: 2020-08-25 | End: 2021-03-23

## 2020-08-25 RX ORDER — ALBUTEROL SULFATE 0.63 MG/3ML
1 SOLUTION RESPIRATORY (INHALATION) EVERY 6 HOURS PRN
COMMUNITY

## 2020-08-25 ASSESSMENT — ENCOUNTER SYMPTOMS
VOICE CHANGE: 0
EYE PAIN: 0
SHORTNESS OF BREATH: 1
CONSTIPATION: 0
EYE ITCHING: 0
CHOKING: 0
SORE THROAT: 0
EYE DISCHARGE: 0
ABDOMINAL PAIN: 0
DIARRHEA: 0
CHEST TIGHTNESS: 0
STRIDOR: 0

## 2020-08-25 NOTE — PROGRESS NOTES
MA Communication: The following orders are received by verbal communication from Dr. Philip Etienne.     FU 4-6 wks

## 2020-08-25 NOTE — PROGRESS NOTES
Pulmonary Outpatient Note   Manish Tomas MD       8/25/2020    Chief Complaint:  Follow-up (3 mo FU) and Shortness of Breath     HPI:   66y.o. year old female here for asthma followup. Virtual visit through [employer]. me    Has mild asthma, using her inhaler (albuterol) about 3 times a day with partial benefit. Short of breath still on a daily basis. PFTs personally reviewed, no obstruction or restriction based on ATS criteria.    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 Dr. Mansoor Schneider Right 05/04/2017    PHACO EMULSIFICATION OF CATARACT WITH INTRAOCULAR LENS IMPLANT RIGHT EYE    COLONOSCOPY  3/24/10    FOOT SURGERY      GALLBLADDER SURGERY      HYSTERECTOMY      NECK SURGERY      PAIN MANAGEMENT PROCEDURE N/A 1/20/2020    MIDLINE LUMBAR FIVE SACRAL ONE EPIDURAL STEROID INJECTION SITE CONFIRMED BY FLUOROSCOPY performed by Tish Rodarte MD at [address] ENDOSCOPY N/A 2/24/2020    EGD DIAGNOSTIC ONLY performed by Kenia Ramirez MD at [address] [place] [address] History     Tobacco Use    Smoking status: Never Smoker    Smokeless tobacco: Never Used   Substance Use Topics    Alcohol use: No       Family History   Problem Relation Age of Onset    High Blood Pressure Mother          Current Outpatient Medications:     albuterol (ACCUNEB) 0.63 MG/3ML nebulizer solution, Take 1 Musculoskeletal: Negative for gait problem, joint swelling and neck stiffness. Neurological: Negative for dizziness, numbness and headaches. Psychiatric/Behavioral: Negative for agitation, confusion and hallucinations. ASSESSMENT:    1. Simple chronic bronchitis (HCC)      PLAN:    -Persistent shortness of breath, multifactorial from asthma, CHF, and other comorbidities  -Escalate regimen with a trial of Symbicort, reviewed proper use and adverse effects  -Continue PRN albuterol  -Offered sleep study testing, she declined. Can bring up at next visit  -CHF management per cardiology   -Return to clinic in 4-6 weeks         Hazel Begum MD    Wayne Klein is a 66 y.o. female being evaluated by a Virtual Visit (video visit) encounter to address concerns as mentioned above. A caregiver was present when appropriate. Due to this being a TeleHealth encounter (During AXWLJ-44 public health emergency), evaluation of the following organ systems was limited: Vitals/Constitutional/EENT/Resp/CV/GI//MS/Neuro/Skin/Heme-Lymph-Imm. Pursuant to the emergency declaration under the [address] [place] [address] authority and the [employer] and [employer] Act, this Virtual Visit was conducted with patient's (and/or legal guardian's) consent, to reduce the patient's risk of exposure to COVID-19 and provide necessary medical care. The patient (and/or legal guardian) has also been advised to contact this office for worsening conditions or problems, and seek emergency medical treatment and/or call 911 if deemed necessary. Patient identification was verified at the start of the visit: Yes    Total time spent for this encounter: Not billed by time    Services were provided through a video synchronous discussion virtually to substitute for in-person clinic visit. Patient and provider were located at their individual homes.     --Dang Montague MD on 8/25/2020 at 2:12 PM    An electronic signature was used to authenticate this note.

## 2020-08-31 RX ORDER — ATORVASTATIN CALCIUM 20 MG/1
20 TABLET, FILM COATED ORAL NIGHTLY
Qty: 90 TABLET | Refills: 1 | Status: SHIPPED | OUTPATIENT
Start: 2020-08-31 | End: 2020-10-13

## 2020-09-10 ENCOUNTER — NURSE ONLY (OUTPATIENT)
Dept: ORTHOPEDIC SURGERY | Age: 75
End: 2020-09-10
Payer: MEDICARE

## 2020-09-10 RX ORDER — BETAMETHASONE SODIUM PHOSPHATE AND BETAMETHASONE ACETATE 3; 3 MG/ML; MG/ML
12 INJECTION, SUSPENSION INTRA-ARTICULAR; INTRALESIONAL; INTRAMUSCULAR; SOFT TISSUE ONCE
Status: COMPLETED | OUTPATIENT
Start: 2020-09-10 | End: 2020-09-10

## 2020-09-10 RX ORDER — BUPIVACAINE HYDROCHLORIDE 5 MG/ML
30 INJECTION, SOLUTION PERINEURAL ONCE
Status: COMPLETED | OUTPATIENT
Start: 2020-09-10 | End: 2020-09-10

## 2020-09-10 RX ADMIN — BUPIVACAINE HYDROCHLORIDE 150 MG: 5 INJECTION, SOLUTION PERINEURAL at 15:31

## 2020-09-10 RX ADMIN — BETAMETHASONE SODIUM PHOSPHATE AND BETAMETHASONE ACETATE 12 MG: 3; 3 INJECTION, SUSPENSION INTRA-ARTICULAR; INTRALESIONAL; INTRAMUSCULAR; SOFT TISSUE at 15:31

## 2020-09-11 ENCOUNTER — TELEPHONE (OUTPATIENT)
Dept: ORTHOPEDIC SURGERY | Age: 75
End: 2020-09-11

## 2020-09-17 ENCOUNTER — OFFICE VISIT (OUTPATIENT)
Dept: ORTHOPEDIC SURGERY | Age: 75
End: 2020-09-17
Payer: MEDICARE

## 2020-09-17 VITALS — HEIGHT: 68 IN | WEIGHT: 175 LBS | BODY MASS INDEX: 26.52 KG/M2

## 2020-09-17 PROCEDURE — 1090F PRES/ABSN URINE INCON ASSESS: CPT | Performed by: PHYSICAL MEDICINE & REHABILITATION

## 2020-09-17 PROCEDURE — G8417 CALC BMI ABV UP PARAM F/U: HCPCS | Performed by: PHYSICAL MEDICINE & REHABILITATION

## 2020-09-17 PROCEDURE — 1123F ACP DISCUSS/DSCN MKR DOCD: CPT | Performed by: PHYSICAL MEDICINE & REHABILITATION

## 2020-09-17 PROCEDURE — 1036F TOBACCO NON-USER: CPT | Performed by: PHYSICAL MEDICINE & REHABILITATION

## 2020-09-17 PROCEDURE — G8427 DOCREV CUR MEDS BY ELIG CLIN: HCPCS | Performed by: PHYSICAL MEDICINE & REHABILITATION

## 2020-09-17 PROCEDURE — G8400 PT W/DXA NO RESULTS DOC: HCPCS | Performed by: PHYSICAL MEDICINE & REHABILITATION

## 2020-09-17 PROCEDURE — 99214 OFFICE O/P EST MOD 30 MIN: CPT | Performed by: PHYSICAL MEDICINE & REHABILITATION

## 2020-09-17 PROCEDURE — 4040F PNEUMOC VAC/ADMIN/RCVD: CPT | Performed by: PHYSICAL MEDICINE & REHABILITATION

## 2020-09-17 NOTE — PROGRESS NOTES
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 Dr. Alison Saravia Right 05/04/2017    PHACO EMULSIFICATION OF CATARACT WITH INTRAOCULAR LENS IMPLANT RIGHT EYE    COLONOSCOPY  3/24/10    FOOT SURGERY      GALLBLADDER SURGERY      HYSTERECTOMY      NECK SURGERY      PAIN MANAGEMENT PROCEDURE N/A 1/20/2020    MIDLINE LUMBAR FIVE SACRAL ONE EPIDURAL STEROID INJECTION SITE CONFIRMED BY FLUOROSCOPY performed by Justin Bethea MD at [place] [id number] UPPER GASTROINTESTINAL ENDOSCOPY N/A 2/24/2020    EGD DIAGNOSTIC ONLY performed by Carlos Adames MD at [address] [place] [address]     Current Medications:     Current Outpatient Medications:     atorvastatin (LIPITOR) 20 MG tablet, Take 1 tablet by mouth nightly, Disp: 90 tablet, Rfl: 1    albuterol (ACCUNEB) 0.63 MG/3ML nebulizer solution, Take 1 ampule by nebulization every 6 hours as needed for Wheezing, Disp: , Rfl:     furosemide (LASIX) 40 MG tablet, Take 40 mg by mouth daily, Disp: , Rfl:     budesonide-formoterol (SYMBICORT) 160-4.5 MCG/ACT AERO, Inhale 2 puffs into the lungs 2 times daily, Disp: 1 Inhaler, Rfl: 3    fluticasone-salmeterol (ADVAIR HFA) 115-21 MCG/ACT inhaler, Inhale 2 puffs into the lungs 2 times daily, Disp: 1 Inhaler, Rfl: 3    amLODIPine (NORVASC) 5 MG tablet, TAKE ONE-HALF TABLET BY MOUTH DAILY, Disp: 45 tablet, Rfl: 1    albuterol sulfate  (90 Base) MCG/ACT inhaler, Inhale 2 puffs into the lungs 4 times daily as needed for Wheezing, Disp: 1 Inhaler, Rfl: 0    Spacer/Aero-Holding Chambers DONYA, 1 Device by Does not apply route daily as needed (Shortness of breath), Disp: 1 Device, Rfl: 0    triamcinolone (KENALOG) 0.1 % cream, APPLY TO AFFECTED AREA EVERY DAY, Disp: , Rfl: 0    aspirin EC 81 MG EC tablet, Take 1 tablet by mouth daily, Disp: 30 tablet, Rfl: 0    Acetaminophen (TYLENOL PO), Take by mouth as needed, Disp: , Rfl:     traMADol (ULTRAM) 50 MG tablet, Take 50 mg by mouth as needed for Pain. ., Disp: , Rfl:     pantoprazole (PROTONIX) 40 MG tablet, Take 20 mg by mouth daily 20 mg daily, Disp: , Rfl:   Allergies:  Streptomycin; Sulfa antibiotics; Tylenol with codeine #3 [acetaminophen-codeine]; and Verapamil  Social History:    reports that she has never smoked. She has never used smokeless tobacco. She reports that she does not drink alcohol or use drugs. Family History:   Family History   Problem Relation Age of Onset    High Blood Pressure Mother        REVIEW OF SYSTEMS: Full ROS noted & scanned   CONSTITUTIONAL: Denies unexplained weight loss, fevers, chills or fatigue  NEUROLOGICAL: Denies unsteady gait or progressive weakness  MUSCULOSKELETAL: Denies joint swelling or redness  PSYCHOLOGICAL: Denies anxiety, depression   SKIN: Denies skin changes, delayed healing, rash, itching   HEMATOLOGIC: Denies easy bleeding or bruising  ENDOCRINE: Denies excessive thirst, urination, heat/cold  RESPIRATORY: Denies current dyspnea, cough  GI: Denies nausea, vomiting, diarrhea   : Denies bowel or bladder issues       PHYSICAL EXAM:    Vitals: Height 5' 8\" (1.727 m), weight 175 lb (79.4 kg). GENERAL EXAM:  · General Apparence: Patient is adequately groomed with no evidence of malnutrition. · Orientation: The patient is oriented to time, place and person. · Mood & Affect:The patient's mood and affect are appropriate   · Vascular: Examination reveals no swelling tenderness in upper or lower extremities.  Good capillary refill  · Lymphatic: The lymphatic examination bilaterally reveals all areas to be without enlargement or induration  · Sensation: Sensation is intact without deficit  · Coordination/Balance: Good coordination       LUMBAR/SACRAL EXAMINATION:  · Inspection: Local inspection shows no step-off or bruising. Lumbar alignment is normal.  Sagittal and Coronal balance is neutral.      · Palpation:   No evidence of tenderness at the midline. No tenderness bilaterally at the paraspinal or trochanters. There is no step-off or paraspinal spasm. · Range of Motion: Loss of extension  · Strength:   Strength testing is 5/5 in all muscle groups tested. · Special Tests:   Straight leg raise and crossed SLR negative. Leg length and pelvis level.  0 out of 5 Giana's signs. · Skin: There are no rashes, ulcerations or lesions. · Reflexes: Reflexes are symmetrically trace at the patellar and ankle tendons. Clonus absent bilaterally at the feet. · Gait & station: Slow wide-based gait with a cane  · Additional Examinations:   · RIGHT LOWER EXTREMITY: Inspection/examination of the right lower extremity does not show any tenderness, deformity or injury. Range of motion is full. There is no gross instability. There are no rashes, ulcerations or lesions. Strength and tone are normal.  ·   · LEFT LOWER EXTREMITY:  Inspection/examination of the left lower extremity does not show any tenderness, deformity or injury. Range of motion is full. There is no gross instability. There are no rashes, ulcerations or lesions.   Strength and tone are normal.    Diagnostic Testing:      New lumbar MRI shows significant lumbar stenosis L4-5      X-rays 12/20/2019 Ap and lat 12/20/19 advanced diffuse discogenic spondylosis without acute fracture    CBC and comprehensive panel reviewed June 2020 including platelets of 428    Impression: Clinical course is worse  Clinical lumbar stenosis with chronic bilateral leg pain   lumbar stenosis-recurrent        Plan:     Midline L5-S1 PATEL, #2    Hazel West MD

## 2020-09-21 NOTE — H&P
HISTORY AND PHYSICAL/PRE-SEDATION ASSESSMENT    Patient:  Balaji Carr   :  1945  Medical Record No.:  [id number]   Date:  2020  Physician:  Naveen Brown M.D. Facility: [facility]     Nursing History and Physical reviewed and agreed upon. Additional findings:    Allergies:  Streptomycin; Sulfa antibiotics; Tylenol with codeine #3 [acetaminophen-codeine]; and Verapamil    Home Medications:    Prior to Admission medications    Medication Sig Start Date End Date Taking? Authorizing Provider   atorvastatin (LIPITOR) 20 MG tablet Take 1 tablet by mouth nightly 20   Leticia Chanel MD   albuterol (ACCUNEB) 0.63 MG/3ML nebulizer solution Take 1 ampule by nebulization every 6 hours as needed for Wheezing    Historical Provider, MD   furosemide (LASIX) 40 MG tablet Take 40 mg by mouth daily    Historical Provider, MD   budesonide-formoterol (SYMBICORT) 160-4.5 MCG/ACT AERO Inhale 2 puffs into the lungs 2 times daily 20   Ish Cantrell MD   fluticasone-salmeterol ([facility]) 097-09 MCG/ACT inhaler Inhale 2 puffs into the lungs 2 times daily 20   Ish Cantrell MD   amLODIPine (NORVASC) 5 MG tablet TAKE ONE-HALF TABLET BY MOUTH DAILY 20   Leticia Chanel MD   albuterol sulfate  (90 Base) MCG/ACT inhaler Inhale 2 puffs into the lungs 4 times daily as needed for Wheezing 2/15/20   Kenyon Brittle, MD   Spacer/Aero-Holding Vickki Flank 1 Device by Does not apply route daily as needed (Shortness of breath) 2/15/20   Kenyon Brittle, MD   triamcinolone (KENALOG) 0.1 % cream APPLY TO AFFECTED AREA EVERY DAY 19   Historical Provider, MD   aspirin EC 81 MG EC tablet Take 1 tablet by mouth daily 19   Leticia Chanel MD   Acetaminophen (TYLENOL PO) Take by mouth as needed    Historical Provider, MD   traMADol (ULTRAM) 50 MG tablet Take 50 mg by mouth as needed for Pain. Bebe Ahumada     Historical Provider, MD   pantoprazole (PROTONIX) 40 MG tablet Take 20 mg by mouth daily 20 mg daily 3/30/14   Historical Provider, MD       Vitals: Stable     PHYSICAL EXAM:  HENT: Airway patent and reviewed  Cardiovascular: Normal rate, regular rhythm, normal heart sounds. Pulmonary/Chest: No wheezes. No rhonchi. No rales. Abdominal: Soft. Bowel sounds are normal. No distension. Mallampati: 2      MALLAMPATI:           []   I. Complete visualization of the soft palate           [x]   II. Complete visualization of the uvula            []   III. Visualization of only the base of the uvula           []   IV. Soft palate is not visible     ASA CLASS:         []   I. Normal, healthy adult           [x]   II.  Mild systemic disease            []   III. Severe systemic disease      Sedation plan:   [x]  Local              []  Minimal                  []  General anesthesia    Patient's condition acceptable for planned procedure/sedation. Post Procedure Plan   Return to same level of care   ______________________     The risks and benefits as well as alternatives to the procedure have been discussed with the patient and or family. The patient and or next of kin understands and agrees to proceed.     Urszula Palomino M.D.

## 2020-09-22 ENCOUNTER — HOSPITAL ENCOUNTER (OUTPATIENT)
Age: 75
Setting detail: OUTPATIENT SURGERY
Discharge: HOME | End: 2020-09-22
Attending: PHYSICAL MEDICINE & REHABILITATION | Admitting: PHYSICAL MEDICINE & REHABILITATION
Payer: MEDICARE

## 2020-09-22 VITALS
HEART RATE: 86 BPM | DIASTOLIC BLOOD PRESSURE: 64 MMHG | TEMPERATURE: 97.5 F | HEIGHT: 68 IN | OXYGEN SATURATION: 98 % | BODY MASS INDEX: 25.01 KG/M2 | WEIGHT: 165 LBS | SYSTOLIC BLOOD PRESSURE: 161 MMHG | RESPIRATION RATE: 16 BRPM

## 2020-09-22 PROCEDURE — 2709999900 HC NON-CHARGEABLE SUPPLY: Performed by: PHYSICAL MEDICINE & REHABILITATION

## 2020-09-22 PROCEDURE — 7100000010 HC PHASE II RECOVERY - FIRST 15 MIN: Performed by: PHYSICAL MEDICINE & REHABILITATION

## 2020-09-22 PROCEDURE — 6360000002 HC RX W HCPCS: Performed by: PHYSICAL MEDICINE & REHABILITATION

## 2020-09-22 PROCEDURE — 3600000002 HC SURGERY LEVEL 2 BASE: Performed by: PHYSICAL MEDICINE & REHABILITATION

## 2020-09-22 PROCEDURE — 2500000003 HC RX 250 WO HCPCS: Performed by: PHYSICAL MEDICINE & REHABILITATION

## 2020-09-22 RX ORDER — METHYLPREDNISOLONE ACETATE 40 MG/ML
INJECTION, SUSPENSION INTRA-ARTICULAR; INTRALESIONAL; INTRAMUSCULAR; SOFT TISSUE
Status: DISCONTINUED
Start: 2020-09-22 | End: 2020-09-22 | Stop reason: HOSPADM

## 2020-09-22 NOTE — OP NOTE
Patient:  Moncho Higginbotham Record #:  [id number]   Date:  9/22/2020  Physician:  Hazel West M.D. Facility: [facility]     Pre-op diagnosis: Lumbar spondylosis, lumbar radiculitis  Post-op diagnosis:  same  Procedure: Midline L5/S1 interlaminar epidural injection #2 with flouroscopic guidance  Anesthesia: Local  Procedure Note:    The patient was admitted through pre-op and written consent was obtained. The patient was advised of the risks and benefits of the procedure, including but not limited to the following: bleeding, pain, infection, temporary paralysis, nerve damage and spinal headache. The patient was given the opportunity to ask questions. There were no contraindications for this procedure. The appropriate area was prepped and draped in a sterile fashion. Landmarks were identified and marked. The skin and soft tissues were anesthetized with 1% lidocaine. A 20G Touhy needle was advanced to the midline L5/S1 interlaminar space using fluoroscopic guidance with ideal needle tip placement confirmed by multiple views. Injection of contrast showed epidural flow. There were no signs of intravascular or intrathecal injection. 80 mg depomedrol and 1cc 1% lidocaine were then injected. There were no complications and the patient tolerated the procedure well. The patient was transferred to the recovery area and monitored. Discharge instructions were given. The patient is to contact me for any post-procedure concerns. The patient is to follow up as scheduled.     DANILO: 3 cm     Estimated blood loss: none    RICARDO Canas MD

## 2020-09-25 NOTE — TELEPHONE ENCOUNTER
Pt call to make sure rkg knew she needed refills of her rx's of Amlodipine and Furosemide and they are being sent to [facility]

## 2020-09-28 NOTE — TELEPHONE ENCOUNTER
Pt was calling again about the status of her rx refills of Amlodipine  Pt's pharm - [facility] said they have not heard from rk. Please check on this and call pt  Pt does not need the Furosemide , it already has 2 refills on it.

## 2020-09-29 RX ORDER — AMLODIPINE BESYLATE 5 MG/1
5 TABLET ORAL DAILY
Qty: 45 TABLET | Refills: 2 | Status: SHIPPED | OUTPATIENT
Start: 2020-09-29 | End: 2021-03-12

## 2020-10-07 ENCOUNTER — OFFICE VISIT (OUTPATIENT)
Dept: ORTHOPEDIC SURGERY | Age: 75
End: 2020-10-07
Payer: MEDICARE

## 2020-10-07 VITALS — WEIGHT: 165 LBS | BODY MASS INDEX: 25.01 KG/M2 | HEIGHT: 68 IN

## 2020-10-07 PROCEDURE — G8484 FLU IMMUNIZE NO ADMIN: HCPCS | Performed by: PHYSICAL MEDICINE & REHABILITATION

## 2020-10-07 PROCEDURE — G8400 PT W/DXA NO RESULTS DOC: HCPCS | Performed by: PHYSICAL MEDICINE & REHABILITATION

## 2020-10-07 PROCEDURE — G8417 CALC BMI ABV UP PARAM F/U: HCPCS | Performed by: PHYSICAL MEDICINE & REHABILITATION

## 2020-10-07 PROCEDURE — G8427 DOCREV CUR MEDS BY ELIG CLIN: HCPCS | Performed by: PHYSICAL MEDICINE & REHABILITATION

## 2020-10-07 PROCEDURE — 1123F ACP DISCUSS/DSCN MKR DOCD: CPT | Performed by: PHYSICAL MEDICINE & REHABILITATION

## 2020-10-07 PROCEDURE — 1090F PRES/ABSN URINE INCON ASSESS: CPT | Performed by: PHYSICAL MEDICINE & REHABILITATION

## 2020-10-07 PROCEDURE — 1036F TOBACCO NON-USER: CPT | Performed by: PHYSICAL MEDICINE & REHABILITATION

## 2020-10-07 PROCEDURE — 4040F PNEUMOC VAC/ADMIN/RCVD: CPT | Performed by: PHYSICAL MEDICINE & REHABILITATION

## 2020-10-07 PROCEDURE — 99212 OFFICE O/P EST SF 10 MIN: CPT | Performed by: PHYSICAL MEDICINE & REHABILITATION

## 2020-10-07 RX ORDER — PANTOPRAZOLE SODIUM 20 MG/1
TABLET, DELAYED RELEASE ORAL
Status: ON HOLD | COMMUNITY
Start: 2020-08-10 | End: 2021-03-27 | Stop reason: HOSPADM

## 2020-10-07 RX ORDER — ATORVASTATIN CALCIUM 40 MG/1
TABLET, FILM COATED ORAL
COMMUNITY
Start: 2020-08-04 | End: 2021-03-23

## 2020-10-07 NOTE — PROGRESS NOTES
Lumbar follow-up: SPINE    CHIEF COMPLAINT:    Chief Complaint   Patient presents with    Back Pain     fu after inj   75-80 % relief       HISTORY OF PRESENT ILLNESS:                The patient is a 66 y.o. female history of back and leg pain. She has significant lumbar stenosis L4-5 follow-up 9/22/2020 L5-S1 interlaminar epidural 80% relief. 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      Current/Past Treatment:   · Physical Therapy:   · Chiropractic:     · Injection:   Past injections in her back/remote, recent hip inject; midline L5-S1 interlaminar epidural January 20, 2020  Medications:            NSAIDS: [employer]            Muscle relaxer:              Steriods:              Neuropathic medications:              Opioids:            Other:   · Surgery/Consult:    Work Status/Functionality:     Past Medical History: Medical history form was reviewed today & scanned into the media tab  Past Medical History:   Diagnosis Date    Acid reflux     CHF (congestive heart failure) (HCC)     Generalized anxiety disorder     Hepatitis A 06/16/2020    Hypertension     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 Dr. Jimenes All WITH IMPLANT Right 05/04/2017    PHACO EMULSIFICATION OF CATARACT WITH INTRAOCULAR LENS IMPLANT RIGHT EYE    COLONOSCOPY  3/24/10    FOOT SURGERY      GALLBLADDER SURGERY      HYSTERECTOMY      NECK SURGERY      PAIN MANAGEMENT PROCEDURE N/A 1/20/2020    MIDLINE LUMBAR FIVE SACRAL ONE EPIDURAL STEROID INJECTION SITE CONFIRMED BY FLUOROSCOPY performed by Ruchi Maza MD at [address] [facility] N/A 9/22/2020    MIDLINE LUMBAR FIVE SACRAL ONE EPIDURAL STEROID INJECTION SITE CONFIRMED BY FLUOROSCOPY performed by Ruchi Maza MD at [place] [address] UPPER GASTROINTESTINAL ENDOSCOPY N/A 2/24/2020    EGD DIAGNOSTIC ONLY performed by Rogelio Molina MD at [address] [place] [address]     Current Medications:     Current Outpatient Medications:     pantoprazole (PROTONIX) 20 MG tablet, , Disp: , Rfl:     atorvastatin (LIPITOR) 40 MG tablet, , Disp: , Rfl:     amLODIPine (NORVASC) 5 MG tablet, Take 1 tablet by mouth daily, Disp: 45 tablet, Rfl: 2    atorvastatin (LIPITOR) 20 MG tablet, Take 1 tablet by mouth nightly, Disp: 90 tablet, Rfl: 1    albuterol (ACCUNEB) 0.63 MG/3ML nebulizer solution, Take 1 ampule by nebulization every 6 hours as needed for Wheezing, Disp: , Rfl:     furosemide (LASIX) 40 MG tablet, Take 40 mg by mouth daily, Disp: , Rfl:     budesonide-formoterol (SYMBICORT) 160-4.5 MCG/ACT AERO, Inhale 2 puffs into the lungs 2 times daily, Disp: 1 Inhaler, Rfl: 3    fluticasone-salmeterol (ADVAIR HFA) 115-21 MCG/ACT inhaler, Inhale 2 puffs into the lungs 2 times daily, Disp: 1 Inhaler, Rfl: 3    albuterol sulfate  (90 Base) MCG/ACT inhaler, Inhale 2 puffs into the lungs 4 times daily as needed for Wheezing, Disp: 1 Inhaler, Rfl: 0    Spacer/Aero-Holding Chambers DONYA, 1 Device by Does not apply route daily as needed (Shortness of breath), Disp: 1 Device, Rfl: 0    triamcinolone (KENALOG) 0.1 % cream, APPLY TO AFFECTED AREA EVERY DAY, Disp: , Rfl: 0    aspirin EC 81 MG EC tablet, Take 1 tablet by mouth daily, Disp: 30 tablet, Rfl: 0    Acetaminophen (TYLENOL PO), Take by mouth as needed, Disp: , Rfl:     traMADol (ULTRAM) 50 MG tablet, Take 50 mg by mouth as needed for Pain. ., Disp: , Rfl:   Allergies:  Streptomycin; Sulfa antibiotics; Tylenol with codeine #3 [acetaminophen-codeine]; and Verapamil  Social History:    reports that she has never smoked. She has never used smokeless tobacco. She reports that she does not drink alcohol or use drugs. Family History:   Family History   Problem Relation Age of Onset    High Blood Pressure Mother        REVIEW OF SYSTEMS: Full ROS noted & scanned   CONSTITUTIONAL: Denies unexplained weight loss, fevers, chills or fatigue  NEUROLOGICAL: Denies unsteady gait or progressive weakness  MUSCULOSKELETAL: Denies joint swelling or redness  PSYCHOLOGICAL: Denies anxiety, depression   SKIN: Denies skin changes, delayed healing, rash, itching   HEMATOLOGIC: Denies easy bleeding or bruising  ENDOCRINE: Denies excessive thirst, urination, heat/cold  RESPIRATORY: Denies current dyspnea, cough  GI: Denies nausea, vomiting, diarrhea   : Denies bowel or bladder issues       PHYSICAL EXAM:    Vitals: Height 5' 8\" (1.727 m), weight 165 lb (74.8 kg). GENERAL EXAM:  · General Apparence: Patient is adequately groomed with no evidence of malnutrition. · Orientation: The patient is oriented to time, place and person. · Mood & Affect:The patient's mood and affect are appropriate   · Vascular: Examination reveals no swelling tenderness in upper or lower extremities. Good capillary refill  · Lymphatic: The lymphatic examination bilaterally reveals all areas to be without enlargement or induration  · Sensation: Sensation is intact without deficit  · Coordination/Balance: Good coordination       LUMBAR/SACRAL EXAMINATION:  · Inspection: Local inspection shows no step-off or bruising.   Lumbar alignment is normal.  Sagittal and Coronal balance is neutral.      · Palpation:   No evidence of tenderness at the midline. No tenderness bilaterally at the paraspinal or trochanters. There is no step-off or paraspinal spasm. · Range of Motion: Loss of extension  · Strength:   Strength testing is 5/5 in all muscle groups tested. · Special Tests:   Straight leg raise and crossed SLR negative. Leg length and pelvis level.  0 out of 5 Giana's signs. · Skin: There are no rashes, ulcerations or lesions. · Reflexes: Reflexes are symmetrically trace at the patellar and ankle tendons. Clonus absent bilaterally at the feet. · Gait & station: Slow wide-based gait with a cane  · Additional Examinations:   · RIGHT LOWER EXTREMITY: Inspection/examination of the right lower extremity does not show any tenderness, deformity or injury. Range of motion is full. There is no gross instability. There are no rashes, ulcerations or lesions. Strength and tone are normal.  ·   · LEFT LOWER EXTREMITY:  Inspection/examination of the left lower extremity does not show any tenderness, deformity or injury. Range of motion is full. There is no gross instability. There are no rashes, ulcerations or lesions. Strength and tone are normal.    Diagnostic Testing:      New lumbar MRI shows significant lumbar stenosis L4-5      X-rays 12/20/2019 Ap and lat 12/20/19 advanced diffuse discogenic spondylosis without acute fracture    CBC and comprehensive panel reviewed June 2020 including platelets of 691    Impression: Clinical course is improved    Clinical lumbar stenosis with chronic bilateral leg pain   lumbar stenosis-recurrent        Plan:     She gets several injections year and likely will need them down the road. She call directly to schedule or come in for reevaluation.     Kellie Jimenez MD

## 2020-10-13 ENCOUNTER — VIRTUAL VISIT (OUTPATIENT)
Dept: PULMONOLOGY | Age: 75
End: 2020-10-13
Payer: MEDICARE

## 2020-10-13 PROCEDURE — G8427 DOCREV CUR MEDS BY ELIG CLIN: HCPCS | Performed by: INTERNAL MEDICINE

## 2020-10-13 PROCEDURE — G8400 PT W/DXA NO RESULTS DOC: HCPCS | Performed by: INTERNAL MEDICINE

## 2020-10-13 PROCEDURE — 1123F ACP DISCUSS/DSCN MKR DOCD: CPT | Performed by: INTERNAL MEDICINE

## 2020-10-13 PROCEDURE — 4040F PNEUMOC VAC/ADMIN/RCVD: CPT | Performed by: INTERNAL MEDICINE

## 2020-10-13 PROCEDURE — 99213 OFFICE O/P EST LOW 20 MIN: CPT | Performed by: INTERNAL MEDICINE

## 2020-10-13 PROCEDURE — 1090F PRES/ABSN URINE INCON ASSESS: CPT | Performed by: INTERNAL MEDICINE

## 2020-10-13 RX ORDER — FUROSEMIDE 40 MG/1
40 TABLET ORAL DAILY
Qty: 90 TABLET | Refills: 3 | Status: ON HOLD
Start: 2020-10-13 | End: 2021-03-27 | Stop reason: HOSPADM

## 2020-10-13 ASSESSMENT — ENCOUNTER SYMPTOMS
DIARRHEA: 0
STRIDOR: 0
EYE DISCHARGE: 0
VOICE CHANGE: 0
CONSTIPATION: 0
SORE THROAT: 0
EYE ITCHING: 0
CHEST TIGHTNESS: 0
ABDOMINAL PAIN: 0
EYE PAIN: 0
CHOKING: 0

## 2020-10-13 NOTE — TELEPHONE ENCOUNTER
Last ov 5.6.20----Next ov 11.12.20  Assessment:       Encounter Diagnoses   Name Primary?  Mild aortic stenosis Yes    Essential hypertension      TIA (transient ischemic attack)           Hypotension due to dehydration in presence of UTI resolved  She is on lipitor due to history of TIA           Plan:  1. Meds reviewed  2. Recommend resuming lipitor 20 mg daily in view of recent TIA  3.  Follow up with me in Nov. 2020

## 2020-10-13 NOTE — PROGRESS NOTES
MA Communication: The following orders are received by verbal communication from Dr. Herb Rodriguez.     Orders include:prn fu

## 2020-10-13 NOTE — PROGRESS NOTES
Pulmonary Outpatient Note   Ever Olivas MD       10/13/2020    Chief Complaint:  Follow-up (6 wk ) and Shortness of Breath     HPI:   66y.o. year old female here for follow up of asthma. Virtual visit through doxy. me    States that she is feeling better compared with last visit six weeks ago  Apparently states that she had severe neck issues, was seen by specialists and they addressed this which subsequently improved her shortness of breath. Also states that she started the Symbicort which has also helped, denies adverse effects. Has since stopped using     PFTs personally reviewed, no obstruction or restriction based on ATS criteria.    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 Dr. Darlene Ramos Right 05/04/2017    PHACO EMULSIFICATION OF CATARACT WITH INTRAOCULAR LENS IMPLANT RIGHT EYE    COLONOSCOPY  3/24/10    FOOT SURGERY      GALLBLADDER SURGERY      HYSTERECTOMY      NECK SURGERY      PAIN MANAGEMENT PROCEDURE N/A 1/20/2020    MIDLINE LUMBAR FIVE SACRAL ONE EPIDURAL STEROID INJECTION SITE CONFIRMED BY FLUOROSCOPY performed by Alyce Mullen MD at [address] N/A 9/22/2020    MIDLINE LUMBAR FIVE SACRAL ONE EPIDURAL STEROID INJECTION SITE CONFIRMED BY FLUOROSCOPY performed by Alyce Mullen MD at [address] ENDOSCOPY N/A 2/24/2020    EGD DIAGNOSTIC ONLY performed by Neema Garrison MD at [address] [place] [address] History     Tobacco Use    Smoking status: Never Smoker    Smokeless tobacco: Never Used   Substance Use Topics    Alcohol use: No       Family History   Problem Relation Age of Onset    High Blood Pressure Mother          Current Outpatient Medications:     pantoprazole (PROTONIX) 20 MG tablet, , Disp: , Rfl:     atorvastatin (LIPITOR) 40 MG tablet, , Disp: , Rfl:     amLODIPine (NORVASC) 5 MG tablet, Take 1 tablet by mouth daily, Disp: 45 tablet, Rfl: 2    albuterol (ACCUNEB) 0.63 MG/3ML nebulizer solution, Take 1 ampule by nebulization every 6 hours as needed for Wheezing, Disp: , Rfl:     furosemide (LASIX) 40 MG tablet, Take 40 mg by mouth daily, Disp: , Rfl:     budesonide-formoterol (SYMBICORT) 160-4.5 MCG/ACT AERO, Inhale 2 puffs into the lungs 2 times daily, Disp: 1 Inhaler, Rfl: 3    albuterol sulfate  (90 Base) MCG/ACT inhaler, Inhale 2 puffs into the lungs 4 times daily as needed for Wheezing, Disp: 1 Inhaler, Rfl: 0    aspirin EC 81 MG EC tablet, Take 1 tablet by mouth daily, Disp: 30 tablet, Rfl: 0    Acetaminophen (TYLENOL PO), Take by mouth as needed, Disp: , Rfl:     traMADol (ULTRAM) 50 MG tablet, Take 50 mg by mouth as needed for Pain. ., Disp: , Rfl:     Spacer/Aero-Holding Chambers DONYA, 1 Device by Does not apply route daily as needed (Shortness of breath), Disp: 1 Device, Rfl: 0    Streptomycin; Sulfa antibiotics; Tylenol with codeine #3 [acetaminophen-codeine]; and Verapamil    There were no vitals filed for this visit. Review of Systems   Constitutional: Negative for chills, fever and unexpected weight change. HENT: Negative for mouth sores, sore throat and voice change. Eyes: Negative for pain, discharge and itching. Respiratory: Negative for choking, chest tightness and stridor. Cardiovascular: Negative for chest pain, palpitations and leg swelling.    Gastrointestinal: Negative for abdominal pain, constipation and diarrhea. Endocrine: Negative for cold intolerance, heat intolerance and polydipsia. Genitourinary: Negative for dysuria, frequency and hematuria. Musculoskeletal: Negative for gait problem, joint swelling and neck stiffness. Neurological: Negative for dizziness, numbness and headaches. Psychiatric/Behavioral: Negative for agitation, confusion and hallucinations. ASSESSMENT:    1. Asthma, mild intermittent, well-controlled      PLAN:    -Improvement in shortness of breath, monitor off inhalers for now  -If her symptoms return can re-escalate inhalers and explore further pulm workup. Hold off for now. Instructed her to return to clinic should this occur    Flo Chicas is a 66 y.o. female being evaluated by a Virtual Visit (video visit) encounter to address concerns as mentioned above. A caregiver was present when appropriate. Due to this being a TeleHealth encounter (During LFX-13 public health emergency), evaluation of the following organ systems was limited: Vitals/Constitutional/EENT/Resp/CV/GI//MS/Neuro/Skin/Heme-Lymph-Imm. Pursuant to the emergency declaration under the [facility], [address] [place] [address] authority and the [employer] Act, this Virtual Visit was conducted with patient's (and/or legal guardian's) consent, to reduce the patient's risk of exposure to COVID-19 and provide necessary medical care. The patient (and/or legal guardian) has also been advised to contact this office for worsening conditions or problems, and seek emergency medical treatment and/or call 911 if deemed necessary. Patient identification was verified at the start of the visit: Yes    Total time spent for this encounter: Not billed by time    Services were provided through a video synchronous discussion virtually to substitute for in-person clinic visit.  Patient and provider were located at their individual homes. --Debbie Lepe MD on 10/13/2020 at 10:36 AM    An electronic signature was used to authenticate this note.

## 2020-10-13 NOTE — PATIENT INSTRUCTIONS
Remember to bring all pulmonary medications to your next appointment with the office. Please keep all of your future appointments scheduled by [facility] office. Out of respect for other patients and providers, you may be asked to reschedule your appointment if you arrive later than your scheduled appointment time. Appointments cancelled less than 24hrs in advance will be considered a no show. Patients with three missed appointments within 1 year or four missed appointments within 2 years can be dismissed from the practice. You may receive a survey regarding the care you received during your visit. Your input is valuable to us. We encourage you to complete and return your survey. We hope you will choose us in the future for your healthcare needs.

## 2020-11-17 NOTE — PROGRESS NOTES
[facility] Office Note  11/18/2020     Subjective:  Ms. Christine Alexander presents today for follow up for  TIA, and mild aortic stenosis. [place]:  Ghazal Dunbar 66 y.o. female presents today to follow up of  SOB TIA, and mild stenosis. She reports last week she thinks she had a TIA. She reports she was sitting at a table talking to her sister and she could not verbalize the words she was trying to say. She reports she felt \"weird\" after that, and was unable further describe how she felt. She reports she has soreness in her neck and back for which she sees a chiropractor. She reports she has SOB with exertion. She reports she thinks she has a pinched nerve in her back that is causing her SOB. She reports she is taking all medications as prescribed and tolerates them well. Patient denies current chest pain, palpitations, dizziness or syncope. PMH:. Ms. Christine Alexander has PMH: of Aortic Stenosis, HTN, HLD, and shortness of breath . She has had allergies which causes her to feel SOB easily with activity. This is unchanged from previous visit. She takes an allergy pill and symptoms improve  Admitted 2/21/20 for dyspnea. Work up raised concern for cirrhosis. Liver ultrasound did confirm a cirrhotic appearance of the liver with mild splenomegaly. This was thought  to be cause of dyspnea and volume overload- diuresed with lasix. She had readmission 2/29/20 for acute ride side facial droop and right side weakness. CT was negative for acute findings. Pt unable to tolerate MRI. Pt status returned to baseline symptoms presumed likely TIA. Review of Systems:         12 point ROS negative in all areas as listed below except as in [place]  Constitutional, EENT, pulmonary, GI, , skin, neurological, hematological, endocrine, Psychiatric. Reviewed past medical history, social, and family history. Does not smoke  Rarely drinks alcohol.    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 Dr. Ny Macias Right 05/04/2017    PHACO EMULSIFICATION OF CATARACT WITH INTRAOCULAR LENS IMPLANT RIGHT EYE    COLONOSCOPY  3/24/10    FOOT SURGERY      GALLBLADDER SURGERY      HYSTERECTOMY      NECK SURGERY      PAIN MANAGEMENT PROCEDURE N/A 1/20/2020    MIDLINE LUMBAR FIVE SACRAL ONE EPIDURAL STEROID INJECTION SITE CONFIRMED BY FLUOROSCOPY performed by Watson Lopez MD at [address] [facility] N/A 9/22/2020    MIDLINE LUMBAR FIVE SACRAL ONE EPIDURAL STEROID INJECTION SITE CONFIRMED BY FLUOROSCOPY performed by Watson Lopez MD at [facility] N/A 2/24/2020    EGD DIAGNOSTIC ONLY performed by Anna Perez MD at [address]       Objective:   /60   Pulse 73   Ht 5' 8\" (1.727 m)   Wt 164 lb 8 oz (74.6 kg)   SpO2 97%   BMI 25.01 kg/m²     Wt Readings from Last 3 Encounters:   11/18/20 164 lb 8 oz (74.6 kg)   10/07/20 165 lb (74.8 kg)   09/22/20 165 lb (74.8 kg)       Physical Exam:  General: No Respiratory distress, appears well developed and well nourished.    Eyes:  Sclera nonicteric  Nose/Sinuses:  negative findings: nose shows no deformity, asymmetry, or inflammation, nasal mucosa normal, septum midline with no perforation or bleeding  Back:  no pain to palpation  Joint:  no active joint inflammation  Musculoskeletal:  negative  Skin:  Warm and dry  Neck:  + JVD and  No Carotid Bruits. Chest:  Clear to auscultation, respiration easy  Cardiovascular:  RRR, S1S2 normal, 1/6 MIRELLA  Transmitted to carotids consistent with aortic stenosis, no diastolic murmur, no rub or thrill. Abdomen:  Soft normal liver and spleen  Extremities: no edema  no clubbing, cyanosis,  Neuro: intact    Medications:   Outpatient Encounter Medications as of 11/18/2020   Medication Sig Dispense Refill    furosemide (LASIX) 40 MG tablet Take 1 tablet by mouth daily 90 tablet 3    pantoprazole (PROTONIX) 20 MG tablet       atorvastatin (LIPITOR) 40 MG tablet       amLODIPine (NORVASC) 5 MG tablet Take 1 tablet by mouth daily 45 tablet 2    albuterol (ACCUNEB) 0.63 MG/3ML nebulizer solution Take 1 ampule by nebulization every 6 hours as needed for Wheezing      budesonide-formoterol (SYMBICORT) 160-4.5 MCG/ACT AERO Inhale 2 puffs into the lungs 2 times daily 1 Inhaler 3    albuterol sulfate  (90 Base) MCG/ACT inhaler Inhale 2 puffs into the lungs 4 times daily as needed for Wheezing 1 Inhaler 0    Spacer/Aero-Holding Chambers DONYA 1 Device by Does not apply route daily as needed (Shortness of breath) 1 Device 0    aspirin EC 81 MG EC tablet Take 1 tablet by mouth daily 30 tablet 0    Acetaminophen (TYLENOL PO) Take by mouth as needed      traMADol (ULTRAM) 50 MG tablet Take 50 mg by mouth as needed for Pain. Abby Christian No facility-administered encounter medications on file as of 11/18/2020. Lab Data:  CBC: No results for input(s): WBC, HGB, HCT, MCV, PLT in the last 72 hours. BMP: No results for input(s): NA, K, CL, CO2, PHOS, BUN, CREATININE in the last 72 hours. Invalid input(s): CA  LIVER PROFILE: No results for input(s): AST, ALT, LIPASE, BILIDIR, BILITOT, ALKPHOS in the last 72 hours. Invalid input(s):   AMYLASE,  ALB  LIPID:   Lab Results   Component Value Date    CHOL 176 10/25/2018    CHOL 133 10/28/2016    CHOL 177 09/03/2013     Lab Results   Component Value Date TRIG 79 10/25/2018    TRIG 75 10/28/2016    TRIG 145 09/03/2013     Lab Results   Component Value Date    HDL 70 10/25/2018    HDL 39 (L) 10/28/2016    HDL 45 09/03/2013     Lab Results   Component Value Date    LDLCALC 90 10/25/2018    LDLCALC 79 10/28/2016    LDLCALC 103 (H) 09/03/2013     Lab Results   Component Value Date    LABVLDL 15 10/28/2016    LABVLDL 29 09/03/2013    LABVLDL 25 04/10/2010    VLDL 16 10/25/2018     No results found for: CHOLHDLRATIO  PT/INR: No results for input(s): PROTIME, INR in the last 72 hours. A1C: No results found for: LABA1C  BNP:  No results for input(s): BNP in the last 72 hours. IMAGING:    EKG 4.29.20  Sinus bradycardia  RBBB LAFB    EKG 4/18/20   Sinus tachycardia with occasional Premature ventricular complexesRight bundle branch blockLeft anterior fascicular block Bifascicular block Moderate voltage criteria for LVH, may be normal variantAbnormal ECGWhen compared with ECG of 29-FEB-2020 20:47,Premature ventricular complexes are now PresentVent. rate has increased BY  42 BPMConfirmed by KAYE HOWE MD ([id number]) on 4/19/2020 11:57:41 AM      CXR 4/18/20   Increased lung markings bilaterally, may be related to bronchitis versus minimal pulmonary vascular congestion. Mild discoid atelectasis at the left lung base. CTA neck 4/18/20   Right dominance of the vertebral arteries, with hypoplastic distal intracranial portion of the left vertebral artery. Otherwise, no acute abnormality or flow-limiting stenosis of the major arteries of the neck. CXR 2/29/20   1. Findings typical of bronchitis or reactive airways disease. 2. No focal infiltrate is evident. ECHO 2/21/20    Summary   There is hyperdynamic LV systolic function with an estimated ejection   fraction of >65%. Mild concentric left ventricular hypertrophy. No regional wall motion abnormalities are seen. Grade I diastolic dysfunction with normal filing pressure.    There is a late peaking gradient recorded across the LV outflow tract   consistent with dynamic obstruction. LVOT pressure gradients are elevated at   rest at 8 mmHg and with valsalva of 36mmHg. Aortic valve appears sclerotic and appears to open well. The aortic valve area is calculated at 1.76 cm2 with max velocity of 2.5   m/sec, a maximum pressure gradient of 25 mmHg and a mean pressure gradient   of 13 mmHg. This is c/w mild aortic stenosis (pressure gradients could be   elevated due hyperdynamic LV). There is no evidence of aortic regurgitation. US LIVER 2/22/20   Cirrhotic ultrasound appearance of the liver with mild splenomegaly. Normal directional flow noted in the main portal vein     10/4/19 lexiscan myoview   Summary  Normal LV function. There is normal isotope uptake at stress and rest. There is no evidence of  myocardial ischemia or scar. Normal myocardial perfusion study. CXR 7/11/19  FINDINGS: The lungs are clear. The cardiac silhouette is within normal limits. There is no pneumothorax or pleural effusion. Status post cholecystectomy and anterior discectomy of the lower cervical spine. Bilateral YOKASTA 4/2/19  There is no arterial insufficiency at rest in the lower extremities  bilaterally.      EKG 12/11/18  Normal sinus rhythmRight bundle branch blockleft axisLeft anterior fascicular block Bifascicular block   voltage criteria for LVH, may be normal variantAbnormal ECGNo previous ECGs availableConfirmed by Chad Dandy MD, STEPHEN ([id number]) on 12/12/2018 9:20:03 AM    ECHO 10/19/18  Summary   Global left ventricular systolic function is normal with ejection fraction   estimated from 55 % to 60 %.   No regional wall motion abnormalities are noted.   Left ventricular size is moderately increased.   There is mild concentric left ventricular hypertrophy.   Normal left ventricular diastolic filling pressure.   The left atrium is mildly dilated.   The aortic valve is thickened/calcified with decreased leaflet mobility   consistent with mild aortic Doppler exam is c/w mild aortic stenosis. No   aortic regurgitation.   The right atrium is mildly dilated. CXR 10/10/18  There is mild pulmonary vascular congestion. The cardiac silhouette is top-normal in size. There is no pneumothorax or pleural effusion. Status post cholecystectomy and anterior discectomy of the lower cervical spine. EKG 10.10.18  NSR within normal    Assessment:  Encounter Diagnoses   Name Primary?  Essential hypertension     Diastolic congestive heart failure, unspecified HF chronicity (HCC)     TIA involving left internal carotid artery Yes    SOB (shortness of breath)     Palpitation     Hyperlipidemia, unspecified hyperlipidemia type        TIA  Dynamic outflow obstruction  She is on lipitor due to history of TIA      Plan:  1. 2 week monitor to assess for AF given history of TIA. 2. Continue medications as prescribed. 3. Labs: fasting lipid (8 hours) and CMP. 4. Follow up in 6 months. 5. Carotids have been assessed in past this yr no obstruction  6. If no arrhythmia on 2 week monitor  Start beta blockers and stop amlodipine  QUALITY MEASURES  1. Tobacco Cessation Counseling: NA  2. Retake of BP if >140/90: NA  3. Documentation to PCP/referring for new patient:  Sent to PCP at close of office visit  4. CAD patient on anti-platelet: asa  5. CAD patient on STATIN therapy:   Yes   6. Patient with CHF and aFib on anticoagulation:  NA     This note has been scribed in the presence of Dr. Gasper Seip, MD by Jeannette Matute RN.     I, Dr. Goyo Catherine, personally performed the services described in this documentation, as scribed by the above signed scribe in my presence. It is both accurate and complete to my knowledge. I agree with the details independently gathered by the clinical support staff, while the remaining scribed note accurately describes my personal service to the patient.

## 2020-11-18 ENCOUNTER — TELEPHONE (OUTPATIENT)
Dept: CARDIOLOGY CLINIC | Age: 75
End: 2020-11-18

## 2020-11-18 ENCOUNTER — OFFICE VISIT (OUTPATIENT)
Dept: CARDIOLOGY CLINIC | Age: 75
End: 2020-11-18
Payer: MEDICARE

## 2020-11-18 VITALS
DIASTOLIC BLOOD PRESSURE: 60 MMHG | SYSTOLIC BLOOD PRESSURE: 120 MMHG | HEIGHT: 68 IN | OXYGEN SATURATION: 97 % | HEART RATE: 73 BPM | BODY MASS INDEX: 24.93 KG/M2 | WEIGHT: 164.5 LBS

## 2020-11-18 PROCEDURE — 1090F PRES/ABSN URINE INCON ASSESS: CPT | Performed by: INTERNAL MEDICINE

## 2020-11-18 PROCEDURE — G8484 FLU IMMUNIZE NO ADMIN: HCPCS | Performed by: INTERNAL MEDICINE

## 2020-11-18 PROCEDURE — G8427 DOCREV CUR MEDS BY ELIG CLIN: HCPCS | Performed by: INTERNAL MEDICINE

## 2020-11-18 PROCEDURE — G8400 PT W/DXA NO RESULTS DOC: HCPCS | Performed by: INTERNAL MEDICINE

## 2020-11-18 PROCEDURE — G8417 CALC BMI ABV UP PARAM F/U: HCPCS | Performed by: INTERNAL MEDICINE

## 2020-11-18 PROCEDURE — 4040F PNEUMOC VAC/ADMIN/RCVD: CPT | Performed by: INTERNAL MEDICINE

## 2020-11-18 PROCEDURE — 1036F TOBACCO NON-USER: CPT | Performed by: INTERNAL MEDICINE

## 2020-11-18 PROCEDURE — 99214 OFFICE O/P EST MOD 30 MIN: CPT | Performed by: INTERNAL MEDICINE

## 2020-11-18 PROCEDURE — 1123F ACP DISCUSS/DSCN MKR DOCD: CPT | Performed by: INTERNAL MEDICINE

## 2020-11-18 NOTE — TELEPHONE ENCOUNTER
Monitor placed by [employer] of monitor 14 days   Monitor ordered by Dr Kayla Woods   Serial number [id number]  Kit ID   Activation successful prior to pt leaving office?  Yes

## 2020-11-18 NOTE — LETTER
[address] Office Note  11/18/2020     Subjective:  Ms. Morenita Parks presents today for follow up for  TIA, and mild aortic stenosis. [place]:  Jessica Jin 66 y.o. female presents today to follow up of  SOB TIA, and mild stenosis. She reports last week she thinks she had a TIA. She reports she was sitting at a table talking to her sister and she could not verbalize the words she was trying to say. She reports she felt \"weird\" after that, and was unable further describe how she felt. She reports she has soreness in her neck and back for which she sees a chiropractor. She reports she has SOB with exertion. She reports she thinks she has a pinched nerve in her back that is causing her SOB. She reports she is taking all medications as prescribed and tolerates them well. Patient denies current chest pain, palpitations, dizziness or syncope. PMH:. Ms. Morenita Parks has PMH: of Aortic Stenosis, HTN, HLD, and shortness of breath . She has had allergies which causes her to feel SOB easily with activity. This is unchanged from previous visit. She takes an allergy pill and symptoms improve  Admitted 2/21/20 for dyspnea. Work up raised concern for cirrhosis. Liver ultrasound did confirm a cirrhotic appearance of the liver with mild splenomegaly. This was thought  to be cause of dyspnea and volume overload- diuresed with lasix. She had readmission 2/29/20 for acute ride side facial droop and right side weakness. CT was negative for acute findings. Pt unable to tolerate MRI. Pt status returned to baseline symptoms presumed likely TIA. Review of Systems:         12 point ROS negative in all areas as listed below except as in [place]  Constitutional, EENT, pulmonary, GI, , skin, neurological, hematological, endocrine, Psychiatric. Reviewed past medical history, social, and family history. Does not smoke  Rarely drinks alcohol.    No history of MI   MOM BP high  DAD does not know    Past Medical History: Component Value Date    TRIG 79 10/25/2018    TRIG 75 10/28/2016    TRIG 145 09/03/2013     Lab Results   Component Value Date    HDL 70 10/25/2018    HDL 39 (L) 10/28/2016    HDL 45 09/03/2013     Lab Results   Component Value Date    LDLCALC 90 10/25/2018    LDLCALC 79 10/28/2016    LDLCALC 103 (H) 09/03/2013     Lab Results   Component Value Date    LABVLDL 15 10/28/2016    LABVLDL 29 09/03/2013    LABVLDL 25 04/10/2010    VLDL 16 10/25/2018     No results found for: CHOLHDLRATIO  PT/INR: No results for input(s): PROTIME, INR in the last 72 hours. A1C: No results found for: LABA1C  BNP:  No results for input(s): BNP in the last 72 hours. IMAGING:    EKG 4.29.20  Sinus bradycardia  RBBB LAFB    EKG 4/18/20   Sinus tachycardia with occasional Premature ventricular complexesRight bundle branch blockLeft anterior fascicular block Bifascicular block Moderate voltage criteria for LVH, may be normal variantAbnormal ECGWhen compared with ECG of 29-FEB-2020 20:47,Premature ventricular complexes are now PresentVent. rate has increased BY  42 BPMConfirmed by KAYE HOWE MD ([id number]) on 4/19/2020 11:57:41 AM      CXR 4/18/20   Increased lung markings bilaterally, may be related to bronchitis versus minimal pulmonary vascular congestion. Mild discoid atelectasis at the left lung base. CTA neck 4/18/20   Right dominance of the vertebral arteries, with hypoplastic distal intracranial portion of the left vertebral artery. Otherwise, no acute abnormality or flow-limiting stenosis of the major arteries of the neck. CXR 2/29/20   1. Findings typical of bronchitis or reactive airways disease. 2. No focal infiltrate is evident. ECHO 2/21/20    Summary   There is hyperdynamic LV systolic function with an estimated ejection   fraction of >65%. Mild concentric left ventricular hypertrophy. No regional wall motion abnormalities are seen. Grade I diastolic dysfunction with normal filing pressure. There is a late peaking gradient recorded across the LV outflow tract   consistent with dynamic obstruction. LVOT pressure gradients are elevated at   rest at 8 mmHg and with valsalva of 36mmHg. Aortic valve appears sclerotic and appears to open well. The aortic valve area is calculated at 1.76 cm2 with max velocity of 2.5   m/sec, a maximum pressure gradient of 25 mmHg and a mean pressure gradient   of 13 mmHg. This is c/w mild aortic stenosis (pressure gradients could be   elevated due hyperdynamic LV). There is no evidence of aortic regurgitation. US LIVER 2/22/20   Cirrhotic ultrasound appearance of the liver with mild splenomegaly. Normal directional flow noted in the main portal vein     10/4/19 lexiscan myoview   Summary  Normal LV function. There is normal isotope uptake at stress and rest. There is no evidence of  myocardial ischemia or scar. Normal myocardial perfusion study. CXR 7/11/19  FINDINGS: The lungs are clear. The cardiac silhouette is within normal limits. There is no pneumothorax or pleural effusion. Status post cholecystectomy and anterior discectomy of the lower cervical spine. Bilateral YOKASTA 4/2/19  There is no arterial insufficiency at rest in the lower extremities  bilaterally. EKG 12/11/18  Normal sinus rhythmRight bundle branch blockleft axisLeft anterior fascicular block Bifascicular block   voltage criteria for LVH, may be normal variantAbnormal ECGNo previous ECGs availableConfirmed by NICOEL Flynn MD ([id number]) on 12/12/2018 9:20:03 AM    ECHO 10/19/18  Summary   Global left ventricular systolic function is normal with ejection fraction   estimated from 55 % to 60 %.   No regional wall motion abnormalities are noted.   Left ventricular size is moderately increased.   There is mild concentric left ventricular hypertrophy.   Normal left ventricular diastolic filling pressure.   The left atrium is mildly dilated.  The aortic valve is thickened/calcified with decreased leaflet mobility   consistent with mild aortic Doppler exam is c/w mild aortic stenosis. No   aortic regurgitation.   The right atrium is mildly dilated. CXR 10/10/18  There is mild pulmonary vascular congestion. The cardiac silhouette is top-normal in size. There is no pneumothorax or pleural effusion. Status post cholecystectomy and anterior discectomy of the lower cervical spine. EKG 10.10.18  NSR within normal    Assessment:  Encounter Diagnoses   Name Primary?  Essential hypertension     Diastolic congestive heart failure, unspecified HF chronicity (HCC)     TIA involving left internal carotid artery Yes    SOB (shortness of breath)     Palpitation     Hyperlipidemia, unspecified hyperlipidemia type        TIA  Dynamic outflow obstruction  She is on lipitor due to history of TIA      Plan:  1. 2 week monitor to assess for AF given history of TIA. 2. Continue medications as prescribed. 3. Labs: fasting lipid (8 hours) and CMP. 4. Follow up in 6 months. 5. Carotids have been assessed in past this yr no obstruction  6. If no arrhythmia on 2 week monitor  Start beta blockers and stop amlodipine  QUALITY MEASURES  1. Tobacco Cessation Counseling: NA  2. Retake of BP if >140/90: NA  3. Documentation to PCP/referring for new patient:  Sent to PCP at close of office visit  4. CAD patient on anti-platelet: asa  5. CAD patient on STATIN therapy:   Yes   6. Patient with CHF and aFib on anticoagulation:  NA     This note has been scribed in the presence of Dr. Caron Vigli MD by Aashish Durant RN.     I, Dr. Sanju Goldstein, personally performed the services described in this documentation, as scribed by the above signed scribe in my presence. It is both accurate and complete to my knowledge.  I agree with the details independently gathered by the clinical support staff, while the remaining scribed note accurately describes my personal service to the patient.

## 2020-11-18 NOTE — PATIENT INSTRUCTIONS
Plan:  1. 2 week monitor to assess for AF given history of TIA. 2. Continue medications as prescribed. 3. Labs: fasting lipid (8 hours) and CMP. 4. Follow up in 6 months.

## 2020-11-22 ENCOUNTER — HOSPITAL ENCOUNTER (OUTPATIENT)
Age: 75
Discharge: HOME | End: 2020-11-22
Payer: MEDICARE

## 2020-11-22 LAB
A/G RATIO: 1.1 (ref 1.1–2.2)
ALBUMIN SERPL-MCNC: 3.4 G/DL (ref 3.4–5)
ALP BLD-CCNC: 92 U/L (ref 40–129)
ALT SERPL-CCNC: 19 U/L (ref 10–40)
ANION GAP SERPL CALCULATED.3IONS-SCNC: 8 MMOL/L (ref 3–16)
AST SERPL-CCNC: 37 U/L (ref 15–37)
BILIRUB SERPL-MCNC: 1 MG/DL (ref 0–1)
BUN BLDV-MCNC: 12 MG/DL (ref 7–20)
CALCIUM SERPL-MCNC: 9 MG/DL (ref 8.3–10.6)
CHLORIDE BLD-SCNC: 101 MMOL/L (ref 99–110)
CHOLESTEROL, FASTING: 161 MG/DL (ref 0–199)
CO2: 30 MMOL/L (ref 21–32)
CREAT SERPL-MCNC: 0.8 MG/DL (ref 0.6–1.2)
GFR AFRICAN AMERICAN: >60
GFR NON-AFRICAN AMERICAN: >60
GLOBULIN: 3.2 G/DL
GLUCOSE BLD-MCNC: 96 MG/DL (ref 70–99)
HDLC SERPL-MCNC: 63 MG/DL (ref 40–60)
LDL CHOLESTEROL CALCULATED: 83 MG/DL
POTASSIUM SERPL-SCNC: 3.7 MMOL/L (ref 3.5–5.1)
SODIUM BLD-SCNC: 139 MMOL/L (ref 136–145)
TOTAL PROTEIN: 6.6 G/DL (ref 6.4–8.2)
TRIGLYCERIDE, FASTING: 77 MG/DL (ref 0–150)
VLDLC SERPL CALC-MCNC: 15 MG/DL

## 2020-11-22 PROCEDURE — 36415 COLL VENOUS BLD VENIPUNCTURE: CPT

## 2020-11-22 PROCEDURE — 80061 LIPID PANEL: CPT

## 2020-11-22 PROCEDURE — 80053 COMPREHEN METABOLIC PANEL: CPT

## 2020-11-23 ENCOUNTER — TELEPHONE (OUTPATIENT)
Dept: CARDIOLOGY CLINIC | Age: 75
End: 2020-11-23

## 2020-11-23 NOTE — TELEPHONE ENCOUNTER
----- Message from Kaushik Nieto MD sent at 11/23/2020  3:38 PM EST -----  Blood test looks good. please call patient.

## 2020-12-14 ENCOUNTER — TELEPHONE (OUTPATIENT)
Dept: CARDIOLOGY CLINIC | Age: 75
End: 2020-12-14

## 2020-12-14 NOTE — TELEPHONE ENCOUNTER
Pt is wondering if she can take an extra dose of furosemide today since she has had additional swelling in her feet and ankles. Pt reports no other swelling and states she has gained around 3 lbs but has also been eating more she thinks. Please advise pt prior to this evening about extra dose.

## 2021-03-12 RX ORDER — AMLODIPINE BESYLATE 5 MG/1
TABLET ORAL
Qty: 45 TABLET | Refills: 1 | Status: ON HOLD
Start: 2021-03-12 | End: 2021-03-27 | Stop reason: HOSPADM

## 2021-03-23 ENCOUNTER — APPOINTMENT (OUTPATIENT)
Dept: GENERAL RADIOLOGY | Age: 76
DRG: 056 | End: 2021-03-23
Payer: MEDICARE

## 2021-03-23 ENCOUNTER — HOSPITAL ENCOUNTER (INPATIENT)
Age: 76
LOS: 3 days | Discharge: HOME | DRG: 056 | End: 2021-03-27
Attending: EMERGENCY MEDICINE | Admitting: INTERNAL MEDICINE
Payer: MEDICARE

## 2021-03-23 ENCOUNTER — HOSPITAL ENCOUNTER (OUTPATIENT)
Dept: GENERAL RADIOLOGY | Age: 76
Discharge: HOME | DRG: 056 | End: 2021-03-23
Payer: MEDICARE

## 2021-03-23 ENCOUNTER — HOSPITAL ENCOUNTER (OUTPATIENT)
Age: 76
Discharge: HOME | DRG: 056 | End: 2021-03-23
Payer: MEDICARE

## 2021-03-23 DIAGNOSIS — R09.02 HYPOXIC EPISODE: ICD-10-CM

## 2021-03-23 DIAGNOSIS — R06.00 DYSPNEA, UNSPECIFIED TYPE: ICD-10-CM

## 2021-03-23 DIAGNOSIS — E87.6 HYPOKALEMIA: ICD-10-CM

## 2021-03-23 DIAGNOSIS — Z87.09 HISTORY OF ASTHMA: ICD-10-CM

## 2021-03-23 DIAGNOSIS — R13.10 DYSPHAGIA, UNSPECIFIED TYPE: Primary | ICD-10-CM

## 2021-03-23 DIAGNOSIS — M89.8X1 PAIN OF RIGHT CLAVICLE: ICD-10-CM

## 2021-03-23 DIAGNOSIS — I10 ESSENTIAL HYPERTENSION: ICD-10-CM

## 2021-03-23 DIAGNOSIS — D69.6 THROMBOCYTOPENIA (HCC): ICD-10-CM

## 2021-03-23 DIAGNOSIS — E83.42 HYPOMAGNESEMIA: ICD-10-CM

## 2021-03-23 DIAGNOSIS — Z91.89 AT RISK FOR ASPIRATION PNEUMONIA: ICD-10-CM

## 2021-03-23 LAB
A/G RATIO: 1.1 (ref 1.1–2.2)
ALBUMIN SERPL-MCNC: 3.4 G/DL (ref 3.4–5)
ALP BLD-CCNC: 86 U/L (ref 40–129)
ALT SERPL-CCNC: 15 U/L (ref 10–40)
ANION GAP SERPL CALCULATED.3IONS-SCNC: 10 MMOL/L (ref 3–16)
AST SERPL-CCNC: 36 U/L (ref 15–37)
BASOPHILS ABSOLUTE: 0.1 K/UL (ref 0–0.2)
BASOPHILS RELATIVE PERCENT: 2.8 %
BILIRUB SERPL-MCNC: 1.8 MG/DL (ref 0–1)
BILIRUBIN URINE: NEGATIVE
BLOOD, URINE: ABNORMAL
BUN BLDV-MCNC: 7 MG/DL (ref 7–20)
CALCIUM SERPL-MCNC: 9.2 MG/DL (ref 8.3–10.6)
CHLORIDE BLD-SCNC: 102 MMOL/L (ref 99–110)
CLARITY: CLEAR
CO2: 28 MMOL/L (ref 21–32)
COLOR: YELLOW
CREAT SERPL-MCNC: 0.7 MG/DL (ref 0.6–1.2)
EOSINOPHILS ABSOLUTE: 0.3 K/UL (ref 0–0.6)
EOSINOPHILS RELATIVE PERCENT: 6.7 %
EPITHELIAL CELLS, UA: NORMAL /HPF (ref 0–5)
GFR AFRICAN AMERICAN: >60
GFR NON-AFRICAN AMERICAN: >60
GLOBULIN: 3.2 G/DL
GLUCOSE BLD-MCNC: 96 MG/DL (ref 70–99)
GLUCOSE URINE: NEGATIVE MG/DL
HCT VFR BLD CALC: 37.4 % (ref 36–48)
HEMOGLOBIN: 12.4 G/DL (ref 12–16)
KETONES, URINE: NEGATIVE MG/DL
LEUKOCYTE ESTERASE, URINE: NEGATIVE
LYMPHOCYTES ABSOLUTE: 1.1 K/UL (ref 1–5.1)
LYMPHOCYTES RELATIVE PERCENT: 28.1 %
MAGNESIUM: 1.5 MG/DL (ref 1.8–2.4)
MCH RBC QN AUTO: 29.7 PG (ref 26–34)
MCHC RBC AUTO-ENTMCNC: 33.3 G/DL (ref 31–36)
MCV RBC AUTO: 89.4 FL (ref 80–100)
MICROSCOPIC EXAMINATION: YES
MONOCYTES ABSOLUTE: 0.3 K/UL (ref 0–1.3)
MONOCYTES RELATIVE PERCENT: 8.5 %
NEUTROPHILS ABSOLUTE: 2.1 K/UL (ref 1.7–7.7)
NEUTROPHILS RELATIVE PERCENT: 53.9 %
NITRITE, URINE: NEGATIVE
PDW BLD-RTO: 16.1 % (ref 12.4–15.4)
PH UA: 6.5 (ref 5–8)
PLATELET # BLD: 90 K/UL (ref 135–450)
PMV BLD AUTO: 9.7 FL (ref 5–10.5)
POTASSIUM REFLEX MAGNESIUM: 3.1 MMOL/L (ref 3.5–5.1)
PRO-BNP: 214 PG/ML (ref 0–449)
PROTEIN UA: NEGATIVE MG/DL
RBC # BLD: 4.18 M/UL (ref 4–5.2)
RBC UA: NORMAL /HPF (ref 0–4)
SODIUM BLD-SCNC: 140 MMOL/L (ref 136–145)
SPECIFIC GRAVITY UA: 1.01 (ref 1–1.03)
TOTAL PROTEIN: 6.6 G/DL (ref 6.4–8.2)
TROPONIN: <0.01 NG/ML
URINE REFLEX TO CULTURE: ABNORMAL
URINE TYPE: ABNORMAL
UROBILINOGEN, URINE: 4 E.U./DL
WBC # BLD: 3.9 K/UL (ref 4–11)
WBC UA: NORMAL /HPF (ref 0–5)

## 2021-03-23 PROCEDURE — 84484 ASSAY OF TROPONIN QUANT: CPT

## 2021-03-23 PROCEDURE — 96365 THER/PROPH/DIAG IV INF INIT: CPT

## 2021-03-23 PROCEDURE — 83880 ASSAY OF NATRIURETIC PEPTIDE: CPT

## 2021-03-23 PROCEDURE — 96366 THER/PROPH/DIAG IV INF ADDON: CPT

## 2021-03-23 PROCEDURE — 93005 ELECTROCARDIOGRAM TRACING: CPT | Performed by: EMERGENCY MEDICINE

## 2021-03-23 PROCEDURE — 80053 COMPREHEN METABOLIC PANEL: CPT

## 2021-03-23 PROCEDURE — 6370000000 HC RX 637 (ALT 250 FOR IP): Performed by: EMERGENCY MEDICINE

## 2021-03-23 PROCEDURE — G0378 HOSPITAL OBSERVATION PER HR: HCPCS

## 2021-03-23 PROCEDURE — 6360000002 HC RX W HCPCS: Performed by: EMERGENCY MEDICINE

## 2021-03-23 PROCEDURE — 81001 URINALYSIS AUTO W/SCOPE: CPT

## 2021-03-23 PROCEDURE — 96367 TX/PROPH/DG ADDL SEQ IV INF: CPT

## 2021-03-23 PROCEDURE — 83735 ASSAY OF MAGNESIUM: CPT

## 2021-03-23 PROCEDURE — 99284 EMERGENCY DEPT VISIT MOD MDM: CPT

## 2021-03-23 PROCEDURE — 85025 COMPLETE CBC W/AUTO DIFF WBC: CPT

## 2021-03-23 PROCEDURE — 71045 X-RAY EXAM CHEST 1 VIEW: CPT

## 2021-03-23 PROCEDURE — 2580000003 HC RX 258: Performed by: EMERGENCY MEDICINE

## 2021-03-23 PROCEDURE — 36415 COLL VENOUS BLD VENIPUNCTURE: CPT

## 2021-03-23 PROCEDURE — 73000 X-RAY EXAM OF COLLAR BONE: CPT

## 2021-03-23 RX ORDER — DEXAMETHASONE SODIUM PHOSPHATE 10 MG/ML
10 INJECTION, SOLUTION INTRAMUSCULAR; INTRAVENOUS ONCE
Status: COMPLETED | OUTPATIENT
Start: 2021-03-23 | End: 2021-03-23

## 2021-03-23 RX ORDER — IPRATROPIUM BROMIDE AND ALBUTEROL SULFATE 2.5; .5 MG/3ML; MG/3ML
1 SOLUTION RESPIRATORY (INHALATION) ONCE
Status: COMPLETED | OUTPATIENT
Start: 2021-03-23 | End: 2021-03-23

## 2021-03-23 RX ORDER — POTASSIUM CHLORIDE 750 MG/1
40 TABLET, EXTENDED RELEASE ORAL ONCE
Status: COMPLETED | OUTPATIENT
Start: 2021-03-23 | End: 2021-03-23

## 2021-03-23 RX ORDER — AZITHROMYCIN 250 MG/1
500 TABLET, FILM COATED ORAL ONCE
Status: COMPLETED | OUTPATIENT
Start: 2021-03-23 | End: 2021-03-23

## 2021-03-23 RX ORDER — POTASSIUM CHLORIDE 7.45 MG/ML
10 INJECTION INTRAVENOUS
Status: DISPENSED | OUTPATIENT
Start: 2021-03-23 | End: 2021-03-24

## 2021-03-23 RX ORDER — SODIUM CHLORIDE 9 MG/ML
1000 INJECTION, SOLUTION INTRAVENOUS CONTINUOUS
Status: DISCONTINUED | OUTPATIENT
Start: 2021-03-23 | End: 2021-03-24

## 2021-03-23 RX ORDER — MAGNESIUM SULFATE IN WATER 40 MG/ML
2000 INJECTION, SOLUTION INTRAVENOUS ONCE
Status: COMPLETED | OUTPATIENT
Start: 2021-03-23 | End: 2021-03-23

## 2021-03-23 RX ORDER — 0.9 % SODIUM CHLORIDE 0.9 %
1000 INTRAVENOUS SOLUTION INTRAVENOUS ONCE
Status: COMPLETED | OUTPATIENT
Start: 2021-03-23 | End: 2021-03-23

## 2021-03-23 RX ADMIN — POTASSIUM CHLORIDE 40 MEQ: 750 TABLET, EXTENDED RELEASE ORAL at 19:58

## 2021-03-23 RX ADMIN — SODIUM CHLORIDE 1000 ML: 9 INJECTION, SOLUTION INTRAVENOUS at 19:51

## 2021-03-23 RX ADMIN — AZITHROMYCIN 500 MG: 250 TABLET, FILM COATED ORAL at 21:20

## 2021-03-23 RX ADMIN — POTASSIUM CHLORIDE 10 MEQ: 7.46 INJECTION, SOLUTION INTRAVENOUS at 23:14

## 2021-03-23 RX ADMIN — SODIUM CHLORIDE 1000 ML: 9 INJECTION, SOLUTION INTRAVENOUS at 23:13

## 2021-03-23 RX ADMIN — MAGNESIUM SULFATE HEPTAHYDRATE 2000 MG: 40 INJECTION, SOLUTION INTRAVENOUS at 19:51

## 2021-03-23 RX ADMIN — IPRATROPIUM BROMIDE AND ALBUTEROL SULFATE 1 AMPULE: .5; 3 SOLUTION RESPIRATORY (INHALATION) at 21:20

## 2021-03-23 RX ADMIN — DEXAMETHASONE SODIUM PHOSPHATE 10 MG: 10 INJECTION, SOLUTION INTRAMUSCULAR; INTRAVENOUS at 21:20

## 2021-03-23 NOTE — Clinical Note
Patient Class: Observation [104]   REQUIRED: Diagnosis: Dysphagia [369997]   Estimated Length of Stay: Estimated stay of less than 2 midnights   Telemetry Bed Required?: Yes

## 2021-03-24 ENCOUNTER — APPOINTMENT (OUTPATIENT)
Dept: GENERAL RADIOLOGY | Age: 76
DRG: 056 | End: 2021-03-24
Payer: MEDICARE

## 2021-03-24 ENCOUNTER — APPOINTMENT (OUTPATIENT)
Dept: CT IMAGING | Age: 76
DRG: 056 | End: 2021-03-24
Payer: MEDICARE

## 2021-03-24 PROBLEM — E43 SEVERE PROTEIN-CALORIE MALNUTRITION (HCC): Status: ACTIVE | Noted: 2021-03-24

## 2021-03-24 LAB
ANION GAP SERPL CALCULATED.3IONS-SCNC: 9 MMOL/L (ref 3–16)
ANISOCYTOSIS: ABNORMAL
BASOPHILS ABSOLUTE: 0 K/UL (ref 0–0.2)
BASOPHILS RELATIVE PERCENT: 0.5 %
BUN BLDV-MCNC: 6 MG/DL (ref 7–20)
CALCIUM SERPL-MCNC: 8.5 MG/DL (ref 8.3–10.6)
CHLORIDE BLD-SCNC: 103 MMOL/L (ref 99–110)
CO2: 25 MMOL/L (ref 21–32)
CREAT SERPL-MCNC: 0.6 MG/DL (ref 0.6–1.2)
EKG ATRIAL RATE: 77 BPM
EKG DIAGNOSIS: NORMAL
EKG P AXIS: 54 DEGREES
EKG P-R INTERVAL: 152 MS
EKG Q-T INTERVAL: 486 MS
EKG QRS DURATION: 146 MS
EKG QTC CALCULATION (BAZETT): 549 MS
EKG R AXIS: -28 DEGREES
EKG T AXIS: 36 DEGREES
EKG VENTRICULAR RATE: 77 BPM
EOSINOPHILS ABSOLUTE: 0 K/UL (ref 0–0.6)
EOSINOPHILS RELATIVE PERCENT: 0.1 %
GFR AFRICAN AMERICAN: >60
GFR NON-AFRICAN AMERICAN: >60
GLUCOSE BLD-MCNC: 118 MG/DL (ref 70–99)
GLUCOSE BLD-MCNC: 169 MG/DL (ref 70–99)
GLUCOSE BLD-MCNC: 196 MG/DL (ref 70–99)
GLUCOSE BLD-MCNC: 86 MG/DL (ref 70–99)
GLUCOSE BLD-MCNC: 96 MG/DL (ref 70–99)
HCT VFR BLD CALC: 36.7 % (ref 36–48)
HEMATOLOGY PATH CONSULT: NORMAL
HEMATOLOGY PATH CONSULT: YES
HEMOGLOBIN: 12.4 G/DL (ref 12–16)
LACTATE DEHYDROGENASE: 262 U/L (ref 100–190)
LYMPHOCYTES ABSOLUTE: 0.2 K/UL (ref 1–5.1)
LYMPHOCYTES RELATIVE PERCENT: 13.4 %
MAGNESIUM: 1.8 MG/DL (ref 1.8–2.4)
MCH RBC QN AUTO: 30.3 PG (ref 26–34)
MCHC RBC AUTO-ENTMCNC: 33.7 G/DL (ref 31–36)
MCV RBC AUTO: 89.7 FL (ref 80–100)
MONO TEST: NEGATIVE
MONOCYTES ABSOLUTE: 0 K/UL (ref 0–1.3)
MONOCYTES RELATIVE PERCENT: 1.1 %
NEUTROPHILS ABSOLUTE: 1.4 K/UL (ref 1.7–7.7)
NEUTROPHILS RELATIVE PERCENT: 84.9 %
PDW BLD-RTO: 16 % (ref 12.4–15.4)
PERFORMED ON: ABNORMAL
PERFORMED ON: ABNORMAL
PERFORMED ON: NORMAL
PERFORMED ON: NORMAL
PLATELET # BLD: 74 K/UL (ref 135–450)
PLATELET SLIDE REVIEW: ABNORMAL
PMV BLD AUTO: 9.8 FL (ref 5–10.5)
POIKILOCYTES: ABNORMAL
POTASSIUM REFLEX MAGNESIUM: 3.9 MMOL/L (ref 3.5–5.1)
PROCALCITONIN: 0.07 NG/ML (ref 0–0.15)
RBC # BLD: 4.09 M/UL (ref 4–5.2)
SARS-COV-2, NAAT: NOT DETECTED
SLIDE REVIEW: ABNORMAL
SODIUM BLD-SCNC: 137 MMOL/L (ref 136–145)
TSH REFLEX: 0.54 UIU/ML (ref 0.27–4.2)
WBC # BLD: 1.6 K/UL (ref 4–11)

## 2021-03-24 PROCEDURE — 86701 HIV-1ANTIBODY: CPT

## 2021-03-24 PROCEDURE — 84443 ASSAY THYROID STIM HORMONE: CPT

## 2021-03-24 PROCEDURE — 83615 LACTATE (LD) (LDH) ENZYME: CPT

## 2021-03-24 PROCEDURE — 2580000003 HC RX 258: Performed by: INTERNAL MEDICINE

## 2021-03-24 PROCEDURE — 92526 ORAL FUNCTION THERAPY: CPT

## 2021-03-24 PROCEDURE — 85025 COMPLETE CBC W/AUTO DIFF WBC: CPT

## 2021-03-24 PROCEDURE — 82746 ASSAY OF FOLIC ACID SERUM: CPT

## 2021-03-24 PROCEDURE — 2580000003 HC RX 258: Performed by: EMERGENCY MEDICINE

## 2021-03-24 PROCEDURE — 6360000002 HC RX W HCPCS: Performed by: EMERGENCY MEDICINE

## 2021-03-24 PROCEDURE — 83010 ASSAY OF HAPTOGLOBIN QUANT: CPT

## 2021-03-24 PROCEDURE — 84165 PROTEIN E-PHORESIS SERUM: CPT

## 2021-03-24 PROCEDURE — 86308 HETEROPHILE ANTIBODY SCREEN: CPT

## 2021-03-24 PROCEDURE — 36415 COLL VENOUS BLD VENIPUNCTURE: CPT

## 2021-03-24 PROCEDURE — 86038 ANTINUCLEAR ANTIBODIES: CPT

## 2021-03-24 PROCEDURE — 82607 VITAMIN B-12: CPT

## 2021-03-24 PROCEDURE — 87635 SARS-COV-2 COVID-19 AMP PRB: CPT

## 2021-03-24 PROCEDURE — 96376 TX/PRO/DX INJ SAME DRUG ADON: CPT

## 2021-03-24 PROCEDURE — 83735 ASSAY OF MAGNESIUM: CPT

## 2021-03-24 PROCEDURE — 70491 CT SOFT TISSUE NECK W/DYE: CPT

## 2021-03-24 PROCEDURE — 6360000002 HC RX W HCPCS: Performed by: INTERNAL MEDICINE

## 2021-03-24 PROCEDURE — 6360000004 HC RX CONTRAST MEDICATION: Performed by: NURSE PRACTITIONER

## 2021-03-24 PROCEDURE — 84145 PROCALCITONIN (PCT): CPT

## 2021-03-24 PROCEDURE — BD11YZZ FLUOROSCOPY OF ESOPHAGUS USING OTHER CONTRAST: ICD-10-PCS | Performed by: RADIOLOGY

## 2021-03-24 PROCEDURE — 84155 ASSAY OF PROTEIN SERUM: CPT

## 2021-03-24 PROCEDURE — 94761 N-INVAS EAR/PLS OXIMETRY MLT: CPT

## 2021-03-24 PROCEDURE — 80048 BASIC METABOLIC PNL TOTAL CA: CPT

## 2021-03-24 PROCEDURE — 86702 HIV-2 ANTIBODY: CPT

## 2021-03-24 PROCEDURE — 96375 TX/PRO/DX INJ NEW DRUG ADDON: CPT

## 2021-03-24 PROCEDURE — 74220 X-RAY XM ESOPHAGUS 1CNTRST: CPT

## 2021-03-24 PROCEDURE — 93010 ELECTROCARDIOGRAM REPORT: CPT | Performed by: INTERNAL MEDICINE

## 2021-03-24 PROCEDURE — 6370000000 HC RX 637 (ALT 250 FOR IP): Performed by: INTERNAL MEDICINE

## 2021-03-24 PROCEDURE — 88185 FLOWCYTOMETRY/TC ADD-ON: CPT

## 2021-03-24 PROCEDURE — 88184 FLOWCYTOMETRY/ TC 1 MARKER: CPT

## 2021-03-24 PROCEDURE — 96367 TX/PROPH/DG ADDL SEQ IV INF: CPT

## 2021-03-24 PROCEDURE — 86803 HEPATITIS C AB TEST: CPT

## 2021-03-24 PROCEDURE — 2700000000 HC OXYGEN THERAPY PER DAY

## 2021-03-24 PROCEDURE — 99232 SBSQ HOSP IP/OBS MODERATE 35: CPT | Performed by: NURSE PRACTITIONER

## 2021-03-24 PROCEDURE — 92610 EVALUATE SWALLOWING FUNCTION: CPT

## 2021-03-24 PROCEDURE — 86023 IMMUNOGLOBULIN ASSAY: CPT

## 2021-03-24 PROCEDURE — 1200000000 HC SEMI PRIVATE

## 2021-03-24 PROCEDURE — 86431 RHEUMATOID FACTOR QUANT: CPT

## 2021-03-24 PROCEDURE — 2500000003 HC RX 250 WO HCPCS: Performed by: INTERNAL MEDICINE

## 2021-03-24 PROCEDURE — 87390 HIV-1 AG IA: CPT

## 2021-03-24 PROCEDURE — 71270 CT THORAX DX C-/C+: CPT

## 2021-03-24 RX ORDER — ACETAMINOPHEN 650 MG/1
650 SUPPOSITORY RECTAL EVERY 6 HOURS PRN
Status: DISCONTINUED | OUTPATIENT
Start: 2021-03-24 | End: 2021-03-27 | Stop reason: HOSPADM

## 2021-03-24 RX ORDER — PROMETHAZINE HYDROCHLORIDE 25 MG/1
12.5 TABLET ORAL EVERY 6 HOURS PRN
Status: DISCONTINUED | OUTPATIENT
Start: 2021-03-24 | End: 2021-03-26 | Stop reason: ALTCHOICE

## 2021-03-24 RX ORDER — ACETAMINOPHEN 325 MG/1
650 TABLET ORAL EVERY 6 HOURS PRN
Status: DISCONTINUED | OUTPATIENT
Start: 2021-03-24 | End: 2021-03-27 | Stop reason: HOSPADM

## 2021-03-24 RX ORDER — SODIUM CHLORIDE 0.9 % (FLUSH) 0.9 %
10 SYRINGE (ML) INJECTION PRN
Status: DISCONTINUED | OUTPATIENT
Start: 2021-03-24 | End: 2021-03-27 | Stop reason: HOSPADM

## 2021-03-24 RX ORDER — POTASSIUM CHLORIDE 7.45 MG/ML
10 INJECTION INTRAVENOUS PRN
Status: DISCONTINUED | OUTPATIENT
Start: 2021-03-24 | End: 2021-03-27 | Stop reason: HOSPADM

## 2021-03-24 RX ORDER — ONDANSETRON 2 MG/ML
4 INJECTION INTRAMUSCULAR; INTRAVENOUS EVERY 6 HOURS PRN
Status: DISCONTINUED | OUTPATIENT
Start: 2021-03-24 | End: 2021-03-26 | Stop reason: ALTCHOICE

## 2021-03-24 RX ORDER — POTASSIUM CHLORIDE 20 MEQ/1
40 TABLET, EXTENDED RELEASE ORAL PRN
Status: DISCONTINUED | OUTPATIENT
Start: 2021-03-24 | End: 2021-03-27 | Stop reason: HOSPADM

## 2021-03-24 RX ORDER — CLONIDINE 0.2 MG/24H
1 PATCH, EXTENDED RELEASE TRANSDERMAL WEEKLY
Status: DISCONTINUED | OUTPATIENT
Start: 2021-03-24 | End: 2021-03-26

## 2021-03-24 RX ORDER — HYDRALAZINE HYDROCHLORIDE 20 MG/ML
10 INJECTION INTRAMUSCULAR; INTRAVENOUS EVERY 6 HOURS PRN
Status: DISCONTINUED | OUTPATIENT
Start: 2021-03-24 | End: 2021-03-27 | Stop reason: HOSPADM

## 2021-03-24 RX ORDER — POLYETHYLENE GLYCOL 3350 17 G/17G
17 POWDER, FOR SOLUTION ORAL DAILY PRN
Status: DISCONTINUED | OUTPATIENT
Start: 2021-03-24 | End: 2021-03-27 | Stop reason: HOSPADM

## 2021-03-24 RX ORDER — DEXTROSE, SODIUM CHLORIDE, AND POTASSIUM CHLORIDE 5; .45; .15 G/100ML; G/100ML; G/100ML
INJECTION INTRAVENOUS CONTINUOUS
Status: DISCONTINUED | OUTPATIENT
Start: 2021-03-24 | End: 2021-03-27

## 2021-03-24 RX ORDER — SODIUM CHLORIDE 0.9 % (FLUSH) 0.9 %
10 SYRINGE (ML) INJECTION EVERY 12 HOURS SCHEDULED
Status: DISCONTINUED | OUTPATIENT
Start: 2021-03-24 | End: 2021-03-27 | Stop reason: HOSPADM

## 2021-03-24 RX ADMIN — POTASSIUM CHLORIDE 10 MEQ: 7.46 INJECTION, SOLUTION INTRAVENOUS at 01:45

## 2021-03-24 RX ADMIN — Medication 10 ML: at 20:01

## 2021-03-24 RX ADMIN — POTASSIUM CHLORIDE 10 MEQ: 7.46 INJECTION, SOLUTION INTRAVENOUS at 02:49

## 2021-03-24 RX ADMIN — IOPAMIDOL 75 ML: 755 INJECTION, SOLUTION INTRAVENOUS at 14:19

## 2021-03-24 RX ADMIN — SODIUM CHLORIDE 1000 ML: 9 INJECTION, SOLUTION INTRAVENOUS at 10:01

## 2021-03-24 RX ADMIN — PIPERACILLIN SODIUM AND TAZOBACTAM SODIUM 3375 MG: 3; .375 INJECTION, POWDER, LYOPHILIZED, FOR SOLUTION INTRAVENOUS at 00:23

## 2021-03-24 RX ADMIN — POTASSIUM CHLORIDE, DEXTROSE MONOHYDRATE AND SODIUM CHLORIDE: 150; 5; 450 INJECTION, SOLUTION INTRAVENOUS at 19:57

## 2021-03-24 RX ADMIN — POTASSIUM CHLORIDE, DEXTROSE MONOHYDRATE AND SODIUM CHLORIDE: 150; 5; 450 INJECTION, SOLUTION INTRAVENOUS at 02:49

## 2021-03-24 RX ADMIN — POTASSIUM CHLORIDE 10 MEQ: 7.46 INJECTION, SOLUTION INTRAVENOUS at 03:45

## 2021-03-24 NOTE — PROGRESS NOTES
4 Eyes Skin Assessment     The patient is being assess for   Admission    I agree that 2 RN's have performed a thorough Head to Toe Skin Assessment on the patient. ALL assessment sites listed below have been assessed. Areas assessed by both nurses:   [x]   Head, Face, and Ears   [x]   Shoulders, Back, and Chest, Abdomen  [x]   Arms, Elbows, and Hands   [x]   Coccyx, Sacrum, and Ischium  [x]   Legs, Feet, and Heels      Co-signer eSignature: Electronically signed by Saul Hernandez RN on 3/24/21 at 5:14 AM EDT    Does the Patient have Skin Breakdown?   No          Tomi Prevention initiated:  No   Wound Care Orders initiated:  No      WOC nurse consulted for Pressure Injury (Stage 3,4, Unstageable, DTI, NWPT, Complex wounds)and New or Established Ostomies:  No      Primary Nurse eSignature: Electronically signed by Jade Reyes RN on 3/24/21 at 5:13 AM EDT

## 2021-03-24 NOTE — PROGRESS NOTES
65 yo w CHF, HTN, cirrhosis on CT w neg. W/U and dysphagia/dysphonia since CVA s/p MBS in 3/2020, admitted w worsening dysphagia. Has GERD on Protonix w neg. EGD 2/2020, and last colonoscopy was 7/2020.     Plan:   1. Ba swallow  2. Await Speech Rx eval  3.  Will follow      Jered Louise MD       ([phone]) [phone]

## 2021-03-24 NOTE — PROGRESS NOTES
Comprehensive Nutrition Assessment    Type and Reason for Visit:  Positive Nutrition Screen, Initial    Nutrition Recommendations/Plan:   1. Continue NPO  2. Monitor swallowing ability     Nutrition Assessment:    Pt. severely malnourished AEB patient admitted with dysphagics with intakes < 25% of her usual intake in last 7 days; has h/o of stroke I years ago with a reported weight loss of 50#. At risk for further nutritional compromise r/t NPO awaiting MBS. Will continue NPO .     Malnutrition Assessment:  Malnutrition Status:  Severe malnutrition    Context:  Acute Illness     Findings of the 6 clinical characteristics of malnutrition:  Energy Intake:  7 - 50% or less of estimated energy requirements for 5 or more days  Weight Loss:  (> 20% loss in 1year)     Body Fat Loss:  1 - Mild body fat loss Orbital   Muscle Mass Loss:  7 - Moderate muscle mass loss Temples (temporalis)  Fluid Accumulation:  Unable to assess     Strength:  Not Performed    Estimated Daily Nutrient Needs:  Energy (kcal):  2581-9421 based ~ 25-30 kcal/kg cbw; Weight Used for Energy Requirements:  Current     Protein (g):  68-82 based ~ 1-1.2 gr/kg cbw; Weight Used for Protein Requirements:  Current        Fluid (ml/day):  3071-1419; Method Used for Fluid Requirements:  1 ml/kcal      Nutrition Related Findings:  elderly female appears younger than stated age; voice is raspy; daughter at bedside; reports a stroke ~ 1 year ago and has expereinced ~ 50# loss > 20% in a yeatr; has not been eating well d/t problems swallowing;  leaves independently in her own home  and she is active with ADL's; seen by ST today and has a MBS for tomrrow AM.      Wounds:  None       Current Nutrition Therapies:    Diet NPO Effective Now    Anthropometric Measures:  · Height: 5' 8\" (172.7 cm)  · Current Body Weight: 150 lb 0.4 oz (68.1 kg)   · Admission Body Weight: 150 lb (68 kg)    · Usual Body Weight: 164 lb (74.4 kg)     · Ideal Body Weight: 140 lbs; % Ideal Body Weight 107.2 %   · BMI: 22.8  · BMI Categories: Normal Weight (BMI 22.0 to 24.9) age over 72       Nutrition Diagnosis:   · Inadequate oral intake related to altered GI structure, inadequate protein-energy intake, swallowing difficulty as evidenced by NPO or clear liquid status due to medical condition, weight loss greater than or equal to 20% in 1 year, moderate muscle loss, mild loss of subcutaneous fat      Nutrition Interventions:   Food and/or Nutrient Delivery:  Continue NPO  Nutrition Education/Counseling:  No recommendation at this time   Coordination of Nutrition Care:  Continue to monitor while inpatient, Speech Therapy    Goals:  pt will adhere to NPO and will be adavnced to a PO diet once her ability to swallow is idientified by [facility]       Nutrition Monitoring and Evaluation:     Food/Nutrient Intake Outcomes:  Diet Advancement/Tolerance  Physical Signs/Symptoms Outcomes:  Chewing or Swallowing, Weight, Nutrition Focused Physical Findings     Discharge Planning:     Too soon to determine     Electronically signed by Nelli Golden RD, LD on 3/24/21 at [address] PM EDT    Contact: [phone]

## 2021-03-24 NOTE — PROGRESS NOTES
Care transferred to [facility]. Shift handoff report given. Ordered continuous fluids ordered through pharmacy prior to shift handoff- oncoming RN aware. dextrose 5 % and 0.45 % NaCl with KCl 20 mEq infusion out of stock on unit.

## 2021-03-24 NOTE — ACP (ADVANCE CARE PLANNING)
Advance Care Planning   Healthcare Decision Maker:    Primary Decision Maker: Renedeidreleena Bobby - Child - [phone]    Click here to complete Healthcare Decision Makers including selection of the Healthcare Decision Maker Relationship (ie \"Primary\").

## 2021-03-24 NOTE — H&P
Hospital Medicine History & Physical      PCP: Portia Membreno MD    Date of Admission: 3/23/2021    Date of Service: Pt seen/examined on 3/23/2021    Chief Complaint: Dysphagia    History Of Present Illness:    66 y.o. female who presented to the emergency department with multiple complaints. She stated she had been feeling short of breath, dehydrated and weak. She also states that she has not been able to swallow over the last couple of days. She thinks her swallowing dysfunction is secondary to her neck and cervical spine issues that she has. She went to her chiropractor who worked on her neck earlier in the day. She however has not been able to swallow or eat anything. She has had this issue in the past and has had esophageal stretching. In the ER she was not hypoxic on room air and did not look volume overloaded or in COPD exacerbation. A p.o. challenge resulted in coughing and concerns of possible aspiration. Decision was made to admit her for GI evaluation and possible speech evaluation for her dysphagia.     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 Dr. Love Jules Right 05/04/2017    PHACO EMULSIFICATION OF CATARACT WITH INTRAOCULAR LENS IMPLANT RIGHT EYE    COLONOSCOPY  3/24/10    FOOT SURGERY      GALLBLADDER SURGERY      HYSTERECTOMY      NECK SURGERY      PAIN MANAGEMENT PROCEDURE N/A 1/20/2020    MIDLINE LUMBAR FIVE SACRAL ONE EPIDURAL STEROID INJECTION SITE CONFIRMED BY FLUOROSCOPY performed by Lauretta Collet, MD at [address] N/A 9/22/2020    MIDLINE LUMBAR FIVE SACRAL ONE EPIDURAL STEROID INJECTION SITE CONFIRMED BY FLUOROSCOPY performed by Lauretta Collet, MD at [address]  GASTROINTESTINAL ENDOSCOPY N/A 2/24/2020    EGD DIAGNOSTIC ONLY performed by Marianna Johnson MD at [address]       Medications Prior to Admission:   Prior to Admission medications    Medication Sig Start Date End Date Taking? Authorizing Provider   amLODIPine (NORVASC) 5 MG tablet TAKE ONE TABLET BY MOUTH DAILY 3/12/21  Yes Saw Morgan MD   furosemide (LASIX) 40 MG tablet Take 1 tablet by mouth daily 10/13/20  Yes Saw Morgan MD   pantoprazole (PROTONIX) 20 MG tablet  8/10/20  Yes Historical Provider, MD   albuterol (ACCUNEB) 0.63 MG/3ML nebulizer solution Take 1 ampule by nebulization every 6 hours as needed for Wheezing   Yes Historical Provider, MD   albuterol sulfate  (90 Base) MCG/ACT inhaler Inhale 2 puffs into the lungs 4 times daily as needed for Wheezing 2/15/20  Yes Jac Masterson MD   Spacer/Aero-Holding Gustavo Alas DONYA 1 Device by Does not apply route daily as needed (Shortness of breath) 2/15/20  Yes Jac Masterson MD   aspirin EC 81 MG EC tablet Take 1 tablet by mouth daily 9/25/19  Yes Saw Morgan MD   Acetaminophen (TYLENOL PO) Take by mouth as needed   Yes Historical Provider, MD       Allergies:  Streptomycin, Sulfa antibiotics, Tylenol with codeine #3 [acetaminophen-codeine], and Verapamil    Social History:      TOBACCO:   reports that she has never smoked. She has never used smokeless tobacco.  ETOH:   reports no history of alcohol use.       Family History:          Problem Relation Age of Onset    High Blood Pressure Mother        REVIEW OF SYSTEMS:   Constitutional:  Negative for fever,chills or night sweats  ENT:   Endorses difficulty swallowing Respiratory: Negative for SOB or wheezing   Cardiovascular:   Negative for  chest pain, palpitations   Gastrointestinal:  Negative for nausea, vomiting, diarrhea  Genitourinary:  Negative for polyuria, dysuria   Hematologic/Lymphatic:  Negative for  bleeding tendency, easy bruising  Musculoskeletal:  Negative for myalgias and arthralgias  Neurologic:  Negative for  confusion,dysarthria. Skin:  Negative for itching,rash  Psychiatric:  Negative for depression,anxiety, agitation. Endocrine:  Negative for polydipsia,polyuria,heat /cold intolerance. PHYSICAL EXAM:    BP (!) 165/71   Pulse 82   Temp 98.3 °F (36.8 °C) (Oral)   Resp 17   Ht 5' 8\" (1.727 m)   Wt 148 lb (67.1 kg)   SpO2 100%   BMI 22.50 kg/m²     General appearance:  No apparent distress, appears stated age and cooperative. HEENT: NCAT, dry mucous membranes  Neck: Supple, with full range of motion. No jugular venous distention. Trachea midline. Respiratory:  Normal respiratory effort. Clear to auscultation, bilaterally without Rales/Wheezes/Rhonchi. Cardiovascular:  Regular rate and rhythm with normal S1/S2 without murmurs, rubs or gallops. Abdomen: Soft, non-tender, non-distended with normal bowel sounds. Musculoskeletal:  No clubbing, cyanosis or edema bilaterally. Full range of motion without deformity. Skin: Skin color, texture, turgor normal.  No rashes or lesions. Neurologic:  Neurovascularly intact without any focal sensory/motor deficits.  Cranial nerves: II-XII intact, grossly non-focal.  Psychiatric:  Alert and oriented, thought content appropriate, normal insight  Capillary Refill: Brisk,< 3 seconds   Peripheral Pulses: +2 palpable, equal bilaterally       Labs:   Recent Labs     03/23/21 1850   WBC 3.9*   HGB 12.4   HCT 37.4   PLT 90*     Recent Labs     03/23/21 1850      K 3.1*      CO2 28   BUN 7   CREATININE 0.7   CALCIUM 9.2     Recent Labs     03/23/21 1850   AST 36   ALT 15   BILITOT 1.8*   ALKPHOS 86 No results for input(s): INR in the last 72 hours. Recent Labs     03/23/21  1850   TROPONINI <0.01       Urinalysis:      Lab Results   Component Value Date    NITRU Negative 03/23/2021    WBCUA 0-2 03/23/2021    BACTERIA 2+ 04/29/2020    RBCUA 0-2 03/23/2021    BLOODU TRACE-INTACT 03/23/2021    SPECGRAV 1.010 03/23/2021    GLUCOSEU Negative 03/23/2021       Radiology:     XR CHEST PORTABLE   Final Result   Mild central pulmonary congestion or pulmonary artery hypertension. Mild bibasilar atelectasis or early infiltrates which is more prominent. XR CHEST PORTABLE   Final Result   No acute cardiopulmonary disease. ASSESSMENT:  Dysphagia  Shortness of breath resolved  Hypertension  GERD  Hypokalemia    PLAN:  We will keep n.p.o. and admit for GI evaluation in the a.m. IV fluids. May also need a speech evaluation prior to discharge. Patient states that the only relief she is really had what is post chiropractic manipulation. She visited her chiropractor earlier in the day but sometimes she notices symptom relief a day later.    -Consult GI and speech  -N.p.o., IV fluids, potassium replacement  -Reevaluate in a.m. DVT Prophylaxis: SCD  Diet: Diet NPO Effective Now  Code Status: Prior    Dispo -admit to [facility] on telemetry      Thank you for the opportunity to be involved in this patient's care.       (Please note that portions of this note were completed with a voice recognition program. Efforts were made to edit the dictations but occasionally words are mis-transcribed.)

## 2021-03-24 NOTE — PROGRESS NOTES
Patient admitted to the floor with dysphagia. Patient asked for something to wet her mouth with, gave her mouth swabs due to patient being NPO. No other needs at this time. Rapid covid test sent to lab. Call light within reach, bed in lowest position.

## 2021-03-24 NOTE — CONSULTS
HEMATOLOGY/ONCOLOGY CONSULTATION:     3/25/2021 8:11 AM    REASON FOR CONSULT: Abnormal CBC, lymphadenopathy    PROVIDERS:  Susan Sparks MD    CHIEF COMPLAINT:     Chief Complaint   Patient presents with    Shortness of Breath     pt states she is here because she feels dehydrated. states it is hard to breathe and her mouth is so dry she is having trouble swallowing. HISTORY OF PRESENT ILLNESS:     HPI:    66 WF with pmh CHF, cirrhosis, ARMAAN, HTN and hepatitis A. The aptient presented to the ER on 3/23 with generalized weakness, dehydration, SOB and dysphagia. She had previously been having issues with neck pain and weakness and had been going o the chiropractor. GI has been asked to see for dysphagia. Her CBC in the past has revealed a mildly low platelet count, however, this admission it had dropped to 74 and her WBC was also low. She also had a CT scan of the neck and chest that showed no obvious mass or LAD.      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 Dr. Haley Pedraza Right 05/04/2017    PHACO EMULSIFICATION OF CATARACT WITH INTRAOCULAR LENS IMPLANT RIGHT EYE    COLONOSCOPY  3/24/10    FOOT SURGERY      GALLBLADDER SURGERY      HYSTERECTOMY      NECK SURGERY      PAIN MANAGEMENT PROCEDURE N/A 1/20/2020    MIDLINE LUMBAR FIVE SACRAL ONE EPIDURAL STEROID INJECTION SITE CONFIRMED BY FLUOROSCOPY performed by Bouchra Izquierdo Monica Garcia MD at [address] [facility] N/A 9/22/2020    MIDLINE LUMBAR FIVE SACRAL ONE EPIDURAL STEROID INJECTION SITE CONFIRMED BY FLUOROSCOPY performed by Jamshid Wright MD at [address] [place] [address] GASTROINTESTINAL ENDOSCOPY N/A 2/24/2020    EGD DIAGNOSTIC ONLY performed by Tad Barcenas MD at [place] [address]:     Social History     Socioeconomic History    Marital status:       Spouse name: Not on file    Number of children: 3    Years of education: Not on file    Highest education level: Not on file   Occupational History    Not on file   Social Needs    Financial resource strain: Not on file    Food insecurity     Worry: Not on file     Inability: Not on file    Transportation needs     Medical: Not on file     Non-medical: Not on file   Tobacco Use    Smoking status: Never Smoker    Smokeless tobacco: Never Used   Substance and Sexual Activity    Alcohol use: No    Drug use: No    Sexual activity: Not Currently     Partners: Male   Lifestyle    Physical activity     Days per week: Not on file     Minutes per session: Not on file    Stress: Not on file   Relationships    Social connections     Talks on phone: Not on file     Gets together: Not on file     Attends Yazidism service: Not on file     Active member of club or organization: Not on file     Attends meetings of clubs or organizations: Not on file     Relationship status: Not on file    Intimate partner violence     Fear of current or ex partner: Not on file     Emotionally abused: Not on file     Physically abused: Not on file     Forced sexual activity: Not on file   Other Topics Concern    Not on file   Social History Narrative    Not on file       FAMILY HISTORY:     Family History   Problem Relation Age of Onset    High Blood Pressure Mother        ALLERGIES:     Allergies as of 03/23/2021 - Review Complete 03/23/2021 Allergen Reaction Noted    Streptomycin  09/19/2011    Sulfa antibiotics Other (See Comments) 12/11/2018    Tylenol with codeine #3 [acetaminophen-codeine] Other (See Comments) 02/29/2020    Verapamil Rash 09/19/2011       MEDICATIONS:     No current facility-administered medications on file prior to encounter. Current Outpatient Medications on File Prior to Encounter   Medication Sig Dispense Refill    amLODIPine (NORVASC) 5 MG tablet TAKE ONE TABLET BY MOUTH DAILY 45 tablet 1    furosemide (LASIX) 40 MG tablet Take 1 tablet by mouth daily 90 tablet 3    pantoprazole (PROTONIX) 20 MG tablet       albuterol (ACCUNEB) 0.63 MG/3ML nebulizer solution Take 1 ampule by nebulization every 6 hours as needed for Wheezing      albuterol sulfate  (90 Base) MCG/ACT inhaler Inhale 2 puffs into the lungs 4 times daily as needed for Wheezing 1 Inhaler 0    Spacer/Aero-Holding Chambers DONYA 1 Device by Does not apply route daily as needed (Shortness of breath) 1 Device 0    aspirin EC 81 MG EC tablet Take 1 tablet by mouth daily 30 tablet 0    Acetaminophen (TYLENOL PO) Take by mouth as needed       REVIEW OF SYSTEMS:       10 point ROS completed. Pertinent positives in HPI, otherwise negative.      PHYSICAL EXAM:       Vitals:    03/25/21 0753   BP: (!) 159/79   Pulse: 70   Resp: 16   Temp: 97 °F (36.1 °C)   SpO2: 96%       General appearance: alert and cooperative  Head: Normocephalic, without obvious abnormality, atraumatic  Neck: No palpable lymphadenopathy in supraclavicular or cervical chains  Lungs: Clear to auscultation bilaterally, no audible rales, wheezes or crackles  Heart: Regular rate and rhythm, S1, S2 normal  Abdomen: Soft, non-tender; bowel sounds normal; no masses,  no organomegaly  Extremities: without cyanosis, clubbing, edema or asymmetry  Skin: No jaundice, purpura or petechiae      LABS:     Lab Results   Component Value Date    WBC 1.6 (LL) 03/24/2021    HGB 12.4 03/24/2021    HCT 36.7 03/24/2021    MCV 89.7 03/24/2021    PLT 74 (L) 03/24/2021       Lab Results   Component Value Date    GLUCOSE 196 (H) 03/24/2021    BUN 6 (L) 03/24/2021    CREATININE 0.6 03/24/2021    K 3.9 03/24/2021       Lab Results   Component Value Date    ALKPHOS 86 03/23/2021    ALT 15 03/23/2021    AST 36 03/23/2021    BILITOT 1.8 (H) 03/23/2021    PROT 6.6 03/23/2021     LDH - 262    Haptoglobin - pending    TSH - 0.54    B12 - pending    Folate - pending    GALEN - pending    Rheum factor -     Plt Ab - pending    HIV - pending    Hep C - pending    Mono - negative    Flow cytometry - pending    SPEP - pending    IMAGING:     Xr Clavicle Right  Result Date: 3/24/2021  Moderate AC joint arthropathy. Fl Esophagram  Result Date: 3/24/2021  Very poorly coordinated swallowing suspected to be related to neurologic cause, possibly prior stroke. The patient became extremely short of breath when attempting to swallow the 13 mm barium tablet and could not swallow the tablet. There is laryngeal penetration but no aspiration over the course of this examination. Aspiration remains a consideration as the patient became extremely short of breath when drinking water along with the barium tablet at the beginning of the examination. Unremarkable esophagram otherwise. Consider consultation with speech pathology. Xr Chest Portable  Result Date: 3/23/2021  Mild central pulmonary congestion or pulmonary artery hypertension. Mild bibasilar atelectasis or early infiltrates which is more prominent. CT Neck/Chest 3/24/21:    No neck mass or enlarged lymph node. Negative chest CT.  No evidence of aspiration or other acute pulmonary   process.  No evidence lymphadenopathy.          PATHOLOGY:     Peripheral smear 3/24/21:    Collected: 03/24/21 0523   Result status: Final   Resulting lab: [facility]   Value: Reviewed   Comment: Peripheral blood smear and histogram are reviewed and show a leukopenia   with absolute neutropenia and thrombocytopenia. The leukocyte findings may be seen related to infections (viral,   rickettsial, some bacterial), drug effect, toxins and marrow failure   disorders.  Should no etiology be identified, consider hematology   consult with bone marrow examination to rule out   myelodysplasia, aleukemic leukemia, or other marrow pathology. Thrombocytopenia may represent a consumptive/destructive process or   marrow suppression/failure. Consider splenic sequestration   (hypersplenism), autoimmune disorder, drug effect, infection,   or primary marrow disorder (myelodysplasia or other marrow   failure process). Should no etiology be identified, recommend   hematology consultation with bone marrow examination. CPT code: 92819. ASSESSMENT:     Problem List Items Addressed This Visit     HTN (hypertension)    Dysphagia - Primary      Other Visit Diagnoses     Dyspnea, unspecified type        At risk for aspiration pneumonia        History of asthma        Hypokalemia        Hypomagnesemia        Hypoxic episode        Thrombocytopenia ([facility] [address]                    PLAN:     1. Leukopenia/TCP    - probably from cirrhosis  - chronic mild TCP in the  range, now down to 74  - leukopenia new this admission  - peripheral smear non-diagnostic  - could be reactive if any infectious etiology  - COVID and mono test negative  - check Hep C and HIV  - checking B12, folate, TSH, LDH/haptoglobin, SPEP, flow cytometry, RF and GALEN  - if workup not diagnostic and counts not improving, might need to consider BMBX  - AM CBC pending      2. Dysphagia    - per GI  - planning barium swallow  - workup per GI      3.  Neck pain/weakness    - bony prominence appreciated on exam  - CT neck and chest 3/24/21 shows no obvious mass or LAD    Tessy Bryant MD

## 2021-03-24 NOTE — PROGRESS NOTES
Pt is alert and oriented, she left [facility] Er with [facility] transport, iv site was saline locked, over the phone report given to [address], pt's daughter knew she when, where and what time she was being transferred    Pt used bedside commode, urine only, she report have a small amount of blood in toiler but she said is from hemorrhoids     Pt's spo2 dropped to 73% on room air, pt did ok with swallow eval, using water, but she then joked on a pill    She's been NPO since, pain rate was not given, but she did complain of pain in hips

## 2021-03-24 NOTE — PROGRESS NOTES
Speech Language Pathology  Facility/Department: [facility]   CLINICAL BEDSIDE SWALLOW EVALUATION    Instrumentation: Yes, FEES warranted  Diet recommendation: NPO; NPO; Meds via alt means of nutrition, OK FOR ICE CHIPS PRN  Risk management: oral care q4 hrs to reduce adverse affects in the event of aspiration    It should be known that cervical mass could explain voice and dysphagia issues, however, progressive dysarthria or any dysarthria is a neuromotor disorder that is typically derived from neurologic pathology. Could consider head imaging considering CT neck showed no mass or swollen lymph node. NAME: Emre Espino  : 1945  MRN: [id number]    ADMISSION DATE: 3/23/2021  ADMITTING DIAGNOSIS: has HTN (hypertension); Generalized anxiety disorder; Acid reflux; Osteoarthritis hips, knees, back; Primary osteoarthritis of right knee; Right wrist sprain, initial encounter; Arthritis of carpometacarpal (CMC) joint of right thumb; Mild aortic stenosis; SOB (shortness of breath) on exertion; Diastolic congestive heart failure ([address] Exertional dyspnea; Cirrhosis, nonalcoholic ([address] Suspected cerebrovascular accident (CVA); LOU (acute kidney injury) [facility]); TIA involving left internal carotid artery; Facial droop; Dysphagia; Supraclavicular lymphadenopathy; and Leukopenia on their problem list.  ONSET DATE: 2021    Recent Chest Xray/CT of Chest: 2021  Impression   Negative chest CT.  No evidence of aspiration or other acute pulmonary   process.  No evidence lymphadenopathy.      Impression   No neck mass or enlarged lymph node.           Date of Eval: 3/24/2021  Evaluating Therapist: Suzie Vasques    Current Diet level:  Current Diet : Regular  Current Liquid Diet : Thin      Primary Complaint  Patient Complaint: Inability to swallow, SOB    Pain: The patient does not complain of pain        Reason for Referral  Emre Espino was referred for a bedside swallow evaluation to assess the efficiency of her swallow function, identify signs and symptoms of aspiration and make recommendations regarding safe dietary consistencies, effective compensatory strategies, and safe eating environment. Medical record review/interview:  Pt is a 65 y/o female admitted to [facility] 3/23 2/2 SOB and dysphagia. Pt reports that she has severe neck pain which often leads to vocal changes and inability to swallow. She regularly visits a chiropractor for neck adjustements which give her momentary releif. She visited her chiropractor yesterday but her sx's worsened and she came to ED. She was dehydrated and hasn't eated in several days. The patient is hoarse, dysphonic, and progressively dysarthric. 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 Esophagram completed this date with severely impaired swallow and consistent laryngeal penetration per Dr. Bee Gaston with suspected neurological pathology. During MBSS completed 03/20 pt was noted with deep laryngeal penetration with all consistencies with no aspiration.     Predisposing dysphagia risk factors: Hx of dysphagia  Clinical signs of possible chronic dysphagia: reduced PO intake, hx of dysphagia, recurrent admissions for dysphagia and SOB  Precipitating dysphagia risk factors: reduced physical mobility and suspected disuse atrophy    Vitals/labs:   Temp: 97.1  SpO2: % on RA  RR: 16 before PO trial, 24 after PO trials  BP: 175/83  HR: 89  WBCs: 1.6- critically low    Cranial nerve exam:   CN V: ophthalmic, maxillary, and mandibular facial sensation- Impaired  CN VII: WNL  CN IX/X: MPT: Impaired; pitch range: Impaired; vocal quality: hoarse, breathy and weak; cough: Weak- perceptually and Dry  CN XII: WNL  These deficits are consistency with motor neuron dysfunction that can be expected following a neurological event/pathology    Laryngeal function exam:   Secretions: Oral mucosa is mobility, immunocompromised state, impaired respiratory-swallow coordination and overt clinical s/s of aspiration at bedside, pt is not safe for oral diet prior to instrumental study. Instrumentation: Yes, FEES warranted  Diet recommendation: NPO; NPO; Meds via alt means of nutrition  Risk management: oral care q4 hrs to reduce adverse affects in the event of aspiration    Impression  Dysphagia Diagnosis: Suspected needs further assessment  Dysphagia Impression : Suspect mod-severe oropharyngeal dysphagia, however, further assessment is needed  Dysphagia Outcome Severity Scale: Level 1: Severe dysphagia- NPO. Unable to tolerate any PO safely     Treatment Plan  Requires SLP Intervention: Yes  Duration/Frequency of Treatment: 3-5x/wk  D/C Recommendations: To be determined  Referral To: Neurology    Recommended Diet and Intervention  Diet Solids Recommendation: NPO  Liquid Consistency Recommendation: NPO  Recommended Form of Meds: Via alternative means of nutrition  Recommendations: FEES;NPO;Consider ice chips PRN     Compensatory Swallowing Strategies: NA     Treatment/Goals  Short-term Goals  Timeframe for Short-term Goals: 5 days (03/29/2021)  Goal 1: The patient will tolerate FEES for further assessment of orpharyngeal swallow  Long-term Goals  Timeframe for Long-term Goals: 7 days (03/31/2021)  Goal 1: The patient will tolerate LRD with no clinical s/s of aspiration or worsening respiratory status    General  Chart Reviewed: Yes  Comments: Chart reviewed prior to completion of assessment  Subjective  Subjective: Pt seen in room with RN permission. Alert and pleasant  Behavior/Cognition: Alert; Cooperative;Pleasant mood  Respiratory Status: Room air  Breath Sounds: Wheezing; Inspiratory wheeze; Expiratory wheeze  O2 Device: None (Room air)  Communication Observation: Functional;Dysarthria  Follows Directions: Complex  Dentition: Adequate  Patient Positioning: Upright in bed  Baseline Vocal Quality:

## 2021-03-24 NOTE — PROGRESS NOTES
Patient has remained NPO throughout the night and has been resting quietly in bed. Rapid covid negative. Call light within reach, bed in lowest position.

## 2021-03-24 NOTE — ED PROVIDER NOTES
CHIEF COMPLAINT  Shortness of Breath (pt states she is here because she feels dehydrated. states it is hard to breathe and her mouth is so dry she is having trouble swallowing. )      HISTORY OF PRESENT ILLNESS  Becky Neal is a 66 y.o. female presents to the ED with shortness of breath, has had issues with this off and on for over a year, worsened today, she feels dehydrated, dry mouth, difficulty swallowing, has had issues with her esophagus before and daughter here with her reports she had to get it stretched in the past, she last had an upper endoscopy by Dr. Deng Flavin February of last year, no acute findings at that time, she does have a history of acid reflux and is on Protonix, she reports history of asthma and she has been wheezing a little recently, has been using her inhaler, denies concern for Covid or exposure, sometimes she coughs and chokes on food or even liquids, and has not been eating/drinking very well for a while now, she has lost a lot of weight over the past year, no known fevers recently, she was seen at the chiropractor today, they told her they thought her breathing problems were from her previous neck surgery, and she has a problem with the bone protruding in her chest/neck, no new numbness or weakness, daughter reports the changes in her speech have been associated with shortness of breath, no vision changes, no facial droop, denies changes in bowel or bladder, no vomiting or abdominal pain, no other complaints, modifying factors or associated symptoms. I have reviewed the following from the nursing documentation.     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 Dr. Mary Carmen John Right 05/04/2017    PHACO EMULSIFICATION OF CATARACT WITH INTRAOCULAR LENS IMPLANT RIGHT EYE    COLONOSCOPY  3/24/10    FOOT SURGERY      GALLBLADDER SURGERY      HYSTERECTOMY      NECK SURGERY      PAIN MANAGEMENT PROCEDURE N/A 1/20/2020    MIDLINE LUMBAR FIVE SACRAL ONE EPIDURAL STEROID INJECTION SITE CONFIRMED BY FLUOROSCOPY performed by Franz Hashimoto, MD at [address] [facility] N/A 9/22/2020    MIDLINE LUMBAR FIVE SACRAL ONE EPIDURAL STEROID INJECTION SITE CONFIRMED BY FLUOROSCOPY performed by Franz Hashimoto, MD at [address]      UPPER GASTROINTESTINAL ENDOSCOPY N/A 2/24/2020    EGD DIAGNOSTIC ONLY performed by rByson Rosario MD at [address] [place] [address]     Family History   Problem Relation Age of Onset    High Blood Pressure Mother      Social History     Socioeconomic History    Marital status:       Spouse name: Not on file    Number of children: 3    Years of education: Not on file    Highest education level: Not on file   Occupational History    Not on file   Social Needs    Financial resource strain: Not on file    Food insecurity     Worry: Not on file     Inability: Not on file    Transportation needs     Medical: Not on file     Non-medical: Not on file   Tobacco Use    Smoking status: Never Smoker    Smokeless tobacco: Never Used   Substance and Sexual Activity    Alcohol use: No    Drug use: No    Sexual activity: Not Currently     Partners: Male   Lifestyle    Physical activity     Days per week: Not on file     Minutes per session: Not on file    Stress: Not on file   Relationships    Social connections     Talks on phone: Not on file     Gets together: Not on file     Attends Religion service: Not on file     Active member of club or organization: Not on file     Attends meetings of clubs or organizations: Not on file     Relationship status: Not on file    Intimate partner violence     Fear of current or ex partner: Not on file     Emotionally abused: Not on file     Physically abused: Not on file     Forced sexual activity: Not on file   Other Topics Concern    Not on file   Social History Narrative    Not on file     Current Facility-Administered Medications   Medication Dose Route Frequency Provider Last Rate Last Admin    potassium chloride 10 mEq/100 mL IVPB (Peripheral Line)  10 mEq Intravenous Q1H Jung Ficks, DO   Stopped at 03/24/21 0024    0.9 % sodium chloride infusion  1,000 mL Intravenous Continuous Jung Ficks, DO   Stopped at 03/24/21 0142     Current Outpatient Medications   Medication Sig Dispense Refill    amLODIPine (NORVASC) 5 MG tablet TAKE ONE TABLET BY MOUTH DAILY 45 tablet 1    furosemide (LASIX) 40 MG tablet Take 1 tablet by mouth daily 90 tablet 3    pantoprazole (PROTONIX) 20 MG tablet       albuterol (ACCUNEB) 0.63 MG/3ML nebulizer solution Take 1 ampule by nebulization every 6 hours as needed for Wheezing      albuterol sulfate  (90 Base) MCG/ACT inhaler Inhale 2 puffs into the lungs 4 times daily as needed for Wheezing 1 Inhaler 0    Spacer/Aero-Holding Chambers DONYA 1 Device by Does not apply route daily as needed (Shortness of breath) 1 Device 0    aspirin EC 81 MG EC tablet Take 1 tablet by mouth daily 30 tablet 0    Acetaminophen (TYLENOL PO) Take by mouth as needed       Allergies   Allergen Reactions    Streptomycin     Sulfa Antibiotics Other (See Comments)     States change in mental status    Tylenol With Codeine #3 [Acetaminophen-Codeine] Other (See Comments)     Per pt, made pt \"immobile. \" Can tolerate cough syrup w/ codeine    Verapamil Rash       REVIEW OF SYSTEMS  10 systems reviewed, pertinent positives per HPI otherwise noted to be negative. PHYSICAL EXAM  BP (!) 181/83   Pulse 91   Temp 98.3 °F (36.8 °C) (Oral)   Resp 19   Ht 5' 8\" (1.727 m)   Wt 148 lb (67.1 kg)   SpO2 100%   BMI 22.50 kg/m²   GENERAL APPEARANCE: Awake and alert. Cooperative. No acute distress  HEAD: Normocephalic. Atraumatic. EYES: PERRL. EOM's grossly intact. ENT: Mucous membranes are dry/tacky, no stridor, but sounds a little congested/hoarse, no exudates, midline uvula  NECK: Supple. No rigidity, she does have slight protrusion and tenderness of her medial clavicle/sternoclavicular joint area, no crepitus  HEART: RRR. Murmur noted  LUNGS: Respirations slightly labored, conversational dyspnea, rate around 20. Lungs are with coarse breath sounds, few scattered wheezes, no crackles or rhonchi. ABDOMEN: Soft. Non-distended. Non-tender. No guarding or rebound. Normal bowel sounds. EXTREMITIES: No peripheral edema. No calf tenderness, moves all extremities equally. All extremities neurovascularly intact. SKIN: Warm and dry. No acute rashes. NEUROLOGICAL: Alert and oriented x4. No gross facial drooping. Strength 5/5, sensation intact. No truncal ataxia. Dry mouth and dyspnea limiting speech evaluation, but no aphasia, answers questions appropriately  PSYCHIATRIC: Normal mood and affect. Appears a little anxious  LABS  I have reviewed all labs for this visit.    Results for orders placed or performed during the hospital encounter of 03/23/21   CBC Auto Differential   Result Value Ref Range    WBC 3.9 (L) 4.0 - 11.0 K/uL    RBC 4.18 4.00 - 5.20 M/uL    Hemoglobin 12.4 12.0 - 16.0 g/dL    Hematocrit 37.4 36.0 - 48.0 %    MCV 89.4 80.0 - 100.0 fL    MCH 29.7 26.0 - 34.0 pg    MCHC 33.3 31.0 - 36.0 g/dL    RDW 16.1 (H) 12.4 - 15.4 %    Platelets 90 (L) 868 - 450 K/uL    MPV 9.7 5.0 - 10.5 fL    Neutrophils % 53.9 %    Lymphocytes % 28.1 %    Monocytes % 8.5 %    Eosinophils % 6.7 %    Basophils % 2.8 %    Neutrophils Absolute 2.1 1.7 - 7.7 K/uL Lymphocytes Absolute 1.1 1.0 - 5.1 K/uL    Monocytes Absolute 0.3 0.0 - 1.3 K/uL    Eosinophils Absolute 0.3 0.0 - 0.6 K/uL    Basophils Absolute 0.1 0.0 - 0.2 K/uL   Comprehensive Metabolic Panel w/ Reflex to MG   Result Value Ref Range    Sodium 140 136 - 145 mmol/L    Potassium reflex Magnesium 3.1 (L) 3.5 - 5.1 mmol/L    Chloride 102 99 - 110 mmol/L    CO2 28 21 - 32 mmol/L    Anion Gap 10 3 - 16    Glucose 96 70 - 99 mg/dL    BUN 7 7 - 20 mg/dL    CREATININE 0.7 0.6 - 1.2 mg/dL    GFR Non-African American >60 >60    GFR African American >60 >60    Calcium 9.2 8.3 - 10.6 mg/dL    Total Protein 6.6 6.4 - 8.2 g/dL    Albumin 3.4 3.4 - 5.0 g/dL    Albumin/Globulin Ratio 1.1 1.1 - 2.2    Total Bilirubin 1.8 (H) 0.0 - 1.0 mg/dL    Alkaline Phosphatase 86 40 - 129 U/L    ALT 15 10 - 40 U/L    AST 36 15 - 37 U/L    Globulin 3.2 g/dL   Urinalysis Reflex to Culture    Specimen: Urine, clean catch   Result Value Ref Range    Color, UA Yellow Straw/Yellow    Clarity, UA Clear Clear    Glucose, Ur Negative Negative mg/dL    Bilirubin Urine Negative Negative    Ketones, Urine Negative Negative mg/dL    Specific Gravity, UA 1.010 1.005 - 1.030    Blood, Urine TRACE-INTACT (A) Negative    pH, UA 6.5 5.0 - 8.0    Protein, UA Negative Negative mg/dL    Urobilinogen, Urine 4.0 (A) <2.0 E.U./dL    Nitrite, Urine Negative Negative    Leukocyte Esterase, Urine Negative Negative    Microscopic Examination YES     Urine Type NotGiven     Urine Reflex to Culture Not Indicated    Troponin   Result Value Ref Range    Troponin <0.01 <0.01 ng/mL   Brain Natriuretic Peptide   Result Value Ref Range    Pro- 0 - 449 pg/mL   Magnesium   Result Value Ref Range    Magnesium 1.50 (L) 1.80 - 2.40 mg/dL   Microscopic Urinalysis   Result Value Ref Range    WBC, UA 0-2 0 - 5 /HPF    RBC, UA 0-2 0 - 4 /HPF    Epithelial Cells, UA 0-1 0 - 5 /HPF   EKG 12 Lead   Result Value Ref Range    Ventricular Rate 77 BPM    Atrial Rate 77 BPM    P-R imaging reviewed and results discussed with patient. 64-year-old female presenting to ED with shortness of breath among other complaints, I suspected asthma exacerbation so I was initially treating this but she is also reporting dysphagia that I do not feel is attributable to her dry mouth as she had proposed, and while in the ED she choked while trying to swallow the azithromycin, and I have concerns for aspiration, she had desatted to 70s temporarily but quickly came up, was placed on 2 L nasal cannula, initiated Zosyn, replaced magnesium IV, had plan to replace potassium orally but patient cannot tolerate, she was made n.p.o., and spoke to hospitalist at [facility] for admission, patient and daughter verbalized understanding of plan for admission and patient was initially reluctant, she was given fluids, I had also treated her asthma with a dose of Decadron since she was having trouble with pills, and she felt a little better after the neb treatment, wheezing improved, potassium ultimately given IV as well, I discussed the incidental thrombocytopenia with patient/daughter as well, she denies any bleeding concerns at this time, I feel the patient warrants further dysphagia evaluation and monitoring post choking/hypoxic episode.     Orders Placed This Encounter   Procedures    XR CHEST PORTABLE    XR CHEST PORTABLE    CBC Auto Differential    Comprehensive Metabolic Panel w/ Reflex to MG    Urinalysis Reflex to Culture    Troponin    Brain Natriuretic Peptide    Magnesium    Microscopic Urinalysis    Diet NPO Effective Now    Inpatient consult to Hospitalist    EKG 12 Lead    PATIENT STATUS (DIRECT) Observation    PATIENT STATUS (FROM ED OR OR/PROCEDURAL) Observation     Orders Placed This Encounter   Medications    0.9 % sodium chloride bolus    DISCONTD: potassium bicarb-citric acid (EFFER-K) effervescent tablet 60 mEq    magnesium sulfate 2000 mg in 50 mL IVPB premix    potassium chloride (KLOR-CON M) extended release tablet 40 mEq    ipratropium-albuterol (DUONEB) nebulizer solution 1 ampule    dexamethasone (PF) (DECADRON) injection 10 mg    azithromycin (ZITHROMAX) tablet 500 mg     Order Specific Question:   Antimicrobial Indications     Answer:   COPD Exacerbation    DISCONTD: ertapenem (INVanz) 1000 mg IVPB minibag    potassium chloride 10 mEq/100 mL IVPB (Peripheral Line)    0.9 % sodium chloride infusion    piperacillin-tazobactam (ZOSYN) 3,375 mg in sodium chloride 0.9 % 100 mL IVPB (mini-bag)     Order Specific Question:   Antimicrobial Indications     Answer:   Aspiration Pneumonia     ED Course as of Mar 24 0154   Tue Mar 23, 2021   1920 Went to see the patient, she needed to go to the restroom, getting urine specimen.    [SY]   1924 Patient still in the restroom. [SY]   2105 Patient refusing to take anything by mouth at this time, stating she cannot swallow, daughter states she had to have her esophagus stretched in the past because of this, I explained that she would need admitted if she cannot tolerate p.o., she states she would like the neb treatment because she thinks it is just because she cannot breathe, but she refused the neb treatment until she goes to the bathroom again. Nursing aware.    [SY]   2200 Patient had attempted p.o. challenge, failed swallowing evaluation, choked on liquid and trying to take pills, desatted to low 70s temporarily, and coughed up sputum. Repeating chest x-ray to evaluate for acute aspiration.    [SY]   2305 Dr. Maria G Arguello agreed to accept for admission.    [SY]      ED Course User Index  [SY] Theo Brewer DO     The total critical care time spent while evaluating and treating this patient was 31 minutes. This excludes time spent doing separately billable procedures.   This includes time at the bedside, data interpretation, medication management, obtaining critical history from collateral sources if the patient is unable to provide it directly, and physician consultation. Specifics of interventions taken and potentially life-threatening diagnostic considerations are listed in the medical decision making. CLINICAL IMPRESSION  1. Dysphagia, unspecified type    2. Dyspnea, unspecified type    3. At risk for aspiration pneumonia    4. History of asthma    5. Hypokalemia    6. Hypomagnesemia    7. Essential hypertension    8. Hypoxic episode    9. Thrombocytopenia (HCC)        Blood pressure (!) 181/83, pulse 91, temperature 98.3 °F (36.8 °C), temperature source Oral, resp. rate 19, height 5' 8\" (1.727 m), weight 148 lb (67.1 kg), SpO2 100 %. DISPOSITION  Yulisa Brizuela was admitted to [facility] in stable condition.                    Annette De La O DO  03/24/21 [id number]

## 2021-03-25 ENCOUNTER — APPOINTMENT (OUTPATIENT)
Dept: GENERAL RADIOLOGY | Age: 76
DRG: 056 | End: 2021-03-25
Payer: MEDICARE

## 2021-03-25 LAB
ANION GAP SERPL CALCULATED.3IONS-SCNC: 10 MMOL/L (ref 3–16)
ANTI-NUCLEAR ANTIBODY (ANA): NEGATIVE
BASOPHILS ABSOLUTE: 0.1 K/UL (ref 0–0.2)
BASOPHILS RELATIVE PERCENT: 0.8 %
BUN BLDV-MCNC: 7 MG/DL (ref 7–20)
CALCIUM SERPL-MCNC: 9 MG/DL (ref 8.3–10.6)
CHLORIDE BLD-SCNC: 103 MMOL/L (ref 99–110)
CO2: 27 MMOL/L (ref 21–32)
CREAT SERPL-MCNC: 0.6 MG/DL (ref 0.6–1.2)
EOSINOPHILS ABSOLUTE: 0.1 K/UL (ref 0–0.6)
EOSINOPHILS RELATIVE PERCENT: 1.5 %
FOLATE: 5.53 NG/ML (ref 4.78–24.2)
GFR AFRICAN AMERICAN: >60
GFR NON-AFRICAN AMERICAN: >60
GLUCOSE BLD-MCNC: 102 MG/DL (ref 70–99)
GLUCOSE BLD-MCNC: 83 MG/DL (ref 70–99)
GLUCOSE BLD-MCNC: 84 MG/DL (ref 70–99)
GLUCOSE BLD-MCNC: 90 MG/DL (ref 70–99)
GLUCOSE BLD-MCNC: 94 MG/DL (ref 70–99)
HAPTOGLOBIN: <10 MG/DL (ref 30–200)
HCT VFR BLD CALC: 39.1 % (ref 36–48)
HEMOGLOBIN: 13.3 G/DL (ref 12–16)
HEPATITIS C ANTIBODY INTERPRETATION: NORMAL
HIV AG/AB: NORMAL
HIV ANTIGEN: NORMAL
HIV-1 ANTIBODY: NORMAL
HIV-2 AB: NORMAL
LYMPHOCYTES ABSOLUTE: 1.7 K/UL (ref 1–5.1)
LYMPHOCYTES RELATIVE PERCENT: 21.9 %
MCH RBC QN AUTO: 30.3 PG (ref 26–34)
MCHC RBC AUTO-ENTMCNC: 34 G/DL (ref 31–36)
MCV RBC AUTO: 89.3 FL (ref 80–100)
MONOCYTES ABSOLUTE: 0.5 K/UL (ref 0–1.3)
MONOCYTES RELATIVE PERCENT: 6.7 %
NEUTROPHILS ABSOLUTE: 5.4 K/UL (ref 1.7–7.7)
NEUTROPHILS RELATIVE PERCENT: 69.1 %
PDW BLD-RTO: 16 % (ref 12.4–15.4)
PERFORMED ON: ABNORMAL
PERFORMED ON: NORMAL
PLATELET # BLD: 106 K/UL (ref 135–450)
PMV BLD AUTO: 9.4 FL (ref 5–10.5)
POTASSIUM REFLEX MAGNESIUM: 3.7 MMOL/L (ref 3.5–5.1)
RBC # BLD: 4.38 M/UL (ref 4–5.2)
RHEUMATOID FACTOR: <10 IU/ML
SODIUM BLD-SCNC: 140 MMOL/L (ref 136–145)
VITAMIN B-12: >2000 PG/ML (ref 211–911)
WBC # BLD: 7.8 K/UL (ref 4–11)

## 2021-03-25 PROCEDURE — 85025 COMPLETE CBC W/AUTO DIFF WBC: CPT

## 2021-03-25 PROCEDURE — 1200000000 HC SEMI PRIVATE

## 2021-03-25 PROCEDURE — 80048 BASIC METABOLIC PNL TOTAL CA: CPT

## 2021-03-25 PROCEDURE — 6360000002 HC RX W HCPCS: Performed by: NURSE PRACTITIONER

## 2021-03-25 PROCEDURE — 2580000003 HC RX 258: Performed by: INTERNAL MEDICINE

## 2021-03-25 PROCEDURE — 99232 SBSQ HOSP IP/OBS MODERATE 35: CPT | Performed by: INTERNAL MEDICINE

## 2021-03-25 PROCEDURE — 2500000003 HC RX 250 WO HCPCS: Performed by: INTERNAL MEDICINE

## 2021-03-25 PROCEDURE — 36415 COLL VENOUS BLD VENIPUNCTURE: CPT

## 2021-03-25 PROCEDURE — 74230 X-RAY XM SWLNG FUNCJ C+: CPT

## 2021-03-25 PROCEDURE — 92611 MOTION FLUOROSCOPY/SWALLOW: CPT

## 2021-03-25 PROCEDURE — 92526 ORAL FUNCTION THERAPY: CPT

## 2021-03-25 RX ADMIN — POTASSIUM CHLORIDE, DEXTROSE MONOHYDRATE AND SODIUM CHLORIDE: 150; 5; 450 INJECTION, SOLUTION INTRAVENOUS at 21:11

## 2021-03-25 RX ADMIN — POTASSIUM CHLORIDE, DEXTROSE MONOHYDRATE AND SODIUM CHLORIDE: 150; 5; 450 INJECTION, SOLUTION INTRAVENOUS at 11:11

## 2021-03-25 RX ADMIN — Medication 10 ML: at 10:29

## 2021-03-25 RX ADMIN — HYDRALAZINE HYDROCHLORIDE 10 MG: 20 INJECTION INTRAMUSCULAR; INTRAVENOUS at 04:50

## 2021-03-25 NOTE — FLOWSHEET NOTE
Consult via phone for Advanced Directives  Patient \"CHAYITO\" and daughter Braulio Ryan present for visit.  reviewed forms with patient and daughter. Patient finds hope in family, God, and is especially encouraged that this visit she 'is getting some real answers.'  Patient praised doctors and staff that have served her 'the staff here have been so great.'     initiated prayer with patient, patient asked for prayer for health, for her family. Patient has 10 grandkids, 3 greats, 4 children whom all live nearby. Elise Cosby is daughter that lives closest.    Dwayne Phillips  [phone]       03/25/21 1626   Encounter Summary   Services provided to: Patient; Family  (dtr Monica bedside at time of visit)   Referral/Consult From: [address] Children;Family members   Place of [address] [place] [address]   (3/25: AD conversation per call, prayer)   Complexity of Encounter Moderate   Length of Encounter 30 minutes   Spiritual Assessment Completed Yes   Spiritual/Anglican   Type Spiritual support   Assessment Approachable;Coping; Hopeful   Intervention Active listening;Nurtured hope;Prayer;Discussed belief system/Scientology practices/lary;Discussed meaning/purpose   Outcome Expressed gratitude; Connection/belonging; Shared life review

## 2021-03-25 NOTE — PROGRESS NOTES
Neutropenic precautions removed per protocol. Pt. Aware. Pt. Currently in bed; bed in lowest position, wheels locked, belongings within reach.

## 2021-03-25 NOTE — PLAN OF CARE
Problem: Skin Integrity:  Goal: Will show no infection signs and symptoms  Description: Will show no infection signs and symptoms  3/25/2021 1430 by Gen Medrano RN  Outcome: Ongoing  3/25/2021 0250 by Carla Patel RN  Outcome: Ongoing  Goal: Absence of new skin breakdown  Description: Absence of new skin breakdown  3/25/2021 1430 by Gen Medrano RN  Outcome: Ongoing  3/25/2021 0250 by Carla Patel RN  Outcome: Ongoing     Problem: Falls - Risk of:  Goal: Will remain free from falls  Description: Will remain free from falls  3/25/2021 1430 by Gen Medrano RN  Outcome: Ongoing  3/25/2021 0250 by Carla Patel RN  Outcome: Ongoing  Goal: Absence of physical injury  Description: Absence of physical injury  3/25/2021 1430 by Gen Medrano RN  Outcome: Ongoing  3/25/2021 0250 by Carla Patel RN  Outcome: Ongoing     Problem: Nutrition  Goal: Optimal nutrition therapy  3/25/2021 1430 by Gen Medrano RN  Outcome: Ongoing  3/25/2021 0250 by Carla Patel RN  Outcome: Ongoing   Gen Medrano RN

## 2021-03-25 NOTE — PROGRESS NOTES
PM assessment completed, see chart. No complaints or needs at this time. Call light within reach, bed in lowest position.

## 2021-03-25 NOTE — PROCEDURES
INSTRUMENTAL SWALLOW REPORT  MODIFIED BARIUM SWALLOW    Given deficits listed above, suspect involvement of the following cranial nerves:  Cranial nerve IX- glossopharyngeal: palatal elevation and lingual movement  Cranial nerve X- vagus: dysphagia symptoms, voice sx's (hoarse, dysphonic, breathy, progressively worsens over short period of time, I.e. minutes)  Cranial nerve XII- hypoglossal: dysarthric speech that is progressive in nature    Given likely involvement of above CN's, cannot rule out neurological or neuromuscular etiology    Assessment: profound oropharyngeal dysphagia, likely acute-on-chronic (?) related to suspected unknown neurological or neuromuscular pathology. Swallow safety is impaired; swallow efficiency is impaired. Pt appears to be at high risk for aspiration PNA, and high risk for malnutrition/dehydration. Non-oral nutrition is indicated. Swallow prognosis is guarded given severity of dysphagia and progressive nature of fatigue. Unable to make appropriate recommendations or treatment plan without further testing    Diet Recommendation: NPO with consideration of alt means of nutrition given current knowledge. Unable to make appropriate recommendations or treatment plan without further testing  Risk Management: OK for ice chips PRN, Oral care q4 hrs, head imaging  Specialist Referrals: Aissatou  Ancillary Tests: Additional head imaging  Goals: Tolerate PO  Follow-up exam: Will cont to follow    All findings were discussed via Perfect Serve with NP-message \"read\".       NAME: Bryce Borja   : 1945  MRN: [id number]       Date of Eval: 3/25/2021     Ordering Physician: Jenny Houser CNP  Radiologist: Dr. Jasmin Mayorga     Referring Diagnosis(es):      Past Medical History:  has a past medical history of Acid reflux, Asthma, CHF (congestive heart failure) ([address] Generalized anxiety disorder, Hepatitis A, Hypertension, MDRO (multiple drug resistant organisms) resistance, Patient complaints: Cannot swallow  Onset of problem:   Date of Onset: 03/23/2021    General Comment  Comments: Chart reviewed prior to completion of assessment  Subjective  Subjective: Pt seen in flouro suite    Behavior/Cognition/Vision/Hearing:  Behavior/Cognition: Alert; Cooperative;Pleasant mood  Vision: Within Functional Limits  Hearing: Within functional limits    Medical record review/interview: The patient is a 65 y/o female with hx of dysphagia who has multiple ED admission for dysphagia. MBS was completed as IP last year 04/20 with rec's for puree solids and honey thick liquids. Pt [facility] for dysphagia. GI consulted prior to SLP. Esophagram completed with no esophageal concerns but recurrent laryngeal penetration and in sufficient swallow overall. Esophagram was stopped due to SOB. Upon assessment at bedside yesterday, the patient verbalized that her neck bothers her and she correlates swallowing problems to this. She reports seeing a chripractor for adjustements which temporarily eases symptoms. She saw her chiropractor 03/23 but had no releif so came to the ED because she hadn't eaten in several days. During her assessment, she was noted with hoarse, dysphonic, and breathy vocal quality. At time of evaluation, the patient was exhibited 100% intelligible speech. At the end of the eval, however, pt was completely unintelligible due to pregressing dysarthria and fatigue. She began exhibited an inspiratory wheeze despite O2 being 100% on RA. She was given limited trials and was inable to tolerate, therefore, SLP made pt NPO with rec's for FEES to further assess vocal function. Also recommended neuro consult and/or head imaging. At the time, was told by MD that there was thought to be mass encroaching on left recurrent laryngeal nerve. This does not explain dyarthria, however, and later CT neck was negative for mass or swollen lymph node.  FEES was attempted this morning, however, due to equipment malfunction, FEES was stopped and pt was taken for MBS. Predisposing dysphagia risk factors: N/A  Clinical signs of possible chronic dysphagia: reduced PO intake and hx of dysphagia  Precipitating dysphagia risk factors: N/A    Trials and Findings:    The patient presents with severely impaired oropharyngeal swallow that characterized by grossly dis-coordinated movements and inadequate timing of any phase of the swallow. Pt assessed with thin liquids via tsp (5cc bolus) and cup sip. There is apparent reduced oral sensation with slight premature spillage to valleculae although thi is inconsistent across swallow. There is lingual pumping noted and palatal elevation, however, movement are dis-coordinated, thus creating an impaired velopharyngeal seal. Bolus is spilled into the pharynx where there is absent epiglottic movement or inversion as trace amounts of the bolus are spilled over the upright epiglottis and the remainder of the bolus is moved anteriorly back into the oral cavity. There is MINIMAL laryngeal elevation which does allow for minimal UES opening, however, most of bolus rests atop the UES and is not cleared from pharynx. Pt exhibits several lingual pumps per bolus which continue to spill trace amounts of liquid bolus over epiglottis. While some residue is passed through UES as mentioned above, the remained of the residue penetrates the laryngeal vestibule where is coats the vocal folds and no effort is made to clear. Following one tsp sip of thin liquid, the patient became so fatigued that she had to physically hold her head up with her hands. Following one tsp sip of thin and one cup sip of thin, the patient became progressively dysarthric and was difficult to understand. Also noted with increased WOB and inspiratory wheeze. No further trials were given as there was significant pharyngeal residue and almost absent pharyngeal swallow.     In summary, the patient presents with dis-coordinated lingual movements, dis-coordinated palatal movements, absent velopharyngeal seal, absent epiglottic movement or inversion, MININAL anterior laryngeal movement, absent anterior laryngeal movement, therefore no excursion occurs. The UES opens slightly on some occasions where partial bolus is passed through, however, most of the bolus rests atop the UES where it pools in the pharynx with no clearance. Deep laryngeal penetration with both trials 2/2 absent epiglottic inversion and pharyngeal swallow initiation. Given deficits listed above, suspect involvement of the following cranial nerves:  Cranial nerve IX- glossopharyngeal: palatal elevation and lingual movement  Cranial nerve X- vagus: dysphagia symptoms, voice sx's (hoarse, dysphonic, breathy, progressively worsens over short period of time, I.e. minutes)  Cranial nerve XII- hypoglossal: dysarthric speech that is progressive in nature    Given likely involvement of above CN's, cannot rule out neurological or neuromuscular etiology    Assessment: profound oropharyngeal dysphagia, likely acute-on-chronic related to suspected unknown neurological or neuromuscular pathology. Swallow safety is impaired; swallow efficiency is impaired. Pt appears to be at high risk for aspiration PNA, and high risk for malnutrition/dehydration. Non-oral nutrition is indicated. Swallow prognosis is guarded given severity of dysphagia and progressive nature of fatigue. Unable to make appropriate recommendations or treatment plan without further testing    Diet Recommendation: NPO with consideration of alt means of nutrition given current knowledge. Unable to make appropriate recommendations or treatment plan without further testing  Risk Management: OK for ice chips PRN, Oral care q4 hrs, head imaging  Specialist Referrals: Aissatou  Ancillary Tests: Additional head imaging  Goals:  Tolerate PO  Follow-up exam: Will cont to follow      Impressions:  Treatment Dx and ICD 10: Profound

## 2021-03-25 NOTE — PROGRESS NOTES
Progress Note    Admit Date:  3/23/2021    66year old female presented with multiple complaints. She stated she had been feeling short of breath, dehydrated and weak. She also states that she has not been able to swallow over the last couple of days. She thinks her swallowing dysfunction is secondary to her neck and cervical spine issues that she has. A p.o. challenge resulted in coughing and concerns of possible aspiration. Decision was made to admit her for GI evaluation and possible speech evaluation for her dysphagia. CT chest was negative for any mass . she has significant dysphagia failed swallowing eval and needs PEG tube placement . GI has been informed    Subjective:  Ms. Adrienne Sosa seen. she is pleasant she is awake alert and oriented persistent dysphagia. I discussed with pt. Patient is agreeable for PEG tube placement    Objective:      Vitals:    03/24/21 2000 03/25/21 0349 03/25/21 0530 03/25/21 0753   BP: (!) 175/80 (!) 178/79 125/68 (!) 159/79   Pulse: 79 78 80 70   Resp: 16 16  16   Temp: 97.7 °F (36.5 °C) 97.1 °F (36.2 °C)  97 °F (36.1 °C)   TempSrc: Oral Oral  Oral   SpO2: 97% 96%  96%   Weight:       Height:                Intake/Output Summary (Last 24 hours) at 3/25/2021 1401  Last data filed at 3/25/2021 1200  Gross per 24 hour   Intake 10 ml   Output    Net 10 ml       Physical Exam:  Gen: No distress. Alert. Eyes: PERRL. No sclera icterus. No conjunctival injection. ENT: No discharge. Pharynx clear. Neck: No JVD. No Carotid Bruit. Trachea midline. Resp: No accessory muscle use. No crackles. No wheezes. No rhonchi. CV: Regular rate. Regular rhythm. No murmur. No rub. No edema. Capillary Refill: Brisk,< 3 seconds   Peripheral Pulses: +2 palpable, equal bilaterally   GI: Non-tender. Non-distended. Normal bowel sounds. No hernia. Skin: Warm and dry. No nodule on exposed extremities. No rash on exposed extremities. M/S: No cyanosis. No joint deformity.  No clubbing. Neuro: Awake. Grossly nonfocal    Psych: Oriented x 3. No anxiety or agitation      Data:  CBC:   Recent Labs     03/23/21 1850 03/24/21  0523 03/25/21  0934   WBC 3.9* 1.6* 7.8   HGB 12.4 12.4 13.3   HCT 37.4 36.7 39.1   MCV 89.4 89.7 89.3   PLT 90* 74* 106*     BMP:   Recent Labs     03/23/21  1850 03/24/21  0523 03/25/21  0934    137 140   K 3.1* 3.9 3.7    103 103   CO2 28 25 27   BUN 7 6* 7   CREATININE 0.7 0.6 0.6     LIVER PROFILE:   Recent Labs     03/23/21 1850   AST 36   ALT 15   BILITOT 1.8*   ALKPHOS 86     PT/INR: No results for input(s): PROTIME, INR in the last 72 hours. CULTURES  COVID: not detected    RADIOLOGY  FL MODIFIED BARIUM SWALLOW W VIDEO   Final Result   Swallowing mechanism grossly within normal limits. Deep penetration   concerning for potential aspiration. Please see separate speech pathology report for full discussion of findings   and recommendations. CT SOFT TISSUE NECK W CONTRAST   Preliminary Result   No neck mass or enlarged lymph node. CT CHEST W WO CONTRAST   Final Result   Negative chest CT. No evidence of aspiration or other acute pulmonary   process. No evidence lymphadenopathy. FL ESOPHAGRAM   Final Result   Very poorly coordinated swallowing suspected to be related to neurologic   cause, possibly prior stroke. The patient became extremely short of breath   when attempting to swallow the 13 mm barium tablet and could not swallow the   tablet. There is laryngeal penetration but no aspiration over the course of this   examination. Aspiration remains a consideration as the patient became   extremely short of breath when drinking water along with the barium tablet at   the beginning of the examination. Unremarkable esophagram otherwise. Consider consultation with speech pathology. XR CHEST PORTABLE   Final Result   Mild central pulmonary congestion or pulmonary artery hypertension.       Mild bibasilar atelectasis or early infiltrates which is more prominent. XR CHEST PORTABLE   Final Result   No acute cardiopulmonary disease. Echo 2/24/2020   Summary   There is hyperdynamic LV systolic function with an estimated ejection   fraction of >65%. Mild concentric left ventricular hypertrophy. No regional wall motion abnormalities are seen. Grade I diastolic dysfunction with normal filing pressure. There is a late peaking gradient recorded across the LV outflow tract   consistent with dynamic obstruction. LVOT pressure gradients are elevated at   rest at 8 mmHg and with valsalva of 36mmHg. Aortic valve appears sclerotic and appears to open well. The aortic valve area is calculated at 1.76 cm2 with max velocity of 2.5   m/sec, a maximum pressure gradient of 25 mmHg and a mean pressure gradient   of 13 mmHg. This is c/w mild aortic stenosis (pressure gradients could be   elevated due hyperdynamic LV). There is no evidence of aortic regurgitation. Assessment/Plan:  Dysphagia  - GI consulted  - SLP Eval  - Esophagram.  Failed swallow eval . Significant dysphagia , likely from prior  CVA   needs PEG     Severe malnutrition  - place PEG and start tube feeds    Right supra-clavicular lymphadenopathy - ruled out   - Checked CT neck/chest , no mass    Shortness of breath  - resolved  - SpO2 stable on RA. Hypertension  - BP elevated. Holding while NPO  - Hydralazine prn. Hypokalemia  Hypomagnesemia  - replaced. Monitor labs. Leukopenia  Thrombocytopenia  - Hem/Onc consult. - leukopenic precautions. GERD  - on PPI at home. Holding while NPO.      DVT Prophylaxis: SCDs  Diet: Diet NPO Effective Now  Code Status: Full Code    PEG tube placement for tomorrow continue IV fluids     Adora Gilford, MD  3/25/2021 Airway patent, Nasal mucosa clear. Mouth with normal mucosa. Throat has no vesicles, no oropharyngeal exudates and uvula is midline.

## 2021-03-25 NOTE — PROGRESS NOTES
Speech Language Pathology    Attempted to further discuss results with pt and daughter, however, pt's door shut with no response upon knocking. Will discuss with them tomorrow.     Gary Hartman M.A., [address] [place] [address] Pathologist,  Phone: [phone]

## 2021-03-25 NOTE — PROGRESS NOTES
Kaila Grey is a 66 y.o. female patient. Current Facility-Administered Medications   Medication Dose Route Frequency Provider Last Rate Last Admin    sodium chloride flush 0.9 % injection 10 mL  10 mL Intravenous 2 times per day Natacha Macias MD   10 mL at 03/24/21 2001    sodium chloride flush 0.9 % injection 10 mL  10 mL Intravenous PRN Natacha Macias MD        promethazine (PHENERGAN) tablet 12.5 mg  12.5 mg Oral Q6H PRN Natacha Macias MD        Or    ondansetron (ZOFRAN) injection 4 mg  4 mg Intravenous Q6H PRN Natacha Macias MD        polyethylene glycol (GLYCOLAX) packet 17 g  17 g Oral Daily PRN Natacha Macias MD        acetaminophen (TYLENOL) tablet 650 mg  650 mg Oral Q6H PRN Natacha Macias MD        Or    acetaminophen (TYLENOL) suppository 650 mg  650 mg Rectal Q6H PRN Natacha Macias MD        dextrose 5 % and 0.45 % NaCl with KCl 20 mEq infusion   Intravenous Continuous Natacha Macias  mL/hr at 03/24/21 1957 New Bag at 03/24/21 1957    potassium chloride (KLOR-CON M) extended release tablet 40 mEq  40 mEq Oral PRN Natacha Macias MD        Or    potassium bicarb-citric acid (EFFER-K) effervescent tablet 40 mEq  40 mEq Oral PRN Natacha Macias MD        Or    potassium chloride 10 mEq/100 mL IVPB (Peripheral Line)  10 mEq Intravenous PRN Natacha Macias  mL/hr at 03/24/21 0345 10 mEq at 03/24/21 0345    hydrALAZINE (APRESOLINE) injection 10 mg  10 mg Intravenous Q6H PRN Remonia Catina, APRN - CNP   10 mg at 03/25/21 0450    cloNIDine (CATAPRES) 0.2 MG/24HR 1 patch  1 patch Transdermal Weekly Natacha Macias MD   1 patch at 03/24/21 5341     Allergies   Allergen Reactions    Streptomycin     Sulfa Antibiotics Other (See Comments)     States change in mental status    Tylenol With Codeine #3 [Acetaminophen-Codeine] Other (See Comments)     Per pt, made pt \"immobile. \" Can tolerate cough syrup w/ codeine    Verapamil Rash     Active Problems:

## 2021-03-25 NOTE — PROGRESS NOTES
Physician Progress Note      PATIENTTrellis Scale               Ada Camila  CSN #:                  [id number]  :                       1945  ADMIT DATE:       3/23/2021 6:10 PM  [address] [place] DATE:  RESPONDING  PROVIDER #:        Tristen Motta MD          QUERY TEXT:    Pt admitted with dysphagia. Noted documentation of severe malnutrition per   dietary consultant on 3/24 progress note. If possible, please document in   progress notes and discharge summary:    The medical record reflects the following:  Risk Factors: advanced age, dysphasia, liver cirrhosis, altered GI structure,   inadequate protein-energy intake, swallowing difficulty  Clinical Indicators: evidenced by NPO or clear liquid status due to medical   condition, weight loss greater than or equal to 20% in 1 year, moderate muscle   loss, mild loss of subcutaneous fat  Treatment: dietician consult, continue NPO as ordered, monitoring    Thank you for your assistance,  Radha Ellis RN,BSN,CCDS,CRCR  Options provided:  -- Severe malnutrition confirmed present on admission  -- Severe malnutrition confirmed not present on admission  -- Severe malnutrition ruled out  -- Other - I will add my own diagnosis  -- Disagree - Not applicable / Not valid  -- Disagree - Clinically unable to determine / Unknown  -- Refer to Clinical Documentation Reviewer    PROVIDER RESPONSE TEXT:    The diagnosis of severe malnutrition was confirmed as present on admission.     Query created by: Boris Olsen on 3/25/2021 9:36 AM      Electronically signed by:  Tristen Motta MD 3/25/2021 2:01 PM

## 2021-03-25 NOTE — CONSULTS
Gastroenterology Consult Note    Patient:   Tino Hudson   YOB: 1945   Facility:   [facility]   Referring/PCP: Marcos Mcarthur MD  Date:     3/25/2021  Consultant:   Rosalina Syed MD    Subjective: This 66 y.o. female was admitted 3/23/2021 with a diagnosis of \"Dysphagia [R13.10]\" and is seen in consultation regarding \"dysphagia\". Information was obtained from interview of  the patient, examination of the patient, and review of records. I did  update the past medical, surgical, social and / or family history. Dysphagia for months moderate in throat assoc w CVA    Current status  Present  Diet Order: Diet NPO Effective Now and she is not tolerating diet. Recently, she has experienced no abdominal  Pain and she has not required Intravenous narcotic analgesics. The patient has also experienced no constipation, diarrhea, fever, hematochezia, melena, nausea and vomiting      Prior to Admission medications    Medication Sig Start Date End Date Taking?  Authorizing Provider   amLODIPine (NORVASC) 5 MG tablet TAKE ONE TABLET BY MOUTH DAILY 3/12/21  Yes Momo Mccoy MD   furosemide (LASIX) 40 MG tablet Take 1 tablet by mouth daily 10/13/20  Yes Momo Mccoy MD   pantoprazole (PROTONIX) 20 MG tablet  8/10/20  Yes Historical Provider, MD   albuterol (ACCUNEB) 0.63 MG/3ML nebulizer solution Take 1 ampule by nebulization every 6 hours as needed for Wheezing   Yes Historical Provider, MD   albuterol sulfate  (90 Base) MCG/ACT inhaler Inhale 2 puffs into the lungs 4 times daily as needed for Wheezing 2/15/20  Yes Trudy Maldonado MD   Spacer/Aero-Holding Lanza Shows DONYA 1 Device by Does not apply route daily as needed (Shortness of breath) 2/15/20  Yes Trudy Maldonado MD   aspirin EC 81 MG EC tablet Take 1 tablet by mouth daily 9/25/19  Yes Momo Mccoy MD   Acetaminophen (TYLENOL PO) Take by mouth as needed   Yes Historical Provider, MD ENDOSCOPY      UPPER GASTROINTESTINAL ENDOSCOPY N/A 2020    EGD DIAGNOSTIC ONLY performed by Katerina Duffy MD at [address] [place]:   Social History     Tobacco Use    Smoking status: Never Smoker    Smokeless tobacco: Never Used   Substance Use Topics    Alcohol use: No     Family:   Family History   Problem Relation Age of Onset    High Blood Pressure Mother        ROS: Pertinent items are noted in HPI.     Objective:   Vital Signs:  Temp (24hrs), Av.2 °F (36.2 °C), Min:97 °F (36.1 °C), Max:97.7 °F (84.4 °C)     Systolic (89QHK), CSX:182 , Min:125 , TXY:501      Diastolic (41AKB), FNB:76, Min:68, Max:83     Pulse  Av.6  Min: 70  Max: 81  BP (!) 159/79   Pulse 70   Temp 97 °F (36.1 °C) (Oral)   Resp 16   Ht 5' 8\" (1.727 m)   Wt 150 lb 11.2 oz (68.4 kg)   SpO2 96%   BMI 22.91 kg/m²      Physical Exam:   BP (!) 159/79   Pulse 70   Temp 97 °F (36.1 °C) (Oral)   Resp 16   Ht 5' 8\" (1.727 m)   Wt 150 lb 11.2 oz (68.4 kg)   SpO2 96%   BMI 22.91 kg/m²   General appearance: alert, appears stated age and cooperative  Lungs: clear to auscultation bilaterally  Chest wall: no tenderness  Heart: regular rate and rhythm, S1, S2 normal, no murmur, click, rub or gallop  Abdomen: soft, non-tender; bowel sounds normal; no masses,  no organomegaly  Extremities: extremities normal, atraumatic, no cyanosis or edema  Skin: Skin color, texture, turgor normal. No rashes or lesions  Neurologic: Grossly normal    Lab and Imaging Review   Recent Labs     21  1850 21  0523   WBC 3.9* 1.6*   HGB 12.4 12.4   MCV 89.4 89.7   PLT 90* 74*    137   K 3.1* 3.9    103   CO2 28 25   BUN 7 6*   CREATININE 0.7 0.6   GLUCOSE 96 196*   CALCIUM 9.2 8.5   PROT 6.6  --    LABALBU 3.4  --    AST 36  --    ALT 15  --    ALKPHOS 86  --    BILITOT 1.8*  --    MG 1.50* 1.80       Assessment:     Patient Active Problem List    Diagnosis Date Noted    Severe protein-calorie malnutrition (HCC)

## 2021-03-26 ENCOUNTER — APPOINTMENT (OUTPATIENT)
Dept: MRI IMAGING | Age: 76
DRG: 056 | End: 2021-03-26
Payer: MEDICARE

## 2021-03-26 ENCOUNTER — ANESTHESIA (OUTPATIENT)
Dept: ENDOSCOPY | Age: 76
DRG: 056 | End: 2021-03-26
Payer: MEDICARE

## 2021-03-26 ENCOUNTER — ANESTHESIA EVENT (OUTPATIENT)
Dept: ENDOSCOPY | Age: 76
DRG: 056 | End: 2021-03-26
Payer: MEDICARE

## 2021-03-26 VITALS
SYSTOLIC BLOOD PRESSURE: 152 MMHG | RESPIRATION RATE: 15 BRPM | DIASTOLIC BLOOD PRESSURE: 65 MMHG | OXYGEN SATURATION: 96 %

## 2021-03-26 LAB
ALBUMIN SERPL-MCNC: 3.3 G/DL (ref 3.1–4.9)
ALPHA-1-GLOBULIN: 0.3 G/DL (ref 0.2–0.4)
ALPHA-2-GLOBULIN: 0.7 G/DL (ref 0.4–1.1)
ANION GAP SERPL CALCULATED.3IONS-SCNC: 9 MMOL/L (ref 3–16)
BASOPHILS ABSOLUTE: 0.1 K/UL (ref 0–0.2)
BASOPHILS RELATIVE PERCENT: 1.6 %
BETA GLOBULIN: 1.3 G/DL (ref 0.9–1.6)
BUN BLDV-MCNC: 8 MG/DL (ref 7–20)
CALCIUM SERPL-MCNC: 8.4 MG/DL (ref 8.3–10.6)
CHLORIDE BLD-SCNC: 102 MMOL/L (ref 99–110)
CO2: 25 MMOL/L (ref 21–32)
CREAT SERPL-MCNC: 0.7 MG/DL (ref 0.6–1.2)
EOSINOPHILS ABSOLUTE: 0.2 K/UL (ref 0–0.6)
EOSINOPHILS RELATIVE PERCENT: 5.1 %
GAMMA GLOBULIN: 1.5 G/DL (ref 0.6–1.8)
GFR AFRICAN AMERICAN: >60
GFR NON-AFRICAN AMERICAN: >60
GLUCOSE BLD-MCNC: 100 MG/DL (ref 70–99)
GLUCOSE BLD-MCNC: 108 MG/DL (ref 70–99)
GLUCOSE BLD-MCNC: 82 MG/DL (ref 70–99)
GLUCOSE BLD-MCNC: 93 MG/DL (ref 70–99)
GLUCOSE BLD-MCNC: 96 MG/DL (ref 70–99)
HCT VFR BLD CALC: 32.3 % (ref 36–48)
HEMOGLOBIN: 11.1 G/DL (ref 12–16)
LYMPHOCYTES ABSOLUTE: 1.1 K/UL (ref 1–5.1)
LYMPHOCYTES RELATIVE PERCENT: 32.6 %
MCH RBC QN AUTO: 30.9 PG (ref 26–34)
MCHC RBC AUTO-ENTMCNC: 34.5 G/DL (ref 31–36)
MCV RBC AUTO: 89.6 FL (ref 80–100)
MONOCYTES ABSOLUTE: 0.3 K/UL (ref 0–1.3)
MONOCYTES RELATIVE PERCENT: 8.7 %
NEUTROPHILS ABSOLUTE: 1.7 K/UL (ref 1.7–7.7)
NEUTROPHILS RELATIVE PERCENT: 52 %
PDW BLD-RTO: 16.3 % (ref 12.4–15.4)
PERFORMED ON: ABNORMAL
PERFORMED ON: NORMAL
PLATELET # BLD: 71 K/UL (ref 135–450)
PLATELET ANTIBODY, IGG: NEGATIVE
PLATELET ANTIBODY, IGM: NEGATIVE
PMV BLD AUTO: 10 FL (ref 5–10.5)
POTASSIUM REFLEX MAGNESIUM: 3.8 MMOL/L (ref 3.5–5.1)
RBC # BLD: 3.6 M/UL (ref 4–5.2)
SODIUM BLD-SCNC: 136 MMOL/L (ref 136–145)
SPE/IFE INTERPRETATION: NORMAL
TOTAL PROTEIN: 7.1 G/DL (ref 6.4–8.2)
WBC # BLD: 3.4 K/UL (ref 4–11)

## 2021-03-26 PROCEDURE — 3700000001 HC ADD 15 MINUTES (ANESTHESIA): Performed by: INTERNAL MEDICINE

## 2021-03-26 PROCEDURE — 3609013300 HC EGD TUBE PLACEMENT: Performed by: INTERNAL MEDICINE

## 2021-03-26 PROCEDURE — 1200000000 HC SEMI PRIVATE

## 2021-03-26 PROCEDURE — 99232 SBSQ HOSP IP/OBS MODERATE 35: CPT | Performed by: INTERNAL MEDICINE

## 2021-03-26 PROCEDURE — 6370000000 HC RX 637 (ALT 250 FOR IP): Performed by: INTERNAL MEDICINE

## 2021-03-26 PROCEDURE — A9579 GAD-BASE MR CONTRAST NOS,1ML: HCPCS | Performed by: INTERNAL MEDICINE

## 2021-03-26 PROCEDURE — 7100000011 HC PHASE II RECOVERY - ADDTL 15 MIN: Performed by: INTERNAL MEDICINE

## 2021-03-26 PROCEDURE — 70553 MRI BRAIN STEM W/O & W/DYE: CPT

## 2021-03-26 PROCEDURE — 6360000004 HC RX CONTRAST MEDICATION: Performed by: INTERNAL MEDICINE

## 2021-03-26 PROCEDURE — 2580000003 HC RX 258: Performed by: INTERNAL MEDICINE

## 2021-03-26 PROCEDURE — 2709999900 HC NON-CHARGEABLE SUPPLY: Performed by: INTERNAL MEDICINE

## 2021-03-26 PROCEDURE — 36415 COLL VENOUS BLD VENIPUNCTURE: CPT

## 2021-03-26 PROCEDURE — 92526 ORAL FUNCTION THERAPY: CPT

## 2021-03-26 PROCEDURE — 0DH63UZ INSERTION OF FEEDING DEVICE INTO STOMACH, PERCUTANEOUS APPROACH: ICD-10-PCS | Performed by: INTERNAL MEDICINE

## 2021-03-26 PROCEDURE — 2580000003 HC RX 258: Performed by: NURSE ANESTHETIST, CERTIFIED REGISTERED

## 2021-03-26 PROCEDURE — 2500000003 HC RX 250 WO HCPCS: Performed by: NURSE ANESTHETIST, CERTIFIED REGISTERED

## 2021-03-26 PROCEDURE — 85025 COMPLETE CBC W/AUTO DIFF WBC: CPT

## 2021-03-26 PROCEDURE — 3700000000 HC ANESTHESIA ATTENDED CARE: Performed by: INTERNAL MEDICINE

## 2021-03-26 PROCEDURE — 6360000002 HC RX W HCPCS: Performed by: NURSE ANESTHETIST, CERTIFIED REGISTERED

## 2021-03-26 PROCEDURE — 80048 BASIC METABOLIC PNL TOTAL CA: CPT

## 2021-03-26 PROCEDURE — 7100000010 HC PHASE II RECOVERY - FIRST 15 MIN: Performed by: INTERNAL MEDICINE

## 2021-03-26 PROCEDURE — 2500000003 HC RX 250 WO HCPCS: Performed by: INTERNAL MEDICINE

## 2021-03-26 RX ORDER — PROCHLORPERAZINE EDISYLATE 5 MG/ML
10 INJECTION INTRAMUSCULAR; INTRAVENOUS EVERY 6 HOURS PRN
Status: DISCONTINUED | OUTPATIENT
Start: 2021-03-26 | End: 2021-03-27 | Stop reason: HOSPADM

## 2021-03-26 RX ORDER — CEFAZOLIN SODIUM 1 G/3ML
2000 INJECTION, POWDER, FOR SOLUTION INTRAMUSCULAR; INTRAVENOUS ONCE
Status: DISCONTINUED | OUTPATIENT
Start: 2021-03-26 | End: 2021-03-26 | Stop reason: CLARIF

## 2021-03-26 RX ORDER — SODIUM CHLORIDE, SODIUM LACTATE, POTASSIUM CHLORIDE, CALCIUM CHLORIDE 600; 310; 30; 20 MG/100ML; MG/100ML; MG/100ML; MG/100ML
INJECTION, SOLUTION INTRAVENOUS CONTINUOUS PRN
Status: DISCONTINUED | OUTPATIENT
Start: 2021-03-26 | End: 2021-03-26 | Stop reason: SDUPTHER

## 2021-03-26 RX ORDER — LIDOCAINE HYDROCHLORIDE 20 MG/ML
INJECTION, SOLUTION INFILTRATION; PERINEURAL PRN
Status: DISCONTINUED | OUTPATIENT
Start: 2021-03-26 | End: 2021-03-26 | Stop reason: SDUPTHER

## 2021-03-26 RX ORDER — PROPOFOL 10 MG/ML
INJECTION, EMULSION INTRAVENOUS PRN
Status: DISCONTINUED | OUTPATIENT
Start: 2021-03-26 | End: 2021-03-26 | Stop reason: SDUPTHER

## 2021-03-26 RX ORDER — CEFAZOLIN SODIUM 1 G/3ML
INJECTION, POWDER, FOR SOLUTION INTRAMUSCULAR; INTRAVENOUS PRN
Status: DISCONTINUED | OUTPATIENT
Start: 2021-03-26 | End: 2021-03-26 | Stop reason: SDUPTHER

## 2021-03-26 RX ORDER — CLONIDINE 0.2 MG/24H
1 PATCH, EXTENDED RELEASE TRANSDERMAL WEEKLY
Status: DISCONTINUED | OUTPATIENT
Start: 2021-03-26 | End: 2021-03-27 | Stop reason: HOSPADM

## 2021-03-26 RX ADMIN — POTASSIUM CHLORIDE, DEXTROSE MONOHYDRATE AND SODIUM CHLORIDE: 150; 5; 450 INJECTION, SOLUTION INTRAVENOUS at 10:09

## 2021-03-26 RX ADMIN — CEFAZOLIN 1000 MG: 330 INJECTION, POWDER, FOR SOLUTION INTRAMUSCULAR; INTRAVENOUS at 14:58

## 2021-03-26 RX ADMIN — LIDOCAINE HYDROCHLORIDE 50 MG: 20 INJECTION, SOLUTION INFILTRATION; PERINEURAL at 14:59

## 2021-03-26 RX ADMIN — GADOTERIDOL 13 ML: 279.3 INJECTION, SOLUTION INTRAVENOUS at 12:00

## 2021-03-26 RX ADMIN — PROPOFOL 200 MG: 10 INJECTION, EMULSION INTRAVENOUS at 14:59

## 2021-03-26 RX ADMIN — Medication 10 ML: at 19:58

## 2021-03-26 RX ADMIN — SODIUM CHLORIDE, POTASSIUM CHLORIDE, SODIUM LACTATE AND CALCIUM CHLORIDE: 600; 310; 30; 20 INJECTION, SOLUTION INTRAVENOUS at 14:52

## 2021-03-26 ASSESSMENT — PAIN SCALES - GENERAL
PAINLEVEL_OUTOF10: 0
PAINLEVEL_OUTOF10: 0

## 2021-03-26 ASSESSMENT — ENCOUNTER SYMPTOMS: SHORTNESS OF BREATH: 1

## 2021-03-26 NOTE — PROGRESS NOTES
Nutrition Note    ENTERAL NUTRITION RECOMMENDATIONS FOR NEW PEG-TUBE  4 Bolus Feeds of Jevity 1.5  (1.5 Calorie with Fiber) 300mls /day during waking a hours (EX 8a, 12p, 4p, 8p) with 225mls  water flush 4 x per day (may flush ~ 110 mls pre and post Jevity 1.5 bolus)  Provides : 1800 kcal; 76 gr Pro, 1800 mls of free water         Electronically signed by Swathi Rae, YEIMI, LD on 3/26/21 at 1:10 PM EDT    Contact: [phone]

## 2021-03-26 NOTE — H&P
Intravenous Q6H PRN Ana Pedraza, APRN - CNP   10 mg at 03/25/21 0450    cloNIDine (CATAPRES) 0.2 MG/24HR 1 patch  1 patch Transdermal Weekly Royal Jorge MD   1 patch at 03/24/21 2146     Facility-Administered Medications Ordered in Other Encounters   Medication Dose Route Frequency Provider Last Rate Last Admin    ceFAZolin (ANCEF) injection   Intravenous PRN Lawson César, APRN - CRNA   1,000 mg at 03/26/21 1458    propofol injection    PRN Lawson César, APRN - CRNA   200 mg at 03/26/21 1459    lidocaine 2 % injection    PRN Lawson César, APRN - CRNA   50 mg at 03/26/21 1459    lactated ringers infusion    Continuous PRN Lawson César, APRN - CRNA   New Bag at 03/26/21 1452     Past Medical History:   Diagnosis Date    Acid reflux     Asthma     CHF (congestive heart failure) (HCC)     Generalized anxiety disorder     Hepatitis A 06/16/2020    Hypertension     MDRO (multiple drug resistant organisms) resistance 04/29/2020    URINE    Osteoarthritis     of hips, knees, back    Screening mammogram 3/26/96     Past Surgical History:   Procedure Laterality Date    ACHILLES TENDON SURGERY      CARPAL TUNNEL RELEASE      CATARACT REMOVAL WITH IMPLANT Left 04/06/2017    Left cataract removal. Dr. Long Henning Right 05/04/2017    PHACO EMULSIFICATION OF CATARACT WITH INTRAOCULAR LENS IMPLANT RIGHT EYE    COLONOSCOPY  3/24/10    FOOT SURGERY      GALLBLADDER SURGERY      HYSTERECTOMY      NECK SURGERY      PAIN MANAGEMENT PROCEDURE N/A 1/20/2020    MIDLINE LUMBAR FIVE SACRAL ONE EPIDURAL STEROID INJECTION SITE CONFIRMED BY FLUOROSCOPY performed by Pankaj Del Valle MD at [address] [facility] N/A 9/22/2020    MIDLINE LUMBAR FIVE SACRAL ONE EPIDURAL STEROID INJECTION SITE CONFIRMED BY FLUOROSCOPY performed by Pankaj Del Valle MD at [address] [place] [address]

## 2021-03-26 NOTE — PROGRESS NOTES
Speech Language Pathology  Facility/Department: [facility]  Dysphagia Daily Treatment Note    NAME: Tino Hudson  : 1945  MRN: [id number]    Patient Diagnosis(es):   Patient Active Problem List    Diagnosis Date Noted    Hypokalemia     Hypomagnesemia     Severe protein-calorie malnutrition ([facility] [address] 2021    Supraclavicular lymphadenopathy     Leukopenia     Dysphagia 2021    TIA involving left internal carotid artery     Facial droop     Suspected cerebrovascular accident (CVA) 2020    LOU (acute kidney injury) ([address] 2020    Cirrhosis, nonalcoholic ([facility] [address]     Exertional dyspnea     Diastolic congestive heart failure ([facility] [address] 2018    Mild aortic stenosis 2018    SOB (shortness of breath) on exertion 2018    Right wrist sprain, initial encounter 2018    Arthritis of carpometacarpal [place]) joint of right thumb 2018    Primary osteoarthritis of right knee 2016    Generalized anxiety disorder 10/03/2011    Acid reflux 10/03/2011    Osteoarthritis hips, knees, back 10/03/2011    HTN (hypertension) 2011     Allergies: Allergies   Allergen Reactions    Streptomycin     Sulfa Antibiotics Other (See Comments)     States change in mental status    Tylenol With Codeine #3 [Acetaminophen-Codeine] Other (See Comments)     Per pt, made pt \"immobile. \" Can tolerate cough syrup w/ codeine    Verapamil Rash     Onset Date: 2021  Subjective: Pt seen in room with RN permission. Alert and cooperative. She is sleeping upon entry but awakens easily with pleasant demeanor. Pain: The patient does not complain of pain     Current Diet: Diet NPO Effective Now    Diet Tolerance: The patient is NPO      Dysphagia Treatment and Impressions:   Pt seen in room at bedside with RN permission   The patient has PEG tube placement scheduled this date. Reports feeling better today.  She continues to endorse weakness and fatigue that is now her baseline (?).  SLP continues to question neurological component given progressive decline and fatigue noted with the patient. To further assess this, the patient was asked to count from 1-100.  The patient was able to count to 14 before she became SOB and was noted with inspiratory wheeze, at 23 she became severely dysarthric. By 28 she reported acute head pain, by 30 the patient's speech was completely unintellgible. By 43 the patient was unable to continue and her eyes close, head drops, and WOB increases. She is responsive to stimuli but requires 1 min of eyes closed and no talking or moving to recover.  During completion of MBS yesterday, following 2 trials, the patient was no longer able to hold her head upright without physically holding it up with her hands. At the suggestion of PT, the patient was placed on 45 degree semi-jim's position and timer was started to assess endurance of cervical strength. The patient was unable to hold her head in upright or neutral position after 1 min and 51 seconds. She then had to hold her head up by her hands. She reported jaw pain bilaterally and unilateral RUE pain. Discussed with NP and PT with rec's for PT referral for weakness.  Her voice becomes progressively hoarse and dysphonic across a span of 41 mins   She was assessed with ice chips and presents with baseline tolerance where she requires multiple swallows and exhibits wet vocal quality. She was educated regarding importance of low vol intake of ice chips, oral care prior to ice chips, and no talking during consumption-u/v. Pt is on RA   Initially MD suspected etiology was r/t mass pressing on L recurrent laryngeal nerve which could explain dysphagia and voice but not dysarthria as it is a neuromotor disorder. CT neck was completed and negative with no mass and no swollen lymph nodes. Yesterday's MD note reports that symptoms could be from old CVA.    Latest is impaired. Pt appears to be at high risk for aspiration PNA, and high risk for malnutrition/dehydration. Non-oral nutrition is indicated. Swallow prognosis is guarded given severity of dysphagia and progressive nature of fatigue. Unable to make appropriate recommendations or treatment plan without further testing     Diet Recommendation: NPO with consideration of alt means of nutrition given current knowledge. Unable to make appropriate recommendations or treatment plan without further testing  Risk Management: OK for ice chips PRN, Oral care q4 hrs, head imaging  Specialist Referrals: Aissatou  Ancillary Tests: Additional head imaging  Goals: Tolerate PO  Follow-up exam: Will cont to follow    Dysphagia Goals:  Timeframe for Long-term Goals: 7 days (03/31/2021)  Goal 1: The patient will tolerate LRD with no clinical s/s of aspiration or worsening respiratory status  Goal ongoing     Short-term Goals  Timeframe for Short-term Goals: 5 days (03/29/2021)  Goal 1: The patient will tolerate FEES for further assessment of orpharyngeal swallow  03/26: Goal not met as once scope was passed to BOT equipment malfunction occurred and video imagery was unavailable. MBSS completed as alternate assessment with results as above. Grossly unable to develop treatment plan until further testing and/or imaging is completed. This is secondary to contra-indication of laryngopharyngeal/oral exercises with pathologies such as myasthenia gravis, MS, etc. but need for these exercises with disorders such as CVA. Will continue to monitor with general goal as below:    Goal 2: The patient will tolerate ice chip trials with no clinical s/s of aspiration  5/5 for optimal comfort    Will update POC as able. Speech/Language/Cog Goals:  Will assess dysarthria once further testing is complete      Recommendations:  Solid Consistency: NPO  Liquid Consistency: NPO  Medication: Meds via alt means of nutrition    Patient/Family/Caregiver Education: Pt and son educated regarding findings, concerns, strategies, etcc with u/v. Compensatory Strategies: N/A- pt is NPO. Oral care with ice chips    Plan:    Continued Dysphagia treatment with goals per plan of care. Discharge Recommendations: TBD    If pt discharges from hospital prior to Speech/Swallowing discharge, this note serves as tx and discharge summary.      Total Treatment Time / Charges     Time in Time out Total Time / units   Cognitive Tx         Speech Tx      Dysphagia Tx 0850 0931 41 mins / 1 unit     Signature:  Georgi Parson M.A., Lasandra Solo #[id number]  Speech-Language Pathologist  Phone: [phone] [facility], [facility]

## 2021-03-26 NOTE — PROGRESS NOTES
PM assessment completed, see flowsheet. No needs or complaints at this time. Call light within reach, bed in lowest position.

## 2021-03-26 NOTE — PROGRESS NOTES
Pt. Transported off unit via wheelchair by transport team to MRI. MRI questionnaire completed using verbal history from patient and written history within Epic. No complaints of sob, chest pain, nausea or pain from beginning of shift through transport off unit.    Carolan Dakins, RN

## 2021-03-26 NOTE — PROGRESS NOTES
Pt. Transported off unit by wheelchair by transport team to [facility]. Tele attached, monitor notified. Shemar Dong RN

## 2021-03-26 NOTE — PROGRESS NOTES
Component Value Date    GLUCOSE 100 (H) 03/26/2021    BUN 8 03/26/2021    CREATININE 0.7 03/26/2021    K 3.8 03/26/2021       Lab Results   Component Value Date    ALKPHOS 86 03/23/2021    ALT 15 03/23/2021    AST 36 03/23/2021    BILITOT 1.8 (H) 03/23/2021    PROT 7.1 03/24/2021     LDH - 262    Haptoglobin - < 10    TSH - 0.54    B12 - > 2000    Folate -5.53    GALEN - negative    Rheum factor - < 10    Plt Ab - pending    HIV - NR    Hep C - NR    Mono - negative    Flow cytometry - No phenotypically abnormal cell population identified. SPEP - pending    IMAGING:     Xr Clavicle Right  Result Date: 3/24/2021  Moderate AC joint arthropathy. Fl Esophagram  Result Date: 3/24/2021  Very poorly coordinated swallowing suspected to be related to neurologic cause, possibly prior stroke. The patient became extremely short of breath when attempting to swallow the 13 mm barium tablet and could not swallow the tablet. There is laryngeal penetration but no aspiration over the course of this examination. Aspiration remains a consideration as the patient became extremely short of breath when drinking water along with the barium tablet at the beginning of the examination. Unremarkable esophagram otherwise. Consider consultation with speech pathology. Xr Chest Portable  Result Date: 3/23/2021  Mild central pulmonary congestion or pulmonary artery hypertension. Mild bibasilar atelectasis or early infiltrates which is more prominent. CT Neck/Chest 3/24/21:    No neck mass or enlarged lymph node. Negative chest CT.  No evidence of aspiration or other acute pulmonary   process.  No evidence lymphadenopathy. Barium swallow 3/25/21:  Swallowing mechanism grossly within normal limits.  Deep penetration   concerning for potential aspiration.       Please see separate speech pathology report for full discussion of findings   and recommendations.          PATHOLOGY:     Peripheral smear 3/24/21:    Collected: 03/24/21 9106   Result status: Final   Resulting lab: [facility]   Value: Reviewed   Comment: Peripheral blood smear and histogram are reviewed and show a leukopenia   with absolute neutropenia and thrombocytopenia. The leukocyte findings may be seen related to infections (viral,   rickettsial, some bacterial), drug effect, toxins and marrow failure   disorders.  Should no etiology be identified, consider hematology   consult with bone marrow examination to rule out   myelodysplasia, aleukemic leukemia, or other marrow pathology. Thrombocytopenia may represent a consumptive/destructive process or   marrow suppression/failure. Consider splenic sequestration   (hypersplenism), autoimmune disorder, drug effect, infection,   or primary marrow disorder (myelodysplasia or other marrow   failure process). Should no etiology be identified, recommend   hematology consultation with bone marrow examination. CPT code: 97969. ASSESSMENT:     Problem List Items Addressed This Visit     HTN (hypertension)    Dysphagia - Primary    Hypokalemia    Hypomagnesemia      Other Visit Diagnoses     Dyspnea, unspecified type        At risk for aspiration pneumonia        History of asthma        Hypoxic episode        Thrombocytopenia ([facility] [address]                    PLAN:     1. Leukopenia/TCP    - suspect from cirrhosis  - chronic mild TCP in the  range, now down to 74  - leukopenia new this admission, now improving  - peripheral smear non-diagnostic  - COVID and mono test negative  - Hep C and HIV negtative  - B12, folate, TSH, LDH, flow cytometry, RF and GALEN normal/neg  - Haptoglobin < 10 suggests liver disease  - SPEP pending  - monitor CBC    2. Dysphagia    - per GI  - barium swallow/speech evaluation noted  - might need PEG      3.  Neck pain/weakness    - bony prominence appreciated on exam  - CT neck and chest 3/24/21 shows no obvious mass or LAD    Nik Lay MD

## 2021-03-26 NOTE — PROGRESS NOTES
Report given and care transferred to [facility], RN. No falls this shift. Bedside handoff completed.

## 2021-03-26 NOTE — ANESTHESIA PRE PROCEDURE
Department of Anesthesiology  Preprocedure Note       Name:  Selin Gilliam   Age:  66 y.o.  :  1945                                          MRN:  [id number]         Date:  3/26/2021      Surgeon: Meche Evans):  Mitul Awan MD    Procedure: Procedure(s):  EGD PEG TUBE INSERTION w/anesthesia    Medications prior to admission:   Prior to Admission medications    Medication Sig Start Date End Date Taking?  Authorizing Provider   amLODIPine (NORVASC) 5 MG tablet TAKE ONE TABLET BY MOUTH DAILY 3/12/21  Yes Val Méndez MD   furosemide (LASIX) 40 MG tablet Take 1 tablet by mouth daily 10/13/20  Yes Val Méndez MD   pantoprazole (PROTONIX) 20 MG tablet  8/10/20  Yes Historical Provider, MD   albuterol (ACCUNEB) 0.63 MG/3ML nebulizer solution Take 1 ampule by nebulization every 6 hours as needed for Wheezing   Yes Historical Provider, MD   albuterol sulfate  (90 Base) MCG/ACT inhaler Inhale 2 puffs into the lungs 4 times daily as needed for Wheezing 2/15/20  Yes Lobito Wall MD   Spacer/Aero-Holding [place] 1 Device by Does not apply route daily as needed (Shortness of breath) 2/15/20  Yes Lobito Wall MD   aspirin EC 81 MG EC tablet Take 1 tablet by mouth daily 19  Yes Val Méndez MD   Acetaminophen (TYLENOL PO) Take by mouth as needed   Yes Historical Provider, MD       Current medications:    Current Facility-Administered Medications   Medication Dose Route Frequency Provider Last Rate Last Admin    ceFAZolin (ANCEF) 2000 mg in sterile water 20 mL IV syringe  2,000 mg Intravenous Once Mitul Awan MD        sodium chloride flush 0.9 % injection 10 mL  10 mL Intravenous 2 times per day Anselmo Esposito MD   10 mL at 21 1029    sodium chloride flush 0.9 % injection 10 mL  10 mL Intravenous PRN Anselmo Esposito MD        promethazine (PHENERGAN) tablet 12.5 mg  12.5 mg Oral Q6H PRN Anselmo Esposito MD        Or    ondansetron (ZOFRAN) injection 4 mg  4 mg Intravenous Q6H PRN Silvia Harley MD        polyethylene glycol (GLYCOLAX) packet 17 g  17 g Oral Daily PRN Silvia Harley MD        acetaminophen (TYLENOL) tablet 650 mg  650 mg Oral Q6H PRN Silvia Harley MD        Or    acetaminophen (TYLENOL) suppository 650 mg  650 mg Rectal Q6H PRN Silvia Harley MD        dextrose 5 % and 0.45 % NaCl with KCl 20 mEq infusion   Intravenous Continuous Silvia Harley  mL/hr at 03/26/21 1009 New Bag at 03/26/21 1009    potassium chloride (KLOR-CON M) extended release tablet 40 mEq  40 mEq Oral PRN Silvia Harley MD        Or    potassium bicarb-citric acid (EFFER-K) effervescent tablet 40 mEq  40 mEq Oral PRN Silvia Harley MD        Or    potassium chloride 10 mEq/100 mL IVPB (Peripheral Line)  10 mEq Intravenous PRN Silvia Harley  mL/hr at 03/24/21 0345 10 mEq at 03/24/21 0345    hydrALAZINE (APRESOLINE) injection 10 mg  10 mg Intravenous Q6H PRN Valeen Million, APRN - CNP   10 mg at 03/25/21 0450    cloNIDine (CATAPRES) 0.2 MG/24HR 1 patch  1 patch Transdermal Weekly Silvia Harley MD   1 patch at 03/24/21 2146       Allergies: Allergies   Allergen Reactions    Streptomycin     Sulfa Antibiotics Other (See Comments)     States change in mental status    Tylenol With Codeine #3 [Acetaminophen-Codeine] Other (See Comments)     Per pt, made pt \"immobile. \" Can tolerate cough syrup w/ codeine    Verapamil Rash       Problem List:    Patient Active Problem List   Diagnosis Code    HTN (hypertension) I10    Generalized anxiety disorder F41.1    Acid reflux K21.9    Osteoarthritis hips, knees, back M15.9    Primary osteoarthritis of right knee M17.11    Right wrist sprain, initial encounter S63.501A    Arthritis of carpometacarpal (CMC) joint of right thumb M18.11    Mild aortic stenosis I35.0    SOB (shortness of breath) on exertion I06.12    Diastolic congestive heart failure (HCC) I50.30    Exertional dyspnea R06.00    Cirrhosis, nonalcoholic ([facility] [address] X28.53    Suspected cerebrovascular accident (CVA) R09.89    LOU (acute kidney injury) ([facility] [address] N17.9    TIA involving left internal carotid artery G45.1    Facial droop R29.810    Dysphagia R13.10    Supraclavicular lymphadenopathy R59.0    Leukopenia D72.819    Severe protein-calorie malnutrition (HCC) E43    Hypokalemia E87.6    Hypomagnesemia E83.42       Past Medical History:        Diagnosis Date    Acid reflux     Asthma     CHF (congestive heart failure) (HCC)     Generalized anxiety disorder     Hepatitis A 06/16/2020    Hypertension     MDRO (multiple drug resistant organisms) resistance 04/29/2020    URINE    Osteoarthritis     of hips, knees, back    Screening mammogram 3/26/96       Past Surgical History:        Procedure Laterality Date    ACHILLES TENDON SURGERY      CARPAL TUNNEL RELEASE      CATARACT REMOVAL WITH IMPLANT Left 04/06/2017    Left cataract removal. Dr. Lyle Bonilla Right 05/04/2017    PHACO EMULSIFICATION OF CATARACT WITH INTRAOCULAR LENS IMPLANT RIGHT EYE    COLONOSCOPY  3/24/10    FOOT SURGERY      GALLBLADDER SURGERY      HYSTERECTOMY      NECK SURGERY      PAIN MANAGEMENT PROCEDURE N/A 1/20/2020    MIDLINE LUMBAR FIVE SACRAL ONE EPIDURAL STEROID INJECTION SITE CONFIRMED BY FLUOROSCOPY performed by Epi Lomeli MD at [address] N/A 9/22/2020    MIDLINE LUMBAR FIVE SACRAL ONE EPIDURAL STEROID INJECTION SITE CONFIRMED BY FLUOROSCOPY performed by pEi Lomeli MD at [address] ENDOSCOPY N/A 2/24/2020    EGD DIAGNOSTIC ONLY performed by Forest Gambino MD at [address] History:    Social History     Tobacco Use    Smoking status: Never Smoker    Smokeless tobacco: Never Used   Substance Use Topics    Alcohol use:  No                                Counseling given: Not Answered Type & Screen (If Applicable):  No results found for: LABABO, LABRH    Drug/Infectious Status (If Applicable):  No results found for: HIV, HEPCAB    COVID-19 Screening (If Applicable):   Lab Results   Component Value Date    COVID19 Not Detected 03/24/2021           Anesthesia Evaluation  Patient summary reviewed and Nursing notes reviewed no history of anesthetic complications:   Airway: Mallampati: II     Neck ROM: full   Dental:          Pulmonary:   (+) shortness of breath:  asthma:                            Cardiovascular:    (+) hypertension:, CHF:, LR:,                   Neuro/Psych:   (+) TIA, psychiatric history:            GI/Hepatic/Renal:   (+) GERD:, hepatitis:, liver disease:,           Endo/Other:                     Abdominal:           Vascular:                                        Anesthesia Plan      MAC     ASA 3     (Medications & allergies reviewed  All available lab & EKG data reviewed)  Induction: intravenous. Anesthetic plan and risks discussed with patient. Plan discussed with CRNA.                   Lynn Hoffman MD   3/26/2021

## 2021-03-26 NOTE — PLAN OF CARE
Problem: Skin Integrity:  Goal: Will show no infection signs and symptoms  Description: Will show no infection signs and symptoms  3/25/2021 2114 by Justo London RN  Outcome: Ongoing  3/25/2021 1430 by Gerhardt Dare, RN  Outcome: Ongoing  Goal: Absence of new skin breakdown  Description: Absence of new skin breakdown  3/25/2021 2114 by Justo London RN  Outcome: Ongoing  3/25/2021 1430 by Gerhardt Dare, RN  Outcome: Ongoing     Problem: Falls - Risk of:  Goal: Will remain free from falls  Description: Will remain free from falls  3/25/2021 2114 by Justo London RN  Outcome: Ongoing  3/25/2021 1430 by Gerhardt Dare, RN  Outcome: Ongoing  Goal: Absence of physical injury  Description: Absence of physical injury  3/25/2021 2114 by Justo London RN  Outcome: Ongoing  3/25/2021 1430 by Gerhardt Dare, RN  Outcome: Ongoing     Problem: Nutrition  Goal: Optimal nutrition therapy  3/25/2021 2114 by Justo London RN  Outcome: Ongoing  3/25/2021 1430 by Gerhardt Dare, RN  Outcome: Ongoing

## 2021-03-26 NOTE — PROGRESS NOTES
Comprehensive Nutrition Assessment    Type and Reason for Visit:  Reassess    Nutrition Recommendations/Plan:   1. Continue NPO   2. 4 Bolus Feeds of Jevity 1.5  (1.5 Calorie with Fiber) 300mls /day during waking a hours (EX 8a, 12p, 4p, 8p) with 225mls  water flush 4 x per day (may flush ~ 110 mls pre and post Jevity 1.5 bolus)  Provides : 1800 kcal; 76 gr Pro, 1800 mls of free water        Nutrition Assessment:    Pt improving from a nutritional standpoint AEB she has had a Peg tube placed today d/tprofound oropharyngeal dysphagia . Remains at risk for further nutritional compromise r/t she remains severely malnourished .   Will continue NPO and begin bolus feedings     Malnutrition Assessment:  Malnutrition Status:  Severe malnutrition    Context:  Acute Illness     Findings of the 6 clinical characteristics of malnutrition:  Energy Intake:  7 - 50% or less of estimated energy requirements for 5 or more days  Weight Loss:  (> 20% loss in 1year)     Body Fat Loss:  1 - Mild body fat loss Orbital   Muscle Mass Loss:  7 - Moderate muscle mass loss Temples (temporalis)  Fluid Accumulation:  Unable to assess     Strength:  Not Performed    Estimated Daily Nutrient Needs:  Energy (kcal):  4633-0780 based ~ 25-30 kcal/kg cbw; Weight Used for Energy Requirements:  Current     Protein (g):  68-82 based ~ 1-1.2 gr/kg cbw; Weight Used for Protein Requirements:  Current        Fluid (ml/day):  7010-2275; Method Used for Fluid Requirements:  1 ml/kcal      Nutrition Related Findings:  pt was awake and had returned room after having PEG tube placed; provided her with the name of the formula and the bolus feeding regime; TF will start in am      Wounds:  None       Current Nutrition Therapies:    Diet NPO Effective Now    Anthropometric Measures:  · Height: 5' 8\" (172.7 cm)  · Current Body Weight: 150 lb 0.4 oz (68.1 kg)   · Admission Body Weight: 150 lb (68 kg)    · Usual Body Weight: 164 lb (74.4 kg)     · Ideal Body Weight: 140 lbs; % Ideal Body Weight 107.2 %   · BMI: 22.8  · BMI Categories: Normal Weight (BMI 22.0 to 24.9) age over 72       Nutrition Diagnosis:   · Inadequate oral intake related to altered GI structure, inadequate protein-energy intake, swallowing difficulty as evidenced by NPO or clear liquid status due to medical condition, weight loss greater than or equal to 20% in 1 year, moderate muscle loss, mild loss of subcutaneous fat      Nutrition Interventions:   Food and/or Nutrient Delivery:  Continue NPO, Start Tube Feeding  Nutrition Education/Counseling:  No recommendation at this time   Coordination of Nutrition Care:  Continue to monitor while inpatient    Goals:  pt will tolerate the bolus enetral feedings w/o N/v/D and weight will remain stabel       Nutrition Monitoring and Evaluation:     Food/Nutrient Intake Outcomes:  Enteral Nutrition Intake/Tolerance  Physical Signs/Symptoms Outcomes:  Nausea or Vomiting, Diarrhea, Constipation, Weight, Nutrition Focused Physical Findings, Biochemical Data     Discharge Planning:     Too soon to determine     Electronically signed by Idalmis Graham RD, LD on 3/26/21 at 6:06 PM EDT    Contact: [phone]

## 2021-03-26 NOTE — OP NOTE
Percutaneous Endoscopic Gastrostomy Note    Patient:   Ronny Becerra   YOB: 1945   Facility:   [facility] [Inpatient]   Referring/PCP: Eric Jonas MD  Procedure:   Esophagogastroduodenoscopy with placement of a percutaneous endoscopic gastrostomy tube  Date:     3/26/2021   Endoscopist:  Srinath Angulo     Preoperative Diagnosis:  Feeding Difficulties  Postoperative Diagnosis:  Feeding Difficulties  Preprocedure medications: Two grams of Cefazolin were given. immediately prior to the procedure. Anesthesia: Propofol  Estimated blood loss: Estimated amount of blood loss is 1ml. Complications: None    Description of Procedure:  Informed consent was obtained from the patient after explanation of the procedure including indications, description of the procedure,  benefits and possible risks and complications of the procedure, and alternatives. Questions were answered. The patient's history was reviewed and a directed physical examination was performed prior to the procedure. Patient recieved prophylactic antibiotics immediately prior to the start of the procedure and was monitored throughout the procedure with pulse oximetry and periodic assessment of vital signs. Patient was sedated as noted above. The Nursing staff and I performed a time out. With the patient in supine position with the head of the bed slightly elevated, Olympus  flexible endoscope was introduced and passed without difficulty. Visualization of the esophagus, stomach, and duodenum was performed and retroflexed view of the proximal stomach was obtained. The scope was passed to the 2nd portion of the duodenum. Esophagus: normal.  Stomach: normal  Duodenum: normal  No contraindication to placement of the gastrostomy tube was appreciated. The site for placement of the gastrostomy tube was identified with palpation and transillumination of the abdomen.  The abdomen, having been prepared, was draped in

## 2021-03-26 NOTE — PROGRESS NOTES
Progress Note    Admit Date:  3/23/2021    66year old female presented with multiple complaints. She stated she had been feeling short of breath, dehydrated and weak. She also states that she has not been able to swallow over the last couple of days. She thinks her swallowing dysfunction is secondary to her neck and cervical spine issues that she has. A p.o. challenge resulted in coughing and concerns of possible aspiration. Decision was made to admit her for GI evaluation and possible speech evaluation for her dysphagia. CT chest was negative for any mass . she has significant dysphagia failed swallowing eval and needs PEG tube placement . GI has been informed    Subjective:  Ms. Karina Bowser seen. she is pleasant she is awake alert and oriented persistent dysphagia. I discussed with pt. Patient is agreeable for PEG tube placement    Plan is for PEG placement today . MRI of the brain pending. Objective:      Vitals:    03/25/21 1415 03/25/21 2029 03/26/21 0111 03/26/21 0800   BP: 119/60 (!) 150/76 (!) 151/72 (!) 142/71   Pulse: 69 67 66 63   Resp: 18 16 16 18   Temp: 97 °F (36.1 °C) 98.3 °F (36.8 °C) 96.6 °F (35.9 °C) 97 °F (36.1 °C)   TempSrc: Oral Oral Oral Oral   SpO2: 96% 95% 95% 96%   Weight:       Height:                Intake/Output Summary (Last 24 hours) at 3/26/2021 1120  Last data filed at 3/26/2021 0800  Gross per 24 hour   Intake 1670 ml   Output    Net 1670 ml       Physical Exam:  Gen: No distress. Alert. Awake, well oriented  Eyes: PERRL. No sclera icterus. No conjunctival injection. ENT: No discharge. Pharynx clear. Neck: No JVD. No Carotid Bruit. Trachea midline. Resp: No accessory muscle use. No crackles. No wheezes. No rhonchi. CV: Regular rate. Regular rhythm. No murmur. No rub. No edema. Capillary Refill: Brisk,< 3 seconds   Peripheral Pulses: +2 palpable, equal bilaterally   GI: Non-tender. Non-distended. Normal bowel sounds. No hernia. Skin: Warm and dry.  No nodule on exposed extremities. No rash on exposed extremities. M/S: No cyanosis. No joint deformity. No clubbing. Neuro: Awake. nonfocal   exam  Psych: Oriented x 3. No anxiety or agitation      Data:  CBC:   Recent Labs     03/24/21  0523 03/25/21  0934 03/26/21  0528   WBC 1.6* 7.8 3.4*   HGB 12.4 13.3 11.1*   HCT 36.7 39.1 32.3*   MCV 89.7 89.3 89.6   PLT 74* 106* 71*     BMP:   Recent Labs     03/24/21  0523 03/25/21  0934 03/26/21  0528    140 136   K 3.9 3.7 3.8    103 102   CO2 25 27 25   BUN 6* 7 8   CREATININE 0.6 0.6 0.7     LIVER PROFILE:   Recent Labs     03/23/21  1850   AST 36   ALT 15   BILITOT 1.8*   ALKPHOS 86     PT/INR: No results for input(s): PROTIME, INR in the last 72 hours. CULTURES  COVID: not detected    RADIOLOGY  FL MODIFIED BARIUM SWALLOW W VIDEO   Final Result   Swallowing mechanism grossly within normal limits. Deep penetration   concerning for potential aspiration. Please see separate speech pathology report for full discussion of findings   and recommendations. CT SOFT TISSUE NECK W CONTRAST   Preliminary Result   No neck mass or enlarged lymph node. CT CHEST W WO CONTRAST   Final Result   Negative chest CT. No evidence of aspiration or other acute pulmonary   process. No evidence lymphadenopathy. FL ESOPHAGRAM   Final Result   Very poorly coordinated swallowing suspected to be related to neurologic   cause, possibly prior stroke. The patient became extremely short of breath   when attempting to swallow the 13 mm barium tablet and could not swallow the   tablet. There is laryngeal penetration but no aspiration over the course of this   examination. Aspiration remains a consideration as the patient became   extremely short of breath when drinking water along with the barium tablet at   the beginning of the examination. Unremarkable esophagram otherwise. Consider consultation with speech pathology.          XR CHEST PORTABLE   Final Result   Mild central pulmonary congestion or pulmonary artery hypertension. Mild bibasilar atelectasis or early infiltrates which is more prominent. XR CHEST PORTABLE   Final Result   No acute cardiopulmonary disease. MRI BRAIN W WO CONTRAST    (Results Pending)       Echo 2/24/2020   Summary   There is hyperdynamic LV systolic function with an estimated ejection   fraction of >65%. Mild concentric left ventricular hypertrophy. No regional wall motion abnormalities are seen. Grade I diastolic dysfunction with normal filing pressure. There is a late peaking gradient recorded across the LV outflow tract   consistent with dynamic obstruction. LVOT pressure gradients are elevated at   rest at 8 mmHg and with valsalva of 36mmHg. Aortic valve appears sclerotic and appears to open well. The aortic valve area is calculated at 1.76 cm2 with max velocity of 2.5   m/sec, a maximum pressure gradient of 25 mmHg and a mean pressure gradient   of 13 mmHg. This is c/w mild aortic stenosis (pressure gradients could be   elevated due hyperdynamic LV). There is no evidence of aortic regurgitation. Assessment/Plan:  Dysphagia  - GI consulted  - SLP Eval  - Esophagram.  Failed swallow eval . Significant dysphagia , likely from prior  CVA   check MRI brain   needs PEG     Severe malnutrition  - place PEG today and start tube feeds in AM    Right supra-clavicular lymphadenopathy - ruled out   - Checked CT neck/chest , no mass    Shortness of breath  - resolved  - SpO2 stable on RA. Hypertension  - BP elevated. Holding while NPO  - Hydralazine prn. Hypokalemia  Hypomagnesemia  - replaced. Monitor labs. Leukopenia  Thrombocytopenia  - Hem/Onc consult. - leukopenic precautions. GERD  - on PPI at home. Holding while NPO.      DVT Prophylaxis: SCDs  Diet: Diet NPO Effective Now  Code Status: Full Code    PEG tube placement for today, continue IV fluids     Campbell Gates Reinaldo King MD  3/26/2021

## 2021-03-27 VITALS
HEIGHT: 68 IN | TEMPERATURE: 97.1 F | WEIGHT: 150.7 LBS | HEART RATE: 66 BPM | OXYGEN SATURATION: 96 % | BODY MASS INDEX: 22.84 KG/M2 | SYSTOLIC BLOOD PRESSURE: 161 MMHG | RESPIRATION RATE: 16 BRPM | DIASTOLIC BLOOD PRESSURE: 69 MMHG

## 2021-03-27 LAB
ANION GAP SERPL CALCULATED.3IONS-SCNC: 7 MMOL/L (ref 3–16)
BASOPHILS ABSOLUTE: 0 K/UL (ref 0–0.2)
BASOPHILS RELATIVE PERCENT: 1.5 %
BUN BLDV-MCNC: 5 MG/DL (ref 7–20)
CALCIUM SERPL-MCNC: 8.3 MG/DL (ref 8.3–10.6)
CHLORIDE BLD-SCNC: 102 MMOL/L (ref 99–110)
CO2: 27 MMOL/L (ref 21–32)
CREAT SERPL-MCNC: 0.6 MG/DL (ref 0.6–1.2)
EOSINOPHILS ABSOLUTE: 0.2 K/UL (ref 0–0.6)
EOSINOPHILS RELATIVE PERCENT: 7.7 %
GFR AFRICAN AMERICAN: >60
GFR NON-AFRICAN AMERICAN: >60
GLUCOSE BLD-MCNC: 106 MG/DL (ref 70–99)
GLUCOSE BLD-MCNC: 84 MG/DL (ref 70–99)
GLUCOSE BLD-MCNC: 93 MG/DL (ref 70–99)
GLUCOSE BLD-MCNC: 96 MG/DL (ref 70–99)
GLUCOSE BLD-MCNC: 98 MG/DL (ref 70–99)
HCT VFR BLD CALC: 32 % (ref 36–48)
HEMOGLOBIN: 11.1 G/DL (ref 12–16)
LYMPHOCYTES ABSOLUTE: 0.7 K/UL (ref 1–5.1)
LYMPHOCYTES RELATIVE PERCENT: 27.4 %
MCH RBC QN AUTO: 30.9 PG (ref 26–34)
MCHC RBC AUTO-ENTMCNC: 34.6 G/DL (ref 31–36)
MCV RBC AUTO: 89.4 FL (ref 80–100)
MONOCYTES ABSOLUTE: 0.2 K/UL (ref 0–1.3)
MONOCYTES RELATIVE PERCENT: 9.5 %
NEUTROPHILS ABSOLUTE: 1.3 K/UL (ref 1.7–7.7)
NEUTROPHILS RELATIVE PERCENT: 53.9 %
PDW BLD-RTO: 16.3 % (ref 12.4–15.4)
PERFORMED ON: NORMAL
PLATELET # BLD: 63 K/UL (ref 135–450)
PMV BLD AUTO: 9.3 FL (ref 5–10.5)
POTASSIUM REFLEX MAGNESIUM: 3.9 MMOL/L (ref 3.5–5.1)
RBC # BLD: 3.58 M/UL (ref 4–5.2)
SODIUM BLD-SCNC: 136 MMOL/L (ref 136–145)
WBC # BLD: 2.5 K/UL (ref 4–11)

## 2021-03-27 PROCEDURE — 85025 COMPLETE CBC W/AUTO DIFF WBC: CPT

## 2021-03-27 PROCEDURE — 2500000003 HC RX 250 WO HCPCS: Performed by: INTERNAL MEDICINE

## 2021-03-27 PROCEDURE — 6370000000 HC RX 637 (ALT 250 FOR IP): Performed by: INTERNAL MEDICINE

## 2021-03-27 PROCEDURE — 92526 ORAL FUNCTION THERAPY: CPT

## 2021-03-27 PROCEDURE — 36415 COLL VENOUS BLD VENIPUNCTURE: CPT

## 2021-03-27 PROCEDURE — 80048 BASIC METABOLIC PNL TOTAL CA: CPT

## 2021-03-27 PROCEDURE — 99238 HOSP IP/OBS DSCHRG MGMT 30/<: CPT | Performed by: INTERNAL MEDICINE

## 2021-03-27 RX ORDER — ACETAMINOPHEN 325 MG/1
325 TABLET ORAL EVERY 4 HOURS PRN
COMMUNITY
Start: 2021-03-27 | End: 2022-01-13

## 2021-03-27 RX ORDER — ASPIRIN 81 MG/1
81 TABLET, CHEWABLE ORAL DAILY
COMMUNITY
Start: 2021-03-27 | End: 2022-01-13

## 2021-03-27 RX ORDER — CLONIDINE 0.2 MG/24H
1 PATCH, EXTENDED RELEASE TRANSDERMAL WEEKLY
Qty: 4 PATCH | Refills: 1 | Status: SHIPPED | OUTPATIENT
Start: 2021-04-02 | End: 2021-07-14

## 2021-03-27 RX ADMIN — ACETAMINOPHEN 650 MG: 325 TABLET ORAL at 16:42

## 2021-03-27 RX ADMIN — POTASSIUM CHLORIDE, DEXTROSE MONOHYDRATE AND SODIUM CHLORIDE: 150; 5; 450 INJECTION, SOLUTION INTRAVENOUS at 01:00

## 2021-03-27 NOTE — DISCHARGE SUMMARY
Name:  Juliette Jay  Room:  1521/5811-26  MRN:    [id number]    Discharge Summary      This discharge summary is in conjunction with a complete physical exam done on the day of discharge. Attending Physician: Dr. Rivas Griffith  Discharging Physician: Dr. Shahram Carr: 3/23/2021  Discharge:   3/27/2021    HPI:  66 y.o. female who presented to the emergency department with multiple complaints. She stated she had been feeling short of breath, dehydrated and weak. She also states that she has not been able to swallow over the last couple of days. She thinks her swallowing dysfunction is secondary to her neck and cervical spine issues that she has. She went to her chiropractor who worked on her neck earlier in the day. She however has not been able to swallow or eat anything. She has had this issue in the past and has had esophageal stretching. In the ER she was not hypoxic on room air and did not look volume overloaded or in COPD exacerbation. A p.o. challenge resulted in coughing and concerns of possible aspiration. Decision was made to admit her for GI evaluation and possible speech evaluation for her dysphagia. Diagnoses this Admission and Hospital Course       Dysphagia  - GI consulted  - SLP Eval  - Esophagram.  Failed swallow eval . Significant dysphagia , likely from prior  CVA   checked MRI brain. Results below. PEG Tube placed. D/c home with Tube feedings. NPO .   4 Bolus Feeds of Jevity 1.5  (1.5 Calorie with Fiber) 300mls 4 times aday during waking a hours ( 8a, 12p, 4p, 8p) with 225mls  water flush 4 x per day (may flush ~ 110 mls pre and post Jevity 1.5 bolus)    Provides : 1800 kcal; 76 gr Pro, 1800 mls of free water. Severe malnutrition  - placed PEG and started tube feeds. Tolerated well.     length of need for tube feeds is greater than 90 days .     Right supra-clavicular lymphadenopathy - ruled out   - Checked CT neck/chest , no mass     Shortness of breath  - resolved  - SpO2 stable on RA.      Hypertension  - BP elevated. Held while NPO  - Hydralazine prn.      Hypokalemia  Hypomagnesemia  - replaced. Monitored labs.      Leukopenia  Thrombocytopenia  - Hem/Onc consulted. - leukopenic precautions. - suspect from cirrhosis  - improved.      GERD  - on PPI at home. Held while NPO.      DVT Prophylaxis: SCDs    Procedures (Please Review Full Report for Details)  EGD with placement of PEG  Modified barium swallow    Consults    GI  Hem/Onc      Physical Exam at Discharge:    BP (!) 154/69   Pulse 69   Temp 97.1 °F (36.2 °C) (Oral)   Resp 16   Ht 5' 8\" (1.727 m)   Wt 150 lb 11.2 oz (68.4 kg)   SpO2 95%   BMI 22.91 kg/m²   Gen: No distress. Alert. Awake, well oriented  Eyes: PERRL. No sclera icterus. No conjunctival injection. ENT: No discharge. Pharynx clear. Neck: No JVD.  No Carotid Bruit. Trachea midline. Resp: No accessory muscle use. No crackles. No wheezes. No rhonchi. CV: Regular rate. Regular rhythm. No murmur. No rub. No edema. Capillary Refill: Brisk,< 3 seconds   Peripheral Pulses: +2 palpable, equal bilaterally   GI: Non-tender. Non-distended. Normal bowel sounds. No hernia. PEG +  Skin: Warm and dry. No nodule on exposed extremities. No rash on exposed extremities. M/S: No cyanosis. No joint deformity. No clubbing. Neuro: Awake. nonfocal   exam  Psych: Oriented x 3.  No anxiety or agitation      CBC:   Recent Labs     03/25/21  0934 03/26/21 0528 03/27/21 0518   WBC 7.8 3.4* 2.5*   HGB 13.3 11.1* 11.1*   HCT 39.1 32.3* 32.0*   MCV 89.3 89.6 89.4   * 71* 63*     BMP:   Recent Labs     03/25/21  0934 03/26/21 0528 03/27/21 0518    136 136   K 3.7 3.8 3.9    102 102   CO2 27 25 27   BUN 7 8 5*   CREATININE 0.6 0.7 0.6       U/A:    Lab Results   Component Value Date    COLORU Yellow 03/23/2021    WBCUA 0-2 03/23/2021    RBCUA 0-2 03/23/2021    BACTERIA 2+ 04/29/2020    CLARITYU Clear 03/23/2021    SPECGRAV 1.010 03/23/2021 LEUKOCYTESUR Negative 03/23/2021    BLOODU TRACE-INTACT 03/23/2021    GLUCOSEU Negative 03/23/2021       CULTURES  COVID: not detected    RADIOLOGY  MRI BRAIN W WO CONTRAST   Final Result   Mild chronic small vessel ischemic changes. No areas of restricted diffusion   to suggest an acute ischemic event. No acute brain parenchymal abnormality. High signal in the basal ganglia bilaterally which sometimes can be seen with   liver disease or chronic TPN. Mild mucoperiosteal thickening in the ethmoid air cells and mastoid air cells   bilaterally. FL MODIFIED BARIUM SWALLOW W VIDEO   Final Result   Swallowing mechanism grossly within normal limits. Deep penetration   concerning for potential aspiration. Please see separate speech pathology report for full discussion of findings   and recommendations. CT SOFT TISSUE NECK W CONTRAST   Final Result   No neck mass or enlarged lymph node. CT CHEST W WO CONTRAST   Final Result   Negative chest CT. No evidence of aspiration or other acute pulmonary   process. No evidence lymphadenopathy. FL ESOPHAGRAM   Final Result   Very poorly coordinated swallowing suspected to be related to neurologic   cause, possibly prior stroke. The patient became extremely short of breath   when attempting to swallow the 13 mm barium tablet and could not swallow the   tablet. There is laryngeal penetration but no aspiration over the course of this   examination. Aspiration remains a consideration as the patient became   extremely short of breath when drinking water along with the barium tablet at   the beginning of the examination. Unremarkable esophagram otherwise. Consider consultation with speech pathology. XR CHEST PORTABLE   Final Result   Mild central pulmonary congestion or pulmonary artery hypertension. Mild bibasilar atelectasis or early infiltrates which is more prominent.          XR CHEST PORTABLE   Final Result   No acute cardiopulmonary disease. Echo 2/24/2020   Summary   There is hyperdynamic LV systolic function with an estimated ejection   fraction of >65%.  Mild concentric left ventricular hypertrophy.   No regional wall motion abnormalities are seen.   Grade I diastolic dysfunction with normal filing pressure.   There is a late peaking gradient recorded across the LV outflow tract   consistent with dynamic obstruction. LVOT pressure gradients are elevated at   rest at 8 mmHg and with valsalva of 36mmHg.   Aortic valve appears sclerotic and appears to open well.   The aortic valve area is calculated at 1.76 cm2 with max velocity of 2.5   m/sec, a maximum pressure gradient of 25 mmHg and a mean pressure gradient   of 13 mmHg. This is c/w mild aortic stenosis (pressure gradients could be   elevated due hyperdynamic LV). Otilia Vargasander is no evidence of aortic regurgitation.       Discharge Medications     Medication List      START taking these medications    aspirin 81 MG chewable tablet  Commonly known as: Aspirin Childrens  1 tablet by PEG Tube route daily  Replaces: aspirin EC 81 MG EC tablet     cloNIDine 0.2 MG/24HR Ptwk  Commonly known as: CATAPRES  Place 1 patch onto the skin once a week  Start taking on: April 2, 2021     omeprazole 2 MG/ML Susp 2 mg/mL oral suspension  Commonly known as: PRILOSEC  10 mLs by Per G Tube route Daily        CHANGE how you take these medications    Tylenol 325 MG tablet  Generic drug: acetaminophen  What changed:   · medication strength  · how much to take  · how to take this  · when to take this  · reasons to take this        CONTINUE taking these medications    * albuterol 0.63 MG/3ML nebulizer solution  Commonly known as: ACCUNEB     * albuterol sulfate  (90 Base) MCG/ACT inhaler  Inhale 2 puffs into the lungs 4 times daily as needed for Wheezing     Spacer/Aero-Holding Erven Glance  1 Device by Does not apply route daily as needed (Shortness of breath) * This list has 2 medication(s) that are the same as other medications prescribed for you. Read the directions carefully, and ask your doctor or other care provider to review them with you. STOP taking these medications    amLODIPine 5 MG tablet  Commonly known as: NORVASC     aspirin EC 81 MG EC tablet  Replaced by: aspirin 81 MG chewable tablet     furosemide 40 MG tablet  Commonly known as: LASIX     pantoprazole 20 MG tablet  Commonly known as: PROTONIX           Where to Get Your Medications      You can get these medications from any pharmacy    Bring a paper prescription for each of these medications  · cloNIDine 0.2 MG/24HR Ptwk  · omeprazole 2 MG/ML Susp 2 mg/mL oral suspension  You don't need a prescription for these medications  · aspirin 81 MG chewable tablet           Discharged in stable condition to home. Follow Up: Follow up with PCP.      Melissa Maxwell MD   3/27/2021

## 2021-03-27 NOTE — DISCHARGE INSTR - COC
Continuity of Care Form    Patient Name: Paul Ríos   :  1945  MRN:  [id number]    Admit date:  3/23/2021  Discharge date:  3/27/21    Code Status Order: Full Code   Advance Directives:   Advance Care Flowsheet Documentation       Date/Time Healthcare Directive Type of Healthcare Directive Copy in [address] Agent's Name Healthcare Agent's Phone Number    21 1401  No, patient does not have an advance directive for healthcare treatment -- -- -- -- --    21 0256  No, patient does not have an advance directive for healthcare treatment -- -- -- -- --            Admitting Physician:  Arabella Adams MD  PCP: Emilia Desir MD    Discharging Nurse: [address] [place] [address] Unit/Room#: 6789/4092-37  Discharging Unit Phone Number: [phone]    Emergency Contact:   Extended Emergency Contact Information  Primary Emergency Contact: Nathan Rosales  Address: [address] [place] [address] Phone: [phone]  Work Phone: [phone]  Relation: Child  Secondary Emergency Contact: [address] Phone: [phone]  Relation: Child    Past Surgical History:  Past Surgical History:   Procedure Laterality Date    ACHILLES TENDON SURGERY      CARPAL TUNNEL RELEASE      CATARACT REMOVAL WITH IMPLANT Left 2017    Left cataract removal. Dr. Quan Bass Right 2017    PHACO EMULSIFICATION OF CATARACT WITH INTRAOCULAR LENS IMPLANT RIGHT EYE    COLONOSCOPY  3/24/10    FOOT SURGERY      GALLBLADDER SURGERY      HYSTERECTOMY      NECK SURGERY      PAIN MANAGEMENT PROCEDURE N/A 2020    MIDLINE LUMBAR FIVE SACRAL ONE EPIDURAL STEROID INJECTION SITE CONFIRMED BY FLUOROSCOPY performed by Ezra Lemus MD at [facility] N/A 2020    MIDLINE LUMBAR FIVE SACRAL ONE EPIDURAL STEROID INJECTION SITE CONFIRMED BY FLUOROSCOPY performed by Yamilet Kraus Name:  · Address:  · Dialysis Schedule:  · Phone:  · Fax:    / signature: Electronically signed by Stacey Garrido RN on 3/27/21 at 3:16 PM EDT    PHYSICIAN SECTION    Prognosis: Good    Condition at Discharge: Stable    Rehab Potential (if transferring to Rehab): Good    Recommended Labs or Other Treatments After Discharge:  speech therapy     Physician Certification: I certify the above information and transfer of Antonio Villar  is necessary for the continuing treatment of the diagnosis listed and that she requires Home Care for less 30 days.      Update Admission H&P: No change in H&P    PHYSICIAN SIGNATURE:  Electronically signed by Chelsi Posada MD on 3/27/21 at 12:17 PM EDT

## 2021-03-27 NOTE — PROGRESS NOTES
Gianna Villavicencio is a 66 y.o. female patient. Current Facility-Administered Medications   Medication Dose Route Frequency Provider Last Rate Last Admin    cloNIDine (CATAPRES) 0.2 MG/24HR 1 patch  1 patch Transdermal Weekly Marcelo Callahan MD   1 patch at 03/26/21 1828    prochlorperazine (COMPAZINE) injection 10 mg  10 mg Intravenous Q6H PRN Marcelo Callahan MD        sodium chloride flush 0.9 % injection 10 mL  10 mL Intravenous 2 times per day Marcelo Callahan MD   10 mL at 03/26/21 1958    sodium chloride flush 0.9 % injection 10 mL  10 mL Intravenous PRN Marcelo Callahan MD        polyethylene glycol (GLYCOLAX) packet 17 g  17 g Oral Daily PRN Marcelo Callahan MD        acetaminophen (TYLENOL) tablet 650 mg  650 mg Oral Q6H PRN Marcelo Callahan MD        Or    acetaminophen (TYLENOL) suppository 650 mg  650 mg Rectal Q6H PRN Marcelo Callahan MD        potassium chloride (KLOR-CON M) extended release tablet 40 mEq  40 mEq Oral PRN Marcelo Callahan MD        Or    potassium bicarb-citric acid (EFFER-K) effervescent tablet 40 mEq  40 mEq Oral PRN Marcelo Callahan MD        Or    potassium chloride 10 mEq/100 mL IVPB (Peripheral Line)  10 mEq Intravenous PRN Marcelo Callahan  mL/hr at 03/24/21 0345 10 mEq at 03/24/21 0345    hydrALAZINE (APRESOLINE) injection 10 mg  10 mg Intravenous Q6H PRN Jason Staff, APRN - CNP   10 mg at 03/25/21 0450     Allergies   Allergen Reactions    Streptomycin     Sulfa Antibiotics Other (See Comments)     States change in mental status    Tylenol With Codeine #3 [Acetaminophen-Codeine] Other (See Comments)     Per pt, made pt \"immobile. \" Can tolerate cough syrup w/ codeine    Verapamil Rash     Active Problems:    Dysphagia    Supraclavicular lymphadenopathy    Leukopenia    Severe protein-calorie malnutrition (HCC)    Hypokalemia    Hypomagnesemia  Resolved Problems:    * No resolved hospital problems.  *    Blood pressure 135/69, pulse 69, temperature 97.8 °F (36.6 °C), temperature source Oral, resp. rate 16, height 5' 8\" (1.727 m), weight 150 lb 11.2 oz (68.4 kg), SpO2 93 %. Subjective:  Symptoms:  Stable. Pain:  She reports no pain. Objective:  General Appearance: In no acute distress and not in pain. Vital signs: (most recent): Blood pressure 135/69, pulse 69, temperature 97.8 °F (36.6 °C), temperature source Oral, resp. rate 16, height 5' 8\" (1.727 m), weight 150 lb 11.2 oz (68.4 kg), SpO2 93 %. Abdomen: Abdomen is soft. Bowel sounds are normal.   There is no abdominal tenderness. Assessment & Plan  74 yo w CHF, HTN, GERD, cirrhosis on CT w neg. W/U and dysphagia/dysphonia since CVA s/p MBS in 3/2020, admitted w worsening oropharyngeal dysphagia requiring PEG placement on 3/26. The PEG site is clear.   Last colonoscopy was 7/2020.      Plan:   1. OK to start using the PEG today w education on use and maintenance and OK to D/C home  2. Needs PEG site cleaning and dressing change bid for 2 days  3.  Will follow in office        Marianna Johnson MD       ([phone]) [phone]     Marianna Johnson MD  3/27/2021

## 2021-03-27 NOTE — CARE COORDINATION
DISCHARGE ORDER  Date/Time 3/27/2021 3:07 PM  Completed by: Stacey Garrido, Case Management    Patient Name: Antonio Villar      : 1945  Admitting Diagnosis: Dysphagia [R13.10]      Admit order Date and Status: INPT 3/23/21  (verify MD's last order for status of admission)      Noted discharge order. If applicable PT/OT recommendation at Discharge: n/a  DME recommendation by PT/OT:n/a  Confirmed discharge plan  (pt/dtr): Yes  with whom__pt and dtr_____________  If pt confirmed DC plan does family need to be contacted by CM Yes if yes who__pt and dtr. ____  Discharge Plan: pt and pt dtr cont to plan for pt to return home at discharge with [facility] to follow for SN and [facility] for home tube feeding. CM spoke with Mirian Stewart with [facility] who states that tube feed/supplies order has been rec'd and pt is covered at 100% for home tubefeed/supplies and that they will do a delivery today. DIOR spoke with Jason Keene with [facility] who states that  he will retrieve all documentation necessary for hhc services from [address] [place] [address] Rd. Pt dtr, Gal Ortiz, present and will provide transport home. Bedside teaching for home tubefeed completed by bedside nurse. Pt dtr states that they do not percieve any difficulties with caring for feeding tube/feedings at home. Reviewed chart. Role of discharge planner explained and patient verbalized understanding. Discharge order is noted. Has Home O2 in place on admit:  No  Informed of need to bring portable home O2 tank on day of discharge for nursing to connect prior to leaving:   Not Indicated  Verbalized agreement/Understanding:   Not Indicated  Pt is being d/c'd to home today. Pt's O2 sats are 96% on roomair. Discharge timeout done with [facility]. All discharge needs and concerns addressed.
INTERDISCIPLINARY PLAN OF CARE CONFERENCE    Date/Time: 3/26/2021 11:21 AM  Completed by: Cele Schaefer, Case Management      Patient Name:  Matilda Kirkland  YOB: 1945  Admitting Diagnosis: Dysphagia [R13.10]     Admit Date/Time:  3/23/2021  6:10 PM    Chart reviewed. Interdisciplinary team contacted or reviewed plan related to patient progress and discharge plans. Disciplines included Case Management, Nursing, and Dietitian. Current Status: Stable  PT/OT recommendation for discharge plan of care:  N/A    Expected D/C Disposition:  Home  Confirmed plan with patient and/or family Yes confirmed with: (name) Son  Met with: patient and Son  Discharge Plan Comments:  Reviewed chart and noted pt will have PEG tube placed later today and possibly dc later. Pt and son agreeable for HHC and home tube feeds. Choose [facility] and [facility] for agencies. Spoke with Jorge [facility] at [facility] who will follow and arrange for HHC/home tube feed needs and check benefits.       Home O2 in place on admit: No  Pt informed of need to bring portable home O2 tank on day of discharge for nursing to connect prior to leaving:  Not Indicated  Verbalized agreement/Understanding:  Not Indicated
Dialysis Schedule:  · Phone:   · Fax: Other Community Services: (ex:PT/OT,Mental Health,Wound Clinic, Cardio/Pul [address]) None    DISCHARGE PLAN: Explained Case Management role/services. Reviewed chart and spoke with pt at bedside. Role of CM explained. Dillon lives home alone but has very supportive family who can assist as needed. States lives in ground floor apt and has no steps. Denies any in home services currently. Anticipate no needs but will follow and arrange [facility] if needed.

## 2021-03-27 NOTE — PROGRESS NOTES
Speech Language Pathology  Facility/Department: [facility]  Dysphagia Daily Treatment Note    NAME: Mykel Nunn  : 1945  MRN: [id number]    Patient Diagnosis(es):   Patient Active Problem List    Diagnosis Date Noted    Hypokalemia     Hypomagnesemia     Severe protein-calorie malnutrition ([address] 2021    Supraclavicular lymphadenopathy     Leukopenia     Dysphagia 2021    TIA involving left internal carotid artery     Facial droop     Suspected cerebrovascular accident (CVA) 2020    LOU (acute kidney injury) ([address] 2020    Cirrhosis, nonalcoholic ([address]     Exertional dyspnea     Diastolic congestive heart failure ([address] 2018    Mild aortic stenosis 2018    SOB (shortness of breath) on exertion 2018    Right wrist sprain, initial encounter 2018    Arthritis of carpometacarpal [place]) joint of right thumb 2018    Primary osteoarthritis of right knee 2016    Generalized anxiety disorder 10/03/2011    Acid reflux 10/03/2011    Osteoarthritis hips, knees, back 10/03/2011    HTN (hypertension) 2011     Allergies: Allergies   Allergen Reactions    Streptomycin     Sulfa Antibiotics Other (See Comments)     States change in mental status    Tylenol With Codeine #3 [Acetaminophen-Codeine] Other (See Comments)     Per pt, made pt \"immobile. \" Can tolerate cough syrup w/ codeine    Verapamil Rash     Onset Date: 2021  Subjective: Pt seen in room with RN permission. Alert and cooperative. She is sleeping upon entry but awakens easily with pleasant demeanor. Pt's daughter was present during the session. Pain: The patient does not complain of pain     Current Diet: Diet NPO - PEG tube. DIET TUBE FEEDING BOLUS NPO. Diet Tolerance:   The patient is NPO      Dysphagia Treatment and Impressions:   Pt seen in room at bedside with RN permission   The patient has PEG tube placed. She continues to endorse weakness and fatigue that is now her baseline.  PRN SLP questions neurological component given progressive decline and fatigue noted with the patient. Pt seemed awake, alert, and her speech was clear upon ST arrival but shortly after speaking (>5 min) pt's speech became dysarthric and her facial muscles seemed to droop. Pt often closed her mouth or lifted her head with her hands.  Pt required multiple attempts to swallow on command, however, she benefited from cues. Pt indep manually closed her mouth d/t oral weakness and fatigue. It should also be noted pt's eye lids were constantly drooping, despite pt's efforts to keep them open.  PO trials of ice chips x5 with 2/5 resulting in slightly delayed weak coughing. Taught the effortful swallow to attempt to strengthen pharyngeal muscles. Pt performed an effortful swallow x8 indep.  If pt is to d/c from the hospital this week, ST recommends home therapy.  ST informed the nurse via phone recommending a neuro consult and she stated she would inform the hospitalist.     Continue to suspect neurological pathology given affected CN's, progressive nature of symptoms including voice and dysarthria, progressive nature of fatigue, and inability to hold neck upright. All findings and concerns were discussed with Marifer Jain NP.      Pt is s/p MBSS 03/25 with the following results:     Given deficits listed above, suspect involvement of the following cranial nerves:  Cranial nerve IX- glossopharyngeal: palatal elevation and lingual movement  Cranial nerve X- vagus: dysphagia symptoms, voice sx's (hoarse, dysphonic, breathy, progressively worsens over short period of time, I.e. minutes)  Cranial nerve XII- hypoglossal: dysarthric speech that is progressive in nature     Given likely involvement of above CN's, cannot rule out neurological or neuromuscular etiology     Assessment: profound oropharyngeal dysphagia, likely acute-on-chronic (?) related to suspected unknown neurological or neuromuscular pathology. Swallow safety is impaired; swallow efficiency is impaired. Pt appears to be at high risk for aspiration PNA, and high risk for malnutrition/dehydration. Non-oral nutrition is indicated. Swallow prognosis is guarded given severity of dysphagia and progressive nature of fatigue. Unable to make appropriate recommendations or treatment plan without further testing     Diet Recommendation: NPO with consideration of alt means of nutrition given current knowledge. Unable to make appropriate recommendations or treatment plan without further testing  Risk Management: OK for ice chips PRN, Oral care q4 hrs, head imaging  Specialist Referrals: Aissatou  Ancillary Tests: Additional head imaging  Goals: Tolerate PO  Follow-up exam: Will cont to follow    Dysphagia Goals:  Timeframe for Long-term Goals: 7 days (03/31/2021)  Goal 1: The patient will tolerate LRD with no clinical s/s of aspiration or worsening respiratory status  Goal ongoing     Short-term Goals  Timeframe for Short-term Goals: 5 days (03/29/2021)  Goal 1: The patient will tolerate FEES for further assessment of orpharyngeal swallow  03/26: Goal not met as once scope was passed to BOT equipment malfunction occurred and video imagery was unavailable. MBSS completed as alternate assessment with results as above. Grossly unable to develop treatment plan until further testing and/or imaging is completed. This is secondary to contra-indication of laryngopharyngeal/oral exercises with pathologies such as myasthenia gravis, MS, etc. but need for these exercises with disorders such as CVA. Will continue to monitor with general goal as below:    Goal 2: The patient will tolerate ice chip trials with no clinical s/s of aspiration  5/5 for optimal comfort    Will update POC as able. Speech/Language/Cog Goals:  Will assess dysarthria once further testing is complete

## 2021-03-27 NOTE — PLAN OF CARE
Problem: Skin Integrity:  Goal: Will show no infection signs and symptoms  Description: Will show no infection signs and symptoms  Outcome: Ongoing     Problem: Falls - Risk of:  Goal: Will remain free from falls  Description: Will remain free from falls  Outcome: Ongoing     Problem: Nutrition  Goal: Optimal nutrition therapy  Outcome: Ongoing

## 2021-03-28 NOTE — PROGRESS NOTES
Pt c/o abdominal cramping, medicated with tylenol via g tube, pt tolerated well. Juanpablo Becker RN\

## 2021-03-28 NOTE — PROGRESS NOTES
Am assessment completed. See flowsheet/ pt denies needs at this time. Pt's daughter at bedside, Call light within reach. Lucila Smyth RN\

## 2021-03-28 NOTE — PROGRESS NOTES
Writer instructed pt and her daughter on tube feeding and medication administration. No residual noted at this time. 110ml water flush administered,  After administering approx 100ml jevity, Pt's stated \"my stomach is starting to cramp\" writer then flushed with approx 50ml. Writer educated  pt and her daughter that instead  of 4 times daily that could try small amounts more frequently. Understanding was verbalized. Pt denies needs at this time. Call light within reach. Marvin York RN\

## 2021-03-28 NOTE — PROGRESS NOTES
prescriptions and discharge instructions given, pt and her daughter verbalized understanding, denies questions/ needs at this time. Pt transported via wheelchair to vehicle for discharge home. Marga Palmer RN\

## 2021-03-28 NOTE — ANESTHESIA POSTPROCEDURE EVALUATION
06/16/2020 08:38 AM        INR                      1.26 (H)            06/16/2020 08:38 AM        APTT                     35.9                04/18/2020 03:10 PM     Intake & Output:  No intake/output data recorded. Nausea & Vomiting:  No    Level of Consciousness:  Awake    Pain Assessment:  Adequate analgesia    Anesthesia Complications:  No apparent anesthetic complications    SUMMARY      Vital signs stable  OK to discharge from Stage I post anesthesia care.   Care transferred from Anesthesiology department on discharge from perioperative area

## 2021-03-29 ENCOUNTER — CARE COORDINATION (OUTPATIENT)
Dept: CASE MANAGEMENT | Age: 76
End: 2021-03-29

## 2021-03-29 NOTE — CARE COORDINATION
Patient contacted regarding Rebecca Noriega. Call within 2 business days of discharge: Yes  Discharge Date: 3/27/21   RARS: Readmission Risk Score: 13       Discussed COVID-19 related testing which was available at this time. COVID-19 Not Detected on 3/24/2021. Patient informed of results, if available? Yes  Patient completed COVID-19 two-step vaccination on 3/9/2021. Patient has following risk factors of: heart failure, asthma and HTN. Care Transition Nurse contacted the patient by telephone to perform post discharge assessment. Verified name and  with patient as identifiers. Provided introduction to self, and explanation of the CTN role, and reason for call due to risk factors for infection and/or exposure to COVID-19. Symptoms reviewed with patient who verbalized the following symptoms: no new symptoms and no worsening symptoms. Due to no new or worsening symptoms encounter was not routed to provider for escalation. CTN reviewed discharge instructions, medical action plan and red flags such as increased shortness of breath, increasing fever and signs of decompensation with patient who verbalized understanding. Reviewed and educated patient on any new and changed medications related to discharge diagnosis. Was patient discharged with a pulse oximeter? No     Discussed exposure protocols and quarantine with CDC Guidelines What to do if you are sick with coronavirus disease .  Patient was given an opportunity for questions and concerns. The patient can contact the [facility] exposure line [phone], [facility]: ([phone]) and/or the PCP office for questions related to their healthcare. Advance Care Planning:   Does patient have an Advance Directive:  decision maker updated. Discussed follow-up appointments.  If no appointment was previously scheduled, appointment scheduling offered: Yes  [address] Dr follow up appointment(s): Future Appointments   Date Time Provider Victoriano Newby   4/2/2021  8:30 AM [facility] MRI RM 1 [facility] MRI [facility]   5/6/2021 12:45 PM MD Teddy Doll [facility]     Non-[facility] follow up appointment(s): PCP 4/1/2021    Non-face-to-face services provided:  Obtained and reviewed discharge summary and/or continuity of care documents    Breathing is improved now that she is not taking PO. [facility] SOC and she is tolerating TF without issues. Knows how to reach them for prn concerns. Waiting for liquid omeprazole order to arrive to pharmacy and will start when it comes in. Denies needs/concerns. CTN provided contact information for future needs. No further CTN outreach scheduled at this time. Patient has CTN contact info for prn needs.      Jet Cortes RN  Care Transitions Nurse  [phone] mobile

## 2021-04-02 ENCOUNTER — HOSPITAL ENCOUNTER (OUTPATIENT)
Age: 76
Discharge: HOME | End: 2021-04-02
Payer: MEDICARE

## 2021-04-02 ENCOUNTER — HOSPITAL ENCOUNTER (OUTPATIENT)
Age: 76
End: 2021-04-02
Payer: MEDICARE

## 2021-04-02 ENCOUNTER — HOSPITAL ENCOUNTER (OUTPATIENT)
Dept: GENERAL RADIOLOGY | Age: 76
Discharge: HOME | End: 2021-04-02
Payer: MEDICARE

## 2021-04-02 ENCOUNTER — HOSPITAL ENCOUNTER (OUTPATIENT)
Dept: MRI IMAGING | Age: 76
Discharge: HOME | End: 2021-04-02
Payer: MEDICARE

## 2021-04-02 DIAGNOSIS — M54.2 NECK PAIN: ICD-10-CM

## 2021-04-02 DIAGNOSIS — M54.12 CERVICAL RADICULITIS: ICD-10-CM

## 2021-04-02 PROCEDURE — 72141 MRI NECK SPINE W/O DYE: CPT

## 2021-04-02 PROCEDURE — 72040 X-RAY EXAM NECK SPINE 2-3 VW: CPT

## 2021-04-10 ENCOUNTER — HOSPITAL ENCOUNTER (OUTPATIENT)
Dept: CT IMAGING | Age: 76
Discharge: HOME | End: 2021-04-10
Payer: MEDICARE

## 2021-04-10 DIAGNOSIS — M54.2 NECK PAIN: ICD-10-CM

## 2021-04-10 DIAGNOSIS — M54.12 CERVICAL RADICULOPATHY: ICD-10-CM

## 2021-04-10 PROCEDURE — 72125 CT NECK SPINE W/O DYE: CPT

## 2021-04-29 ENCOUNTER — OFFICE VISIT (OUTPATIENT)
Dept: INTERNAL MEDICINE CLINIC | Age: 76
End: 2021-04-29

## 2021-04-29 VITALS
WEIGHT: 152 LBS | SYSTOLIC BLOOD PRESSURE: 130 MMHG | HEIGHT: 68 IN | TEMPERATURE: 98.6 F | DIASTOLIC BLOOD PRESSURE: 64 MMHG | BODY MASS INDEX: 23.04 KG/M2 | HEART RATE: 68 BPM

## 2021-04-29 DIAGNOSIS — K74.60 CIRRHOSIS, NONALCOHOLIC (HCC): ICD-10-CM

## 2021-04-29 DIAGNOSIS — F41.1 GENERALIZED ANXIETY DISORDER: ICD-10-CM

## 2021-04-29 DIAGNOSIS — I35.0 MILD AORTIC STENOSIS: Chronic | ICD-10-CM

## 2021-04-29 DIAGNOSIS — K21.9 GASTROESOPHAGEAL REFLUX DISEASE WITHOUT ESOPHAGITIS: ICD-10-CM

## 2021-04-29 DIAGNOSIS — E43 SEVERE PROTEIN-CALORIE MALNUTRITION (HCC): ICD-10-CM

## 2021-04-29 DIAGNOSIS — I10 ESSENTIAL HYPERTENSION: Primary | ICD-10-CM

## 2021-04-29 DIAGNOSIS — M50.30 DDD (DEGENERATIVE DISC DISEASE), CERVICAL: ICD-10-CM

## 2021-04-29 PROBLEM — N17.9 AKI (ACUTE KIDNEY INJURY) (HCC): Status: RESOLVED | Noted: 2020-02-29 | Resolved: 2021-04-29

## 2021-04-29 PROCEDURE — 1036F TOBACCO NON-USER: CPT | Performed by: INTERNAL MEDICINE

## 2021-04-29 PROCEDURE — 1090F PRES/ABSN URINE INCON ASSESS: CPT | Performed by: INTERNAL MEDICINE

## 2021-04-29 PROCEDURE — G8427 DOCREV CUR MEDS BY ELIG CLIN: HCPCS | Performed by: INTERNAL MEDICINE

## 2021-04-29 PROCEDURE — G8400 PT W/DXA NO RESULTS DOC: HCPCS | Performed by: INTERNAL MEDICINE

## 2021-04-29 PROCEDURE — G8420 CALC BMI NORM PARAMETERS: HCPCS | Performed by: INTERNAL MEDICINE

## 2021-04-29 PROCEDURE — 99215 OFFICE O/P EST HI 40 MIN: CPT | Performed by: INTERNAL MEDICINE

## 2021-04-29 PROCEDURE — 4040F PNEUMOC VAC/ADMIN/RCVD: CPT | Performed by: INTERNAL MEDICINE

## 2021-04-29 PROCEDURE — 1123F ACP DISCUSS/DSCN MKR DOCD: CPT | Performed by: INTERNAL MEDICINE

## 2021-04-29 RX ORDER — PANTOPRAZOLE SODIUM 20 MG/1
20 TABLET, DELAYED RELEASE ORAL DAILY
COMMUNITY
End: 2022-03-22

## 2021-04-29 RX ORDER — CITALOPRAM 20 MG/1
20 TABLET ORAL DAILY
COMMUNITY
End: 2022-10-06 | Stop reason: SDUPTHER

## 2021-04-29 ASSESSMENT — ENCOUNTER SYMPTOMS
ABDOMINAL PAIN: 0
NAUSEA: 0
VOMITING: 0
WHEEZING: 0
DIARRHEA: 0
SHORTNESS OF BREATH: 0
COUGH: 0
BLOOD IN STOOL: 0
CHEST TIGHTNESS: 0

## 2021-04-29 NOTE — PROGRESS NOTES
Tracie Castillo (:  1945) is a 66 y.o. female,New patient, here for evaluation of the following chief complaint(s):  Establish Care      ASSESSMENT/PLAN:  1. Essential hypertension  2. Mild aortic stenosis  3. Cirrhosis, nonalcoholic ([facility] [address] [facility]  4. Gastroesophageal reflux disease without esophagitis  5. DDD (degenerative disc disease), cervical  6. Severe protein-calorie malnutrition ([address]  7. Generalized anxiety disorder    Hypertension. Under good control. Continue clonidine patch. Generalized anxiety disorder. Stable. Continue citalopram.    GERD. Stable. Continue Protonix. Cirrhosis. Nonalcoholic steatohepatitis. Patient has been advised to lose weight. Asthma. Mild intermittent. Stable. Continue albuterol inhalers. Mild aortic stenosis. Asymptomatic. Will need follow-up echocardiogram.    Possible myasthenia gravis. Patient has appointment with neurologist tomorrow. Has severe cervical degenerative disc disease. She has been seeing a surgeon. She is scheduled for an EMG. It is not clear what symptoms are from myasthenia gravis and which is attributable to the degenerative disc disease. SUBJECTIVE/OBJECTIVE:  HPI    Patient is here to establish care. She has a history of hypertension which is under good control. Has generalized anxiety disorder stable on Celexa. Has GERD which is stable on proton pump inhibitor. Discharged from the hospital last month after being admitted for dysphagia- failed swallow eval.MRI brain negative. PEG placed and d/c ed on tube feeds. Now eating soft diet. Subsequently saw a neurologist- diagnosed with myasthenia Gravis- has appt tomorrow    Denies any complaints today. Patient is here to establish care. Review of Systems   Constitutional: Negative for fatigue, fever and unexpected weight change. Respiratory: Negative for cough, chest tightness, shortness of breath and wheezing.     Cardiovascular: Negative for chest pain, palpitations and leg swelling. Gastrointestinal: Negative for abdominal pain, blood in stool, diarrhea, nausea and vomiting. Neurological: Negative for light-headedness. Physical Exam  Constitutional:       Appearance: She is well-developed. HENT:      Head: Normocephalic and atraumatic. Eyes:      Pupils: Pupils are equal, round, and reactive to light. Neck:      Musculoskeletal: Normal range of motion and neck supple. Thyroid: No thyromegaly. Cardiovascular:      Rate and Rhythm: Normal rate and regular rhythm. Heart sounds: Normal heart sounds. No murmur. No friction rub. No gallop. Comments: No carotid bruit  Pulmonary:      Effort: Pulmonary effort is normal. No respiratory distress. Breath sounds: Normal breath sounds. No wheezing or rales. Chest:      Chest wall: No tenderness. Abdominal:      General: Bowel sounds are normal. There is no distension. Palpations: Abdomen is soft. There is no mass. Tenderness: There is no abdominal tenderness. There is no guarding or rebound. Neurological:      Mental Status: She is alert and oriented to person, place, and time. Psychiatric:         Mood and Affect: Mood normal.         Behavior: Behavior normal.         Thought Content: Thought content normal.         Judgment: Judgment normal.                 An electronic signature was used to authenticate this note.     --Marta Reilly MD

## 2021-05-05 PROCEDURE — 93272 ECG/REVIEW INTERPRET ONLY: CPT | Performed by: INTERNAL MEDICINE

## 2021-05-06 ENCOUNTER — TELEPHONE (OUTPATIENT)
Dept: CARDIOLOGY | Age: 76
End: 2021-05-06

## 2021-05-06 DIAGNOSIS — R00.2 PALPITATION: ICD-10-CM

## 2021-05-06 DIAGNOSIS — R06.02 SOB (SHORTNESS OF BREATH): ICD-10-CM

## 2021-05-06 DIAGNOSIS — G45.1 TIA INVOLVING LEFT INTERNAL CAROTID ARTERY: ICD-10-CM

## 2021-05-17 ENCOUNTER — OFFICE VISIT (OUTPATIENT)
Dept: CARDIOLOGY CLINIC | Age: 76
End: 2021-05-17
Payer: MEDICARE

## 2021-05-17 VITALS
WEIGHT: 154 LBS | BODY MASS INDEX: 23.34 KG/M2 | HEIGHT: 68 IN | SYSTOLIC BLOOD PRESSURE: 144 MMHG | HEART RATE: 64 BPM | OXYGEN SATURATION: 98 % | DIASTOLIC BLOOD PRESSURE: 72 MMHG

## 2021-05-17 DIAGNOSIS — I73.9 PVD (PERIPHERAL VASCULAR DISEASE) (HCC): ICD-10-CM

## 2021-05-17 DIAGNOSIS — I10 ESSENTIAL HYPERTENSION: ICD-10-CM

## 2021-05-17 DIAGNOSIS — I35.0 MILD AORTIC STENOSIS: Primary | Chronic | ICD-10-CM

## 2021-05-17 DIAGNOSIS — G70.00 MYASTHENIA GRAVIS (HCC): ICD-10-CM

## 2021-05-17 PROCEDURE — 99214 OFFICE O/P EST MOD 30 MIN: CPT | Performed by: INTERNAL MEDICINE

## 2021-05-17 PROCEDURE — 1036F TOBACCO NON-USER: CPT | Performed by: INTERNAL MEDICINE

## 2021-05-17 PROCEDURE — G8400 PT W/DXA NO RESULTS DOC: HCPCS | Performed by: INTERNAL MEDICINE

## 2021-05-17 PROCEDURE — G8427 DOCREV CUR MEDS BY ELIG CLIN: HCPCS | Performed by: INTERNAL MEDICINE

## 2021-05-17 PROCEDURE — 1090F PRES/ABSN URINE INCON ASSESS: CPT | Performed by: INTERNAL MEDICINE

## 2021-05-17 PROCEDURE — G8420 CALC BMI NORM PARAMETERS: HCPCS | Performed by: INTERNAL MEDICINE

## 2021-05-17 PROCEDURE — 4040F PNEUMOC VAC/ADMIN/RCVD: CPT | Performed by: INTERNAL MEDICINE

## 2021-05-17 PROCEDURE — 1123F ACP DISCUSS/DSCN MKR DOCD: CPT | Performed by: INTERNAL MEDICINE

## 2021-05-17 RX ORDER — PYRIDOSTIGMINE BROMIDE 60 MG/1
30 TABLET ORAL 3 TIMES DAILY
COMMUNITY

## 2021-05-17 NOTE — PROGRESS NOTES
[facility] Office Note  5/17/2021     Subjective:  Ms. Perez Calle presents today for follow up for  TIA, and mild aortic stenosis, HTN. [place]:   Since our last office visit she wore a 2 week cardiac event monitor 12/2/20 which revealed predominant SR, no significant rhythm abnormality. She presents to office today walking with a cane. She was admitted to the hospital in March 2021 for weakness and dysphagia. Peg tube was placed at that time. She reports being newly diagnosed with myasthenia gravis 1 month ago. She is following with [facility] neurology Dr. Sheila Hernandez. She also reports having spinal stenosis and possible surgery in the future. Denies chest pain, shortness of breath, edema, dizziness, palpitations and syncope. PMH:. Ms. Perez Calle has PMH: of Aortic Stenosis, HTN, HLD, and shortness of breath . She has had allergies which causes her to feel SOB easily with activity. This is unchanged from previous visit. She takes an allergy pill and symptoms improve  Admitted 2/21/20 for dyspnea. Work up raised concern for cirrhosis. Liver ultrasound did confirm a cirrhotic appearance of the liver with mild splenomegaly. This was thought  to be cause of dyspnea and volume overload- diuresed with lasix. She had readmission 2/29/20 for acute ride side facial droop and right side weakness. CT was negative for acute findings. Pt unable to tolerate MRI. Pt status returned to baseline symptoms presumed likely TIA. Review of Systems:      12 point ROS negative in all areas as listed below except as in [place]  Constitutional, EENT, pulmonary, GI, , skin, neurological, hematological, endocrine, Psychiatric. Reviewed past medical history, social, and family history. Does not smoke  Rarely drinks alcohol.    No history of MI   MOM BP high  DAD does not know    Past Medical History:   Diagnosis Date    Acid reflux     Asthma     Generalized anxiety disorder     Hepatitis A 06/16/2020    Hypertension Carotid Bruits. Chest:  Clear to auscultation, respiration easy  Cardiovascular:  RRR, S1S2 normal, 1/6 MIRELLA  Transmitted to carotids consistent with aortic stenosis, no diastolic murmur, no rub or thrill. Abdomen:  Soft normal liver and spleen  Extremities: no edema  no clubbing, cyanosis,  Neuro: intact    Medications:   Outpatient Encounter Medications as of 5/17/2021   Medication Sig Dispense Refill    AZATHIOPRINE PO Take 20 mg by mouth      PREDNISONE PO Take by mouth      pyridostigmine (MESTINON) 60 MG tablet Take 60 mg by mouth 3 times daily      citalopram (CELEXA) 20 MG tablet Take 20 mg by mouth daily      pantoprazole (PROTONIX) 20 MG tablet Take 20 mg by mouth daily      acetaminophen (TYLENOL) 325 MG tablet 1 tablet by PEG Tube route every 4 hours as needed for Pain or Fever      aspirin (ASPIRIN CHILDRENS) 81 MG chewable tablet 1 tablet by PEG Tube route daily      cloNIDine (CATAPRES) 0.2 MG/24HR PTWK Place 1 patch onto the skin once a week 4 patch 1    albuterol (ACCUNEB) 0.63 MG/3ML nebulizer solution Take 1 ampule by nebulization every 6 hours as needed for Wheezing      albuterol sulfate  (90 Base) MCG/ACT inhaler Inhale 2 puffs into the lungs 4 times daily as needed for Wheezing 1 Inhaler 0    Spacer/Aero-Holding Chambers DONYA 1 Device by Does not apply route daily as needed (Shortness of breath) 1 Device 0     No facility-administered encounter medications on file as of 5/17/2021. Lab Data:  CBC: No results for input(s): WBC, HGB, HCT, MCV, PLT in the last 72 hours. BMP: No results for input(s): NA, K, CL, CO2, PHOS, BUN, CREATININE in the last 72 hours. Invalid input(s): CA  LIVER PROFILE: No results for input(s): AST, ALT, LIPASE, BILIDIR, BILITOT, ALKPHOS in the last 72 hours. Invalid input(s):   AMYLASE,  ALB  LIPID:   Lab Results   Component Value Date    CHOL 176 10/25/2018    CHOL 133 10/28/2016    CHOL 177 09/03/2013     Lab Results   Component Value Date    TRIG 79 10/25/2018    TRIG 75 10/28/2016    TRIG 145 2013     Lab Results   Component Value Date    HDL 63 (H) 2020    HDL 70 10/25/2018    HDL 39 (L) 10/28/2016     Lab Results   Component Value Date    LDLCALC 83 2020    LDLCALC 90 10/25/2018    LDLCALC 79 10/28/2016     Lab Results   Component Value Date    LABVLDL 15 2020    LABVLDL 15 10/28/2016    LABVLDL 29 2013    VLDL 16 10/25/2018     No results found for: CHOLHDLRATIO  PT/INR: No results for input(s): PROTIME, INR in the last 72 hours. A1C: No results found for: LABA1C  BNP:  No results for input(s): BNP in the last 72 hours. IMAGIN week cardiac event monitor 20       EKG 20  Sinus bradycardia  RBBB LAFB    EKG 20   Sinus tachycardia with occasional Premature ventricular complexesRight bundle branch blockLeft anterior fascicular block Bifascicular block Moderate voltage criteria for LVH, may be normal variantAbnormal ECGWhen compared with ECG of 2020 20:47,Premature ventricular complexes are now PresentVent. rate has increased BY  42 BPMConfirmed by KAYE HOWE MD ([id number]) on 2020 11:57:41 AM      CXR 20   Increased lung markings bilaterally, may be related to bronchitis versus minimal pulmonary vascular congestion. Mild discoid atelectasis at the left lung base. CTA neck 20   Right dominance of the vertebral arteries, with hypoplastic distal intracranial portion of the left vertebral artery. Otherwise, no acute abnormality or flow-limiting stenosis of the major arteries of the neck. CXR 20   1. Findings typical of bronchitis or reactive airways disease. 2. No focal infiltrate is evident. ECHO 20    Summary   There is hyperdynamic LV systolic function with an estimated ejection   fraction of >65%. Mild concentric left ventricular hypertrophy. No regional wall motion abnormalities are seen.    Grade I diastolic dysfunction with normal filing pressure. There is a late peaking gradient recorded across the LV outflow tract   consistent with dynamic obstruction. LVOT pressure gradients are elevated at   rest at 8 mmHg and with valsalva of 36mmHg. Aortic valve appears sclerotic and appears to open well. The aortic valve area is calculated at 1.76 cm2 with max velocity of 2.5   m/sec, a maximum pressure gradient of 25 mmHg and a mean pressure gradient   of 13 mmHg. This is c/w mild aortic stenosis (pressure gradients could be   elevated due hyperdynamic LV). There is no evidence of aortic regurgitation. US LIVER 2/22/20   Cirrhotic ultrasound appearance of the liver with mild splenomegaly. Normal directional flow noted in the main portal vein     10/4/19 lexiscan myoview   Summary  Normal LV function. There is normal isotope uptake at stress and rest. There is no evidence of  myocardial ischemia or scar. Normal myocardial perfusion study. CXR 7/11/19  FINDINGS: The lungs are clear. The cardiac silhouette is within normal limits. There is no pneumothorax or pleural effusion. Status post cholecystectomy and anterior discectomy of the lower cervical spine. Bilateral YOKASTA 4/2/19  There is no arterial insufficiency at rest in the lower extremities  bilaterally.      EKG 12/11/18  Normal sinus rhythmRight bundle branch blockleft axisLeft anterior fascicular block Bifascicular block   voltage criteria for LVH, may be normal variantAbnormal ECGNo previous ECGs availableConfirmed by NICOLE Bernal MD ([id number]) on 12/12/2018 9:20:03 AM    ECHO 10/19/18  Summary   Global left ventricular systolic function is normal with ejection fraction   estimated from 55 % to 60 %.   No regional wall motion abnormalities are noted.   Left ventricular size is moderately increased.   There is mild concentric left ventricular hypertrophy.   Normal left ventricular diastolic filling pressure.   The left atrium is mildly dilated.   The aortic valve is

## 2021-05-17 NOTE — PATIENT INSTRUCTIONS
Plan:  1. Meds reviewed. No refills warranted today   2. No changes today. Continue current meds   3.  Follow up with me in 6 months

## 2021-06-04 ENCOUNTER — TELEPHONE (OUTPATIENT)
Dept: INTERNAL MEDICINE CLINIC | Age: 76
End: 2021-06-04

## 2021-06-04 DIAGNOSIS — Z78.0 MENOPAUSE: Primary | ICD-10-CM

## 2021-06-04 NOTE — TELEPHONE ENCOUNTER
----- Message from Jyoti Sigala MD sent at 6/4/2021 10:09 AM EDT -----  Contact: Kerrie Pendleton [phone]  dexa  ----- Message -----  From: Asia Moore MA  Sent: 6/4/2021   9:58 AM EDT  To: Jyoti Sigala MD    Patient called requesting a order for a bone density scan. Patient is seeing [address] [place] [address] [id number] Neurology on 6/29/21 and  they would like patient to have this done before then.  Thanks

## 2021-06-07 ENCOUNTER — HOSPITAL ENCOUNTER (OUTPATIENT)
Dept: GENERAL RADIOLOGY | Age: 76
Discharge: HOME | End: 2021-06-07
Payer: MEDICARE

## 2021-06-07 DIAGNOSIS — Z78.0 MENOPAUSE: ICD-10-CM

## 2021-06-07 PROCEDURE — 77080 DXA BONE DENSITY AXIAL: CPT

## 2021-06-24 ENCOUNTER — HOSPITAL ENCOUNTER (OUTPATIENT)
Dept: MRI IMAGING | Age: 76
Discharge: HOME | End: 2021-06-24
Payer: MEDICARE

## 2021-06-24 DIAGNOSIS — R17 ELEVATED BILIRUBIN: ICD-10-CM

## 2021-06-24 PROCEDURE — 74181 MRI ABDOMEN W/O CONTRAST: CPT

## 2021-07-14 ENCOUNTER — OFFICE VISIT (OUTPATIENT)
Dept: INTERNAL MEDICINE CLINIC | Age: 76
End: 2021-07-14

## 2021-07-14 VITALS
WEIGHT: 162 LBS | SYSTOLIC BLOOD PRESSURE: 152 MMHG | HEIGHT: 68 IN | BODY MASS INDEX: 24.55 KG/M2 | HEART RATE: 80 BPM | DIASTOLIC BLOOD PRESSURE: 78 MMHG

## 2021-07-14 DIAGNOSIS — M50.01 DISORDER OF INTERVERTEBRAL DISC OF HIGH CERVICAL REGION WITH MYELOPATHY: Primary | ICD-10-CM

## 2021-07-14 DIAGNOSIS — G70.00 MYASTHENIA GRAVIS (HCC): ICD-10-CM

## 2021-07-14 DIAGNOSIS — Z01.818 PREOP GENERAL PHYSICAL EXAM: ICD-10-CM

## 2021-07-14 PROCEDURE — 99214 OFFICE O/P EST MOD 30 MIN: CPT | Performed by: INTERNAL MEDICINE

## 2021-07-14 PROCEDURE — 1123F ACP DISCUSS/DSCN MKR DOCD: CPT | Performed by: INTERNAL MEDICINE

## 2021-07-14 PROCEDURE — 4040F PNEUMOC VAC/ADMIN/RCVD: CPT | Performed by: INTERNAL MEDICINE

## 2021-07-14 PROCEDURE — G8427 DOCREV CUR MEDS BY ELIG CLIN: HCPCS | Performed by: INTERNAL MEDICINE

## 2021-07-14 PROCEDURE — 1036F TOBACCO NON-USER: CPT | Performed by: INTERNAL MEDICINE

## 2021-07-14 PROCEDURE — G8420 CALC BMI NORM PARAMETERS: HCPCS | Performed by: INTERNAL MEDICINE

## 2021-07-14 PROCEDURE — 1090F PRES/ABSN URINE INCON ASSESS: CPT | Performed by: INTERNAL MEDICINE

## 2021-07-14 PROCEDURE — G8399 PT W/DXA RESULTS DOCUMENT: HCPCS | Performed by: INTERNAL MEDICINE

## 2021-07-14 RX ORDER — AMLODIPINE BESYLATE 5 MG/1
5 TABLET ORAL DAILY
COMMUNITY
End: 2022-01-13

## 2021-07-14 NOTE — PROGRESS NOTES
Subjective:      Giovanna Sanders is a 64 y.o. female who presents to the office today for a preoperative consultation at the request of surgeon Dr. Elyse Alvarez who plans on performing posterior cervical decompression and fusion. Planned anesthesia is general.       Past Medical History:   Diagnosis Date    Acid reflux     Asthma     Generalized anxiety disorder     Hepatitis A 06/16/2020    Hypertension     MDRO (multiple drug resistant organisms) resistance 04/29/2020    URINE    Osteoarthritis     of hips, knees, back    Screening mammogram 3/26/96     Past Surgical History:   Procedure Laterality Date    ACHILLES TENDON SURGERY      CARPAL TUNNEL RELEASE      CATARACT REMOVAL WITH IMPLANT Left 04/06/2017    Left cataract removal. Dr. Dominga Alba Right 05/04/2017    PHACO EMULSIFICATION OF CATARACT WITH INTRAOCULAR LENS IMPLANT RIGHT EYE    COLONOSCOPY  3/24/10    FOOT SURGERY      GALLBLADDER SURGERY      HYSTERECTOMY      NECK SURGERY      PAIN MANAGEMENT PROCEDURE N/A 1/20/2020    MIDLINE LUMBAR FIVE SACRAL ONE EPIDURAL STEROID INJECTION SITE CONFIRMED BY FLUOROSCOPY performed by Ruchi Maza MD at [facility] N/A 9/22/2020    MIDLINE LUMBAR FIVE SACRAL ONE EPIDURAL STEROID INJECTION SITE CONFIRMED BY FLUOROSCOPY performed by Ruchi Maza MD at [facility] [address] UPPER GASTROINTESTINAL ENDOSCOPY N/A 2/24/2020    EGD DIAGNOSTIC ONLY performed by Rogelio Molina MD at [facility] N/A 3/26/2021    EGD PEG TUBE INSERTION w/anesthesia performed by Rogelio Molina MD at [facility]     Family History   Problem Relation Age of Onset    High Blood Pressure Mother     High Blood Pressure Child     Allergy (Severe) Child     Allergy (Severe) Child      Social History     Socioeconomic History    Marital status:       Spouse name: None    Number of children: 4    Years of education: None    Highest education level: None   Occupational History    None   Tobacco Use    Smoking status: Never Smoker    Smokeless tobacco: Never Used   Vaping Use    Vaping Use: Never used   Substance and Sexual Activity    Alcohol use: No    Drug use: No    Sexual activity: Not Currently     Partners: Male   Other Topics Concern    None   Social History Narrative    None     Social Determinants of Health     Financial Resource Strain:     Difficulty of Paying Living Expenses:    Food Insecurity:     Worried About Running Out of Food in the Last Year:     Ran Out of Food in the Last Year:    Transportation Needs:     Lack of Transportation (Medical):      Lack of Transportation (Non-Medical):    Physical Activity:     Days of Exercise per Week:     Minutes of Exercise per Session:    Stress:     Feeling of Stress :    Social Connections:     Frequency of Communication with Friends and Family:     Frequency of Social Gatherings with Friends and Family:     Attends Congregational Services:     Active Member of Clubs or Organizations:     Attends Club or Organization Meetings:     Marital Status:    Intimate Partner Violence:     Fear of Current or Ex-Partner:     Emotionally Abused:     Physically Abused:     Sexually Abused:      Current Outpatient Medications   Medication Sig Dispense Refill    AZATHIOPRINE PO Take 20 mg by mouth      PREDNISONE PO Take by mouth      pyridostigmine (MESTINON) 60 MG tablet Take 60 mg by mouth 3 times daily      citalopram (CELEXA) 20 MG tablet Take 20 mg by mouth daily      pantoprazole (PROTONIX) 20 MG tablet Take 20 mg by mouth daily      acetaminophen (TYLENOL) 325 MG tablet 1 tablet by PEG Tube route every 4 hours as needed for Pain or Fever      aspirin (ASPIRIN CHILDRENS) 81 MG chewable tablet 1 tablet by PEG Tube route daily      cloNIDine (CATAPRES) 0.2 MG/24HR PTWK Place 1 patch onto the skin once a week 4 patch 1    albuterol (ACCUNEB) 0.63 MG/3ML nebulizer solution Take 1 ampule by nebulization every 6 hours as needed for Wheezing      albuterol sulfate  (90 Base) MCG/ACT inhaler Inhale 2 puffs into the lungs 4 times daily as needed for Wheezing 1 Inhaler 0    Spacer/Aero-Holding Chambers DONYA 1 Device by Does not apply route daily as needed (Shortness of breath) 1 Device 0     No current facility-administered medications for this visit. Allergies   Allergen Reactions    Streptomycin     Sulfa Antibiotics Other (See Comments)     States change in mental status    Tylenol With Codeine #3 [Acetaminophen-Codeine] Other (See Comments)     Per pt, made pt \"immobile. \" Can tolerate cough syrup w/ codeine    Verapamil Rash     Review of Systems  Constitutional: negative for fevers  Respiratory: negative for cough and shortness of breath  Cardiovascular: negative for chest pain, dyspnea, irregular heart beat and lower extremity edema  Gastrointestinal: negative for abdominal pain, dyspepsia, nausea and vomiting     Planned anesthesia: general.  Known anesthesia problems: no  Bleeding risk:  no  Personal or FH of DVT/PE:  no  Patient objection to receiving blood products: no     Objective:      BP (!) 152/78   Pulse 80   Ht 5' 8\" (1.727 m)   Wt 162 lb (73.5 kg)   BMI 24.63 kg/m²     General Appearance:  Alert, cooperative, no distress, appears stated age   Head:  Normocephalic, without obvious abnormality, atraumatic   Eyes:  PERRL, conjunctiva/corneas clear, EOM's intact   Ears:  Normal TM's and external ear canals, both ears       Throat: Lips, mucosa, and tongue normal; teeth and gums normal   Neck: Supple, symmetrical, trachea midline, no adenopathy;  thyroid: not enlarged, symmetric, no tenderness/mass/nodules; no carotid bruit or JVD       Lungs:   Clear to auscultation bilaterally, respirations unlabored       Heart:  Regular rate and rhythm, S1 and S2 normal, 2/6 systolic  murmur, no rub, or gallop   Abdomen:   Soft, non-tender, bowel sounds active all four quadrants,  no masses, no organomegaly,G tube in place    Pelvic: Deferred   Extremities: Extremities normal, atraumatic, no cyanosis or edema   Pulses: 2+ and symmetric   Skin: Skin color, texture, turgor normal, no rashes or lesions   Lymph nodes: Cervical, supraclavicular, and axillary nodes normal                    Assessment:       Diagnosis Orders   1. 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 Preop general physical exam     3. Myasthenia gravis ([address]           64 y.o. female with planned surgery as above. Plan:     HTN. stable. continue norvasc     LVOT dynamic obstruction -stable. No evidence of CHF     EKG (3/23/2021)- Normal sinus rhythmRight bundle branch blockMinimal voltage criteria for LVH, may be normal variant    Myasthenia gravis -She need neurology clearance     Patient medically cleared for above planned procedure.

## 2021-07-30 ENCOUNTER — TELEPHONE (OUTPATIENT)
Dept: INTERNAL MEDICINE CLINIC | Age: 76
End: 2021-07-30

## 2021-07-30 NOTE — TELEPHONE ENCOUNTER
----- Message from Homero Burnett MD sent at 7/30/2021 12:47 PM EDT -----  Contact: Terryarthur Nisa   ---   [facility]   [phone]  Dr. Ann-Marie Lopez put in his notes and mentioned to pt to have neurology clearance   ----- Message -----  From: Jostoya Jose Miguel: 7/30/2021  11:04 AM EDT  To: Homero Burnett MD    Patient of Dr. Ann-Marie Lopez.    Patient is having surgery on Monday August 2. Calling to find out if patient has a neurologist..    If no neurologist, hospital needs clearance from doctor for surgery, or surgery will have to be cancelled. Please call.

## 2021-09-13 ENCOUNTER — HOSPITAL ENCOUNTER (OUTPATIENT)
Age: 76
Discharge: HOME | End: 2021-09-13
Payer: MEDICARE

## 2021-09-13 ENCOUNTER — HOSPITAL ENCOUNTER (OUTPATIENT)
Dept: GENERAL RADIOLOGY | Age: 76
Discharge: HOME | End: 2021-09-13
Payer: MEDICARE

## 2021-09-13 DIAGNOSIS — Z98.1 ARTHRODESIS STATUS: ICD-10-CM

## 2021-09-13 PROCEDURE — 72040 X-RAY EXAM NECK SPINE 2-3 VW: CPT

## 2021-11-18 RX ORDER — AMLODIPINE BESYLATE 5 MG/1
TABLET ORAL
Qty: 30 TABLET | Refills: 0 | Status: SHIPPED | OUTPATIENT
Start: 2021-11-18 | End: 2022-01-13 | Stop reason: SDUPTHER

## 2021-12-06 ENCOUNTER — HOSPITAL ENCOUNTER (OUTPATIENT)
Age: 76
Discharge: HOME | End: 2021-12-06
Payer: MEDICARE

## 2021-12-06 ENCOUNTER — HOSPITAL ENCOUNTER (OUTPATIENT)
Dept: GENERAL RADIOLOGY | Age: 76
Discharge: HOME | End: 2021-12-06
Payer: MEDICARE

## 2021-12-06 DIAGNOSIS — Z98.1 ARTHRODESIS STATUS: ICD-10-CM

## 2021-12-06 PROCEDURE — 72040 X-RAY EXAM NECK SPINE 2-3 VW: CPT

## 2022-01-04 ENCOUNTER — TELEPHONE (OUTPATIENT)
Dept: CARDIOLOGY CLINIC | Age: 77
End: 2022-01-04

## 2022-01-04 RX ORDER — AMLODIPINE BESYLATE 5 MG/1
5 TABLET ORAL DAILY
Qty: 30 TABLET | Status: CANCELLED | OUTPATIENT
Start: 2022-01-04

## 2022-01-12 RX ORDER — AMLODIPINE BESYLATE 5 MG/1
TABLET ORAL
Qty: 30 TABLET | Refills: 0 | Status: CANCELLED | OUTPATIENT
Start: 2022-01-12

## 2022-01-13 ENCOUNTER — OFFICE VISIT (OUTPATIENT)
Dept: INTERNAL MEDICINE CLINIC | Age: 77
End: 2022-01-13

## 2022-01-13 VITALS
SYSTOLIC BLOOD PRESSURE: 170 MMHG | HEIGHT: 68 IN | WEIGHT: 165 LBS | BODY MASS INDEX: 25.01 KG/M2 | HEART RATE: 84 BPM | DIASTOLIC BLOOD PRESSURE: 82 MMHG

## 2022-01-13 DIAGNOSIS — G70.00 MYASTHENIA GRAVIS (HCC): ICD-10-CM

## 2022-01-13 DIAGNOSIS — D64.9 ANEMIA, UNSPECIFIED TYPE: ICD-10-CM

## 2022-01-13 DIAGNOSIS — I73.9 PVD (PERIPHERAL VASCULAR DISEASE) (HCC): ICD-10-CM

## 2022-01-13 DIAGNOSIS — I35.0 MILD AORTIC STENOSIS: Chronic | ICD-10-CM

## 2022-01-13 DIAGNOSIS — I10 PRIMARY HYPERTENSION: Primary | ICD-10-CM

## 2022-01-13 DIAGNOSIS — D69.6 THROMBOCYTOPENIA (HCC): ICD-10-CM

## 2022-01-13 DIAGNOSIS — K74.60 CIRRHOSIS, NONALCOHOLIC (HCC): ICD-10-CM

## 2022-01-13 PROBLEM — R13.10 DYSPHAGIA: Status: RESOLVED | Noted: 2021-03-23 | Resolved: 2022-01-13

## 2022-01-13 PROBLEM — E43 SEVERE PROTEIN-CALORIE MALNUTRITION (HCC): Status: RESOLVED | Noted: 2021-03-24 | Resolved: 2022-01-13

## 2022-01-13 PROCEDURE — 1036F TOBACCO NON-USER: CPT | Performed by: INTERNAL MEDICINE

## 2022-01-13 PROCEDURE — G8399 PT W/DXA RESULTS DOCUMENT: HCPCS | Performed by: INTERNAL MEDICINE

## 2022-01-13 PROCEDURE — G8417 CALC BMI ABV UP PARAM F/U: HCPCS | Performed by: INTERNAL MEDICINE

## 2022-01-13 PROCEDURE — 1123F ACP DISCUSS/DSCN MKR DOCD: CPT | Performed by: INTERNAL MEDICINE

## 2022-01-13 PROCEDURE — 99214 OFFICE O/P EST MOD 30 MIN: CPT | Performed by: INTERNAL MEDICINE

## 2022-01-13 PROCEDURE — 4040F PNEUMOC VAC/ADMIN/RCVD: CPT | Performed by: INTERNAL MEDICINE

## 2022-01-13 PROCEDURE — G8427 DOCREV CUR MEDS BY ELIG CLIN: HCPCS | Performed by: INTERNAL MEDICINE

## 2022-01-13 PROCEDURE — 1090F PRES/ABSN URINE INCON ASSESS: CPT | Performed by: INTERNAL MEDICINE

## 2022-01-13 PROCEDURE — G8484 FLU IMMUNIZE NO ADMIN: HCPCS | Performed by: INTERNAL MEDICINE

## 2022-01-13 RX ORDER — AMLODIPINE BESYLATE 5 MG/1
TABLET ORAL
Qty: 30 TABLET | Refills: 2 | Status: SHIPPED | OUTPATIENT
Start: 2022-01-13 | End: 2022-03-22

## 2022-01-13 ASSESSMENT — ENCOUNTER SYMPTOMS
BLOOD IN STOOL: 0
ABDOMINAL PAIN: 0
DIARRHEA: 0
WHEEZING: 0
VOMITING: 0
SHORTNESS OF BREATH: 0
NAUSEA: 0
CHEST TIGHTNESS: 0
COUGH: 0

## 2022-01-13 NOTE — PROGRESS NOTES
Rosemary Wilson (:  1945) is a 64 y.o. female,Established patient, here for evaluation of the following chief complaint(s):  No chief complaint on file. ASSESSMENT/PLAN:   Diagnosis Orders   1. Primary hypertension     2. Myasthenia gravis ([address]     3. Thrombocytopenia ([address]     4. Cirrhosis, nonalcoholic ([address]     5. PVD (peripheral vascular disease) ([facility]     6. Mild aortic stenosis     7. Anemia, unspecified type           Hypertension. Bp is elevated. She ran out of norvasc 10 days ago. Restart norvasc 5 mg daily      Generalized anxiety disorder. Stable. Continue citalopram.     GERD. Stable. Continue Protonix.     Cirrhosis. Nonalcoholic steatohepatitis. Patient has been advised to lose weight. Chronic anemia and thrombocytopenia  2 to cirrhosis  Also sees a hematologist   Now on iron      Asthma. Mild intermittent. Stable. Continue albuterol inhalers.     Mild aortic stenosis. Asymptomatic. Will need follow-up echocardiogram.Sees cardiology     Myasthenia gravis. Sees neuro  Stable on mestinon and prednisone      H/o  severe cervical degenerative disc disease. s/p cervical fusion. Doing well. Subjective   SUBJECTIVE/OBJECTIVE:  HPI  She has a history of hypertension which is under good control. Has generalized anxiety disorder stable on Celexa. Has GERD which is stable on proton pump inhibitor. H/o asthma - mild intermittent    Has myasthenia gravis- sees neurologist - stable. H/o Cervical fusion 2021  H/o ASTORGA- no edema,abd distension      Review of Systems   Constitutional: Negative for fatigue, fever and unexpected weight change. Respiratory: Negative for cough, chest tightness, shortness of breath and wheezing. Cardiovascular: Negative for chest pain, palpitations and leg swelling. Gastrointestinal: Negative for abdominal pain, blood in stool, diarrhea, nausea and vomiting. Genitourinary: Negative for hematuria.    Neurological: Negative for

## 2022-02-02 ENCOUNTER — TELEPHONE (OUTPATIENT)
Dept: CARDIOLOGY CLINIC | Age: 77
End: 2022-02-02

## 2022-02-02 NOTE — TELEPHONE ENCOUNTER
Patient states that her BP has been elevated for a couple of weeks. She went to get back injection and at office BP was 197/112. She notes that it was that high for 3 days. Yesterday am prior to BP medication it was 167/67, at 1:30 5 1/2 hours after medication it was 160/88. Last night it was 149/63 at 11:30 PM.  This morning prior to medication 189/89 and 6 hours after medication is was 160/63. She is on Amlodipine 5 mg daily. I had to reschedule her OV from tomorrow to March due to weather. I told her to check BP 1-2 hours after she takes BP medication only once a day and call us back in a week with numbers and that I would call her back with any changes that you make now.

## 2022-03-11 ENCOUNTER — TELEPHONE (OUTPATIENT)
Dept: MAMMOGRAPHY | Age: 77
End: 2022-03-11

## 2022-03-11 ENCOUNTER — HOSPITAL ENCOUNTER (OUTPATIENT)
Dept: MAMMOGRAPHY | Age: 77
Discharge: HOME | End: 2022-03-11
Payer: MEDICARE

## 2022-03-11 DIAGNOSIS — R92.8 ABNORMAL MAMMOGRAM: Primary | ICD-10-CM

## 2022-03-11 DIAGNOSIS — Z12.39 SCREENING BREAST EXAMINATION: ICD-10-CM

## 2022-03-11 PROCEDURE — 77067 SCR MAMMO BI INCL CAD: CPT

## 2022-03-11 NOTE — TELEPHONE ENCOUNTER
spoke with patient in regards to radiologist's recommendation for follow up to screening mammogram. tx to sched

## 2022-03-15 ENCOUNTER — HOSPITAL ENCOUNTER (OUTPATIENT)
Age: 77
Discharge: HOME | End: 2022-03-15
Payer: MEDICARE

## 2022-03-15 ENCOUNTER — HOSPITAL ENCOUNTER (OUTPATIENT)
Dept: GENERAL RADIOLOGY | Age: 77
Discharge: HOME | End: 2022-03-15
Payer: MEDICARE

## 2022-03-15 DIAGNOSIS — Z98.1 ARTHRODESIS STATUS: ICD-10-CM

## 2022-03-15 PROCEDURE — 72070 X-RAY EXAM THORAC SPINE 2VWS: CPT

## 2022-03-15 PROCEDURE — 72040 X-RAY EXAM NECK SPINE 2-3 VW: CPT

## 2022-03-22 ENCOUNTER — OFFICE VISIT (OUTPATIENT)
Dept: CARDIOLOGY CLINIC | Age: 77
End: 2022-03-22
Payer: MEDICARE

## 2022-03-22 VITALS
WEIGHT: 173 LBS | SYSTOLIC BLOOD PRESSURE: 176 MMHG | HEART RATE: 73 BPM | BODY MASS INDEX: 26.22 KG/M2 | OXYGEN SATURATION: 98 % | DIASTOLIC BLOOD PRESSURE: 78 MMHG | HEIGHT: 68 IN

## 2022-03-22 DIAGNOSIS — I35.0 MILD AORTIC STENOSIS: Chronic | ICD-10-CM

## 2022-03-22 DIAGNOSIS — I10 PRIMARY HYPERTENSION: Primary | ICD-10-CM

## 2022-03-22 PROCEDURE — 1123F ACP DISCUSS/DSCN MKR DOCD: CPT | Performed by: INTERNAL MEDICINE

## 2022-03-22 PROCEDURE — G8484 FLU IMMUNIZE NO ADMIN: HCPCS | Performed by: INTERNAL MEDICINE

## 2022-03-22 PROCEDURE — G8417 CALC BMI ABV UP PARAM F/U: HCPCS | Performed by: INTERNAL MEDICINE

## 2022-03-22 PROCEDURE — 99214 OFFICE O/P EST MOD 30 MIN: CPT | Performed by: INTERNAL MEDICINE

## 2022-03-22 PROCEDURE — G8399 PT W/DXA RESULTS DOCUMENT: HCPCS | Performed by: INTERNAL MEDICINE

## 2022-03-22 PROCEDURE — G8427 DOCREV CUR MEDS BY ELIG CLIN: HCPCS | Performed by: INTERNAL MEDICINE

## 2022-03-22 PROCEDURE — 1090F PRES/ABSN URINE INCON ASSESS: CPT | Performed by: INTERNAL MEDICINE

## 2022-03-22 PROCEDURE — 4040F PNEUMOC VAC/ADMIN/RCVD: CPT | Performed by: INTERNAL MEDICINE

## 2022-03-22 PROCEDURE — 1036F TOBACCO NON-USER: CPT | Performed by: INTERNAL MEDICINE

## 2022-03-22 RX ORDER — IBUPROFEN 200 MG
1250 CAPSULE ORAL DAILY
COMMUNITY

## 2022-03-22 RX ORDER — PANTOPRAZOLE SODIUM 20 MG/1
TABLET, DELAYED RELEASE ORAL
COMMUNITY
Start: 2021-08-01 | End: 2022-07-06 | Stop reason: SDUPTHER

## 2022-03-22 RX ORDER — METHOCARBAMOL 750 MG/1
750 TABLET, FILM COATED ORAL
COMMUNITY
Start: 2021-08-05 | End: 2022-03-22

## 2022-03-22 RX ORDER — LISINOPRIL AND HYDROCHLOROTHIAZIDE 12.5; 1 MG/1; MG/1
1 TABLET ORAL DAILY
Qty: 90 TABLET | Refills: 3 | Status: SHIPPED | OUTPATIENT
Start: 2022-03-22 | End: 2022-10-06

## 2022-03-22 RX ORDER — LANOLIN ALCOHOL/MO/W.PET/CERES
800 CREAM (GRAM) TOPICAL NIGHTLY
COMMUNITY

## 2022-03-22 RX ORDER — MYCOPHENOLATE MOFETIL 500 MG/1
500 TABLET ORAL 2 TIMES DAILY
COMMUNITY

## 2022-03-22 NOTE — PATIENT INSTRUCTIONS
Plan:  1. Stop taking amlodapine  2. Start taking Lisinopril-HCTZ 10-12.5  3. Goal blood pressure of less than 140/90   Keep home blood pressure log   -take blood pressure a minimum of 2 hours after you take your medications   -take your blood pressure once a day   -in a week call my office with your number  4. Repeat blood work   -BMP in 2-3 weeks  5. Call to schedule the Limited Echo after your blood pressure is back to normal     Follow up with me in 1 month    Your provider has ordered testing for further evaluation. An order/prescription has been included in your paper work.  To schedule outpatient testing, contact Central Scheduling by calling [address] [place] [address] ([phone]).

## 2022-03-22 NOTE — PROGRESS NOTES
[address] Office Note  3/22/2022     Subjective:  Ms. 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 Diagnosed with Myastenia gravis April 2021  C/o high bp home readings    [place]:  Today, she reports her home blood pressure has been all over the place. She is feeling good otherwise. Patient denies current edema, chest pain, sob, palpitations, dizziness or syncope. Patient is taking all cardiac medications as prescribed and tolerates them well. Patient is vaccinated against Covid. Moderna 2/2     PMH:. Admitted to the hospital in March 2021 for weakness and dysphagia. Peg tube was placed at that time. She reports being newly diagnosed with myasthenia gravis 1 month ago. She is following with [facility] neurology Dr. Tiffany Hernandez. Admitted 2/21/20 for dyspnea. Work up raised concern for cirrhosis. Liver ultrasound did confirm a cirrhotic appearance of the liver with mild splenomegaly. This was thought  to be cause of dyspnea and volume overload- diuresed with lasix. She had readmission 2/29/20 for acute ride side facial droop and right side weakness. CT was negative for acute findings. Pt unable to tolerate MRI. Pt status returned to baseline symptoms presumed likely TIA. Ms. Soumya Pedersen has PMH: of Aortic Stenosis, HTN, HLD, and shortness of breath . She has had allergies which causes her to feel SOB easily with activity. This is unchanged from previous visit. She takes an allergy pill and symptoms improve        Review of Systems:      12 point ROS negative in all areas as listed below except as in [place]  Constitutional, EENT, pulmonary, GI, , skin, neurological, hematological, endocrine, Psychiatric. Reviewed past medical history, social, and family history. Does not smoke  Rarely drinks alcohol.    No history of MI   MOM BP high  DAD does not know    Past Medical History:   Diagnosis Date    Acid reflux     Asthma     Generalized anxiety disorder     Hepatitis A 06/16/2020    Hypertension     MDRO (multiple drug resistant organisms) resistance 04/29/2020    URINE    Osteoarthritis     of hips, knees, back    Screening mammogram 3/26/96     Past Surgical History:   Procedure Laterality Date    ACHILLES TENDON SURGERY      CARPAL TUNNEL RELEASE      CATARACT REMOVAL WITH IMPLANT Left 04/06/2017    Left cataract removal. Dr. Chaparro Snow Right 05/04/2017    PHACO EMULSIFICATION OF CATARACT WITH INTRAOCULAR LENS IMPLANT RIGHT EYE    COLONOSCOPY  3/24/10    FOOT SURGERY      GALLBLADDER SURGERY      HYSTERECTOMY      NECK SURGERY      PAIN MANAGEMENT PROCEDURE N/A 1/20/2020    MIDLINE LUMBAR FIVE SACRAL ONE EPIDURAL STEROID INJECTION SITE CONFIRMED BY FLUOROSCOPY performed by Brigitte Ko MD at [facility] N/A 9/22/2020    MIDLINE LUMBAR FIVE SACRAL ONE EPIDURAL STEROID INJECTION SITE CONFIRMED BY FLUOROSCOPY performed by Brigitte Ko MD at [address] GASTROINTESTINAL ENDOSCOPY N/A 2/24/2020    EGD DIAGNOSTIC ONLY performed by Sridhar Angulo MD at [facility] 3/26/2021    EGD PEG TUBE INSERTION w/anesthesia [facility]       Objective: Repeat BP: 176/78  BP (!) 176/78   Pulse 73   Ht 5' 8\" (1.727 m)   Wt 173 lb (78.5 kg)   SpO2 98%   BMI 26.30 kg/m²     Wt Readings from Last 3 Encounters:   03/22/22 173 lb (78.5 kg)   01/13/22 165 lb (74.8 kg)   07/14/21 162 lb (73.5 kg)       Physical Exam:  General: No Respiratory distress, appears well developed and well nourished.    Eyes:  Sclera nonicteric  Nose/Sinuses:  negative findings: nose shows no deformity, asymmetry, or inflammation, nasal mucosa normal, septum midline with no perforation or bleeding  Back:  no pain to palpation  Joint:  no active joint inflammation  Musculoskeletal:  negative  Skin:  Warm and dry  Neck:  + JVD and  No Carotid Bruits. Chest:  Clear to auscultation, respiration easy  Cardiovascular:  RRR 76 bpm, S1S2 normal, 1/6 MIRELLA  Transmitted to carotids consistent with aortic stenosis, no diastolic murmur, no rub or thrill.   Abdomen:  Soft normal liver and spleen  Extremities: 2+ BLE edema  no clubbing, cyanosis,  Neuro: intact    Medications:   Outpatient Encounter Medications as of 3/22/2022   Medication Sig Dispense Refill    pantoprazole (PROTONIX) 20 MG tablet 1 tablet DAILY (route: oral)      vitamin B-12 (CYANOCOBALAMIN) 250 MCG tablet Take 1,000 mcg by mouth daily      calcium carbonate (OYSTER SHELL CALCIUM 500 MG) 1250 (500 Ca) MG tablet Take 1,250 mg by mouth daily      niacin (SLO-NIACIN) 500 MG extended release tablet Take 800 mg by mouth nightly      mycophenolate (CELLCEPT) 500 MG tablet Take 500 mg by mouth 2 times daily      lisinopril-hydroCHLOROthiazide (PRINZIDE;ZESTORETIC) 10-12.5 MG per tablet Take 1 tablet by mouth daily 90 tablet 3    PREDNISONE PO Take by mouth      pyridostigmine (MESTINON) 60 MG tablet Take 30 mg by mouth 3 times daily       albuterol sulfate  (90 Base) MCG/ACT inhaler Inhale 2 puffs into the lungs 4 times daily as needed for Wheezing 1 Inhaler 0    Spacer/Aero-Holding Chambers DONYA 1 Device by Does not apply route daily as needed (Shortness of breath) 1 Device 0    [DISCONTINUED] methocarbamol (ROBAXIN) 750 MG tablet 750 mg      [DISCONTINUED] amLODIPine (NORVASC) 5 MG tablet TAKE ONE TABLET BY MOUTH DAILY (Patient taking differently: Take 5 mg by mouth daily TAKE ONE TABLET BY MOUTH DAILY) 30 tablet 2    citalopram (CELEXA) 20 MG tablet Take 20 mg by mouth daily (Patient not taking: Reported on 3/22/2022)      [DISCONTINUED] pantoprazole (PROTONIX) 20 MG tablet Take 20 mg by mouth daily      albuterol (ACCUNEB) 0.63 MG/3ML nebulizer solution Take 1 ampule by nebulization every 6 hours as needed for Wheezing (Patient not taking: Reported on 3/22/2022)       No facility-administered encounter medications on file as of 3/22/2022. Lab Data:  CBC: No results for input(s): WBC, HGB, HCT, MCV, PLT in the last 72 hours. BMP: No results for input(s): NA, K, CL, CO2, PHOS, BUN, CREATININE, CA in the last 72 hours. LIVER PROFILE: No results for input(s): AST, ALT, LIPASE, BILIDIR, BILITOT, ALKPHOS in the last 72 hours. Invalid input(s): AMYLASE,  ALB  LIPID:   Lab Results   Component Value Date    CHOL 176 10/25/2018    CHOL 133 10/28/2016    CHOL 177 09/03/2013     Lab Results   Component Value Date    TRIG 79 10/25/2018    TRIG 75 10/28/2016    TRIG 145 09/03/2013     Lab Results   Component Value Date    HDL 63 (H) 11/22/2020    HDL 70 10/25/2018    HDL 39 (L) 10/28/2016     Lab Results   Component Value Date    LDLCALC 83 11/22/2020    LDLCALC 90 10/25/2018    LDLCALC 79 10/28/2016     Lab Results   Component Value Date    LABVLDL 15 11/22/2020    LABVLDL 15 10/28/2016    LABVLDL 29 09/03/2013    VLDL 16 10/25/2018     No results found for: CHOLHDLRATIO  PT/INR: No results for input(s): PROTIME, INR in the last 72 hours. A1C: No results found for: LABA1C  BNP:  No results for input(s): BNP in the last 72 hours. 8/5/21 [facility]   WBC=6.7 RBC=2.75  H&H=9.0/26.2 plat=55 Xx=723 K+=4.1 BUN/Cr=16/0.71  AST= ALT= TC= HDL= LDL= TG= HgbA1c=  IMAGING:    MRI Abdomen 6/24/21  1. Stable mild prominence of the biliary system likely due to cholecystectomy. No definite choledocholithiasis although artifact limits evaluation. 2. Cirrhosis. No liver lesion seen although lack of intravenous contrast limits evaluation. 3. Mild splenomegaly is unchanged and may be due to portal hypertension.      2 week cardiac event monitor 12/2/20       EKG 4.29.20  Sinus bradycardia  RBBB LAFB    EKG 4/18/20   Sinus tachycardia with occasional Premature ventricular complexesRight bundle branch blockLeft anterior fascicular block Bifascicular block Moderate voltage criteria for LVH, may be normal variantAbnormal ECGWhen compared with ECG of 29-FEB-2020 20:47,Premature ventricular complexes are now PresentVent. rate has increased BY  42 BPMConfirmed by KAYE HOWE MD ([id number]) on 4/19/2020 11:57:41 AM      CXR 4/18/20   Increased lung markings bilaterally, may be related to bronchitis versus minimal pulmonary vascular congestion. Mild discoid atelectasis at the left lung base. CTA neck 4/18/20   Right dominance of the vertebral arteries, with hypoplastic distal intracranial portion of the left vertebral artery. Otherwise, no acute abnormality or flow-limiting stenosis of the major arteries of the neck. CXR 2/29/20   1. Findings typical of bronchitis or reactive airways disease. 2. No focal infiltrate is evident. ECHO 2/24/20    Summary   There is hyperdynamic LV systolic function with an estimated ejection   fraction of >65%. Mild concentric left ventricular hypertrophy. No regional wall motion abnormalities are seen. Grade I diastolic dysfunction with normal filing pressure. There is a late peaking gradient recorded across the LV outflow tract   consistent with dynamic obstruction. LVOT pressure gradients are elevated at   rest at 8 mmHg and with valsalva of 36mmHg. Aortic valve appears sclerotic and appears to open well. The aortic valve area is calculated at 1.76 cm2 with max velocity of 2.5   m/sec, a maximum pressure gradient of 25 mmHg and a mean pressure gradient   of 13 mmHg. This is c/w mild aortic stenosis (pressure gradients could be   elevated due hyperdynamic LV). There is no evidence of aortic regurgitation. US LIVER 2/22/20   Cirrhotic ultrasound appearance of the liver with mild splenomegaly. Normal directional flow noted in the main portal vein     10/4/19 lexiscan myoview   Summary  Normal LV function.   There is normal isotope uptake at stress and rest. There is no evidence of  myocardial ischemia or scar. Normal myocardial perfusion study. CXR 7/11/19  FINDINGS: The lungs are clear. The cardiac silhouette is within normal limits. There is no pneumothorax or pleural effusion. Status post cholecystectomy and anterior discectomy of the lower cervical spine. Bilateral YOKASTA 4/2/19  There is no arterial insufficiency at rest in the lower extremities  bilaterally. EKG 12/11/18  Normal sinus rhythmRight bundle branch blockleft axisLeft anterior fascicular block Bifascicular block   voltage criteria for LVH, may be normal variantAbnormal ECGNo previous ECGs availableConfirmed by Wendel Cushing MD, STEPHEN ([id number]) on 12/12/2018 9:20:03 AM    ECHO 10/19/18  Summary   Global left ventricular systolic function is normal with ejection fraction   estimated from 55 % to 60 %.   No regional wall motion abnormalities are noted.   Left ventricular size is moderately increased.   There is mild concentric left ventricular hypertrophy.   Normal left ventricular diastolic filling pressure.   The left atrium is mildly dilated.   The aortic valve is thickened/calcified with decreased leaflet mobility   consistent with mild aortic Doppler exam is c/w mild aortic stenosis. No   aortic regurgitation.   The right atrium is mildly dilated. CXR 10/10/18  There is mild pulmonary vascular congestion. The cardiac silhouette is top-normal in size. There is no pneumothorax or pleural effusion. Status post cholecystectomy and anterior discectomy of the lower cervical spine. EKG 10.10.18  NSR within normal    Assessment:  Encounter Diagnoses   Name Primary?  Mild aortic stenosis     Primary hypertension Yes       TIA  Dynamic outflow obstruction  She is on lipitor due to history of TIA      Plan:  1. Stop taking amlodipine due to edema and BP not at goal  2. Start taking Lisinopril-HCTZ 10-12.5  3.  Goal blood pressure of less than 140/90   Keep home blood pressure log   -take blood pressure a minimum of 2 hours after you take your medications   -take your blood pressure once a day   -in a week call my office with your numbers  4. Repeat blood work   -BMP in 2-3 weeks  5. Call to schedule the Limited Echo after your blood pressure is back to normal     Follow up with me in 1 month    This note is scribed in the presence of Dr. Yesika Arevalo MD by Ewa Singh RN.  I, Dr. Mitch Candelario, personally performed the services described in this documentation, as scribed by the above signed scribe in my presence. It is both accurate and complete to my knowledge. I agree with the details independently gathered by the clinical support staff, while the remaining scribed note accurately describes my personal service to the patient. QUALITY MEASURES  1. Tobacco Cessation Counseling: NA  2. Retake of BP if >140/90: NA  3. Documentation to PCP/referring for new patient:  Sent to PCP at close of office visit  4. CAD patient on anti-platelet: NA    5. CAD patient on STATIN therapy: NA  6.  Patient with CHF and aFib on anticoagulation:  NA

## 2022-04-13 ENCOUNTER — HOSPITAL ENCOUNTER (OUTPATIENT)
Dept: MAMMOGRAPHY | Age: 77
Discharge: HOME | End: 2022-04-13
Payer: MEDICARE

## 2022-04-13 ENCOUNTER — HOSPITAL ENCOUNTER (OUTPATIENT)
Dept: ULTRASOUND IMAGING | Age: 77
Discharge: HOME | End: 2022-04-13
Payer: MEDICARE

## 2022-04-13 DIAGNOSIS — R92.8 ABNORMAL MAMMOGRAM: ICD-10-CM

## 2022-04-13 PROCEDURE — G0279 TOMOSYNTHESIS, MAMMO: HCPCS

## 2022-04-13 PROCEDURE — 76642 ULTRASOUND BREAST LIMITED: CPT

## 2022-04-22 ENCOUNTER — HOSPITAL ENCOUNTER (OUTPATIENT)
Dept: NON INVASIVE DIAGNOSTICS | Age: 77
Discharge: HOME | End: 2022-04-22
Payer: MEDICARE

## 2022-04-22 DIAGNOSIS — I35.0 MILD AORTIC STENOSIS: Chronic | ICD-10-CM

## 2022-04-22 PROCEDURE — 93308 TTE F-UP OR LMTD: CPT

## 2022-05-30 ENCOUNTER — HOSPITAL ENCOUNTER (EMERGENCY)
Age: 77
Discharge: HOME | End: 2022-05-30
Payer: MEDICARE

## 2022-05-30 ENCOUNTER — APPOINTMENT (OUTPATIENT)
Dept: CT IMAGING | Age: 77
End: 2022-05-30
Payer: MEDICARE

## 2022-05-30 VITALS
DIASTOLIC BLOOD PRESSURE: 70 MMHG | OXYGEN SATURATION: 100 % | BODY MASS INDEX: 24.63 KG/M2 | RESPIRATION RATE: 16 BRPM | WEIGHT: 162 LBS | SYSTOLIC BLOOD PRESSURE: 179 MMHG | HEART RATE: 84 BPM | TEMPERATURE: 98.5 F

## 2022-05-30 DIAGNOSIS — N28.9 LESION OF RIGHT NATIVE KIDNEY: Primary | ICD-10-CM

## 2022-05-30 DIAGNOSIS — R35.0 URINARY FREQUENCY: ICD-10-CM

## 2022-05-30 DIAGNOSIS — K74.69 OTHER CIRRHOSIS OF LIVER (HCC): ICD-10-CM

## 2022-05-30 LAB
A/G RATIO: 1.4 (ref 1.1–2.2)
ALBUMIN SERPL-MCNC: 3.4 G/DL (ref 3.4–5)
ALP BLD-CCNC: 68 U/L (ref 40–129)
ALT SERPL-CCNC: 16 U/L (ref 10–40)
ANION GAP SERPL CALCULATED.3IONS-SCNC: 11 MMOL/L (ref 3–16)
AST SERPL-CCNC: 29 U/L (ref 15–37)
BACTERIA: ABNORMAL /HPF
BASOPHILS ABSOLUTE: 0.1 K/UL (ref 0–0.2)
BASOPHILS RELATIVE PERCENT: 1.5 %
BILIRUB SERPL-MCNC: 1.3 MG/DL (ref 0–1)
BILIRUBIN URINE: NEGATIVE
BLOOD, URINE: ABNORMAL
BUN BLDV-MCNC: 13 MG/DL (ref 7–20)
CALCIUM SERPL-MCNC: 9.2 MG/DL (ref 8.3–10.6)
CHLORIDE BLD-SCNC: 104 MMOL/L (ref 99–110)
CLARITY: CLEAR
CO2: 26 MMOL/L (ref 21–32)
COLOR: YELLOW
CREAT SERPL-MCNC: 0.8 MG/DL (ref 0.6–1.2)
EOSINOPHILS ABSOLUTE: 0.1 K/UL (ref 0–0.6)
EOSINOPHILS RELATIVE PERCENT: 2.6 %
EPITHELIAL CELLS, UA: ABNORMAL /HPF (ref 0–5)
GFR AFRICAN AMERICAN: >60
GFR NON-AFRICAN AMERICAN: >60
GLUCOSE BLD-MCNC: 98 MG/DL (ref 70–99)
GLUCOSE URINE: NEGATIVE MG/DL
HCT VFR BLD CALC: 37.6 % (ref 36–48)
HEMOGLOBIN: 12.7 G/DL (ref 12–16)
KETONES, URINE: NEGATIVE MG/DL
LEUKOCYTE ESTERASE, URINE: NEGATIVE
LYMPHOCYTES ABSOLUTE: 0.9 K/UL (ref 1–5.1)
LYMPHOCYTES RELATIVE PERCENT: 20.1 %
MCH RBC QN AUTO: 30.2 PG (ref 26–34)
MCHC RBC AUTO-ENTMCNC: 33.8 G/DL (ref 31–36)
MCV RBC AUTO: 89.3 FL (ref 80–100)
MICROSCOPIC EXAMINATION: YES
MONOCYTES ABSOLUTE: 0.5 K/UL (ref 0–1.3)
MONOCYTES RELATIVE PERCENT: 11.1 %
NEUTROPHILS ABSOLUTE: 2.9 K/UL (ref 1.7–7.7)
NEUTROPHILS RELATIVE PERCENT: 64.7 %
NITRITE, URINE: NEGATIVE
PDW BLD-RTO: 14.9 % (ref 12.4–15.4)
PH UA: 6 (ref 5–8)
PLATELET # BLD: 85 K/UL (ref 135–450)
PLATELET SLIDE REVIEW: ABNORMAL
PMV BLD AUTO: 9.5 FL (ref 5–10.5)
POTASSIUM REFLEX MAGNESIUM: 3.6 MMOL/L (ref 3.5–5.1)
PROTEIN UA: NEGATIVE MG/DL
RBC # BLD: 4.21 M/UL (ref 4–5.2)
RBC UA: ABNORMAL /HPF (ref 0–4)
SLIDE REVIEW: ABNORMAL
SODIUM BLD-SCNC: 141 MMOL/L (ref 136–145)
SPECIFIC GRAVITY UA: 1.01 (ref 1–1.03)
TOTAL PROTEIN: 5.9 G/DL (ref 6.4–8.2)
URINE REFLEX TO CULTURE: ABNORMAL
URINE TYPE: ABNORMAL
UROBILINOGEN, URINE: 0.2 E.U./DL
WBC # BLD: 4.5 K/UL (ref 4–11)
WBC UA: ABNORMAL /HPF (ref 0–5)

## 2022-05-30 PROCEDURE — 6370000000 HC RX 637 (ALT 250 FOR IP): Performed by: PHYSICIAN ASSISTANT

## 2022-05-30 PROCEDURE — 80053 COMPREHEN METABOLIC PANEL: CPT

## 2022-05-30 PROCEDURE — 74177 CT ABD & PELVIS W/CONTRAST: CPT

## 2022-05-30 PROCEDURE — 81001 URINALYSIS AUTO W/SCOPE: CPT

## 2022-05-30 PROCEDURE — 96374 THER/PROPH/DIAG INJ IV PUSH: CPT

## 2022-05-30 PROCEDURE — 6360000002 HC RX W HCPCS: Performed by: PHYSICIAN ASSISTANT

## 2022-05-30 PROCEDURE — 36415 COLL VENOUS BLD VENIPUNCTURE: CPT

## 2022-05-30 PROCEDURE — 99285 EMERGENCY DEPT VISIT HI MDM: CPT

## 2022-05-30 PROCEDURE — 2580000003 HC RX 258: Performed by: PHYSICIAN ASSISTANT

## 2022-05-30 PROCEDURE — 85025 COMPLETE CBC W/AUTO DIFF WBC: CPT

## 2022-05-30 PROCEDURE — 6360000004 HC RX CONTRAST MEDICATION: Performed by: PHYSICIAN ASSISTANT

## 2022-05-30 RX ORDER — 0.9 % SODIUM CHLORIDE 0.9 %
1000 INTRAVENOUS SOLUTION INTRAVENOUS ONCE
Status: COMPLETED | OUTPATIENT
Start: 2022-05-30 | End: 2022-05-30

## 2022-05-30 RX ORDER — ONDANSETRON 2 MG/ML
4 INJECTION INTRAMUSCULAR; INTRAVENOUS EVERY 6 HOURS PRN
Status: DISCONTINUED | OUTPATIENT
Start: 2022-05-30 | End: 2022-05-30 | Stop reason: HOSPADM

## 2022-05-30 RX ORDER — ACETAMINOPHEN 500 MG
500 TABLET ORAL ONCE
Status: COMPLETED | OUTPATIENT
Start: 2022-05-30 | End: 2022-05-30

## 2022-05-30 RX ADMIN — IOPAMIDOL 75 ML: 755 INJECTION, SOLUTION INTRAVENOUS at 15:19

## 2022-05-30 RX ADMIN — ONDANSETRON HYDROCHLORIDE 4 MG: 2 INJECTION, SOLUTION INTRAMUSCULAR; INTRAVENOUS at 14:41

## 2022-05-30 RX ADMIN — SODIUM CHLORIDE 1000 ML: 9 INJECTION, SOLUTION INTRAVENOUS at 14:42

## 2022-05-30 RX ADMIN — ACETAMINOPHEN 500 MG: 500 TABLET ORAL at 14:41

## 2022-05-30 ASSESSMENT — ENCOUNTER SYMPTOMS
RESPIRATORY NEGATIVE: 1
ABDOMINAL PAIN: 1
NAUSEA: 1

## 2022-05-30 ASSESSMENT — PAIN - FUNCTIONAL ASSESSMENT: PAIN_FUNCTIONAL_ASSESSMENT: NONE - DENIES PAIN

## 2022-05-30 ASSESSMENT — PAIN DESCRIPTION - LOCATION: LOCATION: ABDOMEN

## 2022-05-30 ASSESSMENT — PAIN SCALES - GENERAL: PAINLEVEL_OUTOF10: 4

## 2022-05-30 ASSESSMENT — PAIN DESCRIPTION - DESCRIPTORS: DESCRIPTORS: ACHING

## 2022-05-30 NOTE — ED PROVIDER NOTES
[address] [facility] Name: Virginia Perez  MRN: [id number]  [place] 1945  Date of evaluation: 5/30/2022  Provider: Mayra Mead PA-C  PCP: Tra Bell MD  Note Started: 1:39 PM EDT       EDMUNDO. I have evaluated this patient. My supervising physician was available for consultation. CHIEF COMPLAINT       Chief Complaint   Patient presents with    Urinary Frequency       HISTORY OF PRESENT ILLNESS   (Location, Timing/Onset, Context/Setting, Quality, Duration, Modifying Factors, Severity, Associated Signs and Symptoms)  Note limiting factors. Chief Complaint: urinary frequency    Virginia Perez is a 64 y.o. female with a past medical history of mild aortic stenosis, hypertension, generalized anxiety disorder acid reflux osteoarthritis, cirrhosis nonalcoholic, history of CVA, history of TIA, history of leukopenia, peripheral vascular disease myasthenia gravis thrombocytopenia anemia brought in today by private car with complaints of urinary frequency. Onset of symptoms over the past several days. States that she feels like she is going more frequently. Denies dysuria. Denies any hematuria. Denies any known fevers or chills. Reports some generalized lower abdominal pain as well. Denies chest pain or shortness of breath today. Denies cough or congestion. She has been taking Tylenol for the discomfort. She does endorse some nausea but denies vomiting. Denies any diarrhea or blood in the stool. Otherwise denies any other complaints. Nothing seems to make symptoms better or worse. Reports that this has happened the in past usually when she has a \"UTI\". Nursing Notes were all reviewed and agreed with or any disagreements were addressed in the HPI. REVIEW OF SYSTEMS    (2-9 systems for level 4, 10 or more for level 5)     Review of Systems   Constitutional: Negative. Respiratory: Negative.     Cardiovascular: Negative. Gastrointestinal: Positive for abdominal pain and nausea. Genitourinary: Positive for frequency. Positives and Pertinent negatives as per HPI. Except as noted above in the ROS, all other systems were reviewed and negative.        PAST MEDICAL HISTORY     Past Medical History:   Diagnosis Date    Acid reflux     Asthma     Generalized anxiety disorder     Hepatitis A 06/16/2020    Hypertension     MDRO (multiple drug resistant organisms) resistance 04/29/2020    URINE    Osteoarthritis     of hips, knees, back    Screening mammogram 3/26/96         SURGICAL HISTORY     Past Surgical History:   Procedure Laterality Date    ACHILLES TENDON SURGERY      CARPAL TUNNEL RELEASE      CATARACT REMOVAL WITH IMPLANT Left 04/06/2017    Left cataract removal. Dr. Benjie Tavarez Right 05/04/2017    PHACO EMULSIFICATION OF CATARACT WITH INTRAOCULAR LENS IMPLANT RIGHT EYE    COLONOSCOPY  3/24/10    FOOT SURGERY      GALLBLADDER SURGERY      HYSTERECTOMY      NECK SURGERY      PAIN MANAGEMENT PROCEDURE N/A 1/20/2020    MIDLINE LUMBAR FIVE SACRAL ONE EPIDURAL STEROID INJECTION SITE CONFIRMED BY FLUOROSCOPY performed by Gabriele Ferro MD at [address] [facility] N/A 9/22/2020    MIDLINE LUMBAR FIVE SACRAL ONE EPIDURAL STEROID INJECTION SITE CONFIRMED BY FLUOROSCOPY performed by Gabriele Ferro MD at [address] [place] [address] GASTROINTESTINAL ENDOSCOPY N/A 2/24/2020    EGD DIAGNOSTIC ONLY performed by George Beltrán MD at [place] [address] 3/26/2021    EGD PEG TUBE INSERTION w/anesthesia performed by George Beltrán MD at [address] [place] [address]       Previous Medications    ALBUTEROL (ACCUNEB) 0.63 MG/3ML NEBULIZER SOLUTION    Take 1 ampule by nebulization every 6 hours as needed for Wheezing    ALBUTEROL SULFATE  (90 BASE) MCG/ACT INHALER    Inhale 2 puffs into the lungs 4 times daily as needed for Wheezing    CALCIUM CARBONATE (OYSTER SHELL CALCIUM 500 MG) 1250 (500 CA) MG TABLET    Take 1,250 mg by mouth daily    CITALOPRAM (CELEXA) 20 MG TABLET    Take 20 mg by mouth daily    LISINOPRIL-HYDROCHLOROTHIAZIDE (PRINZIDE;ZESTORETIC) 10-12.5 MG PER TABLET    Take 1 tablet by mouth daily    MYCOPHENOLATE (CELLCEPT) 500 MG TABLET    Take 500 mg by mouth 2 times daily    NIACIN (SLO-NIACIN) 500 MG EXTENDED RELEASE TABLET    Take 800 mg by mouth nightly    PANTOPRAZOLE (PROTONIX) 20 MG TABLET    1 tablet DAILY (route: oral)    PREDNISONE PO    Take by mouth    PYRIDOSTIGMINE (MESTINON) 60 MG TABLET    Take 30 mg by mouth 3 times daily     SPACER/AERO-HOLDING CHAMBERS DONYA    1 Device by Does not apply route daily as needed (Shortness of breath)    VITAMIN B-12 (CYANOCOBALAMIN) 250 MCG TABLET    Take 1,000 mcg by mouth daily         ALLERGIES     Streptomycin, Sulfa antibiotics, Tylenol with codeine #3 [acetaminophen-codeine], and Verapamil    FAMILYHISTORY       Family History   Problem Relation Age of Onset    High Blood Pressure Mother     High Blood Pressure Child     Allergy (Severe) Child     Allergy (Severe) Child           SOCIAL HISTORY       Social History     Tobacco Use    Smoking status: Never Smoker    Smokeless tobacco: Never Used   Vaping Use    Vaping Use: Never used   Substance Use Topics    Alcohol use: No    Drug use: No       SCREENINGS    Cameron Coma Scale  Eye Opening: Spontaneous  Best Verbal Response: Oriented  Best Motor Response: Obeys commands  Cameron Coma Scale Score: 15        PHYSICAL EXAM    (up to 7 for level 4, 8 or more for level 5)     ED Triage Vitals   BP Temp Temp src Pulse Resp SpO2 Height Weight   -- -- -- -- -- -- -- --       Physical Exam  Vitals and nursing note reviewed. Constitutional:       General: She is awake. She is not in acute distress. Appearance: Normal appearance.  She is well-developed and normal weight. She is not ill-appearing, toxic-appearing or diaphoretic. HENT:      Head: Normocephalic and atraumatic. Nose: Nose normal.   Eyes:      General:         Right eye: No discharge. Left eye: No discharge. Cardiovascular:      Rate and Rhythm: Normal rate and regular rhythm. Pulses:           Radial pulses are 2+ on the right side and 2+ on the left side. Heart sounds: Normal heart sounds. No murmur heard. No gallop. Pulmonary:      Effort: Pulmonary effort is normal. No respiratory distress. Breath sounds: Normal breath sounds. No decreased breath sounds, wheezing, rhonchi or rales. Chest:      Chest wall: No tenderness. Abdominal:      General: Abdomen is flat. Bowel sounds are normal.      Palpations: Abdomen is soft. Tenderness: There is abdominal tenderness in the right lower quadrant, suprapubic area and left lower quadrant. There is no right CVA tenderness, left CVA tenderness, guarding or rebound. Negative signs include Jacobs's sign and McBurney's sign. Musculoskeletal:         General: No deformity. Normal range of motion. Cervical back: Normal range of motion and neck supple. Right lower leg: No edema. Left lower leg: No edema. Skin:     General: Skin is warm and dry. Neurological:      General: No focal deficit present. Mental Status: She is alert and oriented to person, place, and time. GCS: GCS eye subscore is 4. GCS verbal subscore is 5. GCS motor subscore is 6. Psychiatric:         Behavior: Behavior normal. Behavior is cooperative.          DIAGNOSTIC RESULTS   LABS:    Labs Reviewed   URINALYSIS WITH REFLEX TO CULTURE - Abnormal; Notable for the following components:       Result Value    Blood, Urine TRACE-INTACT (*)     All other components within normal limits   CBC WITH AUTO DIFFERENTIAL - Abnormal; Notable for the following components:    Platelets 85 (*)     Lymphocytes Absolute 0.9 (*) All other components within normal limits   COMPREHENSIVE METABOLIC PANEL W/ REFLEX TO MG FOR LOW K - Abnormal; Notable for the following components: Total Protein 5.9 (*)     Total Bilirubin 1.3 (*)     All other components within normal limits   MICROSCOPIC URINALYSIS - Abnormal; Notable for the following components:    Bacteria, UA Rare (*)     All other components within normal limits       When ordered only abnormal lab results are displayed. All other labs were within normal range or not returned as of this dictation. EKG: When ordered, EKG's are interpreted by the Emergency Department Physician in the absence of a cardiologist.  Please see their note for interpretation of EKG. RADIOLOGY:   Non-plain film images such as CT, Ultrasound and MRI are read by the radiologist. Plain radiographic images are visualized and preliminarily interpreted by the ED Provider with the below findings:        Interpretation per the Radiologist below, if available at the time of this note:    CT ABDOMEN PELVIS W IV CONTRAST Additional Contrast? None   Final Result   Addendum 1 of 1   ADDENDUM:   Ultrasound recommended to further evaluate the right renal lesion. Final   Cirrhotic disease of the liver. MRI suggested to further evaluate. No etiology to explain the patient's symptoms. US RENAL COMPLETE    (Results Pending)     No results found.         PROCEDURES   Unless otherwise noted below, none     Procedures    CRITICAL CARE TIME       CONSULTS:  None      EMERGENCY DEPARTMENT COURSE and DIFFERENTIAL DIAGNOSIS/MDM:   Vitals:    Vitals:    05/30/22 1408 05/30/22 1601   BP: (!) 184/72    Pulse: 76 80   Resp: 16    Temp: 98.5 °F (36.9 °C)    SpO2: 100%    Weight: 162 lb (73.5 kg)        Patient was given the following medications:  Medications   ondansetron (ZOFRAN) injection 4 mg (4 mg IntraVENous Given 5/30/22 1441)   0.9 % sodium chloride bolus (0 mLs IntraVENous Stopped 5/30/22 1622) acetaminophen (TYLENOL) tablet 500 mg (500 mg Oral Given 5/30/22 1441)   iopamidol (ISOVUE-370) 76 % injection 75 mL (75 mLs IntraVENous Given 5/30/22 1519)       Patient brought in today by private vehicle with several day history of urinary frequency. On exam she is alert oriented afebrile breathing on room air satting at 100%. She is in no acute distress. Nontoxic in appearance. Old labs records reviewed at this time. Seen and evaluated by myself and my attending was available as needed. Urine negative for infection negative nitrites negative leukocytes with 0-2 WBCs and rare bacteria. CBC shows no white count. Hemoglobin of 12.7. Platelets of 85. She does have known history of thrombocytopenia. No acute electrolyte abnormalities. Kidney function liver function unremarkable. CT scan shows cirrhotic disease of the liver MRI suggested to further evaluate. No etiology to explain patient's symptoms. Ultrasound also recommended to further evaluate the right renal lesion. Patient was updated on all findings. She will be discharged home. She was given outpatient prescription for an ultrasound of the right kidney and told to follow-up with urology and given outpatient urology follow-up. In addition she was told to follow-up with her GI physician Dr. Anna Sosa on for the cirrhotic liver. Also advised close follow-up with her PCP. She states she does have an appointment scheduled in the next 1 week. Patient was given fluids Zofran and Tylenol here. Upon reevaluation she is resting comfortably. Plan at this time will be to discharge home with close follow-up to PCP, GI, urology. Patient told return immediately to the ER if she developed any new or worsening symptoms including but not limited to increased pain, fevers chills intolerable nausea or vomiting, dysuria hematuria or any new or worsening symptoms. She verbalized understanding. Patient discharged in stable condition.     FINAL IMPRESSION      1. Lesion of right native kidney    2. Other cirrhosis of liver ([address]    3. Urinary frequency          DISPOSITION/PLAN   DISPOSITION        PATIENT REFERRED TO:  Lali Reyna MD  [address] , [address] [facility]  [phone]    Schedule an appointment as soon as possible for a visit in 1 day      Jessica Salgado MD  [address] [place] [facility] [address] [facility] [address] [facility]  [phone]    Schedule an appointment as soon as possible for a visit in 1 day      [facility] Emergency Department  [address] [place] [address]  [phone]  Schedule an appointment as soon as possible for a visit   If symptoms worsen    Natty Rodríguez MD  [address] [place] [address] [phone]    Schedule an appointment as soon as possible for a visit         DISCHARGE MEDICATIONS:  New Prescriptions    No medications on file       DISCONTINUED MEDICATIONS:  Discontinued Medications    No medications on file              (Please note that portions of this note were completed with a voice recognition program.  Efforts were made to edit the dictations but occasionally words are mis-transcribed.)    Tracey Ferguson PA-C (electronically signed)            Tracey Ferguson PA-C  05/30/22 0119

## 2022-06-06 ENCOUNTER — HOSPITAL ENCOUNTER (OUTPATIENT)
Dept: ULTRASOUND IMAGING | Age: 77
Discharge: HOME | End: 2022-06-06
Payer: MEDICARE

## 2022-06-06 DIAGNOSIS — N28.9 LESION OF RIGHT NATIVE KIDNEY: ICD-10-CM

## 2022-06-06 PROCEDURE — 76770 US EXAM ABDO BACK WALL COMP: CPT

## 2022-06-13 ENCOUNTER — HOSPITAL ENCOUNTER (OUTPATIENT)
Age: 77
Discharge: HOME | End: 2022-06-13
Payer: MEDICARE

## 2022-06-13 LAB
A/G RATIO: 1.5 (ref 1.1–2.2)
ALBUMIN SERPL-MCNC: 3.3 G/DL (ref 3.4–5)
ALP BLD-CCNC: 69 U/L (ref 40–129)
ALT SERPL-CCNC: 15 U/L (ref 10–40)
ANION GAP SERPL CALCULATED.3IONS-SCNC: 12 MMOL/L (ref 3–16)
AST SERPL-CCNC: 31 U/L (ref 15–37)
BASOPHILS ABSOLUTE: 0.1 K/UL (ref 0–0.2)
BASOPHILS RELATIVE PERCENT: 1.5 %
BILIRUB SERPL-MCNC: 1.1 MG/DL (ref 0–1)
BUN BLDV-MCNC: 11 MG/DL (ref 7–20)
CALCIUM SERPL-MCNC: 8.6 MG/DL (ref 8.3–10.6)
CHLORIDE BLD-SCNC: 104 MMOL/L (ref 99–110)
CO2: 24 MMOL/L (ref 21–32)
CREAT SERPL-MCNC: 1 MG/DL (ref 0.6–1.2)
EOSINOPHILS ABSOLUTE: 0.1 K/UL (ref 0–0.6)
EOSINOPHILS RELATIVE PERCENT: 3 %
GFR AFRICAN AMERICAN: >60
GFR NON-AFRICAN AMERICAN: 53
GLUCOSE BLD-MCNC: 133 MG/DL (ref 70–99)
HCT VFR BLD CALC: 35.1 % (ref 36–48)
HEMOGLOBIN: 11.9 G/DL (ref 12–16)
LYMPHOCYTES ABSOLUTE: 0.8 K/UL (ref 1–5.1)
LYMPHOCYTES RELATIVE PERCENT: 20.1 %
MCH RBC QN AUTO: 30.7 PG (ref 26–34)
MCHC RBC AUTO-ENTMCNC: 34 G/DL (ref 31–36)
MCV RBC AUTO: 90.2 FL (ref 80–100)
MONOCYTES ABSOLUTE: 0.4 K/UL (ref 0–1.3)
MONOCYTES RELATIVE PERCENT: 9.6 %
NEUTROPHILS ABSOLUTE: 2.7 K/UL (ref 1.7–7.7)
NEUTROPHILS RELATIVE PERCENT: 65.8 %
PDW BLD-RTO: 15.3 % (ref 12.4–15.4)
PLATELET # BLD: 106 K/UL (ref 135–450)
PMV BLD AUTO: 10.8 FL (ref 5–10.5)
POTASSIUM SERPL-SCNC: 4.1 MMOL/L (ref 3.5–5.1)
RBC # BLD: 3.89 M/UL (ref 4–5.2)
SODIUM BLD-SCNC: 140 MMOL/L (ref 136–145)
TOTAL PROTEIN: 5.5 G/DL (ref 6.4–8.2)
WBC # BLD: 4.2 K/UL (ref 4–11)

## 2022-06-13 PROCEDURE — 80053 COMPREHEN METABOLIC PANEL: CPT

## 2022-06-13 PROCEDURE — 36415 COLL VENOUS BLD VENIPUNCTURE: CPT

## 2022-06-13 PROCEDURE — 85025 COMPLETE CBC W/AUTO DIFF WBC: CPT

## 2022-07-06 ENCOUNTER — OFFICE VISIT (OUTPATIENT)
Dept: INTERNAL MEDICINE CLINIC | Age: 77
End: 2022-07-06

## 2022-07-06 VITALS
BODY MASS INDEX: 25.31 KG/M2 | SYSTOLIC BLOOD PRESSURE: 138 MMHG | HEIGHT: 68 IN | HEART RATE: 72 BPM | WEIGHT: 167 LBS | DIASTOLIC BLOOD PRESSURE: 78 MMHG

## 2022-07-06 DIAGNOSIS — Z00.00 ROUTINE GENERAL MEDICAL EXAMINATION AT A HEALTH CARE FACILITY: Primary | ICD-10-CM

## 2022-07-06 DIAGNOSIS — D69.6 THROMBOCYTOPENIA (HCC): ICD-10-CM

## 2022-07-06 DIAGNOSIS — F41.1 GENERALIZED ANXIETY DISORDER: ICD-10-CM

## 2022-07-06 DIAGNOSIS — M50.01 DISORDER OF INTERVERTEBRAL DISC OF HIGH CERVICAL REGION WITH MYELOPATHY: ICD-10-CM

## 2022-07-06 DIAGNOSIS — I35.0 MILD AORTIC STENOSIS: Chronic | ICD-10-CM

## 2022-07-06 DIAGNOSIS — I73.9 PVD (PERIPHERAL VASCULAR DISEASE) (HCC): ICD-10-CM

## 2022-07-06 DIAGNOSIS — I10 PRIMARY HYPERTENSION: ICD-10-CM

## 2022-07-06 DIAGNOSIS — N18.31 STAGE 3A CHRONIC KIDNEY DISEASE (HCC): ICD-10-CM

## 2022-07-06 DIAGNOSIS — Z00.00 INITIAL MEDICARE ANNUAL WELLNESS VISIT: ICD-10-CM

## 2022-07-06 PROBLEM — N18.30 CHRONIC RENAL DISEASE, STAGE III (HCC): Status: ACTIVE | Noted: 2022-07-06

## 2022-07-06 PROCEDURE — 1123F ACP DISCUSS/DSCN MKR DOCD: CPT | Performed by: INTERNAL MEDICINE

## 2022-07-06 PROCEDURE — G0438 PPPS, INITIAL VISIT: HCPCS | Performed by: INTERNAL MEDICINE

## 2022-07-06 RX ORDER — TRAMADOL HYDROCHLORIDE 50 MG/1
50 TABLET ORAL EVERY 12 HOURS PRN
Qty: 60 TABLET | Refills: 0 | Status: SHIPPED | OUTPATIENT
Start: 2022-07-06 | End: 2022-08-05

## 2022-07-06 RX ORDER — PANTOPRAZOLE SODIUM 20 MG/1
TABLET, DELAYED RELEASE ORAL
Qty: 90 TABLET | Refills: 1 | Status: SHIPPED | OUTPATIENT
Start: 2022-07-06

## 2022-07-06 RX ORDER — ALBUTEROL SULFATE 90 UG/1
2 AEROSOL, METERED RESPIRATORY (INHALATION) 4 TIMES DAILY PRN
Qty: 1 EACH | Refills: 2 | Status: SHIPPED | OUTPATIENT
Start: 2022-07-06

## 2022-07-06 ASSESSMENT — LIFESTYLE VARIABLES
HOW OFTEN DO YOU HAVE A DRINK CONTAINING ALCOHOL: MONTHLY OR LESS
HOW MANY STANDARD DRINKS CONTAINING ALCOHOL DO YOU HAVE ON A TYPICAL DAY: 1 OR 2

## 2022-07-06 ASSESSMENT — PATIENT HEALTH QUESTIONNAIRE - PHQ9
SUM OF ALL RESPONSES TO PHQ QUESTIONS 1-9: 0
1. LITTLE INTEREST OR PLEASURE IN DOING THINGS: 0
SUM OF ALL RESPONSES TO PHQ QUESTIONS 1-9: 0
SUM OF ALL RESPONSES TO PHQ9 QUESTIONS 1 & 2: 0
2. FEELING DOWN, DEPRESSED OR HOPELESS: 0

## 2022-07-06 NOTE — PROGRESS NOTES
Medicare Annual Wellness Visit    Adrienne Lamb is here for Medicare AWV    Assessment & Plan   Routine general medical examination at a health care facility  Stage 3a chronic kidney disease ([address]  PVD (peripheral vascular disease) ([address]  Primary hypertension  Mild aortic stenosis  Generalized anxiety disorder  Thrombocytopenia ([address]      Recommendations for Preventive Services Due: see orders and patient instructions/AVS.  Recommended screening schedule for the next 5-10 years is provided to the patient in written form: see Patient Instructions/AVS.     No follow-ups on file. Subjective   The following acute and/or chronic problems were also addressed today:   Diagnosis Orders   1. Routine general medical examination at a health care facility     2. Stage 3a chronic kidney disease ([address]     3. PVD (peripheral vascular disease) ([address]     4. Primary hypertension     5. Mild aortic stenosis     6. Generalized anxiety disorder     7. Thrombocytopenia ([address]     8. 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 Bp is good  Continue lisinopril/HCTZ 10/12.5 mg daily      Generalized anxiety disorder.  Stable.  Continue citalopram.     GERD.  Stable.  Continue Protonix.     Cirrhosis.  Nonalcoholic steatohepatitis.  Patient has been advised to lose weight.     Chronic anemia and thrombocytopenia  2 to cirrhosis  Also sees a hematologist   Now on iron      Asthma.  Mild intermittent.  Stable.  Continue albuterol inhalers.     Mild aortic stenosis.  Asymptomatic. Sees cardiology     Myasthenia gravis. Sees neuro  Stable on mestinon and cellcept      H/o  severe cervical degenerative disc disease.  s/p cervical fusion. Doing well. Has back pain and pin in hips,shoulder,knees   tramadol 50 mg q 12 prn    Advised Shingrix    Patient's complete Health Risk Assessment and screening values have been reviewed and are found in Flowsheets.  The daily  Historical Provider, MD   niacin (SLO-NIACIN) 500 MG extended release tablet Take 800 mg by mouth nightly  Historical Provider, MD   mycophenolate (CELLCEPT) 500 MG tablet Take 500 mg by mouth 2 times daily  Historical Provider, MD   lisinopril-hydroCHLOROthiazide (PRINZIDE;ZESTORETIC) 10-12.5 MG per tablet Take 1 tablet by mouth daily  Audra Higginbotham MD   pyridostigmine (MESTINON) 60 MG tablet Take 30 mg by mouth 3 times daily   Historical Provider, MD   citalopram (CELEXA) 20 MG tablet Take 20 mg by mouth daily  Patient not taking: Reported on 3/22/2022  Historical Provider, MD   albuterol (ACCUNEB) 0.63 MG/3ML nebulizer solution Take 1 ampule by nebulization every 6 hours as needed for Wheezing  Patient not taking: Reported on 3/22/2022  Historical Provider, MD   albuterol sulfate  (90 Base) MCG/ACT inhaler Inhale 2 puffs into the lungs 4 times daily as needed for Wheezing  Santos Velázquez MD   Spacer/Aero-Holding Chambers DONYA 1 Device by Does not apply route daily as needed (Shortness of breath)  Santos Velázquez MD       [facility] (Including outside providers/suppliers regularly involved in providing care):   Patient Care Team:  Chava Coon MD as PCP - General (Internal Medicine)  Chava Coon MD as PCP - [facility] Empaneled Provider     Reviewed and updated this visit:  Tobacco  Allergies  Meds  Problems  Med Hx  Surg Hx  Soc Hx  Fam Hx

## 2022-07-06 NOTE — PATIENT INSTRUCTIONS
Personalized Preventive Plan for Betty Courts - 7/6/2022  Medicare offers a range of preventive health benefits. Some of the tests and screenings are paid in full while other may be subject to a deductible, co-insurance, and/or copay. Some of these benefits include a comprehensive review of your medical history including lifestyle, illnesses that may run in your family, and various assessments and screenings as appropriate. After reviewing your medical record and screening and assessments performed today your provider may have ordered immunizations, labs, imaging, and/or referrals for you. A list of these orders (if applicable) as well as your Preventive Care list are included within your After Visit Summary for your review. Other Preventive Recommendations:    · A preventive eye exam performed by an eye specialist is recommended every 1-2 years to screen for glaucoma; cataracts, macular degeneration, and other eye disorders. · A preventive dental visit is recommended every 6 months. · Try to get at least 150 minutes of exercise per week or 10,000 steps per day on a pedometer . · Order or download the FREE \"Exercise & Physical Activity: Your Everyday Guide\" from [employer]. Call 8-592.479.3505 or search [employer] online. · You need 0087-5880 mg of calcium and 7563-9839 IU of vitamin D per day. It is possible to meet your calcium requirement with diet alone, but a vitamin D supplement is usually necessary to meet this goal.  · When exposed to the sun, use a sunscreen that protects against both UVA and UVB radiation with an SPF of 30 or greater. Reapply every 2 to 3 hours or after sweating, drying off with a towel, or swimming. · Always wear a seat belt when traveling in a car. Always wear a helmet when riding a bicycle or motorcycle.

## 2022-07-12 ENCOUNTER — HOSPITAL ENCOUNTER (OUTPATIENT)
Dept: CT IMAGING | Age: 77
Discharge: HOME | End: 2022-07-12
Payer: MEDICARE

## 2022-07-12 DIAGNOSIS — D41.00 NEOPLASM OF UNCERTAIN BEHAVIOR OF KIDNEY, UNSPECIFIED LATERALITY: ICD-10-CM

## 2022-07-12 PROCEDURE — 74170 CT ABD WO CNTRST FLWD CNTRST: CPT

## 2022-07-12 PROCEDURE — 6360000004 HC RX CONTRAST MEDICATION: Performed by: UROLOGY

## 2022-07-12 RX ADMIN — IOPAMIDOL 75 ML: 755 INJECTION, SOLUTION INTRAVENOUS at 15:39

## 2022-10-06 ENCOUNTER — OFFICE VISIT (OUTPATIENT)
Dept: INTERNAL MEDICINE CLINIC | Age: 77
End: 2022-10-06

## 2022-10-06 VITALS
DIASTOLIC BLOOD PRESSURE: 80 MMHG | BODY MASS INDEX: 26.83 KG/M2 | WEIGHT: 177 LBS | SYSTOLIC BLOOD PRESSURE: 168 MMHG | HEART RATE: 76 BPM | HEIGHT: 68 IN

## 2022-10-06 DIAGNOSIS — I10 PRIMARY HYPERTENSION: Primary | ICD-10-CM

## 2022-10-06 DIAGNOSIS — K74.60 CIRRHOSIS, NONALCOHOLIC (HCC): ICD-10-CM

## 2022-10-06 DIAGNOSIS — I73.9 PVD (PERIPHERAL VASCULAR DISEASE) (HCC): ICD-10-CM

## 2022-10-06 DIAGNOSIS — D69.6 THROMBOCYTOPENIA (HCC): ICD-10-CM

## 2022-10-06 DIAGNOSIS — G70.00 MYASTHENIA GRAVIS (HCC): ICD-10-CM

## 2022-10-06 DIAGNOSIS — Z23 NEED FOR INFLUENZA VACCINATION: ICD-10-CM

## 2022-10-06 DIAGNOSIS — M17.11 PRIMARY OSTEOARTHRITIS OF RIGHT KNEE: ICD-10-CM

## 2022-10-06 PROCEDURE — 99214 OFFICE O/P EST MOD 30 MIN: CPT | Performed by: INTERNAL MEDICINE

## 2022-10-06 PROCEDURE — G8399 PT W/DXA RESULTS DOCUMENT: HCPCS | Performed by: INTERNAL MEDICINE

## 2022-10-06 PROCEDURE — G8484 FLU IMMUNIZE NO ADMIN: HCPCS | Performed by: INTERNAL MEDICINE

## 2022-10-06 PROCEDURE — 1090F PRES/ABSN URINE INCON ASSESS: CPT | Performed by: INTERNAL MEDICINE

## 2022-10-06 PROCEDURE — 1036F TOBACCO NON-USER: CPT | Performed by: INTERNAL MEDICINE

## 2022-10-06 PROCEDURE — G8427 DOCREV CUR MEDS BY ELIG CLIN: HCPCS | Performed by: INTERNAL MEDICINE

## 2022-10-06 PROCEDURE — G0008 ADMIN INFLUENZA VIRUS VAC: HCPCS | Performed by: INTERNAL MEDICINE

## 2022-10-06 PROCEDURE — 90662 IIV NO PRSV INCREASED AG IM: CPT | Performed by: INTERNAL MEDICINE

## 2022-10-06 PROCEDURE — G8417 CALC BMI ABV UP PARAM F/U: HCPCS | Performed by: INTERNAL MEDICINE

## 2022-10-06 PROCEDURE — 1123F ACP DISCUSS/DSCN MKR DOCD: CPT | Performed by: INTERNAL MEDICINE

## 2022-10-06 RX ORDER — CITALOPRAM 20 MG/1
20 TABLET ORAL DAILY
Qty: 30 TABLET | Refills: 3 | Status: SHIPPED | OUTPATIENT
Start: 2022-10-06

## 2022-10-06 RX ORDER — LISINOPRIL AND HYDROCHLOROTHIAZIDE 20; 12.5 MG/1; MG/1
1 TABLET ORAL DAILY
Qty: 30 TABLET | Refills: 2 | Status: SHIPPED | OUTPATIENT
Start: 2022-10-06

## 2022-10-06 ASSESSMENT — ENCOUNTER SYMPTOMS
CHEST TIGHTNESS: 0
ABDOMINAL PAIN: 0
NAUSEA: 0
VOMITING: 0
BLOOD IN STOOL: 0
SHORTNESS OF BREATH: 0
DIARRHEA: 0
WHEEZING: 0
COUGH: 0

## 2022-10-06 NOTE — PROGRESS NOTES
Anna Richter (:  1945) is a 76 y.o. female,Established patient, here for evaluation of the following chief complaint(s):  No chief complaint on file. ASSESSMENT/PLAN:        Diagnosis Orders   1. Primary hypertension        2. Cirrhosis, nonalcoholic ([facility]        3. Primary osteoarthritis of right knee        4. PVD (peripheral vascular disease) ([facility]        5. Myasthenia gravis ([facility]        6. Thrombocytopenia ([facility]        7. Need for influenza vaccination              Hypertension. Bp is elevated   Increase L/H to 20/12.5 mg daily      Generalized anxiety disorder. Stable. Continue citalopram.     GERD. Stable. Continue Protonix. Cirrhosis. Nonalcoholic steatohepatitis. Chronic anemia and thrombocytopenia  2 to cirrhosis  Also sees a hematologist   Now on iron      Asthma. Mild intermittent. Stable. Continue albuterol inhalers. Mild aortic stenosis. Asymptomatic. Sees cardiology     Myasthenia gravis. Sees neuro  Stable on mestinon and cellcept      H/o  severe cervical degenerative disc disease. s/p cervical fusion. Doing well. Has back pain and pin in hips,shoulder,knees   tramadol 50 mg q 12 prn- did not help much     Advised Shingrix    Subjective   SUBJECTIVE/OBJECTIVE:  HPI  She has a history of hypertension which is under good control. Has generalized anxiety disorder stable on Celexa. Has GERD which is stable on proton pump inhibitor. H/o asthma - mild intermittent    Has myasthenia gravis- sees neurologist - stable. H/o Cervical fusion 2021  H/o ASTORGA- no edema,abd distension    Review of Systems   Constitutional:  Negative for fatigue, fever and unexpected weight change. Respiratory:  Negative for cough, chest tightness, shortness of breath and wheezing. Cardiovascular:  Negative for chest pain, palpitations and leg swelling. Gastrointestinal:  Negative for abdominal pain, blood in stool, diarrhea, nausea and vomiting.    Genitourinary: Negative for dysuria and hematuria. Neurological:  Negative for light-headedness and headaches. Objective   Physical Exam  Constitutional:       Appearance: She is well-developed. HENT:      Head: Normocephalic and atraumatic. Eyes:      Pupils: Pupils are equal, round, and reactive to light. Neck:      Thyroid: No thyromegaly. Cardiovascular:      Rate and Rhythm: Normal rate and regular rhythm. Heart sounds: Murmur heard. No friction rub. No gallop. Pulmonary:      Effort: Pulmonary effort is normal. No respiratory distress. Breath sounds: Normal breath sounds. No wheezing or rales. Chest:      Chest wall: No tenderness. Abdominal:      General: Bowel sounds are normal. There is no distension. Palpations: Abdomen is soft. There is no mass. Tenderness: There is no abdominal tenderness. There is no guarding or rebound. Musculoskeletal:      Cervical back: Normal range of motion and neck supple. Neurological:      Mental Status: She is alert and oriented to person, place, and time. An electronic signature was used to authenticate this note.     --Ozzie Sher MD

## 2022-12-27 RX ORDER — LISINOPRIL AND HYDROCHLOROTHIAZIDE 20; 12.5 MG/1; MG/1
TABLET ORAL
Qty: 30 TABLET | Refills: 2 | Status: SHIPPED | OUTPATIENT
Start: 2022-12-27

## 2023-01-20 ENCOUNTER — OFFICE VISIT (OUTPATIENT)
Dept: INTERNAL MEDICINE CLINIC | Age: 78
End: 2023-01-20

## 2023-01-20 VITALS
DIASTOLIC BLOOD PRESSURE: 84 MMHG | HEART RATE: 72 BPM | WEIGHT: 184 LBS | HEIGHT: 68 IN | SYSTOLIC BLOOD PRESSURE: 148 MMHG | BODY MASS INDEX: 27.89 KG/M2

## 2023-01-20 DIAGNOSIS — I10 PRIMARY HYPERTENSION: ICD-10-CM

## 2023-01-20 DIAGNOSIS — Z00.00 ROUTINE GENERAL MEDICAL EXAMINATION AT A HEALTH CARE FACILITY: ICD-10-CM

## 2023-01-20 DIAGNOSIS — R41.3 MEMORY LOSS: ICD-10-CM

## 2023-01-20 DIAGNOSIS — N18.31 STAGE 3A CHRONIC KIDNEY DISEASE (HCC): ICD-10-CM

## 2023-01-20 DIAGNOSIS — G70.00 MYASTHENIA GRAVIS (HCC): ICD-10-CM

## 2023-01-20 DIAGNOSIS — I73.9 PVD (PERIPHERAL VASCULAR DISEASE) (HCC): ICD-10-CM

## 2023-01-20 DIAGNOSIS — R07.9 CHEST PAIN, UNSPECIFIED TYPE: ICD-10-CM

## 2023-01-20 DIAGNOSIS — I10 PRIMARY HYPERTENSION: Primary | ICD-10-CM

## 2023-01-20 DIAGNOSIS — K74.60 CIRRHOSIS, NONALCOHOLIC (HCC): ICD-10-CM

## 2023-01-20 DIAGNOSIS — D69.6 THROMBOCYTOPENIA (HCC): ICD-10-CM

## 2023-01-20 LAB
A/G RATIO: 1.8 (ref 1.1–2.2)
ALBUMIN SERPL-MCNC: 3.7 G/DL (ref 3.4–5)
ALP BLD-CCNC: 56 U/L (ref 40–129)
ALT SERPL-CCNC: 12 U/L (ref 10–40)
AMMONIA: 61 UMOL/L (ref 11–51)
ANION GAP SERPL CALCULATED.3IONS-SCNC: 13 MMOL/L (ref 3–16)
AST SERPL-CCNC: 24 U/L (ref 15–37)
BASOPHILS ABSOLUTE: 0.1 K/UL (ref 0–0.2)
BASOPHILS RELATIVE PERCENT: 1.9 %
BILIRUB SERPL-MCNC: 1 MG/DL (ref 0–1)
BUN BLDV-MCNC: 14 MG/DL (ref 7–20)
CALCIUM SERPL-MCNC: 8.6 MG/DL (ref 8.3–10.6)
CHLORIDE BLD-SCNC: 105 MMOL/L (ref 99–110)
CO2: 24 MMOL/L (ref 21–32)
CREAT SERPL-MCNC: 0.9 MG/DL (ref 0.6–1.2)
EOSINOPHILS ABSOLUTE: 0.2 K/UL (ref 0–0.6)
EOSINOPHILS RELATIVE PERCENT: 4.5 %
GFR SERPL CREATININE-BSD FRML MDRD: >60 ML/MIN/{1.73_M2}
GLUCOSE BLD-MCNC: 97 MG/DL (ref 70–99)
HCT VFR BLD CALC: 34.7 % (ref 36–48)
HEMOGLOBIN: 11.5 G/DL (ref 12–16)
LYMPHOCYTES ABSOLUTE: 0.6 K/UL (ref 1–5.1)
LYMPHOCYTES RELATIVE PERCENT: 15.1 %
MCH RBC QN AUTO: 29.7 PG (ref 26–34)
MCHC RBC AUTO-ENTMCNC: 33 G/DL (ref 31–36)
MCV RBC AUTO: 89.7 FL (ref 80–100)
MONOCYTES ABSOLUTE: 0.3 K/UL (ref 0–1.3)
MONOCYTES RELATIVE PERCENT: 8.3 %
NEUTROPHILS ABSOLUTE: 2.6 K/UL (ref 1.7–7.7)
NEUTROPHILS RELATIVE PERCENT: 70.2 %
PDW BLD-RTO: 15.5 % (ref 12.4–15.4)
PLATELET # BLD: 96 K/UL (ref 135–450)
PLATELET SLIDE REVIEW: ABNORMAL
PMV BLD AUTO: 10.2 FL (ref 5–10.5)
POTASSIUM SERPL-SCNC: 3.9 MMOL/L (ref 3.5–5.1)
RBC # BLD: 3.86 M/UL (ref 4–5.2)
SLIDE REVIEW: ABNORMAL
SODIUM BLD-SCNC: 142 MMOL/L (ref 136–145)
TOTAL PROTEIN: 5.8 G/DL (ref 6.4–8.2)
TSH REFLEX: 1.65 UIU/ML (ref 0.27–4.2)
VITAMIN B-12: 1205 PG/ML (ref 211–911)
WBC # BLD: 3.8 K/UL (ref 4–11)

## 2023-01-20 PROCEDURE — 99214 OFFICE O/P EST MOD 30 MIN: CPT | Performed by: INTERNAL MEDICINE

## 2023-01-20 PROCEDURE — 93000 ELECTROCARDIOGRAM COMPLETE: CPT | Performed by: INTERNAL MEDICINE

## 2023-01-20 PROCEDURE — G8427 DOCREV CUR MEDS BY ELIG CLIN: HCPCS | Performed by: INTERNAL MEDICINE

## 2023-01-20 PROCEDURE — G8399 PT W/DXA RESULTS DOCUMENT: HCPCS | Performed by: INTERNAL MEDICINE

## 2023-01-20 PROCEDURE — 3077F SYST BP >= 140 MM HG: CPT | Performed by: INTERNAL MEDICINE

## 2023-01-20 PROCEDURE — G8484 FLU IMMUNIZE NO ADMIN: HCPCS | Performed by: INTERNAL MEDICINE

## 2023-01-20 PROCEDURE — 1123F ACP DISCUSS/DSCN MKR DOCD: CPT | Performed by: INTERNAL MEDICINE

## 2023-01-20 PROCEDURE — 3079F DIAST BP 80-89 MM HG: CPT | Performed by: INTERNAL MEDICINE

## 2023-01-20 PROCEDURE — 1036F TOBACCO NON-USER: CPT | Performed by: INTERNAL MEDICINE

## 2023-01-20 PROCEDURE — 1090F PRES/ABSN URINE INCON ASSESS: CPT | Performed by: INTERNAL MEDICINE

## 2023-01-20 PROCEDURE — G8417 CALC BMI ABV UP PARAM F/U: HCPCS | Performed by: INTERNAL MEDICINE

## 2023-01-20 ASSESSMENT — ENCOUNTER SYMPTOMS
ABDOMINAL PAIN: 0
DIARRHEA: 0
CHEST TIGHTNESS: 0
COUGH: 0
SHORTNESS OF BREATH: 0
VOMITING: 0
BLOOD IN STOOL: 0
NAUSEA: 0
WHEEZING: 0

## 2023-01-20 NOTE — PROGRESS NOTES
Noel Gonzalez (:  1945) is a 76 y.o. female,Established patient, here for evaluation of the following chief complaint(s):  Follow-up         ASSESSMENT/PLAN:        Diagnosis Orders   1. Primary hypertension  CBC with Auto Differential    Comprehensive Metabolic Panel      2. Myasthenia gravis ([facility]        3. Cirrhosis, nonalcoholic (HCC)  Ammonia      4. PVD (peripheral vascular disease) ([facility]        5. Thrombocytopenia (HCC)        6. Stage 3a chronic kidney disease ([facility]        7. Memory loss  Vitamin B12      8. Routine general medical examination at a [facility] facility  TSH with Reflex      9. Chest pain, unspecified type  EKG 12 Lead - Clinic Performed    EKG 12 Lead - Clinic Performed          Hypertension. Bp is elevated  Continue L/H to 20/12.5 mg daily for now      Generalized anxiety disorder. Stable. Continue citalopram.     GERD. Stable. Continue Protonix. Cirrhosis. Nonalcoholic steatohepatitis. Chest pain  EKG- NSR, RBBB,LAHB  Advised to see her cardiologist soon     Chronic anemia and thrombocytopenia  2 to cirrhosis  Also sees a hematologist   Now on iron      Asthma. Mild intermittent. Stable. Continue albuterol inhalers. Mild aortic stenosis. Asymptomatic. Sees cardiology     Myasthenia gravis. Sees neuro  Stable on mestinon and cellcept     Memory loss   Check TSH, B 12   Check ammonia   See neurology     H/o  severe cervical degenerative disc disease. s/p cervical fusion. Doing well. Advised Shingrix    Subjective   SUBJECTIVE/OBJECTIVE:  HPI  She has a history of hypertension which is under good control. Has generalized anxiety disorder stable on Celexa. Has GERD which is stable on proton pump inhibitor. H/o asthma - mild intermittent    Has myasthenia gravis- sees neurologist - stable.   H/o Cervical fusion 2021  H/o ASTORGA- no edema,abd distension    She is concerned about her memory  Having difficulty driving and sometimes     Review of Systems   Constitutional:  Negative for fatigue, fever and unexpected weight change. Respiratory:  Negative for cough, chest tightness, shortness of breath and wheezing. Cardiovascular:  Positive for chest pain (left sided, not related to exertion ,radiates t oleft scapula,lasts 15 minutes or so). Negative for palpitations and leg swelling. Gastrointestinal:  Negative for abdominal pain, blood in stool, diarrhea, nausea and vomiting. Genitourinary:  Negative for dysuria and hematuria. Neurological:  Negative for light-headedness. Objective   Physical Exam  Constitutional:       Appearance: She is well-developed. HENT:      Head: Normocephalic and atraumatic. Eyes:      Pupils: Pupils are equal, round, and reactive to light. Neck:      Thyroid: No thyromegaly. Cardiovascular:      Rate and Rhythm: Normal rate and regular rhythm. Heart sounds: Normal heart sounds. No murmur heard. No friction rub. No gallop. Comments: No carotid bruit  Pulmonary:      Effort: Pulmonary effort is normal. No respiratory distress. Breath sounds: Normal breath sounds. No wheezing or rales. Chest:      Chest wall: No tenderness. Abdominal:      General: Bowel sounds are normal. There is no distension. Palpations: Abdomen is soft. There is no mass. Tenderness: There is no abdominal tenderness. There is no guarding or rebound. Musculoskeletal:      Cervical back: Normal range of motion and neck supple. Neurological:      Mental Status: She is alert and oriented to person, place, and time. An electronic signature was used to authenticate this note.     --Kerry Luna MD

## 2023-01-23 ENCOUNTER — TELEPHONE (OUTPATIENT)
Dept: INTERNAL MEDICINE CLINIC | Age: 78
End: 2023-01-23

## 2023-01-23 ENCOUNTER — OFFICE VISIT (OUTPATIENT)
Dept: CARDIOLOGY CLINIC | Age: 78
End: 2023-01-23

## 2023-01-23 VITALS
WEIGHT: 177.8 LBS | DIASTOLIC BLOOD PRESSURE: 80 MMHG | OXYGEN SATURATION: 96 % | HEIGHT: 68 IN | HEART RATE: 66 BPM | BODY MASS INDEX: 26.95 KG/M2 | SYSTOLIC BLOOD PRESSURE: 160 MMHG | TEMPERATURE: 98.3 F

## 2023-01-23 DIAGNOSIS — R39.9 UTI SYMPTOMS: Primary | ICD-10-CM

## 2023-01-23 DIAGNOSIS — I10 PRIMARY HYPERTENSION: ICD-10-CM

## 2023-01-23 DIAGNOSIS — R06.09 EXERTIONAL DYSPNEA: ICD-10-CM

## 2023-01-23 DIAGNOSIS — I35.0 MILD AORTIC STENOSIS: Chronic | ICD-10-CM

## 2023-01-23 DIAGNOSIS — I20.0 UNSTABLE ANGINA PECTORIS (HCC): Primary | ICD-10-CM

## 2023-01-23 PROBLEM — R07.89 OTHER CHEST PAIN: Status: ACTIVE | Noted: 2023-01-23

## 2023-01-23 RX ORDER — LACTULOSE 10 G/15ML
10 SOLUTION ORAL EVERY EVENING
Qty: 450 ML | Refills: 2 | Status: SHIPPED | OUTPATIENT
Start: 2023-01-23

## 2023-01-23 NOTE — PATIENT INSTRUCTIONS
Plan:  1. Current epic labs reviewed with patient. 2. Current medications reviewed. Refills given as warranted. 3. Call [phone] to schedule a Myoview Stress Test to look for blockages and the blood flow through the heart  -A small IV will be placed into your arm to administer a radioisotope (myoview) to visualize your heart. .   -You will then have a waiting period to allow the tracer to be absorbed by the heart muscle. After the waiting period, we will take pictures of the blood flow to your heart muscle with the nuclear camera. -Following your pictures, we will perform your stress test. We can do your stress test by  giving you a medication (Rylie Grout) which makes your body think it is exercising.   -We will monitor you before, during, and after your stress test to make sure you are safe and comfortable.  -you will need to be fasting after midnight  -No caffeine for 24 hours before testing  -testing can last 3-4 hours   4. Repeat bp 160/80  5. I am going to give your blood pressure a little more time to work since it was just changed by pcp. 6. I recommend following up with Dr. Sameera Cardenas about the pain you are having under your right rib. Call and see if you can get your appointment moved up to February.    7.  Recommend having a chest xray also    Follow up with me in 6 months

## 2023-01-23 NOTE — TELEPHONE ENCOUNTER
----- Message from Lovetta Alpers, MD sent at 1/23/2023  3:58 PM EST -----  What did he recommend for her chest pain she was having on the left side     She can come and leave an urine sample  ----- Message -----  From: Keith Juarez  Sent: 1/23/2023   3:49 PM EST  To: Lovetta Alpers, MD    Pt states she seen Dr. Yaniv Novoa today and he recommended that she schedule another appointment with you as the cyst on her R kidney may have burst. Pt just seen you on Friday, do you need to see her again?

## 2023-01-23 NOTE — TELEPHONE ENCOUNTER
----- Message from Gary Tristan MD sent at 1/23/2023  3:43 PM EST -----  All the blood tests are stable except for mild elevation in ammonia level    Elevation in ammonia levels from the cirrhosis  Start lactulose 10 g daily - 450 ml 2 rf   So help decrease the ammonia level. Will cause more frequent BMs.

## 2023-01-23 NOTE — PROGRESS NOTES
[address] Office Note  1/23/2023     Subjective:  Ms. 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 Diagnosed with Myastenia gravis April 2021  C/o Chest Pain upper abdominal pain  and SOB    [place]:  OV 3/22/22 started lisinopril-HCTZ 10-12.5 mg daily. Stopped amlodipine for swelling. Limited echo 4/22/22 showed EF=60%. Moderate posterior leaflet mitral annular calcification. Pressures consistent with mild aortic stenosis. Presented to ED 5/30/22 Rt kidney lesion and cirrhotic liver. Renal US 7/7/22 unremarkable. CT abdomen 7/13/22 showed benign cyst rt kidney, cirrhosis of liver with finding of portal HTN. Today, her daughter is present. She reports she had what felt like a knot under her right rib and at [place] it felt like it burst and the pain shot across her abdomen. Cp is center of chest, under left armpit and radiates to back for the past month. Pain comes and goes and nothing brings it on. [place] does not cause the pain. She can be sitting and CP occurs. The pain lasts a few minutes to a few hours. She denies any pain in neck, jaw or arm. She is more SOB than usual with exertion. She is winded when she is SOB    Patient is vaccinated against Covid. Moderna 2/2     PMH:. Admitted to the hospital in March 2021 for weakness and dysphagia. Peg tube was placed at that time. She reports being newly diagnosed with myasthenia gravis 1 month ago. She is following with [facility] neurology Dr. Richard Gutierrez. Admitted 2/21/20 for dyspnea. Work up raised concern for cirrhosis. Liver ultrasound did confirm a cirrhotic appearance of the liver with mild splenomegaly. This was thought  to be cause of dyspnea and volume overload- diuresed with lasix. She had readmission 2/29/20 for acute ride side facial droop and right side weakness. CT was negative for acute findings. Pt unable to tolerate MRI.  Pt status returned to baseline symptoms presumed likely TIA. Ms. Shia Patrick has PMH: of Aortic Stenosis, HTN, HLD, and shortness of breath . She has had allergies which causes her to feel SOB easily with activity. This is unchanged from previous visit. She takes an allergy pill and symptoms improve        Review of Systems:      12 point ROS negative in all areas as listed below except as in [place]  Constitutional, EENT, pulmonary, GI, , skin, neurological, hematological, endocrine, Psychiatric. Reviewed past medical history, social, and family history. Does not smoke  Rarely drinks alcohol.    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 Dr. Naheed Barker Right 05/04/2017    PHACO EMULSIFICATION OF CATARACT WITH INTRAOCULAR LENS IMPLANT RIGHT EYE    COLONOSCOPY  3/24/10    FOOT SURGERY      GALLBLADDER SURGERY      HYSTERECTOMY (CERVIX STATUS UNKNOWN)      NECK SURGERY      PAIN MANAGEMENT PROCEDURE N/A 1/20/2020    MIDLINE LUMBAR FIVE SACRAL ONE EPIDURAL STEROID INJECTION SITE CONFIRMED BY FLUOROSCOPY performed by Flaquita Rodriguez MD at [address] [facility] N/A 9/22/2020    MIDLINE LUMBAR FIVE SACRAL ONE EPIDURAL STEROID INJECTION SITE CONFIRMED BY FLUOROSCOPY performed by Flaquita Rodriguez MD at [address] [facility] 2/24/2020    EGD DIAGNOSTIC ONLY performed by Betty Sanchez MD at [address] [facility] GASTROINTESTINAL ENDOSCOPY N/A 3/26/2021    EGD PEG TUBE INSERTION w/anesthesia performed by Jakub Humphries MD at [facility]       Objective: Repeat BP: 176/78  BP (!) 160/80   Pulse 66   Temp 98.3 °F (36.8 °C)   Ht 5' 8\" (1.727 m)   Wt 177 lb 12.8 oz (80.6 kg)   SpO2 96%   BMI 27.03 kg/m²     Wt Readings from Last 3 Encounters:   01/23/23 177 lb 12.8 oz (80.6 kg)   01/20/23 184 lb (83.5 kg)   10/06/22 177 lb (80.3 kg)       Physical Exam:  General: No Respiratory distress, appears well developed and well nourished. Eyes:  Sclera nonicteric  Nose/Sinuses:  negative findings: nose shows no deformity, asymmetry, or inflammation, nasal mucosa normal, septum midline with no perforation or bleeding  Back:  no pain to palpation  Joint:  no active joint inflammation  Musculoskeletal:  negative  Skin:  Warm and dry  Neck:  + JVD and  No Carotid Bruits. Chest:  Clear to auscultation, respiration easy  Cardiovascular:  RRR 64 bpm, S1S2 normal, 1/6 MIRELLA  Transmitted to carotids consistent with aortic stenosis, no diastolic murmur, no rub or thrill.   Abdomen:  Soft normal liver and spleen  Extremities: No edema  no clubbing, cyanosis,  Neuro: intact    Medications:   Outpatient Encounter Medications as of 1/23/2023   Medication Sig Dispense Refill    lisinopril-hydroCHLOROthiazide (PRINZIDE;ZESTORETIC) 20-12.5 MG per tablet TAKE ONE TABLET BY MOUTH DAILY 30 tablet 2    citalopram (CELEXA) 20 MG tablet Take 1 tablet by mouth daily 30 tablet 3    pantoprazole (PROTONIX) 20 MG tablet 1 tablet DAILY (route: oral) 90 tablet 1    albuterol sulfate HFA (PROVENTIL;VENTOLIN;PROAIR) 108 (90 Base) MCG/ACT inhaler Inhale 2 puffs into the lungs 4 times daily as needed for Wheezing 1 each 2    vitamin B-12 (CYANOCOBALAMIN) 250 MCG tablet Take 1,000 mcg by mouth daily      calcium carbonate (OYSTER SHELL CALCIUM 500 MG) 1250 (500 Ca) MG tablet Take 1,250 mg by mouth daily      niacin (SLO-NIACIN) 500 MG extended release tablet Take 800 mg by mouth nightly      mycophenolate (CELLCEPT) 500 MG tablet Take 500 mg by mouth 2 times daily      pyridostigmine (MESTINON) 60 MG tablet Take 30 mg by mouth 3 times daily       albuterol (ACCUNEB) 0.63 MG/3ML nebulizer solution Take 1 ampule by nebulization every 6 hours as needed for Wheezing      Spacer/Aero-Holding Chambers DONYA 1 Device by Does not apply route daily as needed (Shortness of breath) 1 Device 0     No facility-administered encounter medications on file as of 1/23/2023. Lab Data:  CBC:   No results for input(s): WBC, HGB, HCT, MCV, PLT in the last 72 hours. BMP:   No results for input(s): NA, K, CL, CO2, PHOS, BUN, CREATININE, CA in the last 72 hours. LIVER PROFILE:   No results for input(s): AST, ALT, LIPASE, BILIDIR, BILITOT, ALKPHOS in the last 72 hours. Invalid input(s): AMYLASE,  ALB    LIPID:   Lab Results   Component Value Date    CHOL 176 10/25/2018    CHOL 133 10/28/2016    CHOL 177 09/03/2013     Lab Results   Component Value Date    TRIG 79 10/25/2018    TRIG 75 10/28/2016    TRIG 145 09/03/2013     Lab Results   Component Value Date    HDL 63 (H) 11/22/2020    HDL 70 10/25/2018    HDL 39 (L) 10/28/2016     Lab Results   Component Value Date    LDLCALC 83 11/22/2020    LDLCALC 90 10/25/2018    LDLCALC 79 10/28/2016     Lab Results   Component Value Date    LABVLDL 15 11/22/2020    LABVLDL 15 10/28/2016    LABVLDL 29 09/03/2013    VLDL 16 10/25/2018     No results found for: CHOLHDLRATIO  PT/INR: No results for input(s): PROTIME, INR in the last 72 hours. A1C: No results found for: LABA1C  BNP:  No results for input(s): BNP in the last 72 hours. IMAGING:    EKG 1/20/23  Sinus  Rhythm   -Right bundle branch block with left axis -bifascicular block. CT Abdomen pelvis 5/30/22  Cirrhotic disease of the liver. Renal US 7/7/22  Unremarkable ultrasound of the kidneys.      CT abdomen 7/13/22   Benign-appearing cyst with thin septation in the right kidney (B[place]niak 2), for which no further evaluation is necessary. Cirrhosis with findings of portal hypertension again demonstrated. No focal liver lesion or ascites. Limited echo 4/22/22   Limited exam for aortic stenosis. LV systolic function is normal with EF estimated at 60%. No regional wall motion abnormalities. Moderate posterior leaflet mitral annular calcification. The maximal transaortic velocity is 2.28m/s which gives peak pressure   gradient= 21mmHg and mean pressure gradient= 10mmHg which is c/w mild aortic   stenosis. MRI Abdomen 6/24/21  1. Stable mild prominence of the biliary system likely due to cholecystectomy. No definite choledocholithiasis although artifact limits evaluation. 2. Cirrhosis. No liver lesion seen although lack of intravenous contrast limits evaluation. 3. Mild splenomegaly is unchanged and may be due to portal hypertension. 2 week cardiac event monitor 12/2/20       EKG 4.29.20  Sinus bradycardia  RBBB LAFB    EKG 4/18/20   Sinus tachycardia with occasional Premature ventricular complexesRight bundle branch blockLeft anterior fascicular block Bifascicular block Moderate voltage criteria for LVH, may be normal variantAbnormal ECGWhen compared with ECG of 29-FEB-2020 20:47,Premature ventricular complexes are now PresentVent. rate has increased BY  42 BPMConfirmed by KAYE HOWE MD ([id number]) on 4/19/2020 11:57:41 AM      CXR 4/18/20   Increased lung markings bilaterally, may be related to bronchitis versus minimal pulmonary vascular congestion. Mild discoid atelectasis at the left lung base. CTA neck 4/18/20   Right dominance of the vertebral arteries, with hypoplastic distal intracranial portion of the left vertebral artery. Otherwise, no acute abnormality or flow-limiting stenosis of the major arteries of the neck. CXR 2/29/20   1. Findings typical of bronchitis or reactive airways disease. 2. No focal infiltrate is evident.        ECHO 2/24/20 Summary   There is hyperdynamic LV systolic function with an estimated ejection   fraction of >65%. Mild concentric left ventricular hypertrophy. No regional wall motion abnormalities are seen. Grade I diastolic dysfunction with normal filing pressure. There is a late peaking gradient recorded across the LV outflow tract   consistent with dynamic obstruction. LVOT pressure gradients are elevated at   rest at 8 mmHg and with valsalva of 36mmHg. Aortic valve appears sclerotic and appears to open well. The aortic valve area is calculated at 1.76 cm2 with max velocity of 2.5   m/sec, a maximum pressure gradient of 25 mmHg and a mean pressure gradient   of 13 mmHg. This is c/w mild aortic stenosis (pressure gradients could be   elevated due hyperdynamic LV). There is no evidence of aortic regurgitation. US LIVER 2/22/20   Cirrhotic ultrasound appearance of the liver with mild splenomegaly. Normal directional flow noted in the main portal vein     10/4/19 lexiscan myoview   Summary  Normal LV function. There is normal isotope uptake at stress and rest. There is no evidence of  myocardial ischemia or scar. Normal myocardial perfusion study. CXR 7/11/19  FINDINGS: The lungs are clear. The cardiac silhouette is within normal limits. There is no pneumothorax or pleural effusion. Status post cholecystectomy and anterior discectomy of the lower cervical spine. Bilateral YOKASTA 4/2/19  There is no arterial insufficiency at rest in the lower extremities  bilaterally. EKG 12/11/18  Normal sinus rhythmRight bundle branch blockleft axisLeft anterior fascicular block Bifascicular block   voltage criteria for LVH, may be normal variantAbnormal ECGNo previous ECGs availableConfirmed by NICOLE NASH MD ([id number]) on 12/12/2018 9:20:03 AM    ECHO 10/19/18  Summary   Global left ventricular systolic function is normal with ejection fraction   estimated from 55 % to 60 %.    No regional wall motion abnormalities are noted. Left ventricular size is moderately increased. There is mild concentric left ventricular hypertrophy. Normal left ventricular diastolic filling pressure. The left atrium is mildly dilated. The aortic valve is thickened/calcified with decreased leaflet mobility   consistent with mild aortic Doppler exam is c/w mild aortic stenosis. No   aortic regurgitation. The right atrium is mildly dilated. CXR 10/10/18  There is mild pulmonary vascular congestion. The cardiac silhouette is top-normal in size. There is no pneumothorax or pleural effusion. Status post cholecystectomy and anterior discectomy of the lower cervical spine. EKG 10.10.18  NSR within normal    Assessment:  Encounter Diagnoses   Name Primary? Mild aortic stenosis     Primary hypertension     Unstable angina pectoris (HCC) Yes    Exertional dyspnea    Dynamic outflow obstruction only mild LVH  She is on lipitor due to history of TIA  HTN-Lisinopril-HCTZ 20-12.5 mg daily. Goal bp 140/90 or less  Aortic stenosis is mild and stable will continue to monitor  with periodic echo 1-2 yrs. Angina atypical but concerning will do lexiscan she is not able to walk due to arthritis knee and hip. She has cirrhosis of liver incidental finding on CT with portal hypertension  She is getting forgetful. Plan:  1. Current epic labs reviewed with patient. 2. Current medications reviewed. Refills given as warranted. 3. Call [phone] to schedule a Myoview Stress Test to look for blockages and the blood flow through the heart  -A small IV will be placed into your arm to administer a radioisotope (myoview) to visualize your heart. .   -You will then have a waiting period to allow the tracer to be absorbed by the heart muscle. After the waiting period, we will take pictures of the blood flow to your heart muscle with the nuclear camera.    -Following your pictures, we will perform your stress test. We can do your stress test by  giving you a medication (Tiffany Phy) which makes your body think it is exercising.   -We will monitor you before, during, and after your stress test to make sure you are safe and comfortable.  -you will need to be fasting after midnight  -No caffeine for 24 hours before testing  -testing can last 3-4 hours   4. Repeat bp 160/80  5. I am going to give your blood pressure a little more time to work since it was just changed by pcp. 6. I recommend following up with Dr. Lamin Henderson about the pain you are having under your right rib. Call and see if you can get your appointment moved up to February. 7.  Recommend having a chest xray also    Follow up with me in 6 months    This note is scribed in the presence of Dr. Darren Koenig MD by Jesse Morris RN.  I, Dr. Kacy Sears, personally performed the services described in this documentation, as scribed by the above signed scribe in my presence. It is both accurate and complete to my knowledge. I agree with the details independently gathered by the clinical support staff, while the remaining scribed note accurately describes my personal service to the patient. QUALITY MEASURES  1. Tobacco Cessation Counseling: NA  2. Retake of BP if >140/90: NA  3. Documentation to PCP/referring for new patient:  Sent to PCP at close of office visit  4. CAD patient on anti-platelet: NA    5. CAD patient on STATIN therapy: NA  6.  Patient with CHF and aFib on anticoagulation:  NA

## 2023-01-24 ENCOUNTER — TELEPHONE (OUTPATIENT)
Dept: INTERNAL MEDICINE CLINIC | Age: 78
End: 2023-01-24

## 2023-01-24 NOTE — TELEPHONE ENCOUNTER
----- Message from Raf Lopez MD sent at 1/24/2023  4:01 PM EST -----  Contact: Queta Membreno [phone]  See urologist   ----- Message -----  From: Rosenda Palacio  Sent: 1/24/2023   2:40 PM EST  To: Raf Lopez MD     Savi Case dropped off patient urine sample today. Queta Membreno states patient had a scan on her kidneys and found a cyst on her right kidney.  Patient felt like it burst.

## 2023-01-30 RX ORDER — PANTOPRAZOLE SODIUM 20 MG/1
TABLET, DELAYED RELEASE ORAL
Qty: 90 TABLET | Refills: 1 | Status: SHIPPED | OUTPATIENT
Start: 2023-01-30

## 2023-02-03 ENCOUNTER — HOSPITAL ENCOUNTER (OUTPATIENT)
Dept: NON INVASIVE DIAGNOSTICS | Age: 78
Discharge: HOME | End: 2023-02-03
Payer: MEDICARE

## 2023-02-03 ENCOUNTER — HOSPITAL ENCOUNTER (OUTPATIENT)
Dept: GENERAL RADIOLOGY | Age: 78
Discharge: HOME | End: 2023-02-03
Payer: MEDICARE

## 2023-02-03 ENCOUNTER — HOSPITAL ENCOUNTER (OUTPATIENT)
Dept: NUCLEAR MEDICINE | Age: 78
Discharge: HOME | End: 2023-02-03
Payer: MEDICARE

## 2023-02-03 ENCOUNTER — TELEPHONE (OUTPATIENT)
Dept: INTERNAL MEDICINE CLINIC | Age: 78
End: 2023-02-03

## 2023-02-03 ENCOUNTER — HOSPITAL ENCOUNTER (OUTPATIENT)
Age: 78
Discharge: HOME | End: 2023-02-03
Payer: MEDICARE

## 2023-02-03 DIAGNOSIS — I20.0 UNSTABLE ANGINA PECTORIS (HCC): ICD-10-CM

## 2023-02-03 DIAGNOSIS — R06.09 EXERTIONAL DYSPNEA: ICD-10-CM

## 2023-02-03 PROCEDURE — 71046 X-RAY EXAM CHEST 2 VIEWS: CPT

## 2023-02-03 PROCEDURE — A9502 TC99M TETROFOSMIN: HCPCS | Performed by: INTERNAL MEDICINE

## 2023-02-03 PROCEDURE — 3430000000 HC RX DIAGNOSTIC RADIOPHARMACEUTICAL: Performed by: INTERNAL MEDICINE

## 2023-02-03 PROCEDURE — 93017 CV STRESS TEST TRACING ONLY: CPT

## 2023-02-03 RX ORDER — DOXAZOSIN 2 MG/1
2 TABLET ORAL DAILY
Qty: 30 TABLET | Refills: 0 | Status: SHIPPED | OUTPATIENT
Start: 2023-02-03

## 2023-02-03 RX ADMIN — TETROFOSMIN 10.9 MILLICURIE: 1.38 INJECTION, POWDER, LYOPHILIZED, FOR SOLUTION INTRAVENOUS at 08:00

## 2023-02-03 NOTE — TELEPHONE ENCOUNTER
----- Message from Anya Jordan MD sent at 2/3/2023  1:31 PM EST -----  Contact: Meli Daniels [phone]  30 days  Can reschedule in 1 week    ----- Message -----  From: Obey Ojeda  Sent: 2/3/2023  11:18 AM EST  To: Anya Jordan MD    Are we only sending in a few days worth of Norvasc or she just needs to take it for a few days before rescheduling?   ----- Message -----  From: Anya Jordan MD  Sent: 2/3/2023  11:05 AM EST  To: Obey Ojeda    Should add norvasc as well 5 mg daily for few days  and reschedule the test  Continue lisinopril    ----- Message -----  From: Obey Ojeda  Sent: 2/3/2023  11:04 AM EST  To: MD Dr SHADE Armstrong patient. Daughter states patient had a stress test today that they had to stop because her BP was 237/91. Patient took her Lisinopril 20-12.5 mg and an hour later it came down to 181/80. Wanting to know what they should do?

## 2023-02-03 NOTE — PROGRESS NOTES
Pt resting bp 237/91 readjusted bp cuff bp 236/90. Dr. Cindy Jackson aware pt asymptomatic. Pt stress test cancelled at this time. Pt daughter and pt educated to take bp meds once pt got home and to follow up with  their doctor who prescribes her bp medications. Dr. Cindy Jackson also advised pt to monitor bp at home.

## 2023-02-03 NOTE — TELEPHONE ENCOUNTER
----- Message from Juliette Allan MD sent at 2/3/2023  3:26 PM EST -----  Contact: Paulina Fisher [phone]  No norvasc as mentioned allergy   Add cardura 2 mg nightly   Can do stress test next week     ----- Message -----  From: Nery Valverde  Sent: 2/3/2023   3:15 PM EST  To: Juliette Allan MD    Daughter states pt will be leaving for vacation and won't be back until around the 17th, wants to know if the test can wait until then? Also wants to know if pt should call with bp readings after 30 days of Norvasc or schedule an appointment or just only take the Norvasc for 30 days and stop? Please advise. Also allergy to Verapamil for Norvasc. Please advise.    ----- Message -----  From: Nilsa Hernández  Sent: 2/3/2023   1:34 PM EST  To: Mortimer Bracket Poff    Left message to return call. Medication not sent in yet.   ----- Message -----  From: Juliette Allan MD  Sent: 2/3/2023   1:31 PM EST  To: [facility]    30 days  Can reschedule in 1 week    ----- Message -----  From: Nilsa Hernández  Sent: 2/3/2023  11:18 AM EST  To: Juliette Allan MD    Are we only sending in a few days worth of Norvasc or she just needs to take it for a few days before rescheduling?   ----- Message -----  From: Juliette Allan MD  Sent: 2/3/2023  11:05 AM EST  To: Nilsa Hernández    Should add norvasc as well 5 mg daily for few days  and reschedule the test  Continue lisinopril    ----- Message -----  From: Nilsa Hernández  Sent: 2/3/2023  11:04 AM EST  To: MD Dr SHADE Montoya patient. Daughter states patient had a stress test today that they had to stop because her BP was 237/91. Patient took her Lisinopril 20-12.5 mg and an hour later it came down to 181/80. Wanting to know what they should do?

## 2023-02-04 ENCOUNTER — HOSPITAL ENCOUNTER (OUTPATIENT)
Dept: ULTRASOUND IMAGING | Age: 78
Discharge: HOME | End: 2023-02-04
Payer: MEDICARE

## 2023-02-04 DIAGNOSIS — R10.11 ABDOMINAL PAIN, RIGHT UPPER QUADRANT: ICD-10-CM

## 2023-02-04 PROCEDURE — 76705 ECHO EXAM OF ABDOMEN: CPT

## 2023-02-13 ENCOUNTER — TELEPHONE (OUTPATIENT)
Dept: CARDIOLOGY CLINIC | Age: 78
End: 2023-02-13

## 2023-02-13 NOTE — TELEPHONE ENCOUNTER
Caron Viets, 1945    Cardiac Risk Assessment    What type of procedure are you having? EGD     When is your procedure scheduled for?  2.20.23    Medications to be stopped. NONE    What physician is performing your procedure? DR. Claudell Castor    Phone Number:   [phone] EXT [phone]    Fax number to send the letter:   [phone]    Cardiologist:   SOHAM     Last Appointment:   1.23.23  Assessment:       Encounter Diagnoses   Name Primary? Mild aortic stenosis      Primary hypertension      Unstable angina pectoris (HCC) Yes    Exertional dyspnea     Dynamic outflow obstruction only mild LVH  She is on lipitor due to history of TIA  HTN-Lisinopril-HCTZ 20-12.5 mg daily. Goal bp 140/90 or less  Aortic stenosis is mild and stable will continue to monitor  with periodic echo 1-2 yrs. Angina atypical but concerning will do lexiscan she is not able to walk due to arthritis knee and hip. She has cirrhosis of liver incidental finding on CT with portal hypertension  She is getting forgetful. Plan:  1. Current epic labs reviewed with patient. 2. Current medications reviewed. Refills given as warranted. 3. Call [phone] to schedule a Myoview Stress Test to look for blockages and the blood flow through the heart  -A small IV will be placed into your arm to administer a radioisotope (myoview) to visualize your heart. .   -You will then have a waiting period to allow the tracer to be absorbed by the heart muscle. After the waiting period, we will take pictures of the blood flow to your heart muscle with the nuclear camera.    -Following your pictures, we will perform your stress test. We can do your stress test by  giving you a medication (Oxford Ball) which makes your body think it is exercising.   -We will monitor you before, during, and after your stress test to make sure you are safe and comfortable.  -you will need to be fasting after midnight  -No caffeine for 24 hours before testing  -testing can last 3-4 hours   4. Repeat bp 160/80  5. I am going to give your blood pressure a little more time to work since it was just changed by pcp. 6. I recommend following up with Dr. Rocio Alonso about the pain you are having under your right rib. Call and see if you can get your appointment moved up to February.    7.  Recommend having a chest xray also     Follow up with me in 6 months

## 2023-02-13 NOTE — LETTER
[address]  [address] [place] [address] [address]IVE  [address] [facility]  Phone: [phone]  Fax: [phone]    Marcos Angulo MD        February 20, 2023    Elijah Adkins 1945 is low cardiac risk for planned EGD. If you have any questions or concerns, please don't hesitate to call.     Sincerely,        Marcos Angulo MD

## 2023-02-17 ENCOUNTER — HOSPITAL ENCOUNTER (OUTPATIENT)
Dept: NON INVASIVE DIAGNOSTICS | Age: 78
Discharge: HOME | End: 2023-02-17
Payer: MEDICARE

## 2023-02-17 ENCOUNTER — HOSPITAL ENCOUNTER (OUTPATIENT)
Dept: NUCLEAR MEDICINE | Age: 78
Discharge: HOME | End: 2023-02-17
Payer: MEDICARE

## 2023-02-17 ENCOUNTER — APPOINTMENT (OUTPATIENT)
Dept: NON INVASIVE DIAGNOSTICS | Age: 78
End: 2023-02-17
Payer: MEDICARE

## 2023-02-17 ENCOUNTER — TELEPHONE (OUTPATIENT)
Dept: INTERNAL MEDICINE CLINIC | Age: 78
End: 2023-02-17

## 2023-02-17 LAB
LV EF: 69 %
LVEF MODALITY: NORMAL

## 2023-02-17 PROCEDURE — 6360000002 HC RX W HCPCS: Performed by: INTERNAL MEDICINE

## 2023-02-17 PROCEDURE — A9502 TC99M TETROFOSMIN: HCPCS | Performed by: INTERNAL MEDICINE

## 2023-02-17 PROCEDURE — 3430000000 HC RX DIAGNOSTIC RADIOPHARMACEUTICAL: Performed by: INTERNAL MEDICINE

## 2023-02-17 PROCEDURE — 78452 HT MUSCLE IMAGE SPECT MULT: CPT

## 2023-02-17 PROCEDURE — 93017 CV STRESS TEST TRACING ONLY: CPT

## 2023-02-17 RX ADMIN — REGADENOSON 0.4 MG: 0.08 INJECTION, SOLUTION INTRAVENOUS at 09:55

## 2023-02-17 RX ADMIN — TETROFOSMIN 10.9 MILLICURIE: 1.38 INJECTION, POWDER, LYOPHILIZED, FOR SOLUTION INTRAVENOUS at 07:45

## 2023-02-17 RX ADMIN — TETROFOSMIN 32.1 MILLICURIE: 1.38 INJECTION, POWDER, LYOPHILIZED, FOR SOLUTION INTRAVENOUS at 09:55

## 2023-02-17 NOTE — TELEPHONE ENCOUNTER
----- Message from Sushant Cortez sent at 2/17/2023  4:28 PM EST -----  Contact: Kinsey Yin [phone]  Continue with the half for now and wait until next appt per Dr Taina Cherry  ----- Message -----  From: Sushant Cortez  Sent: 2/17/2023  12:13 PM EST  To: Porfirio Richard MD    Patient's daughter states patient has already been taking half. States her BP today was 163/71 and both times patient has had stress test done her BP jumps to over 200.  ----- Message -----  From: Porfirio Richard MD  Sent: 2/17/2023  10:51 AM EST  To: Sushant Cortez    Take 1/2 a pill   ----- Message -----  From: Elsworth Scheuermann  Sent: 2/17/2023  10:33 AM EST  To: Porfirio Richard MD    Caller, patient daughter, states patient is currently getting a stress test done at [facility]. Patient BP was 149/91 and now over 200 again. Caller states patient received new medication 2 weeks ago is taking 1/2 pill a day. When she took a full pill her BP dropped so low she got light headed and dizzy.  Please Advise       doxazosin (CARDURA) 2 MG tablet    Emily Suarez [id number] - [facility] [phone] Karl Vernon [phone]   [address] [address] [place] [address] [address] [facility]   Phone:  [phone]  Fax:  [phone]

## 2023-02-20 ENCOUNTER — TELEPHONE (OUTPATIENT)
Dept: CARDIOLOGY CLINIC | Age: 78
End: 2023-02-20

## 2023-02-20 NOTE — TELEPHONE ENCOUNTER
----- Message from Kelton Newton MD sent at 2/19/2023  6:47 AM EST -----  She wants cardiac clearance I believe it was for EGD OK to send cardiac clearance low cardiac risk.

## 2023-02-21 ENCOUNTER — TELEPHONE (OUTPATIENT)
Dept: CARDIOLOGY CLINIC | Age: 78
End: 2023-02-21

## 2023-02-21 NOTE — TELEPHONE ENCOUNTER
Called patient to see where she needs clearance faxed to. VM is not set up. Message from Dr. Aydee Parham- She wants cardiac clearance I believe it was for EGD OK to send cardiac clearance low cardiac risk.

## 2023-02-21 NOTE — TELEPHONE ENCOUNTER
Last OV 1/23/23    She is on lipitor due to history of TIA  HTN-Lisinopril-HCTZ 20-12.5 mg daily. Goal bp 140/90 or less  Aortic stenosis is mild and stable will continue to monitor  with periodic echo 1-2 yrs. Angina atypical but concerning will do lexiscan she is not able to walk due to arthritis knee and hip. She has cirrhosis of liver incidental finding on CT with portal hypertension  She is getting forgetful. Plan:  1. Current epic labs reviewed with patient. 2. Current medications reviewed. Refills given as warranted. 3. Call [phone] to schedule a Myoview Stress Test to look for blockages and the blood flow through the heart  -A small IV will be placed into your arm to administer a radioisotope (myoview) to visualize your heart. .   -You will then have a waiting period to allow the tracer to be absorbed by the heart muscle. After the waiting period, we will take pictures of the blood flow to your heart muscle with the nuclear camera. -Following your pictures, we will perform your stress test. We can do your stress test by  giving you a medication (Othella Curd) which makes your body think it is exercising.   -We will monitor you before, during, and after your stress test to make sure you are safe and comfortable.  -you will need to be fasting after midnight  -No caffeine for 24 hours before testing  -testing can last 3-4 hours   4. Repeat bp 160/80  5. I am going to give your blood pressure a little more time to work since it was just changed by pcp. 6. I recommend following up with Dr. Rowan Crowder about the pain you are having under your right rib. Call and see if you can get your appointment moved up to February.    7.  Recommend having a chest xray also

## 2023-02-21 NOTE — TELEPHONE ENCOUNTER
----- Message from Garry Daniels MD sent at 2/19/2023  6:47 AM EST -----  She wants cardiac clearance I believe it was for EGD OK to send cardiac clearance low cardiac risk.

## 2023-02-21 NOTE — TELEPHONE ENCOUNTER
Pts daughter called and stated pt was supposed to get endoscopy done yesterday but they were unable due to pts BP being over 200, stated this am pts BP was 201/84, please advise

## 2023-02-22 RX ORDER — AMLODIPINE BESYLATE 5 MG/1
5 TABLET ORAL DAILY
Qty: 30 TABLET | Refills: 5 | Status: SHIPPED | OUTPATIENT
Start: 2023-02-22

## 2023-02-22 NOTE — PROGRESS NOTES
Start amlodipine 5 mg daily in addition to other meds for HBP.   She should not stop taking BLood pressure medication even if she is npo

## 2023-02-27 RX ORDER — DOXAZOSIN 2 MG/1
TABLET ORAL
Qty: 30 TABLET | Refills: 2 | Status: SHIPPED | OUTPATIENT
Start: 2023-02-27

## 2023-03-29 RX ORDER — LISINOPRIL AND HYDROCHLOROTHIAZIDE 20; 12.5 MG/1; MG/1
TABLET ORAL
Qty: 90 TABLET | Refills: 1 | Status: SHIPPED | OUTPATIENT
Start: 2023-03-29 | End: 2023-05-12 | Stop reason: SDUPTHER

## 2023-04-03 ENCOUNTER — OFFICE VISIT (OUTPATIENT)
Dept: INTERNAL MEDICINE CLINIC | Age: 78
End: 2023-04-03

## 2023-04-03 VITALS
HEIGHT: 68 IN | HEART RATE: 72 BPM | WEIGHT: 178 LBS | DIASTOLIC BLOOD PRESSURE: 64 MMHG | BODY MASS INDEX: 26.98 KG/M2 | SYSTOLIC BLOOD PRESSURE: 138 MMHG

## 2023-04-03 DIAGNOSIS — G70.00 MYASTHENIA GRAVIS (HCC): ICD-10-CM

## 2023-04-03 DIAGNOSIS — D69.6 THROMBOCYTOPENIA (HCC): ICD-10-CM

## 2023-04-03 DIAGNOSIS — I73.9 PVD (PERIPHERAL VASCULAR DISEASE) (HCC): ICD-10-CM

## 2023-04-03 DIAGNOSIS — K74.60 CIRRHOSIS, NONALCOHOLIC (HCC): ICD-10-CM

## 2023-04-03 DIAGNOSIS — I35.0 MILD AORTIC STENOSIS: Chronic | ICD-10-CM

## 2023-04-03 DIAGNOSIS — I10 PRIMARY HYPERTENSION: Primary | ICD-10-CM

## 2023-04-03 DIAGNOSIS — F41.1 GENERALIZED ANXIETY DISORDER: ICD-10-CM

## 2023-04-03 PROCEDURE — 3078F DIAST BP <80 MM HG: CPT | Performed by: INTERNAL MEDICINE

## 2023-04-03 PROCEDURE — 3075F SYST BP GE 130 - 139MM HG: CPT | Performed by: INTERNAL MEDICINE

## 2023-04-03 PROCEDURE — 99214 OFFICE O/P EST MOD 30 MIN: CPT | Performed by: INTERNAL MEDICINE

## 2023-04-03 PROCEDURE — 1123F ACP DISCUSS/DSCN MKR DOCD: CPT | Performed by: INTERNAL MEDICINE

## 2023-04-03 RX ORDER — CITALOPRAM 20 MG/1
20 TABLET ORAL DAILY
Qty: 30 TABLET | Refills: 5 | Status: SHIPPED | OUTPATIENT
Start: 2023-04-03

## 2023-04-03 ASSESSMENT — ENCOUNTER SYMPTOMS
COUGH: 0
CHEST TIGHTNESS: 0
DIARRHEA: 0
VOMITING: 0
ABDOMINAL PAIN: 0
NAUSEA: 0
SHORTNESS OF BREATH: 0
WHEEZING: 0
BLOOD IN STOOL: 0

## 2023-04-03 NOTE — PROGRESS NOTES
Miguelina Mejia (:  1945) is a 76 y.o. female,Established patient, here for evaluation of the following chief complaint(s):  Follow-up         ASSESSMENT/PLAN:        Diagnosis Orders   1. Primary hypertension        2. Cirrhosis, nonalcoholic ([facility]        3. PVD (peripheral vascular disease) ([facility]        4. Myasthenia gravis ([facility]        5. Thrombocytopenia ([facility]        6. Mild aortic stenosis        7. Generalized anxiety disorder            Hypertension. Bp is good  Continue current meds      Generalized anxiety disorder. Stable. Continue citalopram.     GERD. Stable. Continue Protonix. Cirrhosis. Nonalcoholic steatohepatitis. Continue lactulose      Chronic anemia and thrombocytopenia  2 to cirrhosis  Also sees a hematologist   Now on iron      Asthma. Mild intermittent. Stable. Continue albuterol inhalers. Mild aortic stenosis. Asymptomatic. Sees cardiology     Myasthenia gravis. Sees neuro  Stable on mestinon and cellcept       H/o  severe cervical degenerative disc disease. s/p cervical fusion. Doing well. Advised Shingrix    Subjective   SUBJECTIVE/OBJECTIVE:  HPI  She has a history of hypertension which is under good control. Has generalized anxiety disorder stable on Celexa. Has GERD which is stable on proton pump inhibitor. H/o asthma - mild intermittent    Has myasthenia gravis- sees neurologist - stable. H/o Cervical fusion 2021  H/o ASTORGA- no edema,abd distension     Has mildly elevated ammonia- now on lactulose     Review of Systems   Constitutional:  Negative for fatigue, fever and unexpected weight change. Respiratory:  Negative for cough, chest tightness, shortness of breath and wheezing. Cardiovascular:  Negative for chest pain, palpitations and leg swelling. Gastrointestinal:  Negative for abdominal pain, blood in stool, diarrhea, nausea and vomiting. Genitourinary:  Negative for dysuria and hematuria.    Neurological:  Negative for

## 2023-05-11 ENCOUNTER — TELEPHONE (OUTPATIENT)
Dept: CARDIOLOGY CLINIC | Age: 78
End: 2023-05-11

## 2023-05-11 NOTE — TELEPHONE ENCOUNTER
Pt requesting meloxicam refill. Please advise if you can refill. If so what dose and frequency?     LOV RKG 2/22/2023  Next OV RKG 7/17/2023

## 2023-05-11 NOTE — TELEPHONE ENCOUNTER
PICC Team Note    Order for PICC line received and acknowledged.  Lab and diagnostic results reviewed along with patient history.  Writer spoke with Dr Mohamud regarding lab values of Cr 2.99, eGFR 16 and CKD stage 3b-4 writer requesting clearance/clarification  of a tunneled or bedside PICC placement.  Per Dr Mohamud, no PICC at this time, ok for midline placement if needed. Dr Diaz and Stacy ESCALANTE updated. Order modified to reflect midline placement.      MidLine Insertion Procedure Note    Procedure: Insertion of a single lumen BARD PowerGlide Pro Midline    Indications:  access    Procedure Details   Sterile technique was used including hand hygiene, mask, chloraprep, sterile gel, probe cover, sterile gloves, StatLock, BioPatch, and transparent dressing per hospital protocol. Sign placed above patient bed indicating Midline placement.    A 18 gauge 10 cm Midline was inserted into the Left Brachial vein with 1 poke(s) per hospital protocol. Per site rite measurement midline occupies 28 % of vessel. Blood return noted and catheter was flushed with 10 cc NS. Dual cap was applied and the bed was returned to low position prior to leaving patient room. Midline education information provided to patient. Patient tolerated procedure well.      Please Note:  This is a Power injectable PowerGlide Midline and may be used for contrast injections.    LOT #:   [id number]    Recommendations:  No chest x--ray or ECG confirmation required as this is a peripheral IV. Midline may be used immediately. Stacy ESCALANTE notified and reminded to change IV tubing prior to accessing Midline.      Zayda De La Cruz RN  [facility] PICC TEAM   Pt called requesting [facility] to send a refill for meloxicam, stated [facility] has filled this in the past, if so please send to :   Petty Bonilla [id number] - [facility], [address] Tedd Seip [phone] [address] [address] [place] [address] [address], [address]   Phone:  [phone]  Fax:  [phone]

## 2023-05-12 RX ORDER — CITALOPRAM 20 MG/1
20 TABLET ORAL DAILY
Qty: 90 TABLET | Refills: 0 | Status: SHIPPED | OUTPATIENT
Start: 2023-05-12

## 2023-05-12 RX ORDER — LACTULOSE 10 G/15ML
10 SOLUTION ORAL EVERY EVENING
Qty: 450 ML | Refills: 2 | Status: SHIPPED | OUTPATIENT
Start: 2023-05-12

## 2023-05-12 RX ORDER — DOXAZOSIN 2 MG/1
2 TABLET ORAL DAILY
Qty: 90 TABLET | Refills: 0 | Status: SHIPPED | OUTPATIENT
Start: 2023-05-12

## 2023-05-12 RX ORDER — LISINOPRIL AND HYDROCHLOROTHIAZIDE 20; 12.5 MG/1; MG/1
1 TABLET ORAL DAILY
Qty: 90 TABLET | Refills: 0 | Status: SHIPPED | OUTPATIENT
Start: 2023-05-12

## 2023-05-12 RX ORDER — PANTOPRAZOLE SODIUM 20 MG/1
TABLET, DELAYED RELEASE ORAL
Qty: 90 TABLET | Refills: 0 | Status: SHIPPED | OUTPATIENT
Start: 2023-05-12

## 2023-05-12 NOTE — TELEPHONE ENCOUNTER
Attempted to contact pt. Pt mailbox is full, unable to leave vm. Will try again later. Kisha Solano MD  [facility] 15 hours ago (4:42 PM)     RG  This drug is not good for long term use and is used safely for short time for specific condition. I cannot fill it.

## 2023-05-16 RX ORDER — AMLODIPINE BESYLATE 5 MG/1
5 TABLET ORAL DAILY
Qty: 90 TABLET | Refills: 0 | Status: SHIPPED | OUTPATIENT
Start: 2023-05-16

## 2023-05-18 DIAGNOSIS — M50.01 DISORDER OF INTERVERTEBRAL DISC OF HIGH CERVICAL REGION WITH MYELOPATHY: ICD-10-CM

## 2023-05-18 RX ORDER — TRAMADOL HYDROCHLORIDE 50 MG/1
50 TABLET ORAL EVERY 12 HOURS PRN
Qty: 60 TABLET | Refills: 0 | Status: SHIPPED | OUTPATIENT
Start: 2023-05-18 | End: 2023-06-17

## 2023-05-19 RX ORDER — MELOXICAM 7.5 MG/1
7.5 TABLET ORAL DAILY
Qty: 30 TABLET | Refills: 0 | Status: SHIPPED | OUTPATIENT
Start: 2023-05-19

## 2023-07-27 RX ORDER — DOXAZOSIN 2 MG/1
TABLET ORAL
Qty: 90 TABLET | Refills: 0 | Status: SHIPPED | OUTPATIENT
Start: 2023-07-27

## 2023-07-27 RX ORDER — CITALOPRAM 20 MG/1
TABLET ORAL
Qty: 90 TABLET | Refills: 0 | Status: SHIPPED | OUTPATIENT
Start: 2023-07-27

## 2023-07-27 RX ORDER — LACTULOSE 10 G/15ML
10 SOLUTION ORAL EVERY EVENING
Qty: 1350 ML | Refills: 0 | Status: SHIPPED | OUTPATIENT
Start: 2023-07-27

## 2023-07-27 RX ORDER — LISINOPRIL AND HYDROCHLOROTHIAZIDE 20; 12.5 MG/1; MG/1
TABLET ORAL
Qty: 90 TABLET | Refills: 0 | Status: SHIPPED | OUTPATIENT
Start: 2023-07-27

## 2023-07-27 RX ORDER — PANTOPRAZOLE SODIUM 20 MG/1
TABLET, DELAYED RELEASE ORAL
Qty: 90 TABLET | Refills: 0 | Status: SHIPPED | OUTPATIENT
Start: 2023-07-27

## 2023-07-27 RX ORDER — AMLODIPINE BESYLATE 5 MG/1
TABLET ORAL
Qty: 90 TABLET | Refills: 0 | Status: SHIPPED | OUTPATIENT
Start: 2023-07-27

## 2023-08-03 ENCOUNTER — OFFICE VISIT (OUTPATIENT)
Dept: INTERNAL MEDICINE CLINIC | Age: 78
End: 2023-08-03

## 2023-08-03 VITALS
BODY MASS INDEX: 26.52 KG/M2 | HEART RATE: 72 BPM | DIASTOLIC BLOOD PRESSURE: 58 MMHG | SYSTOLIC BLOOD PRESSURE: 130 MMHG | WEIGHT: 175 LBS | HEIGHT: 68 IN

## 2023-08-03 DIAGNOSIS — M15.9 PRIMARY OSTEOARTHRITIS INVOLVING MULTIPLE JOINTS: ICD-10-CM

## 2023-08-03 DIAGNOSIS — I10 PRIMARY HYPERTENSION: Primary | ICD-10-CM

## 2023-08-03 DIAGNOSIS — I35.0 MILD AORTIC STENOSIS: Chronic | ICD-10-CM

## 2023-08-03 DIAGNOSIS — I73.9 PVD (PERIPHERAL VASCULAR DISEASE) (HCC): ICD-10-CM

## 2023-08-03 DIAGNOSIS — K74.60 CIRRHOSIS, NONALCOHOLIC (HCC): ICD-10-CM

## 2023-08-03 DIAGNOSIS — K21.9 GASTROESOPHAGEAL REFLUX DISEASE WITHOUT ESOPHAGITIS: ICD-10-CM

## 2023-08-03 DIAGNOSIS — G70.00 MYASTHENIA GRAVIS (HCC): ICD-10-CM

## 2023-08-03 DIAGNOSIS — I10 PRIMARY HYPERTENSION: ICD-10-CM

## 2023-08-03 DIAGNOSIS — F41.1 GENERALIZED ANXIETY DISORDER: ICD-10-CM

## 2023-08-03 PROCEDURE — G8417 CALC BMI ABV UP PARAM F/U: HCPCS | Performed by: INTERNAL MEDICINE

## 2023-08-03 PROCEDURE — G8427 DOCREV CUR MEDS BY ELIG CLIN: HCPCS | Performed by: INTERNAL MEDICINE

## 2023-08-03 PROCEDURE — 3075F SYST BP GE 130 - 139MM HG: CPT | Performed by: INTERNAL MEDICINE

## 2023-08-03 PROCEDURE — 3078F DIAST BP <80 MM HG: CPT | Performed by: INTERNAL MEDICINE

## 2023-08-03 PROCEDURE — 1090F PRES/ABSN URINE INCON ASSESS: CPT | Performed by: INTERNAL MEDICINE

## 2023-08-03 PROCEDURE — 1123F ACP DISCUSS/DSCN MKR DOCD: CPT | Performed by: INTERNAL MEDICINE

## 2023-08-03 PROCEDURE — 99214 OFFICE O/P EST MOD 30 MIN: CPT | Performed by: INTERNAL MEDICINE

## 2023-08-03 PROCEDURE — 1036F TOBACCO NON-USER: CPT | Performed by: INTERNAL MEDICINE

## 2023-08-03 PROCEDURE — G8399 PT W/DXA RESULTS DOCUMENT: HCPCS | Performed by: INTERNAL MEDICINE

## 2023-08-03 RX ORDER — HYDROCODONE BITARTRATE AND ACETAMINOPHEN 5; 325 MG/1; MG/1
1 TABLET ORAL EVERY 12 HOURS PRN
Qty: 60 TABLET | Refills: 0 | Status: SHIPPED | OUTPATIENT
Start: 2023-08-03 | End: 2023-09-02

## 2023-08-03 RX ORDER — MELOXICAM 7.5 MG/1
7.5 TABLET ORAL DAILY
Qty: 90 TABLET | Refills: 0 | Status: CANCELLED | OUTPATIENT
Start: 2023-08-03

## 2023-08-03 ASSESSMENT — ENCOUNTER SYMPTOMS
SHORTNESS OF BREATH: 0
NAUSEA: 0
CHEST TIGHTNESS: 0
VOMITING: 0
COUGH: 0
WHEEZING: 0
BLOOD IN STOOL: 0
ABDOMINAL PAIN: 0
DIARRHEA: 0

## 2023-08-03 ASSESSMENT — PATIENT HEALTH QUESTIONNAIRE - PHQ9
1. LITTLE INTEREST OR PLEASURE IN DOING THINGS: 0
2. FEELING DOWN, DEPRESSED OR HOPELESS: 0
SUM OF ALL RESPONSES TO PHQ QUESTIONS 1-9: 0
SUM OF ALL RESPONSES TO PHQ QUESTIONS 1-9: 0
SUM OF ALL RESPONSES TO PHQ9 QUESTIONS 1 & 2: 0
SUM OF ALL RESPONSES TO PHQ QUESTIONS 1-9: 0
SUM OF ALL RESPONSES TO PHQ QUESTIONS 1-9: 0

## 2023-08-03 NOTE — PROGRESS NOTES
cough, chest tightness, shortness of breath and wheezing. Cardiovascular:  Negative for chest pain, palpitations and leg swelling. Gastrointestinal:  Negative for abdominal pain, blood in stool, diarrhea, nausea and vomiting. Genitourinary:  Negative for dysuria and hematuria. Neurological:  Negative for light-headedness. Objective   Physical Exam  Constitutional:       Appearance: She is well-developed. HENT:      Head: Normocephalic and atraumatic. Eyes:      Pupils: Pupils are equal, round, and reactive to light. Neck:      Thyroid: No thyromegaly. Cardiovascular:      Rate and Rhythm: Normal rate and regular rhythm. Heart sounds: Normal heart sounds. No murmur heard. No friction rub. No gallop. Comments: No carotid bruit  Pulmonary:      Effort: Pulmonary effort is normal. No respiratory distress. Breath sounds: Normal breath sounds. No wheezing or rales. Chest:      Chest wall: No tenderness. Abdominal:      General: Bowel sounds are normal. There is no distension. Palpations: Abdomen is soft. There is no mass. Tenderness: There is no abdominal tenderness. There is no guarding or rebound. Musculoskeletal:      Cervical back: Normal range of motion and neck supple. Neurological:      Mental Status: She is alert and oriented to person, place, and time. An electronic signature was used to authenticate this note.     --Jason Rosenthal MD

## 2023-08-04 LAB
ANION GAP SERPL CALCULATED.3IONS-SCNC: 12 MMOL/L (ref 3–16)
BASOPHILS # BLD: 0 K/UL (ref 0–0.2)
BASOPHILS NFR BLD: 1.5 %
BUN SERPL-MCNC: 13 MG/DL (ref 7–20)
CALCIUM SERPL-MCNC: 9.1 MG/DL (ref 8.3–10.6)
CHLORIDE SERPL-SCNC: 109 MMOL/L (ref 99–110)
CO2 SERPL-SCNC: 23 MMOL/L (ref 21–32)
CREAT SERPL-MCNC: 0.9 MG/DL (ref 0.6–1.2)
DEPRECATED RDW RBC AUTO: 14.8 % (ref 12.4–15.4)
EOSINOPHIL # BLD: 0.1 K/UL (ref 0–0.6)
EOSINOPHIL NFR BLD: 3.1 %
GFR SERPLBLD CREATININE-BSD FMLA CKD-EPI: >60 ML/MIN/{1.73_M2}
GLUCOSE SERPL-MCNC: 108 MG/DL (ref 70–99)
HCT VFR BLD AUTO: 32.9 % (ref 36–48)
HGB BLD-MCNC: 11.3 G/DL (ref 12–16)
LYMPHOCYTES # BLD: 0.6 K/UL (ref 1–5.1)
LYMPHOCYTES NFR BLD: 22.3 %
MCH RBC QN AUTO: 30.5 PG (ref 26–34)
MCHC RBC AUTO-ENTMCNC: 34.2 G/DL (ref 31–36)
MCV RBC AUTO: 89.2 FL (ref 80–100)
MONOCYTES # BLD: 0.2 K/UL (ref 0–1.3)
MONOCYTES NFR BLD: 8.9 %
NEUTROPHILS # BLD: 1.7 K/UL (ref 1.7–7.7)
NEUTROPHILS NFR BLD: 64.2 %
PLATELET # BLD AUTO: 86 K/UL (ref 135–450)
PMV BLD AUTO: 10.5 FL (ref 5–10.5)
POTASSIUM SERPL-SCNC: 4.1 MMOL/L (ref 3.5–5.1)
RBC # BLD AUTO: 3.69 M/UL (ref 4–5.2)
SODIUM SERPL-SCNC: 144 MMOL/L (ref 136–145)
WBC # BLD AUTO: 2.7 K/UL (ref 4–11)

## 2023-08-14 ENCOUNTER — TELEPHONE (OUTPATIENT)
Dept: INTERNAL MEDICINE CLINIC | Age: 78
End: 2023-08-14

## 2023-08-14 NOTE — TELEPHONE ENCOUNTER
----- Message from Sweta Wills sent at 8/11/2023  2:41 PM EDT -----  Contact: brandy [phone]  Per Dr. Anatoliy Ybarra- make appt with Elton Oleary for documentation/orders. ----- Message -----  From: Carlos Manuel  Sent: 8/9/2023   3:36 PM EDT  To: Jason Rosenthal MD    Caller, pt daughter, states pt is asking how to get a life chair and power chair. Mainly pt needs a lift chair for her home. Caller asking if that is something medicare will help pay for as well. Please advise.

## 2023-08-15 ENCOUNTER — TELEPHONE (OUTPATIENT)
Dept: INTERNAL MEDICINE CLINIC | Age: 78
End: 2023-08-15

## 2023-08-15 DIAGNOSIS — M15.3 OTHER SECONDARY OSTEOARTHRITIS OF MULTIPLE SITES: Primary | ICD-10-CM

## 2023-08-15 NOTE — TELEPHONE ENCOUNTER
----- Message from Esther Trevizo MD sent at 8/15/2023 11:19 AM EDT -----  Contact: Socorro De La O [phone]  She can see her   Osteoarthritis   ----- Message -----  From: Deniz Woods  Sent: 8/15/2023  10:46 AM EDT  To: Esther Trevizo MD    Arthritis pain.  ----- Message -----  From: Esther Trevizo MD  Sent: 8/15/2023  10:15 AM EDT  To: Deniz Woods    What does she want to see the rheumatologist for?  ----- Message -----  From: [email or web address]  Sent: 8/15/2023   9:30 AM EDT  To: Esther Trevizo MD    Daughter requesting a referral for a Rheumatologist for patient. She was looking at Dr. Fritz Tuttle with [facility] states if you have someone you recommend, she is open to your recommendation.   Please advise

## 2023-08-25 ENCOUNTER — OFFICE VISIT (OUTPATIENT)
Dept: INTERNAL MEDICINE CLINIC | Age: 78
End: 2023-08-25

## 2023-08-25 VITALS
HEART RATE: 64 BPM | HEIGHT: 68 IN | RESPIRATION RATE: 14 BRPM | BODY MASS INDEX: 27.58 KG/M2 | SYSTOLIC BLOOD PRESSURE: 140 MMHG | DIASTOLIC BLOOD PRESSURE: 70 MMHG | WEIGHT: 182 LBS

## 2023-08-25 DIAGNOSIS — M15.3 OTHER SECONDARY OSTEOARTHRITIS OF MULTIPLE SITES: Primary | ICD-10-CM

## 2023-08-25 PROCEDURE — 1123F ACP DISCUSS/DSCN MKR DOCD: CPT | Performed by: NURSE PRACTITIONER

## 2023-08-25 PROCEDURE — 1090F PRES/ABSN URINE INCON ASSESS: CPT | Performed by: NURSE PRACTITIONER

## 2023-08-25 PROCEDURE — 3074F SYST BP LT 130 MM HG: CPT | Performed by: NURSE PRACTITIONER

## 2023-08-25 PROCEDURE — 3078F DIAST BP <80 MM HG: CPT | Performed by: NURSE PRACTITIONER

## 2023-08-25 PROCEDURE — 1036F TOBACCO NON-USER: CPT | Performed by: NURSE PRACTITIONER

## 2023-08-25 PROCEDURE — 99214 OFFICE O/P EST MOD 30 MIN: CPT | Performed by: NURSE PRACTITIONER

## 2023-08-25 PROCEDURE — G8417 CALC BMI ABV UP PARAM F/U: HCPCS | Performed by: NURSE PRACTITIONER

## 2023-08-25 PROCEDURE — G8427 DOCREV CUR MEDS BY ELIG CLIN: HCPCS | Performed by: NURSE PRACTITIONER

## 2023-08-25 PROCEDURE — G8399 PT W/DXA RESULTS DOCUMENT: HCPCS | Performed by: NURSE PRACTITIONER

## 2023-08-25 NOTE — PROGRESS NOTES
CATARACT REMOVAL WITH IMPLANT Right 05/04/2017    PHACO EMULSIFICATION OF CATARACT WITH INTRAOCULAR LENS IMPLANT RIGHT EYE    COLONOSCOPY  3/24/10    FOOT SURGERY      GALLBLADDER SURGERY      HYSTERECTOMY (CERVIX STATUS UNKNOWN)      NECK SURGERY      PAIN MANAGEMENT PROCEDURE N/A 1/20/2020    MIDLINE LUMBAR FIVE SACRAL ONE EPIDURAL STEROID INJECTION SITE CONFIRMED BY FLUOROSCOPY performed by Jamshid Florence MD at [address] [facility] N/A 9/22/2020    MIDLINE LUMBAR FIVE SACRAL ONE EPIDURAL STEROID INJECTION SITE CONFIRMED BY FLUOROSCOPY performed by Jamshid Florence MD at [address] [facility] N/A 2/24/2020    EGD DIAGNOSTIC ONLY performed by Yuan Rees MD at [address] [facility] 3/26/2021    EGD PEG TUBE INSERTION w/anesthesia performed by Yuan Rees MD at [address] [facility]       Physical Exam  Physical Exam  Constitutional:       Appearance: She is well-developed. HENT:      Head: Normocephalic and atraumatic. Eyes:      Pupils: Pupils are equal, round, and reactive to light. Neck:      Thyroid: No thyromegaly. Cardiovascular:      Rate and Rhythm: Normal rate and regular rhythm. Heart sounds: Normal heart sounds. No murmur heard. No friction rub. No gallop. Comments: No carotid bruit  Pulmonary:      Effort: Pulmonary effort is normal. No respiratory distress. Breath sounds: Normal breath sounds. No wheezing or rales. Chest:      Chest wall: No tenderness. Abdominal:      General: Bowel sounds are normal. There is no distension. Palpations: Abdomen is soft. There is no mass. Tenderness: There is no abdominal tenderness. There is no guarding or rebound. Musculoskeletal:      Cervical back: Normal range of motion and neck supple. Neurological:      Mental Status: She is alert and oriented to person, place, and time.

## 2023-08-28 ENCOUNTER — HOSPITAL ENCOUNTER (INPATIENT)
Age: 78
LOS: 1 days | Discharge: HOME | DRG: 069 | End: 2023-08-30
Attending: EMERGENCY MEDICINE | Admitting: INTERNAL MEDICINE
Payer: MEDICARE

## 2023-08-28 ENCOUNTER — APPOINTMENT (OUTPATIENT)
Dept: CT IMAGING | Age: 78
DRG: 069 | End: 2023-08-28
Payer: MEDICARE

## 2023-08-28 ENCOUNTER — APPOINTMENT (OUTPATIENT)
Dept: GENERAL RADIOLOGY | Age: 78
DRG: 069 | End: 2023-08-28
Payer: MEDICARE

## 2023-08-28 DIAGNOSIS — R31.9 URINARY TRACT INFECTION WITH HEMATURIA, SITE UNSPECIFIED: ICD-10-CM

## 2023-08-28 DIAGNOSIS — N39.0 URINARY TRACT INFECTION WITH HEMATURIA, SITE UNSPECIFIED: ICD-10-CM

## 2023-08-28 DIAGNOSIS — N18.9 CHRONIC KIDNEY DISEASE, UNSPECIFIED CKD STAGE: ICD-10-CM

## 2023-08-28 DIAGNOSIS — R77.8 ELEVATED TROPONIN: ICD-10-CM

## 2023-08-28 DIAGNOSIS — G45.9 TIA (TRANSIENT ISCHEMIC ATTACK): Primary | ICD-10-CM

## 2023-08-28 DIAGNOSIS — R42 DIZZINESS: ICD-10-CM

## 2023-08-28 DIAGNOSIS — R29.90 STROKE-LIKE SYMPTOMS: ICD-10-CM

## 2023-08-28 PROBLEM — R79.89 ELEVATED TROPONIN: Status: ACTIVE | Noted: 2023-08-28

## 2023-08-28 LAB
ALBUMIN SERPL-MCNC: 3.8 G/DL (ref 3.4–5)
ALBUMIN/GLOB SERPL: 1.4 {RATIO} (ref 1.1–2.2)
ALP SERPL-CCNC: 68 U/L (ref 40–129)
ALT SERPL-CCNC: 11 U/L (ref 10–40)
ANION GAP SERPL CALCULATED.3IONS-SCNC: 13 MMOL/L (ref 3–16)
AST SERPL-CCNC: 23 U/L (ref 15–37)
BACTERIA URNS QL MICRO: ABNORMAL /HPF
BASOPHILS # BLD: 0 K/UL (ref 0–0.2)
BASOPHILS NFR BLD: 1 %
BILIRUB SERPL-MCNC: 0.6 MG/DL (ref 0–1)
BILIRUB UR QL STRIP.AUTO: NEGATIVE
BUN SERPL-MCNC: 16 MG/DL (ref 7–20)
CALCIUM SERPL-MCNC: 9.1 MG/DL (ref 8.3–10.6)
CHLORIDE SERPL-SCNC: 101 MMOL/L (ref 99–110)
CLARITY UR: ABNORMAL
CO2 SERPL-SCNC: 23 MMOL/L (ref 21–32)
COLOR UR: YELLOW
CREAT SERPL-MCNC: 1.2 MG/DL (ref 0.6–1.2)
DEPRECATED RDW RBC AUTO: 14.9 % (ref 12.4–15.4)
EOSINOPHIL # BLD: 0.1 K/UL (ref 0–0.6)
EOSINOPHIL NFR BLD: 2.1 %
EPI CELLS #/AREA URNS HPF: ABNORMAL /HPF (ref 0–5)
GFR SERPLBLD CREATININE-BSD FMLA CKD-EPI: 46 ML/MIN/{1.73_M2}
GLUCOSE SERPL-MCNC: 145 MG/DL (ref 70–99)
GLUCOSE UR STRIP.AUTO-MCNC: NEGATIVE MG/DL
HCT VFR BLD AUTO: 35.1 % (ref 36–48)
HGB BLD-MCNC: 12.1 G/DL (ref 12–16)
HGB UR QL STRIP.AUTO: ABNORMAL
KETONES UR STRIP.AUTO-MCNC: NEGATIVE MG/DL
LEUKOCYTE ESTERASE UR QL STRIP.AUTO: ABNORMAL
LYMPHOCYTES # BLD: 0.5 K/UL (ref 1–5.1)
LYMPHOCYTES NFR BLD: 11.3 %
MCH RBC QN AUTO: 30.1 PG (ref 26–34)
MCHC RBC AUTO-ENTMCNC: 34.6 G/DL (ref 31–36)
MCV RBC AUTO: 87.2 FL (ref 80–100)
MONOCYTES # BLD: 0.3 K/UL (ref 0–1.3)
MONOCYTES NFR BLD: 8.1 %
NEUTROPHILS # BLD: 3.2 K/UL (ref 1.7–7.7)
NEUTROPHILS NFR BLD: 77.5 %
NITRITE UR QL STRIP.AUTO: NEGATIVE
NT-PROBNP SERPL-MCNC: 802 PG/ML (ref 0–449)
PH UR STRIP.AUTO: 6 [PH] (ref 5–8)
PLATELET # BLD AUTO: 93 K/UL (ref 135–450)
PMV BLD AUTO: 9.6 FL (ref 5–10.5)
POTASSIUM SERPL-SCNC: 3.7 MMOL/L (ref 3.5–5.1)
PROT SERPL-MCNC: 6.5 G/DL (ref 6.4–8.2)
PROT UR STRIP.AUTO-MCNC: NEGATIVE MG/DL
RBC # BLD AUTO: 4.03 M/UL (ref 4–5.2)
RBC #/AREA URNS HPF: ABNORMAL /HPF (ref 0–4)
RENAL EPI CELLS #/AREA UR COMP ASSIST: ABNORMAL /HPF (ref 0–1)
SODIUM SERPL-SCNC: 137 MMOL/L (ref 136–145)
SP GR UR STRIP.AUTO: 1.02 (ref 1–1.03)
TROPONIN, HIGH SENSITIVITY: 23 NG/L (ref 0–14)
TSH SERPL DL<=0.005 MIU/L-ACNC: 1.54 UIU/ML (ref 0.27–4.2)
UA COMPLETE W REFLEX CULTURE PNL UR: YES
UA DIPSTICK W REFLEX MICRO PNL UR: YES
URN SPEC COLLECT METH UR: ABNORMAL
UROBILINOGEN UR STRIP-ACNC: 1 E.U./DL
WBC # BLD AUTO: 4.2 K/UL (ref 4–11)
WBC #/AREA URNS HPF: ABNORMAL /HPF (ref 0–5)

## 2023-08-28 PROCEDURE — 96374 THER/PROPH/DIAG INJ IV PUSH: CPT

## 2023-08-28 PROCEDURE — 93005 ELECTROCARDIOGRAM TRACING: CPT | Performed by: EMERGENCY MEDICINE

## 2023-08-28 PROCEDURE — 85025 COMPLETE CBC W/AUTO DIFF WBC: CPT

## 2023-08-28 PROCEDURE — 71045 X-RAY EXAM CHEST 1 VIEW: CPT

## 2023-08-28 PROCEDURE — 83880 ASSAY OF NATRIURETIC PEPTIDE: CPT

## 2023-08-28 PROCEDURE — 80053 COMPREHEN METABOLIC PANEL: CPT

## 2023-08-28 PROCEDURE — 84443 ASSAY THYROID STIM HORMONE: CPT

## 2023-08-28 PROCEDURE — 2580000003 HC RX 258: Performed by: EMERGENCY MEDICINE

## 2023-08-28 PROCEDURE — 81001 URINALYSIS AUTO W/SCOPE: CPT

## 2023-08-28 PROCEDURE — 87086 URINE CULTURE/COLONY COUNT: CPT

## 2023-08-28 PROCEDURE — 99285 EMERGENCY DEPT VISIT HI MDM: CPT

## 2023-08-28 PROCEDURE — 70450 CT HEAD/BRAIN W/O DYE: CPT

## 2023-08-28 PROCEDURE — 36415 COLL VENOUS BLD VENIPUNCTURE: CPT

## 2023-08-28 PROCEDURE — 84484 ASSAY OF TROPONIN QUANT: CPT

## 2023-08-28 PROCEDURE — 87088 URINE BACTERIA CULTURE: CPT

## 2023-08-28 PROCEDURE — 6370000000 HC RX 637 (ALT 250 FOR IP): Performed by: EMERGENCY MEDICINE

## 2023-08-28 PROCEDURE — 87186 SC STD MICRODIL/AGAR DIL: CPT

## 2023-08-28 RX ORDER — ASPIRIN 81 MG/1
81 TABLET, CHEWABLE ORAL ONCE
Status: COMPLETED | OUTPATIENT
Start: 2023-08-28 | End: 2023-08-28

## 2023-08-28 RX ORDER — 0.9 % SODIUM CHLORIDE 0.9 %
500 INTRAVENOUS SOLUTION INTRAVENOUS ONCE
Status: COMPLETED | OUTPATIENT
Start: 2023-08-28 | End: 2023-08-28

## 2023-08-28 RX ORDER — ACETAMINOPHEN 500 MG
500 TABLET ORAL ONCE
Status: COMPLETED | OUTPATIENT
Start: 2023-08-28 | End: 2023-08-28

## 2023-08-28 RX ADMIN — SODIUM CHLORIDE 500 ML: 9 INJECTION, SOLUTION INTRAVENOUS at 22:51

## 2023-08-28 RX ADMIN — ASPIRIN 81 MG: 81 TABLET, CHEWABLE ORAL at 22:49

## 2023-08-28 RX ADMIN — ACETAMINOPHEN 500 MG: 500 TABLET ORAL at 22:49

## 2023-08-28 ASSESSMENT — PAIN - FUNCTIONAL ASSESSMENT: PAIN_FUNCTIONAL_ASSESSMENT: NONE - DENIES PAIN

## 2023-08-29 ENCOUNTER — APPOINTMENT (OUTPATIENT)
Dept: MRI IMAGING | Age: 78
DRG: 069 | End: 2023-08-29
Payer: MEDICARE

## 2023-08-29 LAB
EKG ATRIAL RATE: 68 BPM
EKG DIAGNOSIS: NORMAL
EKG P AXIS: 39 DEGREES
EKG P-R INTERVAL: 136 MS
EKG Q-T INTERVAL: 474 MS
EKG QRS DURATION: 144 MS
EKG QTC CALCULATION (BAZETT): 504 MS
EKG R AXIS: -36 DEGREES
EKG T AXIS: 15 DEGREES
EKG VENTRICULAR RATE: 68 BPM
GLUCOSE BLD-MCNC: 137 MG/DL (ref 70–99)
LV EF: 65 %
LVEF MODALITY: NORMAL
PERFORMED ON: ABNORMAL
TROPONIN, HIGH SENSITIVITY: 20 NG/L (ref 0–14)

## 2023-08-29 PROCEDURE — 1200000000 HC SEMI PRIVATE

## 2023-08-29 PROCEDURE — 70551 MRI BRAIN STEM W/O DYE: CPT

## 2023-08-29 PROCEDURE — 6360000002 HC RX W HCPCS: Performed by: EMERGENCY MEDICINE

## 2023-08-29 PROCEDURE — 6360000002 HC RX W HCPCS: Performed by: INTERNAL MEDICINE

## 2023-08-29 PROCEDURE — 2580000003 HC RX 258: Performed by: EMERGENCY MEDICINE

## 2023-08-29 PROCEDURE — 87040 BLOOD CULTURE FOR BACTERIA: CPT

## 2023-08-29 PROCEDURE — 36415 COLL VENOUS BLD VENIPUNCTURE: CPT

## 2023-08-29 PROCEDURE — 70544 MR ANGIOGRAPHY HEAD W/O DYE: CPT

## 2023-08-29 PROCEDURE — 95816 EEG AWAKE AND DROWSY: CPT

## 2023-08-29 PROCEDURE — 2580000003 HC RX 258: Performed by: INTERNAL MEDICINE

## 2023-08-29 PROCEDURE — 93010 ELECTROCARDIOGRAM REPORT: CPT | Performed by: INTERNAL MEDICINE

## 2023-08-29 PROCEDURE — 70547 MR ANGIOGRAPHY NECK W/O DYE: CPT

## 2023-08-29 PROCEDURE — 99223 1ST HOSP IP/OBS HIGH 75: CPT | Performed by: PSYCHIATRY & NEUROLOGY

## 2023-08-29 PROCEDURE — 84484 ASSAY OF TROPONIN QUANT: CPT

## 2023-08-29 PROCEDURE — 6370000000 HC RX 637 (ALT 250 FOR IP): Performed by: INTERNAL MEDICINE

## 2023-08-29 PROCEDURE — 93306 TTE W/DOPPLER COMPLETE: CPT

## 2023-08-29 PROCEDURE — 95816 EEG AWAKE AND DROWSY: CPT | Performed by: PSYCHIATRY & NEUROLOGY

## 2023-08-29 RX ORDER — ONDANSETRON 4 MG/1
4 TABLET, ORALLY DISINTEGRATING ORAL EVERY 8 HOURS PRN
Status: DISCONTINUED | OUTPATIENT
Start: 2023-08-29 | End: 2023-08-30 | Stop reason: HOSPADM

## 2023-08-29 RX ORDER — NIACIN 500 MG/1
500 TABLET, EXTENDED RELEASE ORAL NIGHTLY
Status: DISCONTINUED | OUTPATIENT
Start: 2023-08-29 | End: 2023-08-30 | Stop reason: HOSPADM

## 2023-08-29 RX ORDER — SODIUM CHLORIDE 9 MG/ML
INJECTION, SOLUTION INTRAVENOUS PRN
Status: DISCONTINUED | OUTPATIENT
Start: 2023-08-29 | End: 2023-08-30 | Stop reason: HOSPADM

## 2023-08-29 RX ORDER — ENOXAPARIN SODIUM 100 MG/ML
40 INJECTION SUBCUTANEOUS DAILY
Status: DISCONTINUED | OUTPATIENT
Start: 2023-08-29 | End: 2023-08-30 | Stop reason: HOSPADM

## 2023-08-29 RX ORDER — ALBUTEROL SULFATE 2.5 MG/3ML
0.63 SOLUTION RESPIRATORY (INHALATION) EVERY 6 HOURS PRN
Status: DISCONTINUED | OUTPATIENT
Start: 2023-08-29 | End: 2023-08-30 | Stop reason: HOSPADM

## 2023-08-29 RX ORDER — HYDROCHLOROTHIAZIDE 25 MG/1
12.5 TABLET ORAL DAILY
Status: DISCONTINUED | OUTPATIENT
Start: 2023-08-29 | End: 2023-08-30 | Stop reason: HOSPADM

## 2023-08-29 RX ORDER — LISINOPRIL 20 MG/1
20 TABLET ORAL DAILY
Status: DISCONTINUED | OUTPATIENT
Start: 2023-08-29 | End: 2023-08-30 | Stop reason: HOSPADM

## 2023-08-29 RX ORDER — PANTOPRAZOLE SODIUM 40 MG/1
40 TABLET, DELAYED RELEASE ORAL
Status: DISCONTINUED | OUTPATIENT
Start: 2023-08-29 | End: 2023-08-30 | Stop reason: HOSPADM

## 2023-08-29 RX ORDER — ONDANSETRON 2 MG/ML
4 INJECTION INTRAMUSCULAR; INTRAVENOUS EVERY 6 HOURS PRN
Status: DISCONTINUED | OUTPATIENT
Start: 2023-08-29 | End: 2023-08-30 | Stop reason: HOSPADM

## 2023-08-29 RX ORDER — MYCOPHENOLATE MOFETIL 250 MG/1
500 CAPSULE ORAL 2 TIMES DAILY
Status: DISCONTINUED | OUTPATIENT
Start: 2023-08-29 | End: 2023-08-30 | Stop reason: HOSPADM

## 2023-08-29 RX ORDER — AMLODIPINE BESYLATE 5 MG/1
5 TABLET ORAL DAILY
Status: DISCONTINUED | OUTPATIENT
Start: 2023-08-29 | End: 2023-08-30 | Stop reason: HOSPADM

## 2023-08-29 RX ORDER — SODIUM CHLORIDE 0.9 % (FLUSH) 0.9 %
5-40 SYRINGE (ML) INJECTION EVERY 12 HOURS SCHEDULED
Status: DISCONTINUED | OUTPATIENT
Start: 2023-08-29 | End: 2023-08-30 | Stop reason: HOSPADM

## 2023-08-29 RX ORDER — PYRIDOSTIGMINE BROMIDE 60 MG/1
30 TABLET ORAL 3 TIMES DAILY
Status: DISCONTINUED | OUTPATIENT
Start: 2023-08-29 | End: 2023-08-30 | Stop reason: HOSPADM

## 2023-08-29 RX ORDER — POLYETHYLENE GLYCOL 3350 17 G/17G
17 POWDER, FOR SOLUTION ORAL DAILY PRN
Status: DISCONTINUED | OUTPATIENT
Start: 2023-08-29 | End: 2023-08-30 | Stop reason: HOSPADM

## 2023-08-29 RX ORDER — ACETAMINOPHEN 325 MG/1
650 TABLET ORAL EVERY 6 HOURS PRN
Status: DISCONTINUED | OUTPATIENT
Start: 2023-08-29 | End: 2023-08-30 | Stop reason: HOSPADM

## 2023-08-29 RX ORDER — DOXAZOSIN 2 MG/1
2 TABLET ORAL DAILY
Status: DISCONTINUED | OUTPATIENT
Start: 2023-08-29 | End: 2023-08-30 | Stop reason: HOSPADM

## 2023-08-29 RX ORDER — ACETAMINOPHEN 650 MG/1
650 SUPPOSITORY RECTAL EVERY 6 HOURS PRN
Status: DISCONTINUED | OUTPATIENT
Start: 2023-08-29 | End: 2023-08-30 | Stop reason: HOSPADM

## 2023-08-29 RX ORDER — LISINOPRIL AND HYDROCHLOROTHIAZIDE 20; 12.5 MG/1; MG/1
1 TABLET ORAL DAILY
Status: DISCONTINUED | OUTPATIENT
Start: 2023-08-29 | End: 2023-08-29 | Stop reason: CLARIF

## 2023-08-29 RX ORDER — SODIUM CHLORIDE 9 MG/ML
INJECTION, SOLUTION INTRAVENOUS CONTINUOUS
Status: DISCONTINUED | OUTPATIENT
Start: 2023-08-29 | End: 2023-08-30 | Stop reason: HOSPADM

## 2023-08-29 RX ORDER — SODIUM CHLORIDE 0.9 % (FLUSH) 0.9 %
5-40 SYRINGE (ML) INJECTION PRN
Status: DISCONTINUED | OUTPATIENT
Start: 2023-08-29 | End: 2023-08-30 | Stop reason: HOSPADM

## 2023-08-29 RX ADMIN — ENOXAPARIN SODIUM 40 MG: 100 INJECTION SUBCUTANEOUS at 09:19

## 2023-08-29 RX ADMIN — NIACIN 500 MG: 500 TABLET, EXTENDED RELEASE ORAL at 04:54

## 2023-08-29 RX ADMIN — MYCOPHENOLATE MOFETIL 500 MG: 250 CAPSULE ORAL at 04:54

## 2023-08-29 RX ADMIN — MYCOPHENOLATE MOFETIL 500 MG: 250 CAPSULE ORAL at 21:13

## 2023-08-29 RX ADMIN — ONDANSETRON 4 MG: 2 INJECTION INTRAMUSCULAR; INTRAVENOUS at 05:52

## 2023-08-29 RX ADMIN — CEFTRIAXONE SODIUM 1000 MG: 1 INJECTION, POWDER, FOR SOLUTION INTRAMUSCULAR; INTRAVENOUS at 01:18

## 2023-08-29 RX ADMIN — PANTOPRAZOLE SODIUM 40 MG: 40 TABLET, DELAYED RELEASE ORAL at 06:00

## 2023-08-29 RX ADMIN — SODIUM CHLORIDE: 9 INJECTION, SOLUTION INTRAVENOUS at 21:10

## 2023-08-29 RX ADMIN — PYRIDOSTIGMINE BROMIDE 30 MG: 60 TABLET ORAL at 09:06

## 2023-08-29 RX ADMIN — PYRIDOSTIGMINE BROMIDE 30 MG: 60 TABLET ORAL at 16:28

## 2023-08-29 RX ADMIN — PYRIDOSTIGMINE BROMIDE 30 MG: 60 TABLET ORAL at 21:12

## 2023-08-29 RX ADMIN — NIACIN 500 MG: 500 TABLET, EXTENDED RELEASE ORAL at 21:12

## 2023-08-29 RX ADMIN — LISINOPRIL 20 MG: 20 TABLET ORAL at 09:21

## 2023-08-29 RX ADMIN — SODIUM CHLORIDE: 9 INJECTION, SOLUTION INTRAVENOUS at 04:00

## 2023-08-29 NOTE — CARE COORDINATION
Case Management Assessment  Initial Evaluation    Date/Time of Evaluation: 8/29/2023 1:59 PM  Assessment Completed by: Lexis Turner RN    If patient is discharged prior to next notation, then this note serves as note for discharge by case management. Patient Name: Sherly Mcgregor                   YOB: 1945  Diagnosis: TIA (transient ischemic attack) [G45.9]  Dizziness [R42]  Elevated troponin [R77.8]  Stroke-like symptoms [R29.90]  Urinary tract infection with hematuria, site unspecified [N39.0, R31.9]  Chronic kidney disease, unspecified CKD stage [N18.9]                   Date / Time: 8/28/2023  9:42 PM    Patient Admission Status: Inpatient   Readmission Risk (Low < 19, Mod (19-27), High > 27): Readmission Risk Score: 9.8    Current PCP: Lulú Horton MD  PCP verified by CM? (P) Yes       08/29/23 1519   Service Assessment   Patient Orientation Alert and Oriented;Person;Place;Situation   Cognition Alert   History Provided By Patient   Primary Caregiver Self   Accompanied By/Relationship self   Support Systems Children  (adult children and grandchilren live nearby and assist PRN)   Patient's [address] [place] [address] Avenue is: Legal Next of Kin   PCP Verified by CM Yes   Last Visit to PCP Within last 6 months   Prior Functional Level Independent in ADLs/IADLs   Current Functional Level Independent in ADLs/IADLs   Can patient return to prior living arrangement Yes   Ability to make needs known: Good   Family able to assist with home care needs: Yes   Would you like for me to discuss the discharge plan with any other family members/significant others, and if so, who? No   Financial Resources Really Simple Resources None   CM/SW Referral Other (see comment)  (NA)   Discharge Planning   Type of Residence Apartment   Living Arrangements Alone   Current Services Prior To Admission Durable Medical Equipment   Current DME Prior to [employer]; Shower Chair  (walk-in shower)

## 2023-08-29 NOTE — PROGRESS NOTES
Telemetry Assignment Communication Form    Patient has orders for continuous telemetry OR pulse oximeter only orders    To be filled out by Clinical    Patient has Admission or Transfer orders and is assigned to Room Number: 221    Continuous Telemetry:  Yes       WITH/WITHOUT: without Continuous pulse ox    Patient has pulse ox ONLY orders:  No    (Once this top section is completed:  Select \"Route\" and send note to Fax number: [phone] ))      ___________________________________________________________________________      [facility]    Patient assigned to tele box number: __________________               (to be written in by [facility] when telemetry box is assigned to patient)    ___________________________________________________________________________      Bedside RN confirming that the box listed above is in fact the telemetry box number being placed on the patient listed above.         X________________________________________ RN signature        __________________________________________RN assigned to Patient (please print)    _______________ Date    ____________ Time

## 2023-08-29 NOTE — ED NOTES
[facility]  sent to Dr. Abe Brito for admission      Tanvi Bolaños  08/28/23 [phone]

## 2023-08-29 NOTE — PROGRESS NOTES
08/29/23 1436   Encounter Summary   Encounter Overview/Reason  Initial Encounter   Service Provided For: Patient   Referral/Consult From: [address] Children;Friends/neighbors   Last Encounter  08/29/23  ([facility] visited - provided pastoral support and prayer)   Assessment/Intervention/Outcome   Intervention Prayer (assurance of)/[place]

## 2023-08-29 NOTE — PLAN OF CARE
Problem: Discharge Planning  Goal: Discharge to home or other facility with appropriate resources  Outcome: Progressing  Flowsheets  Taken 8/29/2023 0417  Discharge to home or other facility with appropriate resources: Identify barriers to discharge with patient and caregiver  Taken 8/29/2023 0347  Discharge to home or other facility with appropriate resources: Identify barriers to discharge with patient and caregiver     Problem: Safety - Adult  Goal: Free from fall injury  Outcome: Progressing

## 2023-08-29 NOTE — ED NOTES
Report called. Patient readied for transport to inpatient unit.      Margaret Ceja RN  08/29/23 0750

## 2023-08-29 NOTE — H&P
[facility]:    HOSPITALIST' ADMISSION HISTORY & PHYSICAL NOTE:                                                                                                                               8/29/2023 1:38 AM  Patient: Ama Harrington 1945  PCP: Harshal Quintero MD    Chief Complaint On Presentation:  Sudden onset of dizziness with blurred vision and confusion last night. History Of Present Illness:  Patient is a very pleasant 78years old  female with past medical history significant for hypertension, history of asthma, hepatitis with liver cirrhosis, myasthenia gravis, recurrent urinary tract infection with MDRO who happened to be in her usual state of health last night around 9:00 when she is having became very confused associated with dizziness and headaches with blurred vision and some short-lived dysarthria as noted by the patient's neighbor, and was subsequently brought to the emergency room for further evaluation. On arrival to the emergency room patient was noted to be afebrile with a temp of 97.4, bradycardic with heart rate of 47, hypotensive with initial blood pressure of 65/36 with an SPO2 of 94% on room air. While in the emergency room patient had a CT of the head done which was negative for any acute intracranial abnormality. Routine labs including CBC was pretty much within normal limits without any leukocytosis but significant thrombocytopenia with platelets of 97. Serum chemistry was consistent with chronic renal insufficiency with a creatinine of 1.2 and overall GFR of 46. Urine analysis was consistent with significant urinary tract infection with leukocyte esterase and urine WBC of 21-50.   Because X   Other        Expected  Disposition:   Home     HH/PT/OT/RN    SNF/LTC    Other:         SAFETY:   Code Status: Full Code   DVT prophylaxis Lovenox  Bladder catheter: No  Disposition: TBD     Time Spent:  Time spent in review of chart, discussion with other providers, including ER physician, nursing, case management, other family members, and patient himself, evaluation, assessment and strategy: 39 mins    Edson Jacob MD  1

## 2023-08-29 NOTE — PROGRESS NOTES
Came to see pt for staff concern. Pt back in bed when this writer arrived. Nauseous and vomited x1. States, \"I feel better. \"  Discussed that she may have had a vagal response while using the commode b/c HR and BP dropped. VSS now and pt denies any pain. Pt a/ox4. Staff RN to call with any other complaints.

## 2023-08-29 NOTE — PROCEDURES
INTERPRETATION:  This 25-minute, computer-assisted video EEG recording is normal.  No potentially epileptiform activity was present during the recording. CLASSIFICATION:  Normal (awake and drowsy). Sleep - unsuccessful. EKG channel. DESCRIPTION:    BACKGROUND:  The awake recording revealed 9 Hz alpha activity over the posterior head region. Given the extensive study, the patient still did not sleep. The EEG showed normal V waves during drowsiness. There was no significant change on the EEG background with photic stimulation. Hyperventilation was omitted due to old age. INTERICTAL DISCHARGES: None    CLINICAL EVENTS:  None    The EKG channel was unremarkable.

## 2023-08-29 NOTE — CONSULTS
Neurology consultation note    Patient name: Feng Holland      Chief Complaint:  Transient episode of confusion and speech difficulty. History of present illness: This is an 78years old right-handed female. The patient was admitted overnight due to the new onset of spell with speech difficulty and confusion. Patient was at her usual state of health until last evening. At that time, the patient tried to stand up for the toilet and developed lightheadedness with with spinning sensation. The patient felt she almost passed out so she sat down on the toilet for at least 5 minutes. Then she was followed by her neighbor with confusion and speech difficulty. The patient was hypotensive upon admission with bradycardia. She was also found to have hematuria and UTI upon admission. The patient is noted for history of CVA with right hemiparesis and speech difficulty few years ago. The patient is also noted for myasthenia gravis. The patient is currently on pyridostigmine and CellCept. The patient stopped aspirin a year later after she developed stroke. At that time, the patient was reported polypharmacy and her PCP readjusted her medications.     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 Dr. Alena Matias Right 05/04/2017    PHACO EMULSIFICATION OF CATARACT WITH INTRAOCULAR LENS IMPLANT RIGHT EYE    COLONOSCOPY  3/24/10    FOOT SURGERY      GALLBLADDER SURGERY      HYSTERECTOMY (CERVIX STATUS UNKNOWN)

## 2023-08-29 NOTE — PROGRESS NOTES
Consult called in to Dr Betty Rene, Neurology. 8-29-23 @8912.  [facility]

## 2023-08-29 NOTE — ED PROVIDER NOTES
[address] [facility]      Pt Name: Patricio Whatley  MRN: [id number]  [address] [facility] 1945  Date of evaluation: 8/28/2023  Provider: Bladimir Sanchez DO    CHIEF COMPLAINT       Chief Complaint   Patient presents with    Dizziness     Pt with sudden onset of headache, dizziness, blurred vision and ams. Cannot establish LKW. Pts neighbor came to see pt and noted her being altered at approx 845p. HISTORY OF PRESENT ILLNESS   (Location/Symptom, Timing/Onset, Context/Setting, Quality, Duration, Modifying Factors, Severity)  Note limiting factors. Patricio Whatley is a 78 y.o. female with past medical history of myasthenia gravis, hypertension, asthma, TIA, CKD, osteoarthritis, hepatitis, cirrhosis, generalized anxiety disorder, MDRO resistant urine, and acid reflux who presents to the emergency department accompanied by son and daughter for chief complaint of dizziness and headache. Patient's family is at bedside who assist in history that they were called by a neighbor as patient lives in a [place] and older senior community and they visit each other frequently. Patient was visited by a neighbor around 815 this evening and she was noted to be altered with slurred speech. Patient was noted not to be a acting herself and they checked her blood pressure and she had a systolic blood pressure in the 90s. Patient states that she is not sure when exactly she was herself but she remembers making beings and putting cakes in the oven and she began feeling dizzy with a frontal headache bilaterally and felt like she was going to pass out. Patient believes that this began around approximately 4 PM but she is unsure of her last known normal.  Patient's family checked her blood pressure and her systolic blood pressure was 108 when they came to see her around 9 PM and they noticed that she was confused and had slurred speech.   Patient arrives to the emergency department and her symptoms

## 2023-08-29 NOTE — PROGRESS NOTES
Patient appears to be sleeping as manifested by eyes closed, respiration easy and even. Telemetry on. Call light within reach.

## 2023-08-30 VITALS
RESPIRATION RATE: 14 BRPM | HEIGHT: 68 IN | DIASTOLIC BLOOD PRESSURE: 63 MMHG | BODY MASS INDEX: 27.02 KG/M2 | WEIGHT: 178.3 LBS | TEMPERATURE: 98.1 F | OXYGEN SATURATION: 98 % | HEART RATE: 68 BPM | SYSTOLIC BLOOD PRESSURE: 144 MMHG

## 2023-08-30 LAB
ANION GAP SERPL CALCULATED.3IONS-SCNC: 8 MMOL/L (ref 3–16)
BACTERIA UR CULT: ABNORMAL
BASOPHILS # BLD: 0 K/UL (ref 0–0.2)
BASOPHILS NFR BLD: 1.2 %
BUN SERPL-MCNC: 14 MG/DL (ref 7–20)
CALCIUM SERPL-MCNC: 8.1 MG/DL (ref 8.3–10.6)
CHLORIDE SERPL-SCNC: 107 MMOL/L (ref 99–110)
CHOLEST SERPL-MCNC: 109 MG/DL (ref 0–199)
CO2 SERPL-SCNC: 25 MMOL/L (ref 21–32)
CREAT SERPL-MCNC: 1 MG/DL (ref 0.6–1.2)
DEPRECATED RDW RBC AUTO: 15.3 % (ref 12.4–15.4)
EOSINOPHIL # BLD: 0.2 K/UL (ref 0–0.6)
EOSINOPHIL NFR BLD: 5.1 %
GFR SERPLBLD CREATININE-BSD FMLA CKD-EPI: 57 ML/MIN/{1.73_M2}
GLUCOSE SERPL-MCNC: 95 MG/DL (ref 70–99)
HCT VFR BLD AUTO: 33 % (ref 36–48)
HDLC SERPL-MCNC: 49 MG/DL (ref 40–60)
HGB BLD-MCNC: 11.1 G/DL (ref 12–16)
INR PPP: 1.19 (ref 0.84–1.16)
LDLC SERPL CALC-MCNC: 48 MG/DL
LYMPHOCYTES # BLD: 0.7 K/UL (ref 1–5.1)
LYMPHOCYTES NFR BLD: 20.4 %
MCH RBC QN AUTO: 29.9 PG (ref 26–34)
MCHC RBC AUTO-ENTMCNC: 33.6 G/DL (ref 31–36)
MCV RBC AUTO: 89 FL (ref 80–100)
MONOCYTES # BLD: 0.3 K/UL (ref 0–1.3)
MONOCYTES NFR BLD: 9.6 %
NEUTROPHILS # BLD: 2.1 K/UL (ref 1.7–7.7)
NEUTROPHILS NFR BLD: 63.7 %
ORGANISM: ABNORMAL
PLATELET # BLD AUTO: 71 K/UL (ref 135–450)
PMV BLD AUTO: 9.1 FL (ref 5–10.5)
POTASSIUM SERPL-SCNC: 4.3 MMOL/L (ref 3.5–5.1)
PROTHROMBIN TIME: 15.1 SEC (ref 11.5–14.8)
RBC # BLD AUTO: 3.7 M/UL (ref 4–5.2)
SODIUM SERPL-SCNC: 140 MMOL/L (ref 136–145)
TRIGL SERPL-MCNC: 60 MG/DL (ref 0–150)
VLDLC SERPL CALC-MCNC: 12 MG/DL
WBC # BLD AUTO: 3.3 K/UL (ref 4–11)

## 2023-08-30 PROCEDURE — 80061 LIPID PANEL: CPT

## 2023-08-30 PROCEDURE — 99233 SBSQ HOSP IP/OBS HIGH 50: CPT | Performed by: PSYCHIATRY & NEUROLOGY

## 2023-08-30 PROCEDURE — 85025 COMPLETE CBC W/AUTO DIFF WBC: CPT

## 2023-08-30 PROCEDURE — 6360000002 HC RX W HCPCS: Performed by: INTERNAL MEDICINE

## 2023-08-30 PROCEDURE — 80048 BASIC METABOLIC PNL TOTAL CA: CPT

## 2023-08-30 PROCEDURE — 97161 PT EVAL LOW COMPLEX 20 MIN: CPT

## 2023-08-30 PROCEDURE — 97530 THERAPEUTIC ACTIVITIES: CPT

## 2023-08-30 PROCEDURE — 99238 HOSP IP/OBS DSCHRG MGMT 30/<: CPT | Performed by: INTERNAL MEDICINE

## 2023-08-30 PROCEDURE — 2580000003 HC RX 258: Performed by: INTERNAL MEDICINE

## 2023-08-30 PROCEDURE — 85610 PROTHROMBIN TIME: CPT

## 2023-08-30 PROCEDURE — 6370000000 HC RX 637 (ALT 250 FOR IP): Performed by: INTERNAL MEDICINE

## 2023-08-30 PROCEDURE — 36415 COLL VENOUS BLD VENIPUNCTURE: CPT

## 2023-08-30 RX ORDER — ATORVASTATIN CALCIUM 20 MG/1
20 TABLET, FILM COATED ORAL DAILY
Qty: 30 TABLET | Refills: 0 | Status: SHIPPED | OUTPATIENT
Start: 2023-08-30

## 2023-08-30 RX ORDER — ASPIRIN 81 MG/1
81 TABLET ORAL DAILY
Qty: 90 TABLET | Refills: 1 | Status: SHIPPED | OUTPATIENT
Start: 2023-08-30

## 2023-08-30 RX ORDER — NITROFURANTOIN 25; 75 MG/1; MG/1
100 CAPSULE ORAL 2 TIMES DAILY
Qty: 10 CAPSULE | Refills: 0 | Status: SHIPPED | OUTPATIENT
Start: 2023-08-30 | End: 2023-09-04

## 2023-08-30 RX ADMIN — DOXAZOSIN 2 MG: 2 TABLET ORAL at 09:29

## 2023-08-30 RX ADMIN — CEFTRIAXONE SODIUM 1000 MG: 1 INJECTION, POWDER, FOR SOLUTION INTRAMUSCULAR; INTRAVENOUS at 00:50

## 2023-08-30 RX ADMIN — PYRIDOSTIGMINE BROMIDE 30 MG: 60 TABLET ORAL at 09:31

## 2023-08-30 RX ADMIN — HYDROCHLOROTHIAZIDE 12.5 MG: 25 TABLET ORAL at 09:29

## 2023-08-30 RX ADMIN — PANTOPRAZOLE SODIUM 40 MG: 40 TABLET, DELAYED RELEASE ORAL at 05:15

## 2023-08-30 RX ADMIN — AMLODIPINE BESYLATE 5 MG: 5 TABLET ORAL at 09:31

## 2023-08-30 RX ADMIN — MYCOPHENOLATE MOFETIL 500 MG: 250 CAPSULE ORAL at 09:29

## 2023-08-30 RX ADMIN — LISINOPRIL 20 MG: 20 TABLET ORAL at 09:31

## 2023-08-30 NOTE — PLAN OF CARE
Problem: Discharge Planning  Goal: Discharge to home or other facility with appropriate resources  8/30/2023 1453 by May Marie RN  Outcome: Adequate for Discharge  8/30/2023 1452 by May Marie RN  Outcome: Progressing     Problem: Safety - Adult  Goal: Free from fall injury  8/30/2023 1453 by May Marie RN  Outcome: Adequate for Discharge  8/30/2023 1452 by May Marie RN  Outcome: Progressing     Problem: Neurosensory - Adult  Goal: Achieves stable or improved neurological status  8/30/2023 1453 by May Marie RN  Outcome: Adequate for Discharge  8/30/2023 1452 by May Marie RN  Outcome: Progressing

## 2023-08-30 NOTE — PROGRESS NOTES
Inpatient Physical Therapy Evaluation & Treatment    Unit: 2 [address] [place]  Date:  8/30/2023  Patient Name:    Estiven Prince  Admitting diagnosis:  TIA (transient ischemic attack) [G45.9]  Dizziness [R42]  Elevated troponin [R77.8]  Stroke-like symptoms [R29.90]  Urinary tract infection with hematuria, site unspecified [N39.0, R31.9]  Chronic kidney disease, unspecified CKD stage [N18.9]  Admit Date:  8/28/2023  Precautions/Restrictions/WB Status/ Lines/ Wounds/ Oxygen: Fall risk and Isolation Precautions: Contact          Treatment Time:  13:05-13:50  Treatment Number:  1   Timed Code Treatment Minutes:  35minutes  Total Treatment Minutes:  45  minutes    Patient Stated Goals for Therapy: \" to go home \"          Discharge Recommendations: Home with PRN assistance and Home PT  DME needs for discharge: Needs Met       Therapy recommendation for EMS Transport: can transport by wheelchair    Therapy recommendations for staff:   Stand by assist for ambulation with use of rolling walker (RW) and gait belt within halls    History Of Present Illness:  Patient is a very pleasant 78years old  female with past medical history significant for hypertension, history of asthma, hepatitis with liver cirrhosis, myasthenia gravis, recurrent urinary tract infection with MDRO who happened to be in her usual state of health last night around 9:00 when she is having became very confused associated with dizziness and headaches with blurred vision and some short-lived dysarthria as noted by the patient's neighbor, and was subsequently brought to the emergency room for further evaluation. On arrival to the emergency room patient was noted to be afebrile with a temp of 97.4, bradycardic with heart rate of 47, hypotensive with initial blood pressure of 65/36 with an SPO2 of 94% on room air. While in the emergency room patient had a CT of the head done which was negative for any acute intracranial abnormality.   Routine labs including A&O Person, Place, Time, and Situation   Able to follow 2 step commands    Pain   Yes  Location: neck  Rating: mild /10,chronic  Pain Medicine Status: No request made    Preadmission Environment:   Pt. Lives with    Alone  Home environment:    apartment   Steps to enter first floor:   No steps  Steps to second floor/basement: N/A  Laundry:     1st floor  Bathroom:     walk in shower, shower chair , and versa frame over toilet  and hand held shower  Pt sleeps in a:    Adjustable bed, also sleeps recliner  Equipment owned:    RW, rollator, manual WC, shower chair/bench, and lifealert,cane  Patient reports that she is waiting for approval for a lift chair and a power wheelchair    Preadmission Status:  Pt. Able to drive: Yes  Pt. Fully independent with ADLs: Yes  Pt. Required assistance from family for: Independent PTA  Pt independent for functional transfers and utilized Rollator for mobility in home and  family assist or cane  out in community  Has had difficulty sit to stand transfers recently  History of falls: No  Home Health Services:None    Objective  Does this pt have an acute or acute on chronic diagnosis of CHF? No    Upper Extremity ROM/Strength  B shoulder ROM limited by pain L>R   Equal and good  strength    Lower Extremity ROM / Strength   AROM WFL: Yes  ROM limitations:     Strength Assessment (measured on a 0-5 scale): and equal  R LE   Quad   4   Ant Tib  4   Hamstring 4   Iliopsoas 4  L LE  Quad   4   Ant Tib  4   Hamstring 4   Iliopsoas 4    Lower Extremity Sensation    [facility]    Coordination  WFL. heel to shin    Tone  WNL    Balance  Static Sitting:  Normal; Independent  Dynamic Sitting:  Normal; Independent   Comments: no LOB    Static Standing: Good ; Modified Independent  Dynamic Standing: Good ; CGA initially,then Sup  Comments: no LOB,RW and gait belt    Posture  Seated: Forward head and neck and Thoracic kyphosis  Standing:  Forward head and neck and Thoracic kyphosis    Bed Mobility

## 2023-08-30 NOTE — CARE COORDINATION
DISCHARGE ORDER  Date/Time 2023 10:49 AM  Completed by: Carol Ann Suárez RN, Case Management    Patient Name: Marzetta Lesch      : 1945  Admitting Diagnosis: TIA (transient ischemic attack) [G45.9]  Dizziness [R42]  Elevated troponin [R77.8]  Stroke-like symptoms [R29.90]  Urinary tract infection with hematuria, site unspecified [N39.0, R31.9]  Chronic kidney disease, unspecified CKD stage [N18.9]      Admit order Date and Status: IP 2023  (verify MD's last order for status of admission)      Noted discharge order. If applicable PT/OT recommendation at Discharge: NA  DME recommendation by PT/OT:NA  Confirmed discharge plan  Discharge Plan: Chart reviewed. Met with pt at bedside and explained the role of the CM. Plans to return  home today. +family lives nearby and can assist PRN. No new HHC of DME needs identified. No barriers to Dc home today. Date of Last IMM Given: 2023    Has Home O2 in place on admit:  No  Informed of need to bring portable home O2 tank on day of discharge for nursing to connect prior to leaving:   No  Verbalized agreement/Understanding:   No  Pt is being d/c'd to home today. Pt's O2 sats are 99% on RA. Discharge timeout done with Meryl COREA. All discharge needs and concerns addressed.

## 2023-08-30 NOTE — DISCHARGE SUMMARY
Name:  Geofm Romberg  Room:  0221/0221-02  MRN:    [id number]    Discharge Summary      This discharge summary is in conjunction with a complete physical exam done on the day of discharge. Discharging Physician: Jose Carpio MD      Admit: 8/28/2023  Discharge:  8/30/2023     Diagnoses this Admission    Principal Problem:    TIA (transient ischemic attack)  Active Problems:    Chronic kidney disease    Dizziness    Stroke-like symptoms    Elevated troponin    Urinary tract infection with hematuria  Resolved Problems:    * No resolved hospital problems. *      Procedures (Please Review Full Report for Details)    none    Consults    IP CONSULT TO INTERNAL MEDICINE  IP CONSULT TO NEUROLOGY      HPI:    Patient is a very pleasant 78years old  female with past medical history significant for hypertension, history of asthma, hepatitis with liver cirrhosis, myasthenia gravis, recurrent urinary tract infection with MDRO who happened to be in her usual state of health last night around 9:00 when she is having became very confused associated with dizziness and headaches with blurred vision and some short-lived dysarthria as noted by the patient's neighbor, and was subsequently brought to the emergency room for further evaluation. On arrival to the emergency room patient was noted to be afebrile with a temp of 97.4, bradycardic with heart rate of 47, hypotensive with initial blood pressure of 65/36 with an SPO2 of 94% on room air. While in the emergency room patient had a CT of the head done which was negative for any acute intracranial abnormality. Routine labs including CBC was pretty much within normal limits without any leukocytosis but significant thrombocytopenia with platelets of 97. Serum chemistry was consistent with chronic renal insufficiency with a creatinine of 1.2 and overall GFR of 46.   Urine analysis was consistent with significant urinary tract infection with leukocyte

## 2023-08-30 NOTE — PLAN OF CARE
Problem: Discharge Planning  Goal: Discharge to home or other facility with appropriate resources  Outcome: Progressing  Flowsheets (Taken 8/29/2023 2108)  Discharge to home or other facility with appropriate resources: Identify barriers to discharge with patient and caregiver     Problem: Safety - Adult  Goal: Free from fall injury  Outcome: Progressing     Problem: Neurosensory - Adult  Goal: Achieves stable or improved neurological status  Outcome: Progressing  Flowsheets (Taken 8/29/2023 2108)  Achieves stable or improved neurological status: Assess for and report changes in neurological status

## 2023-08-31 ENCOUNTER — CLINICAL DOCUMENTATION ONLY (OUTPATIENT)
Facility: CLINIC | Age: 78
End: 2023-08-31

## 2023-08-31 NOTE — CARE COORDINATION
[facility]  Received referral regarding [address] services. CTN to follow up with pt. CTN to send referral when [facility] orders received from .    8/31 Follow up with Leila Dobbins. HC orders received today. Sent referral to [facility] for [facility].       Electronically signed by Nathaniel Mckeon RN on 8/31/2023 at 9:38 AM

## 2023-09-02 LAB
BACTERIA BLD CULT ORG #2: NORMAL
BACTERIA BLD CULT: NORMAL

## 2023-09-06 ENCOUNTER — TELEPHONE (OUTPATIENT)
Dept: INTERNAL MEDICINE CLINIC | Age: 78
End: 2023-09-06

## 2023-09-06 NOTE — TELEPHONE ENCOUNTER
----- Message from Destini Borja MD sent at 9/6/2023  2:28 PM EDT -----  They look good   ----- Message -----  From: Mirella Mcguire  Sent: 9/6/2023   2:14 PM EDT  To: Destini Borja MD    Pt's daughter requesting blood work results from 8/30/23. Please advise.

## 2023-09-06 NOTE — TELEPHONE ENCOUNTER
----- Message from La Villasenor MD sent at 9/6/2023  8:52 AM EDT -----  Contact: tevin [phone]  [facility]   ----- Message -----  From: Shawn Meade  Sent: 9/5/2023   4:33 PM EDT  To: La Villasenor MD    Caller [facility], asking if you will sign orders  PT & OT for patient.  Please advise

## 2023-09-07 ENCOUNTER — HOSPITAL ENCOUNTER (EMERGENCY)
Age: 78
Discharge: HOME | End: 2023-09-07
Attending: EMERGENCY MEDICINE
Payer: MEDICARE

## 2023-09-07 ENCOUNTER — APPOINTMENT (OUTPATIENT)
Dept: CT IMAGING | Age: 78
End: 2023-09-07
Payer: MEDICARE

## 2023-09-07 VITALS
BODY MASS INDEX: 27.28 KG/M2 | SYSTOLIC BLOOD PRESSURE: 168 MMHG | HEART RATE: 66 BPM | WEIGHT: 180 LBS | TEMPERATURE: 97.8 F | OXYGEN SATURATION: 100 % | DIASTOLIC BLOOD PRESSURE: 68 MMHG | RESPIRATION RATE: 18 BRPM | HEIGHT: 68 IN

## 2023-09-07 DIAGNOSIS — I95.9 HYPOTENSIVE EPISODE: ICD-10-CM

## 2023-09-07 DIAGNOSIS — R42 LIGHTHEADED: Primary | ICD-10-CM

## 2023-09-07 LAB
ALBUMIN SERPL-MCNC: 3.9 G/DL (ref 3.4–5)
ALBUMIN/GLOB SERPL: 1.4 {RATIO} (ref 1.1–2.2)
ALP SERPL-CCNC: 53 U/L (ref 40–129)
ALT SERPL-CCNC: 15 U/L (ref 10–40)
ANION GAP SERPL CALCULATED.3IONS-SCNC: 14 MMOL/L (ref 3–16)
ANISOCYTOSIS BLD QL SMEAR: ABNORMAL
AST SERPL-CCNC: 39 U/L (ref 15–37)
BACTERIA URNS QL MICRO: ABNORMAL /HPF
BASOPHILS # BLD: 0 K/UL (ref 0–0.2)
BASOPHILS NFR BLD: 1 %
BILIRUB SERPL-MCNC: 0.6 MG/DL (ref 0–1)
BILIRUB UR QL STRIP.AUTO: NEGATIVE
BUN SERPL-MCNC: 24 MG/DL (ref 7–20)
CALCIUM SERPL-MCNC: 9.2 MG/DL (ref 8.3–10.6)
CHLORIDE SERPL-SCNC: 103 MMOL/L (ref 99–110)
CLARITY UR: CLEAR
CO2 SERPL-SCNC: 21 MMOL/L (ref 21–32)
COARSE GRAN CASTS #/AREA URNS LPF: ABNORMAL /LPF (ref 0–2)
COLOR UR: YELLOW
CREAT SERPL-MCNC: 1.2 MG/DL (ref 0.6–1.2)
DEPRECATED RDW RBC AUTO: 15.1 % (ref 12.4–15.4)
EOSINOPHIL # BLD: 0 K/UL (ref 0–0.6)
EOSINOPHIL NFR BLD: 0 %
EPI CELLS #/AREA URNS HPF: ABNORMAL /HPF (ref 0–5)
GFR SERPLBLD CREATININE-BSD FMLA CKD-EPI: 46 ML/MIN/{1.73_M2}
GLUCOSE SERPL-MCNC: 107 MG/DL (ref 70–99)
GLUCOSE UR STRIP.AUTO-MCNC: NEGATIVE MG/DL
HCT VFR BLD AUTO: 35.1 % (ref 36–48)
HGB BLD-MCNC: 11.8 G/DL (ref 12–16)
HGB UR QL STRIP.AUTO: ABNORMAL
HYPOCHROMIA BLD QL SMEAR: ABNORMAL
KETONES UR STRIP.AUTO-MCNC: NEGATIVE MG/DL
LEUKOCYTE ESTERASE UR QL STRIP.AUTO: NEGATIVE
LYMPHOCYTES # BLD: 0.2 K/UL (ref 1–5.1)
LYMPHOCYTES NFR BLD: 8 %
MCH RBC QN AUTO: 30 PG (ref 26–34)
MCHC RBC AUTO-ENTMCNC: 33.7 G/DL (ref 31–36)
MCV RBC AUTO: 88.8 FL (ref 80–100)
MONOCYTES # BLD: 0.1 K/UL (ref 0–1.3)
MONOCYTES NFR BLD: 2 %
NEUTROPHILS # BLD: 2.8 K/UL (ref 1.7–7.7)
NEUTROPHILS NFR BLD: 89 %
NITRITE UR QL STRIP.AUTO: NEGATIVE
PH UR STRIP.AUTO: 6 [PH] (ref 5–8)
PLATELET # BLD AUTO: 86 K/UL (ref 135–450)
PLATELET BLD QL SMEAR: ABNORMAL
PMV BLD AUTO: 10.3 FL (ref 5–10.5)
POIKILOCYTOSIS BLD QL SMEAR: ABNORMAL
POTASSIUM SERPL-SCNC: 4.8 MMOL/L (ref 3.5–5.1)
PROT SERPL-MCNC: 6.7 G/DL (ref 6.4–8.2)
PROT UR STRIP.AUTO-MCNC: NEGATIVE MG/DL
RBC # BLD AUTO: 3.95 M/UL (ref 4–5.2)
RBC #/AREA URNS HPF: ABNORMAL /HPF (ref 0–4)
SARS-COV-2 RDRP RESP QL NAA+PROBE: NOT DETECTED
SLIDE REVIEW: ABNORMAL
SODIUM SERPL-SCNC: 138 MMOL/L (ref 136–145)
SP GR UR STRIP.AUTO: 1.01 (ref 1–1.03)
UA COMPLETE W REFLEX CULTURE PNL UR: ABNORMAL
UA DIPSTICK W REFLEX MICRO PNL UR: YES
URN SPEC COLLECT METH UR: ABNORMAL
UROBILINOGEN UR STRIP-ACNC: 0.2 E.U./DL
WBC # BLD AUTO: 3.1 K/UL (ref 4–11)
WBC #/AREA URNS HPF: ABNORMAL /HPF (ref 0–5)

## 2023-09-07 PROCEDURE — 36415 COLL VENOUS BLD VENIPUNCTURE: CPT

## 2023-09-07 PROCEDURE — 87635 SARS-COV-2 COVID-19 AMP PRB: CPT

## 2023-09-07 PROCEDURE — 80053 COMPREHEN METABOLIC PANEL: CPT

## 2023-09-07 PROCEDURE — 6370000000 HC RX 637 (ALT 250 FOR IP): Performed by: EMERGENCY MEDICINE

## 2023-09-07 PROCEDURE — 85025 COMPLETE CBC W/AUTO DIFF WBC: CPT

## 2023-09-07 PROCEDURE — 81001 URINALYSIS AUTO W/SCOPE: CPT

## 2023-09-07 PROCEDURE — 99284 EMERGENCY DEPT VISIT MOD MDM: CPT

## 2023-09-07 PROCEDURE — 70450 CT HEAD/BRAIN W/O DYE: CPT

## 2023-09-07 RX ORDER — ACETAMINOPHEN 500 MG
1000 TABLET ORAL ONCE
Status: COMPLETED | OUTPATIENT
Start: 2023-09-07 | End: 2023-09-07

## 2023-09-07 RX ADMIN — ACETAMINOPHEN: 500 TABLET ORAL at 13:11

## 2023-09-07 ASSESSMENT — PAIN - FUNCTIONAL ASSESSMENT: PAIN_FUNCTIONAL_ASSESSMENT: NONE - DENIES PAIN

## 2023-09-07 ASSESSMENT — PAIN SCALES - GENERAL: PAINLEVEL_OUTOF10: 2

## 2023-09-07 ASSESSMENT — PAIN DESCRIPTION - LOCATION: LOCATION: HEAD

## 2023-09-07 NOTE — ED PROVIDER NOTES
Emergency Department Attending Physician Note  Location: [address] [place] [address] ED  9/7/2023       Pt Name: Slick Schmidt  MRN: [id number]  [address] [facility] 1945    Date of evaluation: 9/7/2023  Provider: Que Eduardo DO  PCP: Katt Noyola MD    Note Started: 12:49 PM EDT 9/7/23    CHIEF COMPLAINT  Chief Complaint   Patient presents with    Fatigue     Pt states this morning had an episode where she was diaphoresis and fatigue, states BP was in the 90s on home machine. Dizziness       HISTORY OF PRESENT ILLNESS:  History obtained by patient. Limitations to history : None. Slick Schmidt is a 78 y.o. female with a significant PMHx of asthma, reflux, and other comorbidities as listed below, presenting emergency department today with an episode of what she describes as lightheadedness, and got a little sweaty. She also has a headache, but that started last night, not sudden onset, denies any other concerns. She says its not really severe. Says she almost passed out. Took her blood pressure, and it was in the 90s. Not quite sure what started this, but here in the emergency department, blood pressure stable and she says she feels much better. No congestion, cough, shortness of breath, chest pain. Denies any other concerns including back pain, and no actual syncope. Denies any nausea or vomiting. Eating and drinking normally. Denies any numbness, weakness, tingling, vision changes. ECHO last week:   Conclusions   Summary   Left ventricular systolic function is hyperdynamic with ejection fraction   estimated at >65%. No regional wall motion abnormalities. There is mild concentric left ventricular hypertrophy. Grade II diastolic dysfunction with elevated left ventricular filling   pressure. The left atrium is moderately dilated. Aortic valve leaflets appear thickened without significant aortic valve   stenosis. Moderate mitral annular calcification.    Mild updating family. Time spent is specifically for management of the presenting complaint and symptoms initially, direct bedside care, reevaluation, review of records, and consultation. There was a high probability of clinically significant life-threatening deterioration in the patient's condition, which required my urgent intervention. _____________________________________    DISCHARGE MEDICATIONS (if applicable):  Discharge Medication List as of 9/7/2023  4:44 PM          DISCONTINUED MEDICATIONS:  Discharge Medication List as of 9/7/2023  4:44 PM               DISPOSITION REFERRAL (if applicable):  [facility]  [facility]  [phone]    If symptoms worsen, As needed    Alexa Dominguez MD  [address] [place] [address]  [phone]    Schedule an appointment as soon as possible for a visit         _____________________________________      Santos Pena DO, ([facility]) am the primary attending of record and contributed the majority of evaluation and treatment of emergent care for this encounter. This chart was generated in part by using Dragon Dictation system and may contain errors related to that system including errors in grammar, punctuation, and spelling, as well as words and phrases that may be inappropriate. If there are any questions or concerns please feel free to contact the dictating provider for clarification.      SANDI PENA DO (electronically signed)   [address] [place] [address] DO Gretchen  09/09/23 [id number]

## 2023-09-08 ENCOUNTER — TELEPHONE (OUTPATIENT)
Dept: CARDIOLOGY CLINIC | Age: 78
End: 2023-09-08

## 2023-09-08 NOTE — TELEPHONE ENCOUNTER
Patient daughter Patito Narayan calling in to make appt for patient. She reports recently seen in ED for hypotension. Patient has experienced BP fluctuations as high as 293'O systolic and low as 83'F systolic readings. She has correlated at times dizziness and near syncope. Acute first available appt with SOHAM scheduled at [facility], she VU to appt. I instructed patient to keep a BP/HR log for 1 week taking BP in AM prior to medications and 3 hrs after medications. As well as taking if she has any symptoms. To call office with log after 1 week. She VU.

## 2023-09-09 ASSESSMENT — ENCOUNTER SYMPTOMS
COUGH: 0
CHEST TIGHTNESS: 0
RHINORRHEA: 0
ABDOMINAL PAIN: 0
BACK PAIN: 0
DIARRHEA: 0
VOMITING: 0
SHORTNESS OF BREATH: 0

## 2023-09-13 ENCOUNTER — TELEPHONE (OUTPATIENT)
Dept: INTERNAL MEDICINE CLINIC | Age: 78
End: 2023-09-13

## 2023-09-13 NOTE — TELEPHONE ENCOUNTER
----- Message from Vale Newsome MD sent at 9/13/2023 11:27 AM EDT -----  Contact: Roxanne Olmos   ----- Message -----  From: Brandi Bearden  Sent: 9/12/2023   4:28 PM EDT  To: Vale Newsome MD    [address] requesting verbal order for skilled nursing.   Please advise

## 2023-09-19 ENCOUNTER — OFFICE VISIT (OUTPATIENT)
Dept: CARDIOLOGY CLINIC | Age: 78
End: 2023-09-19
Payer: MEDICARE

## 2023-09-19 VITALS
OXYGEN SATURATION: 98 % | WEIGHT: 183.5 LBS | SYSTOLIC BLOOD PRESSURE: 164 MMHG | BODY MASS INDEX: 27.9 KG/M2 | HEART RATE: 62 BPM | DIASTOLIC BLOOD PRESSURE: 70 MMHG

## 2023-09-19 DIAGNOSIS — E78.00 HIGH CHOLESTEROL: ICD-10-CM

## 2023-09-19 DIAGNOSIS — I95.2 HYPOTENSION DUE TO DRUGS: Primary | ICD-10-CM

## 2023-09-19 DIAGNOSIS — I35.0 MILD AORTIC STENOSIS: Chronic | ICD-10-CM

## 2023-09-19 PROCEDURE — 1111F DSCHRG MED/CURRENT MED MERGE: CPT | Performed by: INTERNAL MEDICINE

## 2023-09-19 PROCEDURE — 3078F DIAST BP <80 MM HG: CPT | Performed by: INTERNAL MEDICINE

## 2023-09-19 PROCEDURE — 99214 OFFICE O/P EST MOD 30 MIN: CPT | Performed by: INTERNAL MEDICINE

## 2023-09-19 PROCEDURE — G8427 DOCREV CUR MEDS BY ELIG CLIN: HCPCS | Performed by: INTERNAL MEDICINE

## 2023-09-19 PROCEDURE — G8399 PT W/DXA RESULTS DOCUMENT: HCPCS | Performed by: INTERNAL MEDICINE

## 2023-09-19 PROCEDURE — 1036F TOBACCO NON-USER: CPT | Performed by: INTERNAL MEDICINE

## 2023-09-19 PROCEDURE — 1123F ACP DISCUSS/DSCN MKR DOCD: CPT | Performed by: INTERNAL MEDICINE

## 2023-09-19 PROCEDURE — G8417 CALC BMI ABV UP PARAM F/U: HCPCS | Performed by: INTERNAL MEDICINE

## 2023-09-19 PROCEDURE — 1090F PRES/ABSN URINE INCON ASSESS: CPT | Performed by: INTERNAL MEDICINE

## 2023-09-19 PROCEDURE — 3074F SYST BP LT 130 MM HG: CPT | Performed by: INTERNAL MEDICINE

## 2023-09-19 NOTE — PATIENT INSTRUCTIONS
Plan:  Lab work from August 2023 reviewed. Current medications reviewed. Refills given as warranted. STOP Doxazosin   Monitor BP 2-3 times daily over the next 2-3 weeks and call back with his BP log.     Keep Follow up in December

## 2023-09-19 NOTE — PROGRESS NOTES
Discussed with family  Plan:  Lab work from August 2023 reviewed. Current medications reviewed. Refills given as warranted. STOP Doxazosin   Monitor BP 2-3 times daily over the next 2-3 weeks and call back with his BP log. Keep Follow up in December     This note has been scribed in the presence of Dr Tonya Calderon MD, by Parul Chamberlain RN.      I, Dr. Tonya Calderon, personally performed the services described in this documentation, as scribed by the above signed scribe in my presence. It is both accurate and complete to my knowledge. I agree with the details independently gathered by the clinical support staff, while the remaining scribed note accurately describes my personal service to the patient. QUALITY MEASURES  1. Tobacco Cessation Counseling: NA  2. Retake of BP if >140/90: NA  3. Documentation to PCP/referring for new patient:  Sent to PCP at close of office visit  4. CAD patient on anti-platelet: NA    5. CAD patient on STATIN therapy: NA  6.  Patient with CHF and aFib on anticoagulation:  NA

## 2023-09-26 ENCOUNTER — TELEPHONE (OUTPATIENT)
Dept: INTERNAL MEDICINE CLINIC | Age: 78
End: 2023-09-26

## 2023-09-26 NOTE — TELEPHONE ENCOUNTER
----- Message from Alexia Cui sent at 9/26/2023  2:18 PM EDT -----  Contact: Wayne Doss [phone]  Caller asking what company pt lift chair and motorized chair went?  Please advise

## 2023-09-26 NOTE — TELEPHONE ENCOUNTER
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 optic nerve is suspicious for glaucoma Plan: Discussed diagnosis in detail with patient. Discussed treatment options with patient. Will continue to observe condition and or symptoms. Will continue to monitor IOP. Patient instructed to call if condition gets worse. Call if [address] worsens. Sushant Patel informed that orders were faxed to [facility]. She states they did not receive anything.   Orders re-faxed

## 2023-09-27 PROBLEM — R79.89 ELEVATED TROPONIN: Status: RESOLVED | Noted: 2023-08-28 | Resolved: 2023-09-27

## 2023-10-12 RX ORDER — MELOXICAM 7.5 MG/1
7.5 TABLET ORAL DAILY
Qty: 30 TABLET | Refills: 0 | Status: SHIPPED | OUTPATIENT
Start: 2023-10-12

## 2023-10-18 ENCOUNTER — TELEPHONE (OUTPATIENT)
Dept: INTERNAL MEDICINE CLINIC | Age: 78
End: 2023-10-18

## 2023-10-18 ENCOUNTER — HOSPITAL ENCOUNTER (OUTPATIENT)
Age: 78
Setting detail: SPECIMEN
Discharge: HOME | End: 2023-10-18
Payer: MEDICARE

## 2023-10-18 LAB
BACTERIA URNS QL MICRO: ABNORMAL /HPF
BILIRUB UR QL STRIP.AUTO: NEGATIVE
CLARITY UR: CLEAR
COLOR UR: YELLOW
EPI CELLS #/AREA URNS HPF: ABNORMAL /HPF (ref 0–5)
GLUCOSE UR STRIP.AUTO-MCNC: NEGATIVE MG/DL
HGB UR QL STRIP.AUTO: ABNORMAL
KETONES UR STRIP.AUTO-MCNC: ABNORMAL MG/DL
LEUKOCYTE ESTERASE UR QL STRIP.AUTO: ABNORMAL
NITRITE UR QL STRIP.AUTO: NEGATIVE
PH UR STRIP.AUTO: 6.5 [PH] (ref 5–8)
PROT UR STRIP.AUTO-MCNC: 30 MG/DL
RBC #/AREA URNS HPF: ABNORMAL /HPF (ref 0–4)
SP GR UR STRIP.AUTO: 1.02 (ref 1–1.03)
UA DIPSTICK W REFLEX MICRO PNL UR: YES
URN SPEC COLLECT METH UR: ABNORMAL
UROBILINOGEN UR STRIP-ACNC: 1 E.U./DL
WBC #/AREA URNS HPF: ABNORMAL /HPF (ref 0–5)

## 2023-10-18 PROCEDURE — 87077 CULTURE AEROBIC IDENTIFY: CPT

## 2023-10-18 PROCEDURE — 87186 SC STD MICRODIL/AGAR DIL: CPT

## 2023-10-18 PROCEDURE — 81001 URINALYSIS AUTO W/SCOPE: CPT

## 2023-10-18 PROCEDURE — 87086 URINE CULTURE/COLONY COUNT: CPT

## 2023-10-18 NOTE — TELEPHONE ENCOUNTER
----- Message from Wendy Thompson sent at 10/18/2023  4:50 PM EDT -----  Contact: edd [facility] [phone]  Per Dr. Jimbo Hernandez- can see pt tomorrow morning or if pain is severe go to ER.  ----- Message -----  From: Carmen Jaureguis: 10/18/2023   4:24 PM EDT  To: MD Augusto Rossi, an RN from [facility] states that the pt is having pain in her kidney area and is passing some blood with her urine. Augusto Lafleur states she is going to collect a urine sample and drop it off at [facility] in [place]. Please advise.

## 2023-10-19 ENCOUNTER — OFFICE VISIT (OUTPATIENT)
Dept: INTERNAL MEDICINE CLINIC | Age: 78
End: 2023-10-19

## 2023-10-19 VITALS
DIASTOLIC BLOOD PRESSURE: 82 MMHG | HEART RATE: 72 BPM | SYSTOLIC BLOOD PRESSURE: 164 MMHG | WEIGHT: 183 LBS | BODY MASS INDEX: 27.74 KG/M2 | HEIGHT: 68 IN

## 2023-10-19 DIAGNOSIS — R31.9 PAINLESS HEMATURIA: Primary | ICD-10-CM

## 2023-10-19 PROCEDURE — G8417 CALC BMI ABV UP PARAM F/U: HCPCS | Performed by: INTERNAL MEDICINE

## 2023-10-19 PROCEDURE — 1036F TOBACCO NON-USER: CPT | Performed by: INTERNAL MEDICINE

## 2023-10-19 PROCEDURE — 99213 OFFICE O/P EST LOW 20 MIN: CPT | Performed by: INTERNAL MEDICINE

## 2023-10-19 PROCEDURE — 1123F ACP DISCUSS/DSCN MKR DOCD: CPT | Performed by: INTERNAL MEDICINE

## 2023-10-19 PROCEDURE — 3077F SYST BP >= 140 MM HG: CPT | Performed by: INTERNAL MEDICINE

## 2023-10-19 PROCEDURE — 1090F PRES/ABSN URINE INCON ASSESS: CPT | Performed by: INTERNAL MEDICINE

## 2023-10-19 PROCEDURE — G8484 FLU IMMUNIZE NO ADMIN: HCPCS | Performed by: INTERNAL MEDICINE

## 2023-10-19 PROCEDURE — G8427 DOCREV CUR MEDS BY ELIG CLIN: HCPCS | Performed by: INTERNAL MEDICINE

## 2023-10-19 PROCEDURE — G8399 PT W/DXA RESULTS DOCUMENT: HCPCS | Performed by: INTERNAL MEDICINE

## 2023-10-19 PROCEDURE — 3079F DIAST BP 80-89 MM HG: CPT | Performed by: INTERNAL MEDICINE

## 2023-10-19 ASSESSMENT — ENCOUNTER SYMPTOMS
DIARRHEA: 0
CHEST TIGHTNESS: 0
SHORTNESS OF BREATH: 0
NAUSEA: 0
ABDOMINAL PAIN: 0
COUGH: 0
VOMITING: 0
BLOOD IN STOOL: 0
WHEEZING: 0

## 2023-10-19 NOTE — PROGRESS NOTES
Randie Hamman (:  1945) is a 78 y.o. female,Established patient, here for evaluation of the following chief complaint(s):  Hematuria         ASSESSMENT/PLAN:        Diagnosis Orders   1. Painless hematuria          Urine culture pending   Advised to see urology     Subjective   SUBJECTIVE/OBJECTIVE:  Hematuria  Pertinent negatives include no abdominal pain, fever, nausea or vomiting.     C/o hematuria- 1 week. painless  No dysuria  No new back pain- has chronic low back and hip pain  No fever,chills    Review of Systems   Constitutional:  Negative for fatigue, fever and unexpected weight change. Respiratory:  Negative for cough, chest tightness, shortness of breath and wheezing. Cardiovascular:  Negative for chest pain, palpitations and leg swelling. Gastrointestinal:  Negative for abdominal pain, blood in stool, diarrhea, nausea and vomiting. Genitourinary:  Positive for hematuria. Neurological:  Negative for light-headedness. Objective   Physical Exam  Constitutional:       Appearance: She is well-developed. HENT:      Head: Normocephalic and atraumatic. Eyes:      Pupils: Pupils are equal, round, and reactive to light. Neck:      Thyroid: No thyromegaly. Cardiovascular:      Rate and Rhythm: Normal rate and regular rhythm. Heart sounds: Normal heart sounds. No murmur heard. No friction rub. No gallop. Comments: No carotid bruit  Pulmonary:      Effort: Pulmonary effort is normal. No respiratory distress. Breath sounds: Normal breath sounds. No wheezing or rales. Chest:      Chest wall: No tenderness. Abdominal:      General: Bowel sounds are normal. There is no distension. Palpations: Abdomen is soft. There is no mass. Tenderness: There is no abdominal tenderness. There is no right CVA tenderness, left CVA tenderness, guarding or rebound. Musculoskeletal:      Cervical back: Normal range of motion and neck supple.    Neurological:

## 2023-10-22 LAB
BACTERIA UR CULT: ABNORMAL
BACTERIA UR CULT: ABNORMAL
ORGANISM: ABNORMAL
ORGANISM: ABNORMAL

## 2023-10-24 ENCOUNTER — TELEPHONE (OUTPATIENT)
Dept: INTERNAL MEDICINE CLINIC | Age: 78
End: 2023-10-24

## 2023-10-24 RX ORDER — ATORVASTATIN CALCIUM 20 MG/1
20 TABLET, FILM COATED ORAL DAILY
Qty: 90 TABLET | Refills: 0 | Status: SHIPPED | OUTPATIENT
Start: 2023-10-24

## 2023-10-24 RX ORDER — NITROFURANTOIN 25; 75 MG/1; MG/1
100 CAPSULE ORAL 2 TIMES DAILY
Qty: 10 CAPSULE | Refills: 0 | Status: SHIPPED | OUTPATIENT
Start: 2023-10-24 | End: 2023-10-29

## 2023-10-24 NOTE — TELEPHONE ENCOUNTER
----- Message from Janine Rebolledo MD sent at 10/23/2023 11:24 AM EDT -----  Urine culture shows infection    Macrobid 100 bid # 10

## 2023-10-26 ENCOUNTER — HOSPITAL ENCOUNTER (OUTPATIENT)
Dept: MAMMOGRAPHY | Age: 78
Discharge: HOME | End: 2023-10-26
Payer: MEDICARE

## 2023-10-26 ENCOUNTER — HOSPITAL ENCOUNTER (OUTPATIENT)
Dept: ULTRASOUND IMAGING | Age: 78
Discharge: HOME | End: 2023-10-26
Payer: MEDICARE

## 2023-10-26 DIAGNOSIS — Z12.31 SCREENING MAMMOGRAM, ENCOUNTER FOR: ICD-10-CM

## 2023-10-26 DIAGNOSIS — K74.60 CIRRHOSIS OF LIVER WITHOUT ASCITES, UNSPECIFIED HEPATIC CIRRHOSIS TYPE (HCC): ICD-10-CM

## 2023-10-26 PROCEDURE — 76705 ECHO EXAM OF ABDOMEN: CPT

## 2023-10-26 PROCEDURE — 77063 BREAST TOMOSYNTHESIS BI: CPT

## 2023-11-14 ENCOUNTER — OFFICE VISIT (OUTPATIENT)
Dept: INTERNAL MEDICINE CLINIC | Age: 78
End: 2023-11-14

## 2023-11-14 VITALS
SYSTOLIC BLOOD PRESSURE: 114 MMHG | HEART RATE: 76 BPM | HEIGHT: 68 IN | WEIGHT: 186 LBS | BODY MASS INDEX: 28.19 KG/M2 | DIASTOLIC BLOOD PRESSURE: 78 MMHG

## 2023-11-14 DIAGNOSIS — Z00.00 MEDICARE ANNUAL WELLNESS VISIT, SUBSEQUENT: Primary | ICD-10-CM

## 2023-11-14 DIAGNOSIS — Z23 NEED FOR INFLUENZA VACCINATION: ICD-10-CM

## 2023-11-14 DIAGNOSIS — D69.6 THROMBOCYTOPENIA (HCC): ICD-10-CM

## 2023-11-14 DIAGNOSIS — K74.60 CIRRHOSIS, NONALCOHOLIC (HCC): ICD-10-CM

## 2023-11-14 DIAGNOSIS — N18.31 STAGE 3A CHRONIC KIDNEY DISEASE (HCC): ICD-10-CM

## 2023-11-14 DIAGNOSIS — Z00.00 ROUTINE GENERAL MEDICAL EXAMINATION AT A HEALTH CARE FACILITY: ICD-10-CM

## 2023-11-14 DIAGNOSIS — I73.9 PVD (PERIPHERAL VASCULAR DISEASE) (HCC): ICD-10-CM

## 2023-11-14 DIAGNOSIS — G70.00 MYASTHENIA GRAVIS (HCC): ICD-10-CM

## 2023-11-14 DIAGNOSIS — I10 PRIMARY HYPERTENSION: ICD-10-CM

## 2023-11-14 PROBLEM — R42 DIZZINESS: Status: RESOLVED | Noted: 2023-08-28 | Resolved: 2023-11-14

## 2023-11-14 PROBLEM — R07.89 OTHER CHEST PAIN: Status: RESOLVED | Noted: 2023-01-23 | Resolved: 2023-11-14

## 2023-11-14 PROBLEM — R29.90 STROKE-LIKE SYMPTOMS: Status: RESOLVED | Noted: 2023-08-28 | Resolved: 2023-11-14

## 2023-11-14 PROBLEM — S63.501A RIGHT WRIST SPRAIN, INITIAL ENCOUNTER: Status: RESOLVED | Noted: 2018-07-12 | Resolved: 2023-11-14

## 2023-11-14 PROCEDURE — G0439 PPPS, SUBSEQ VISIT: HCPCS | Performed by: INTERNAL MEDICINE

## 2023-11-14 PROCEDURE — 90662 IIV NO PRSV INCREASED AG IM: CPT | Performed by: INTERNAL MEDICINE

## 2023-11-14 PROCEDURE — 3074F SYST BP LT 130 MM HG: CPT | Performed by: INTERNAL MEDICINE

## 2023-11-14 PROCEDURE — G0008 ADMIN INFLUENZA VIRUS VAC: HCPCS | Performed by: INTERNAL MEDICINE

## 2023-11-14 PROCEDURE — 3078F DIAST BP <80 MM HG: CPT | Performed by: INTERNAL MEDICINE

## 2023-11-14 PROCEDURE — G8484 FLU IMMUNIZE NO ADMIN: HCPCS | Performed by: INTERNAL MEDICINE

## 2023-11-14 PROCEDURE — 1123F ACP DISCUSS/DSCN MKR DOCD: CPT | Performed by: INTERNAL MEDICINE

## 2023-11-14 RX ORDER — MELOXICAM 15 MG/1
15 TABLET ORAL DAILY
Qty: 30 TABLET | Refills: 2 | Status: SHIPPED | OUTPATIENT
Start: 2023-11-14

## 2023-11-14 ASSESSMENT — LIFESTYLE VARIABLES
HOW MANY STANDARD DRINKS CONTAINING ALCOHOL DO YOU HAVE ON A TYPICAL DAY: 1 OR 2
HOW OFTEN DO YOU HAVE A DRINK CONTAINING ALCOHOL: MONTHLY OR LESS

## 2023-11-14 ASSESSMENT — PATIENT HEALTH QUESTIONNAIRE - PHQ9
SUM OF ALL RESPONSES TO PHQ QUESTIONS 1-9: 0
SUM OF ALL RESPONSES TO PHQ9 QUESTIONS 1 & 2: 0
2. FEELING DOWN, DEPRESSED OR HOPELESS: 0
1. LITTLE INTEREST OR PLEASURE IN DOING THINGS: 0
SUM OF ALL RESPONSES TO PHQ QUESTIONS 1-9: 0

## 2023-11-14 NOTE — PATIENT INSTRUCTIONS
[place] on Aging online. You need 5670-3394 mg of calcium and 1825-0843 IU of vitamin D per day. It is possible to meet your calcium requirement with diet alone, but a vitamin D supplement is usually necessary to meet this goal.  When exposed to the sun, use a sunscreen that protects against both UVA and UVB radiation with an SPF of 30 or greater. Reapply every 2 to 3 hours or after sweating, drying off with a towel, or swimming. Always wear a seat belt when traveling in a car. Always wear a helmet when riding a bicycle or motorcycle.

## 2023-11-15 ENCOUNTER — TRANSCRIBE ORDERS (OUTPATIENT)
Dept: ADMINISTRATIVE | Age: 78
End: 2023-11-15

## 2023-11-15 DIAGNOSIS — M25.551 RIGHT HIP PAIN: Primary | ICD-10-CM

## 2023-11-20 ENCOUNTER — TELEPHONE (OUTPATIENT)
Dept: CARDIOLOGY CLINIC | Age: 78
End: 2023-11-20

## 2023-11-20 NOTE — TELEPHONE ENCOUNTER
Kade Carrasco, 1945    Cardiac Risk Assessment    What type of procedure are you having? Cystoscopy, Retrograde pyelogram    When is your procedure scheduled for?  12/04/2023    Medications to be stopped. ASPIRIN    What physician is performing your procedure? Dr. Sophia Grace    Phone Number:   [phone]    Fax number to send the letter:   [phone]    Cardiologist:   Dr. Karan Martínez    Last Appointment:   09/19/2023    Next Appointment:   12/04/2023    Are you on any blood thinners?    yes    Last Stress test:   10/08/2023    Last Echo:   08/29/2023    Device Type and :   NONE     SOHAM SMITH

## 2023-11-24 ENCOUNTER — HOSPITAL ENCOUNTER (OUTPATIENT)
Dept: MRI IMAGING | Age: 78
Discharge: HOME | End: 2023-11-24

## 2023-11-24 DIAGNOSIS — M25.551 RIGHT HIP PAIN: ICD-10-CM

## 2023-11-30 ENCOUNTER — TELEPHONE (OUTPATIENT)
Dept: INTERNAL MEDICINE CLINIC | Age: 78
End: 2023-11-30

## 2023-11-30 NOTE — TELEPHONE ENCOUNTER
----- Message from Svetlana Castro MD sent at 11/30/2023  9:23 AM EST -----  Contact: Tiffany Romero [phone]  Ordered by ortho   ----- Message -----  From: Ethel Lozada  Sent: 11/29/2023   4:44 PM EST  To: Svetlana Castro MD    Caller [facility], states pts MRI of the hip needs to be changed to a CT instead.  Please advise

## 2023-12-15 ENCOUNTER — HOSPITAL ENCOUNTER (OUTPATIENT)
Dept: CT IMAGING | Age: 78
Discharge: HOME | End: 2023-12-15
Payer: MEDICARE

## 2023-12-15 DIAGNOSIS — G89.29 CHRONIC RIGHT HIP PAIN: ICD-10-CM

## 2023-12-15 DIAGNOSIS — M25.551 CHRONIC RIGHT HIP PAIN: ICD-10-CM

## 2023-12-15 DIAGNOSIS — M16.11 PRIMARY OSTEOARTHRITIS OF RIGHT HIP: ICD-10-CM

## 2023-12-15 PROCEDURE — 73700 CT LOWER EXTREMITY W/O DYE: CPT

## 2024-01-02 RX ORDER — DOXAZOSIN 2 MG/1
TABLET ORAL
Qty: 90 TABLET | Refills: 0 | OUTPATIENT
Start: 2024-01-02

## 2024-01-02 RX ORDER — AMLODIPINE BESYLATE 5 MG/1
TABLET ORAL
Qty: 90 TABLET | Refills: 2 | Status: SHIPPED | OUTPATIENT
Start: 2024-01-02

## 2024-01-02 RX ORDER — ATORVASTATIN CALCIUM 20 MG/1
20 TABLET, FILM COATED ORAL DAILY
Qty: 90 TABLET | Refills: 0 | Status: SHIPPED | OUTPATIENT
Start: 2024-01-02

## 2024-01-02 RX ORDER — PANTOPRAZOLE SODIUM 20 MG/1
TABLET, DELAYED RELEASE ORAL
Qty: 90 TABLET | Refills: 0 | Status: SHIPPED | OUTPATIENT
Start: 2024-01-02

## 2024-01-02 RX ORDER — CITALOPRAM 20 MG/1
TABLET ORAL
Qty: 90 TABLET | Refills: 0 | Status: SHIPPED | OUTPATIENT
Start: 2024-01-02

## 2024-01-02 RX ORDER — LISINOPRIL AND HYDROCHLOROTHIAZIDE 20; 12.5 MG/1; MG/1
TABLET ORAL
Qty: 90 TABLET | Refills: 0 | Status: SHIPPED | OUTPATIENT
Start: 2024-01-02

## 2024-01-02 RX ORDER — LACTULOSE 10 G/15ML
10 SOLUTION ORAL EVERY EVENING
Qty: 1350 ML | Refills: 0 | Status: SHIPPED | OUTPATIENT
Start: 2024-01-02

## 2024-01-05 RX ORDER — LACTULOSE 10 G/15ML
10 SOLUTION ORAL EVERY EVENING
Refills: 3 | OUTPATIENT
Start: 2024-01-05

## 2024-01-09 ENCOUNTER — HOSPITAL ENCOUNTER (OUTPATIENT)
Dept: GENERAL RADIOLOGY | Age: 79
Discharge: HOME | End: 2024-01-09
Payer: MEDICARE

## 2024-01-09 DIAGNOSIS — N95.9 UNSPECIFIED MENOPAUSAL AND PERIMENOPAUSAL DISORDER: ICD-10-CM

## 2024-01-09 PROCEDURE — 77080 DXA BONE DENSITY AXIAL: CPT

## 2024-02-02 ENCOUNTER — HOSPITAL ENCOUNTER (OUTPATIENT)
Age: 79
Discharge: HOME | End: 2024-02-02
Payer: COMMERCIAL

## 2024-02-02 ENCOUNTER — OFFICE VISIT (OUTPATIENT)
Dept: INTERNAL MEDICINE CLINIC | Age: 79
End: 2024-02-02

## 2024-02-02 VITALS
RESPIRATION RATE: 14 BRPM | DIASTOLIC BLOOD PRESSURE: 70 MMHG | HEIGHT: 68 IN | SYSTOLIC BLOOD PRESSURE: 146 MMHG | BODY MASS INDEX: 28.95 KG/M2 | HEART RATE: 68 BPM | WEIGHT: 191 LBS

## 2024-02-02 DIAGNOSIS — R31.9 PAINLESS HEMATURIA: ICD-10-CM

## 2024-02-02 DIAGNOSIS — I10 PRIMARY HYPERTENSION: ICD-10-CM

## 2024-02-02 DIAGNOSIS — K21.9 GASTROESOPHAGEAL REFLUX DISEASE WITHOUT ESOPHAGITIS: ICD-10-CM

## 2024-02-02 DIAGNOSIS — I35.0 MILD AORTIC STENOSIS: ICD-10-CM

## 2024-02-02 DIAGNOSIS — Z01.818 PRE-OP EXAM: ICD-10-CM

## 2024-02-02 DIAGNOSIS — D69.6 THROMBOCYTOPENIA (HCC): ICD-10-CM

## 2024-02-02 DIAGNOSIS — G70.00 MYASTHENIA GRAVIS (HCC): ICD-10-CM

## 2024-02-02 DIAGNOSIS — Z01.818 PRE-OP EXAM: Primary | ICD-10-CM

## 2024-02-02 DIAGNOSIS — K74.60 CIRRHOSIS, NONALCOHOLIC (HCC): ICD-10-CM

## 2024-02-02 DIAGNOSIS — I73.9 PVD (PERIPHERAL VASCULAR DISEASE) (HCC): ICD-10-CM

## 2024-02-02 DIAGNOSIS — F41.1 GENERALIZED ANXIETY DISORDER: ICD-10-CM

## 2024-02-02 DIAGNOSIS — N18.31 STAGE 3A CHRONIC KIDNEY DISEASE (HCC): ICD-10-CM

## 2024-02-02 DIAGNOSIS — M15.9 PRIMARY OSTEOARTHRITIS INVOLVING MULTIPLE JOINTS: ICD-10-CM

## 2024-02-02 LAB
EKG ATRIAL RATE: 71 BPM
EKG DIAGNOSIS: NORMAL
EKG P AXIS: 52 DEGREES
EKG P-R INTERVAL: 152 MS
EKG Q-T INTERVAL: 460 MS
EKG QRS DURATION: 146 MS
EKG QTC CALCULATION (BAZETT): 499 MS
EKG R AXIS: -36 DEGREES
EKG T AXIS: 37 DEGREES
EKG VENTRICULAR RATE: 71 BPM

## 2024-02-02 PROCEDURE — 3078F DIAST BP <80 MM HG: CPT | Performed by: NURSE PRACTITIONER

## 2024-02-02 PROCEDURE — 93005 ELECTROCARDIOGRAM TRACING: CPT | Performed by: NURSE PRACTITIONER

## 2024-02-02 PROCEDURE — 1090F PRES/ABSN URINE INCON ASSESS: CPT | Performed by: NURSE PRACTITIONER

## 2024-02-02 PROCEDURE — 1123F ACP DISCUSS/DSCN MKR DOCD: CPT | Performed by: NURSE PRACTITIONER

## 2024-02-02 PROCEDURE — G8417 CALC BMI ABV UP PARAM F/U: HCPCS | Performed by: NURSE PRACTITIONER

## 2024-02-02 PROCEDURE — G8399 PT W/DXA RESULTS DOCUMENT: HCPCS | Performed by: NURSE PRACTITIONER

## 2024-02-02 PROCEDURE — G8427 DOCREV CUR MEDS BY ELIG CLIN: HCPCS | Performed by: NURSE PRACTITIONER

## 2024-02-02 PROCEDURE — G8484 FLU IMMUNIZE NO ADMIN: HCPCS | Performed by: NURSE PRACTITIONER

## 2024-02-02 PROCEDURE — 1036F TOBACCO NON-USER: CPT | Performed by: NURSE PRACTITIONER

## 2024-02-02 PROCEDURE — 99214 OFFICE O/P EST MOD 30 MIN: CPT | Performed by: NURSE PRACTITIONER

## 2024-02-02 PROCEDURE — 3077F SYST BP >= 140 MM HG: CPT | Performed by: NURSE PRACTITIONER

## 2024-02-02 RX ORDER — DULOXETIN HYDROCHLORIDE 30 MG/1
30 CAPSULE, DELAYED RELEASE ORAL DAILY
Qty: 30 CAPSULE | Refills: 1 | Status: SHIPPED | OUTPATIENT
Start: 2024-02-02

## 2024-02-02 NOTE — PROGRESS NOTES
palpable, equal bilaterally   GI: Non-tender. Non-distended.  Normal bowel sounds. No hernia.   Skin: Warm and dry. No nodule on exposed extremities. No rash on exposed extremities.   M/S: No cyanosis. No joint deformity. No clubbing.   Neuro: Awake. Grossly nonfocal    Psych: Oriented x 3. No anxiety or agitation         Assessment:       Diagnosis Orders   1. Pre-op exam        2. Painless hematuria        3. Stage 3a chronic kidney disease (HCC)        4. Myasthenia gravis (HCC)        5. Cirrhosis, nonalcoholic (HCC)        6. PVD (peripheral vascular disease) (HCC)        7. Thrombocytopenia (HCC)        8. Primary hypertension        9. Generalized anxiety disorder        10. Gastroesophageal reflux disease without esophagitis        11. Mild aortic stenosis        12. Primary osteoarthritis involving multiple joints              78 y.o. female with planned surgery as above.         Plan:     Stopped Citalopram, and use Cymbalta in it's place.  Will use 30 mg for now.  Can go up to 60 mg if needed.     Patient medically cleared for above planned procedure.    Adilia Kumar FNP-C  2/2/2024

## 2024-02-23 ENCOUNTER — HOSPITAL ENCOUNTER (EMERGENCY)
Age: 79
Discharge: HOME | End: 2024-02-23
Payer: COMMERCIAL

## 2024-02-23 ENCOUNTER — APPOINTMENT (OUTPATIENT)
Dept: CT IMAGING | Age: 79
End: 2024-02-23
Payer: COMMERCIAL

## 2024-02-23 ENCOUNTER — TELEPHONE (OUTPATIENT)
Dept: INTERNAL MEDICINE CLINIC | Age: 79
End: 2024-02-23

## 2024-02-23 VITALS
OXYGEN SATURATION: 99 % | HEART RATE: 70 BPM | DIASTOLIC BLOOD PRESSURE: 77 MMHG | RESPIRATION RATE: 16 BRPM | WEIGHT: 190.6 LBS | BODY MASS INDEX: 28.89 KG/M2 | HEIGHT: 68 IN | SYSTOLIC BLOOD PRESSURE: 167 MMHG | TEMPERATURE: 97.7 F

## 2024-02-23 DIAGNOSIS — N30.01 ACUTE CYSTITIS WITH HEMATURIA: Primary | ICD-10-CM

## 2024-02-23 LAB
ALBUMIN SERPL-MCNC: 3.7 G/DL (ref 3.4–5)
ALBUMIN/GLOB SERPL: 1.3 {RATIO} (ref 1.1–2.2)
ALP SERPL-CCNC: 64 U/L (ref 40–129)
ALT SERPL-CCNC: 17 U/L (ref 10–40)
ANION GAP SERPL CALCULATED.3IONS-SCNC: 13 MMOL/L (ref 3–16)
AST SERPL-CCNC: 30 U/L (ref 15–37)
BACTERIA URNS QL MICRO: ABNORMAL /HPF
BASOPHILS # BLD: 0.2 K/UL (ref 0–0.2)
BASOPHILS NFR BLD: 3.6 %
BILIRUB SERPL-MCNC: 0.9 MG/DL (ref 0–1)
BILIRUB UR QL STRIP.AUTO: NEGATIVE
BUN SERPL-MCNC: 18 MG/DL (ref 7–20)
CALCIUM SERPL-MCNC: 8.4 MG/DL (ref 8.3–10.6)
CHLORIDE SERPL-SCNC: 106 MMOL/L (ref 99–110)
CLARITY UR: CLEAR
CO2 SERPL-SCNC: 22 MMOL/L (ref 21–32)
COLOR UR: YELLOW
CREAT SERPL-MCNC: 0.8 MG/DL (ref 0.6–1.2)
DEPRECATED RDW RBC AUTO: 14.7 % (ref 12.4–15.4)
EOSINOPHIL # BLD: 0.1 K/UL (ref 0–0.6)
EOSINOPHIL NFR BLD: 2.6 %
EPI CELLS #/AREA URNS HPF: ABNORMAL /HPF (ref 0–5)
GFR SERPLBLD CREATININE-BSD FMLA CKD-EPI: >60 ML/MIN/{1.73_M2}
GLUCOSE SERPL-MCNC: 98 MG/DL (ref 70–99)
GLUCOSE UR STRIP.AUTO-MCNC: NEGATIVE MG/DL
HCT VFR BLD AUTO: 36.6 % (ref 36–48)
HGB BLD-MCNC: 12.6 G/DL (ref 12–16)
HGB UR QL STRIP.AUTO: ABNORMAL
KETONES UR STRIP.AUTO-MCNC: NEGATIVE MG/DL
LEUKOCYTE ESTERASE UR QL STRIP.AUTO: ABNORMAL
LYMPHOCYTES # BLD: 0.7 K/UL (ref 1–5.1)
LYMPHOCYTES NFR BLD: 15.4 %
MCH RBC QN AUTO: 30.1 PG (ref 26–34)
MCHC RBC AUTO-ENTMCNC: 34.6 G/DL (ref 31–36)
MCV RBC AUTO: 87.2 FL (ref 80–100)
MONOCYTES # BLD: 0.2 K/UL (ref 0–1.3)
MONOCYTES NFR BLD: 5.7 %
MUCOUS THREADS #/AREA URNS LPF: ABNORMAL /LPF
NEUTROPHILS # BLD: 3.1 K/UL (ref 1.7–7.7)
NEUTROPHILS NFR BLD: 72.7 %
NITRITE UR QL STRIP.AUTO: NEGATIVE
PH UR STRIP.AUTO: 5.5 [PH] (ref 5–8)
PLATELET # BLD AUTO: 105 K/UL (ref 135–450)
PMV BLD AUTO: 10.4 FL (ref 5–10.5)
POTASSIUM SERPL-SCNC: 3.7 MMOL/L (ref 3.5–5.1)
PROT SERPL-MCNC: 6.6 G/DL (ref 6.4–8.2)
PROT UR STRIP.AUTO-MCNC: ABNORMAL MG/DL
RBC # BLD AUTO: 4.19 M/UL (ref 4–5.2)
RBC #/AREA URNS HPF: ABNORMAL /HPF (ref 0–4)
SODIUM SERPL-SCNC: 141 MMOL/L (ref 136–145)
SP GR UR STRIP.AUTO: >=1.03 (ref 1–1.03)
UA COMPLETE W REFLEX CULTURE PNL UR: YES
UA DIPSTICK W REFLEX MICRO PNL UR: YES
URN SPEC COLLECT METH UR: ABNORMAL
UROBILINOGEN UR STRIP-ACNC: 0.2 E.U./DL
WBC # BLD AUTO: 4.2 K/UL (ref 4–11)
WBC #/AREA URNS HPF: ABNORMAL /HPF (ref 0–5)

## 2024-02-23 PROCEDURE — 85025 COMPLETE CBC W/AUTO DIFF WBC: CPT

## 2024-02-23 PROCEDURE — 6360000002 HC RX W HCPCS: Performed by: PHYSICIAN ASSISTANT

## 2024-02-23 PROCEDURE — 2580000003 HC RX 258: Performed by: PHYSICIAN ASSISTANT

## 2024-02-23 PROCEDURE — 96365 THER/PROPH/DIAG IV INF INIT: CPT

## 2024-02-23 PROCEDURE — 36415 COLL VENOUS BLD VENIPUNCTURE: CPT

## 2024-02-23 PROCEDURE — 87186 SC STD MICRODIL/AGAR DIL: CPT

## 2024-02-23 PROCEDURE — 99284 EMERGENCY DEPT VISIT MOD MDM: CPT

## 2024-02-23 PROCEDURE — 74176 CT ABD & PELVIS W/O CONTRAST: CPT

## 2024-02-23 PROCEDURE — 87088 URINE BACTERIA CULTURE: CPT

## 2024-02-23 PROCEDURE — 87086 URINE CULTURE/COLONY COUNT: CPT

## 2024-02-23 PROCEDURE — 81001 URINALYSIS AUTO W/SCOPE: CPT

## 2024-02-23 PROCEDURE — 80053 COMPREHEN METABOLIC PANEL: CPT

## 2024-02-23 RX ORDER — CEFUROXIME AXETIL 250 MG/1
250 TABLET ORAL 2 TIMES DAILY
Qty: 14 TABLET | Refills: 0 | Status: SHIPPED | OUTPATIENT
Start: 2024-02-23 | End: 2024-03-01

## 2024-02-23 RX ADMIN — CEFTRIAXONE SODIUM 1000 MG: 1 INJECTION, POWDER, FOR SOLUTION INTRAMUSCULAR; INTRAVENOUS at 14:40

## 2024-02-23 ASSESSMENT — LIFESTYLE VARIABLES
HOW OFTEN DO YOU HAVE A DRINK CONTAINING ALCOHOL: NEVER
HOW MANY STANDARD DRINKS CONTAINING ALCOHOL DO YOU HAVE ON A TYPICAL DAY: PATIENT DOES NOT DRINK

## 2024-02-23 ASSESSMENT — PAIN SCALES - GENERAL
PAINLEVEL_OUTOF10: 0
PAINLEVEL_OUTOF10: 2

## 2024-02-23 ASSESSMENT — PAIN DESCRIPTION - ORIENTATION: ORIENTATION: RIGHT

## 2024-02-23 ASSESSMENT — PAIN DESCRIPTION - LOCATION: LOCATION: BACK

## 2024-02-23 ASSESSMENT — PAIN - FUNCTIONAL ASSESSMENT: PAIN_FUNCTIONAL_ASSESSMENT: 0-10

## 2024-02-23 NOTE — DISCHARGE INSTRUCTIONS
Started on Ceftin for urinary tract infection.  Recommend you follow-up with your doctor in the office on Monday.  Follow-up with urology as planned for repeat cystoscopy in 3 months.  Return to the ER for worsening symptoms specifically for worsening blood in urine, inability to urinate or empty bladder, or if you develop fever or vomiting.

## 2024-02-23 NOTE — TELEPHONE ENCOUNTER
----- Message from Lindsey Penaloza MD sent at 2/23/2024  1:21 PM EST -----  Contact: [phone]  If urine is all blood, she should call the urologist or go to the ED   ----- Message -----  From: Stacy Hillman MA  Sent: 2/23/2024  10:27 AM EST  To: Lindsey Penaloza MD    Patient states that she has been having UTI symptoms for about 2 weeks, this morning she woke up and her urine is all blood. She did have a bladder scope about 2 weeks ago and they told her she had some blood in her urine and to take AZO. She took the AZO about a week and a half ago. She wants to know if she can come in for a urine sample or have you call something in for her. Please advise.         [id number] - [place], [place] - [address] - P [phone] - F [phone]  [address] [place] [facility] [address]  Phone: [phone]  Fax: [phone]

## 2024-02-23 NOTE — ED PROVIDER NOTES
note for interpretation of EKG.    RADIOLOGY:   Non-plain film images such as CT, Ultrasound and MRI are read by the radiologist. Plain radiographic images are visualized and preliminarily interpreted by the ED Provider with the below findings:      Interpretation per the Radiologist below, if available at the time of this note:    CT ABDOMEN PELVIS WO CONTRAST Additional Contrast? None   Final Result   1. Negative for urinary tract stones.   2. Possible cystitis.   3. Mild findings for cirrhosis without change.           CT ABDOMEN PELVIS WO CONTRAST Additional Contrast? None    Result Date: 2/23/2024  EXAMINATION: CT OF THE ABDOMEN AND PELVIS WITHOUT CONTRAST 2/23/2024 2:34 pm TECHNIQUE: CT of the abdomen and pelvis was performed without the administration of intravenous contrast. Multiplanar reformatted images are provided for review. Automated exposure control, iterative reconstruction, and/or weight based adjustment of the mA/kV was utilized to reduce the radiation dose to as low as reasonably achievable. COMPARISON: 07/12/2022 HISTORY: ORDERING SYSTEM PROVIDED HISTORY: hematuria TECHNOLOGIST PROVIDED HISTORY: Reason for exam:->hematuria Additional Contrast?->None Decision Support Exception - unselect if not a suspected or confirmed emergency medical condition->Emergency Medical Condition (MA) Reason for Exam: hematuria FINDINGS: Lower chest: Heart size is mildly enlarged..  The lung bases atelectasis in the left lower lobe and hypoaeration. : Nostones in the kidneys. No hydronephrosis. No renal sinus stranding. No stones along either ureter. No stones in the bladder.  Hypodense nodule in the right kidney is unchanged. Organs: Liver contour is nodular without change.  Spleen, and adrenal glands are unremarkable. No peripancreatic stranding. GI/Bowel: The stomach is unremarkable. No small bowel dilation. No colonic wall thickening. Pelvis: No free fluid.Hysterectomy.  Multiple phleboliths in the pelvis.  The

## 2024-02-23 NOTE — DISCHARGE INSTR - COC
Continuity of Care Form    Patient Name: Rachele Eaton   :  1945  MRN:  [id number]    Admit date:  2024  Discharge date:  ***    Code Status Order: Prior   Advance Directives:     Admitting Physician:  No admitting provider for patient encounter.  PCP: Lindsey Penaloza MD    Discharging Nurse: ***  Discharging Hospital Unit/Room#: P2/P2  Discharging Unit Phone Number: ***    Emergency Contact:   Extended Emergency Contact Information  Primary Emergency Contact: CamachoLe  Address: [address]  Home Phone: [phone]  Work Phone: [phone]  Mobile Phone: [phone]  Relation: Child  Secondary Emergency Contact: EatonCayden   [facility]  Home Phone: [phone]  Mobile Phone: [phone]  Relation: Child    Past Surgical History:  Past Surgical History:   Procedure Laterality Date    ACHILLES TENDON SURGERY      CARPAL TUNNEL RELEASE      CATARACT REMOVAL WITH IMPLANT Left 2017    Left cataract removal. Dr. Shore    CATARACT REMOVAL WITH IMPLANT Right 2017    PHACO EMULSIFICATION OF CATARACT WITH INTRAOCULAR LENS IMPLANT RIGHT EYE    COLONOSCOPY  2010    FOOT SURGERY      GALLBLADDER SURGERY      HYSTERECTOMY (CERVIX STATUS UNKNOWN)      NECK SURGERY      OVARY REMOVAL      PAIN MANAGEMENT PROCEDURE N/A 2020    MIDLINE LUMBAR FIVE SACRAL ONE EPIDURAL STEROID INJECTION SITE CONFIRMED BY FLUOROSCOPY performed by RICARDO Nevarez MD at [facility] OR    PAIN MANAGEMENT PROCEDURE N/A 2020    MIDLINE LUMBAR FIVE SACRAL ONE EPIDURAL STEROID INJECTION SITE CONFIRMED BY FLUOROSCOPY performed by RICARDO Nevarez MD at [facility] OR    UPPER GASTROINTESTINAL ENDOSCOPY      UPPER GASTROINTESTINAL ENDOSCOPY N/A 2020    EGD DIAGNOSTIC ONLY performed by Norma Hermosillo MD at [facility] ENDOSCOPY    UPPER GASTROINTESTINAL ENDOSCOPY N/A 2021    EGD PEG TUBE INSERTION w/anesthesia performed by Norma Hermosillo MD at [facility]

## 2024-02-23 NOTE — PROGRESS NOTES
Patient is being discharged from ED. They are alert and oriented times 4. Speech is clear. Denies pain. Discharge education provided. Patient verbalized understanding. Patient stated that they will make their own follow up appointments. They verbalized when they should follow up. Patient verbalized that they have all of their belongings. IV removed per policy and procedure. Catheter intact. Bandage applied. Patient verbalized how to stop bleeding if bleeding occurs from the site (pressure and elevation over the heart). Neighbor at bedside to take the patient home. Electronically signed by Prudencio Dyer RN on 2/23/2024 at 3:52 PM

## 2024-02-25 LAB
BACTERIA UR CULT: ABNORMAL
ORGANISM: ABNORMAL

## 2024-02-26 ENCOUNTER — CLINICAL DOCUMENTATION ONLY (OUTPATIENT)
Facility: CLINIC | Age: 79
End: 2024-02-26

## 2024-02-27 RX ORDER — AMLODIPINE BESYLATE 5 MG/1
5 TABLET ORAL DAILY
Qty: 90 TABLET | Refills: 1 | Status: SHIPPED | OUTPATIENT
Start: 2024-02-27

## 2024-02-27 RX ORDER — LISINOPRIL AND HYDROCHLOROTHIAZIDE 20; 12.5 MG/1; MG/1
1 TABLET ORAL DAILY
Qty: 90 TABLET | Refills: 1 | Status: SHIPPED | OUTPATIENT
Start: 2024-02-27

## 2024-02-27 RX ORDER — PANTOPRAZOLE SODIUM 20 MG/1
20 TABLET, DELAYED RELEASE ORAL DAILY
Qty: 90 TABLET | Refills: 1 | Status: SHIPPED | OUTPATIENT
Start: 2024-02-27

## 2024-02-27 RX ORDER — ATORVASTATIN CALCIUM 20 MG/1
20 TABLET, FILM COATED ORAL DAILY
Qty: 90 TABLET | Refills: 1 | Status: SHIPPED | OUTPATIENT
Start: 2024-02-27

## 2024-02-27 RX ORDER — LACTULOSE 10 G/15ML
10 SOLUTION ORAL EVERY EVENING
Qty: 1350 ML | Refills: 1 | Status: SHIPPED | OUTPATIENT
Start: 2024-02-27

## 2024-02-27 RX ORDER — DULOXETIN HYDROCHLORIDE 30 MG/1
30 CAPSULE, DELAYED RELEASE ORAL DAILY
Qty: 30 CAPSULE | Refills: 1 | Status: SHIPPED | OUTPATIENT
Start: 2024-02-27

## 2024-02-29 ENCOUNTER — OFFICE VISIT (OUTPATIENT)
Dept: CARDIOLOGY CLINIC | Age: 79
End: 2024-02-29
Payer: MEDICARE

## 2024-02-29 VITALS
DIASTOLIC BLOOD PRESSURE: 58 MMHG | WEIGHT: 190.8 LBS | HEART RATE: 85 BPM | OXYGEN SATURATION: 100 % | HEIGHT: 68 IN | BODY MASS INDEX: 28.92 KG/M2 | SYSTOLIC BLOOD PRESSURE: 138 MMHG

## 2024-02-29 DIAGNOSIS — E78.00 HYPERCHOLESTEROLEMIA: ICD-10-CM

## 2024-02-29 DIAGNOSIS — I10 PRIMARY HYPERTENSION: ICD-10-CM

## 2024-02-29 DIAGNOSIS — K74.60 CIRRHOSIS, NONALCOHOLIC (HCC): ICD-10-CM

## 2024-02-29 DIAGNOSIS — I51.7 MILD CONCENTRIC LEFT VENTRICULAR HYPERTROPHY: Primary | ICD-10-CM

## 2024-02-29 PROBLEM — N18.9 CHRONIC KIDNEY DISEASE: Status: RESOLVED | Noted: 2022-07-06 | Resolved: 2024-02-29

## 2024-02-29 PROCEDURE — G8427 DOCREV CUR MEDS BY ELIG CLIN: HCPCS | Performed by: INTERNAL MEDICINE

## 2024-02-29 PROCEDURE — G8484 FLU IMMUNIZE NO ADMIN: HCPCS | Performed by: INTERNAL MEDICINE

## 2024-02-29 PROCEDURE — 3075F SYST BP GE 130 - 139MM HG: CPT | Performed by: INTERNAL MEDICINE

## 2024-02-29 PROCEDURE — 3078F DIAST BP <80 MM HG: CPT | Performed by: INTERNAL MEDICINE

## 2024-02-29 PROCEDURE — 1036F TOBACCO NON-USER: CPT | Performed by: INTERNAL MEDICINE

## 2024-02-29 PROCEDURE — 1123F ACP DISCUSS/DSCN MKR DOCD: CPT | Performed by: INTERNAL MEDICINE

## 2024-02-29 PROCEDURE — G8417 CALC BMI ABV UP PARAM F/U: HCPCS | Performed by: INTERNAL MEDICINE

## 2024-02-29 PROCEDURE — G8399 PT W/DXA RESULTS DOCUMENT: HCPCS | Performed by: INTERNAL MEDICINE

## 2024-02-29 PROCEDURE — 1090F PRES/ABSN URINE INCON ASSESS: CPT | Performed by: INTERNAL MEDICINE

## 2024-02-29 PROCEDURE — 99214 OFFICE O/P EST MOD 30 MIN: CPT | Performed by: INTERNAL MEDICINE

## 2024-02-29 NOTE — PATIENT INSTRUCTIONS
Plan:  Medications reviewed:    -Stop niacin.   2. Labs reviewed.     Follow up with me in 6 months.

## 2024-03-05 ENCOUNTER — OFFICE VISIT (OUTPATIENT)
Dept: INTERNAL MEDICINE CLINIC | Age: 79
End: 2024-03-05

## 2024-03-05 VITALS
HEART RATE: 88 BPM | WEIGHT: 190 LBS | DIASTOLIC BLOOD PRESSURE: 80 MMHG | HEIGHT: 67 IN | SYSTOLIC BLOOD PRESSURE: 150 MMHG | RESPIRATION RATE: 16 BRPM | BODY MASS INDEX: 29.82 KG/M2

## 2024-03-05 DIAGNOSIS — N30.01 ACUTE CYSTITIS WITH HEMATURIA: Primary | ICD-10-CM

## 2024-03-05 PROCEDURE — 99213 OFFICE O/P EST LOW 20 MIN: CPT | Performed by: NURSE PRACTITIONER

## 2024-03-05 PROCEDURE — 1036F TOBACCO NON-USER: CPT | Performed by: NURSE PRACTITIONER

## 2024-03-05 PROCEDURE — 1090F PRES/ABSN URINE INCON ASSESS: CPT | Performed by: NURSE PRACTITIONER

## 2024-03-05 PROCEDURE — G8399 PT W/DXA RESULTS DOCUMENT: HCPCS | Performed by: NURSE PRACTITIONER

## 2024-03-05 PROCEDURE — G8417 CALC BMI ABV UP PARAM F/U: HCPCS | Performed by: NURSE PRACTITIONER

## 2024-03-05 PROCEDURE — 3079F DIAST BP 80-89 MM HG: CPT | Performed by: NURSE PRACTITIONER

## 2024-03-05 PROCEDURE — 1123F ACP DISCUSS/DSCN MKR DOCD: CPT | Performed by: NURSE PRACTITIONER

## 2024-03-05 PROCEDURE — G8484 FLU IMMUNIZE NO ADMIN: HCPCS | Performed by: NURSE PRACTITIONER

## 2024-03-05 PROCEDURE — G8427 DOCREV CUR MEDS BY ELIG CLIN: HCPCS | Performed by: NURSE PRACTITIONER

## 2024-03-05 PROCEDURE — 3077F SYST BP >= 140 MM HG: CPT | Performed by: NURSE PRACTITIONER

## 2024-03-05 RX ORDER — DULOXETINE 40 MG/1
40 CAPSULE, DELAYED RELEASE ORAL DAILY
Qty: 90 CAPSULE | Refills: 0 | Status: SHIPPED | OUTPATIENT
Start: 2024-03-05 | End: 2024-03-08

## 2024-03-05 ASSESSMENT — ENCOUNTER SYMPTOMS
NAUSEA: 0
ABDOMINAL PAIN: 0
VOMITING: 0
BACK PAIN: 0

## 2024-03-05 NOTE — PROGRESS NOTES
Chief Complaint:   Rachele Eaton is a 78 y.o. female who presents for   Chief Complaint   Patient presents with    Follow-up       HPI      Patient presents for follow up after being in the ED.   Having dysuria, hematuria.  She noted clots in her urine.  Her lower back was hurting as well.   She has completed the antibiotic and is feeling improved.       Review of Systems  Review of Systems   Constitutional:  Negative for fever.   Gastrointestinal:  Negative for abdominal pain, nausea and vomiting.   Genitourinary:  Negative for dysuria, flank pain, frequency and hematuria.   Musculoskeletal:  Negative for back pain.         Allergies  Streptomycin, Sulfa antibiotics, Tylenol with codeine #3 [acetaminophen-codeine], and Verapamil      Vitals  BP (!) 150/80 (Site: Left Upper Arm, Position: Sitting)   Pulse 88   Resp 16   Ht 1.702 m (5' 7\")   Wt 86.2 kg (190 lb)   BMI 29.76 kg/m²     Current Medications  Current Outpatient Medications   Medication Sig Dispense Refill    Omega-3 Fatty Acids (FISH OIL ADULT GUMMIES PO) Take by mouth      Turmeric (QC TUMERIC COMPLEX PO) Take by mouth      amLODIPine (NORVASC) 5 MG tablet Take 1 tablet by mouth daily 90 tablet 1    atorvastatin (LIPITOR) 20 MG tablet Take 1 tablet by mouth daily 90 tablet 1    DULoxetine (CYMBALTA) 30 MG extended release capsule Take 1 capsule by mouth daily 30 capsule 1    lactulose (CHRONULAC) 10 GM/15ML solution Take 15 mLs by mouth every evening 1350 mL 1    lisinopril-hydroCHLOROthiazide (PRINZIDE;ZESTORETIC) 20-12.5 MG per tablet Take 1 tablet by mouth daily 90 tablet 1    pantoprazole (PROTONIX) 20 MG tablet Take 1 tablet by mouth daily 90 tablet 1    aspirin 81 MG EC tablet Take 1 tablet by mouth daily 90 tablet 1    Handicap Placard MISC by Does not apply route 5 Years 1 each 0    albuterol sulfate HFA (PROVENTIL;VENTOLIN;PROAIR) 108 (90 Base) MCG/ACT inhaler Inhale 2 puffs into the lungs 4 times daily as needed for Wheezing 1 each 2

## 2024-03-07 ENCOUNTER — TELEPHONE (OUTPATIENT)
Dept: INTERNAL MEDICINE CLINIC | Age: 79
End: 2024-03-07

## 2024-03-07 NOTE — TELEPHONE ENCOUNTER
----- Message from ALIVIA Montenegro CNP sent at 3/7/2024  2:31 PM EST -----  Try Duloxetine 60 mg    ----- Message -----  From: Mirela Del Valle  Sent: 3/6/2024   8:39 AM EST  To: ALIVIA Montenegro CNP    Pharmacy states Duloxetine EC/DR 40 MG capsules is not covered.    Covered alternatives:  Duloxetine 60 mg capsules  Venlafaxine ER 75 MG capsules  Desvenlafax ER 50 MG tablets

## 2024-03-08 RX ORDER — DULOXETIN HYDROCHLORIDE 60 MG/1
60 CAPSULE, DELAYED RELEASE ORAL DAILY
Qty: 90 CAPSULE | Refills: 0 | Status: SHIPPED | OUTPATIENT
Start: 2024-03-08

## 2024-03-11 ENCOUNTER — TELEPHONE (OUTPATIENT)
Dept: INTERNAL MEDICINE CLINIC | Age: 79
End: 2024-03-11

## 2024-03-11 NOTE — TELEPHONE ENCOUNTER
----- Message from Lindsey Penaloza MD sent at 3/11/2024  1:44 PM EDT -----  Contact: [phone]  She is not allergic to either of the medications  She should continue them  ----- Message -----  From: Marifer Justice  Sent: 3/11/2024   1:05 PM EDT  To: Lindsey Penaloza MD    Pharmacy has questions about both below medications. Pharmacy states pt is allergenic to both medications. Please advise          Reference # [id number]    lisinopril-hydroCHLOROthiazide (PRINZIDE;ZESTORETIC) 20-12.5 MG per tablet      amLODIPine (NORVASC) 5 MG tablet    Pharmacy    [facility] - [place], [place] - [address] [facility] [phone] - F [phone]

## 2024-04-01 RX ORDER — DULOXETIN HYDROCHLORIDE 30 MG/1
30 CAPSULE, DELAYED RELEASE ORAL DAILY
Qty: 30 CAPSULE | Refills: 2 | OUTPATIENT
Start: 2024-04-01

## 2024-05-09 RX ORDER — DULOXETIN HYDROCHLORIDE 60 MG/1
60 CAPSULE, DELAYED RELEASE ORAL DAILY
Qty: 90 CAPSULE | Refills: 0 | Status: SHIPPED | OUTPATIENT
Start: 2024-05-09 | End: 2024-05-10 | Stop reason: SDUPTHER

## 2024-05-10 ENCOUNTER — OFFICE VISIT (OUTPATIENT)
Dept: INTERNAL MEDICINE CLINIC | Age: 79
End: 2024-05-10

## 2024-05-10 VITALS
SYSTOLIC BLOOD PRESSURE: 138 MMHG | WEIGHT: 198 LBS | HEART RATE: 76 BPM | DIASTOLIC BLOOD PRESSURE: 80 MMHG | HEIGHT: 67 IN | BODY MASS INDEX: 31.08 KG/M2

## 2024-05-10 DIAGNOSIS — G70.00 MYASTHENIA GRAVIS (HCC): ICD-10-CM

## 2024-05-10 DIAGNOSIS — I10 PRIMARY HYPERTENSION: Primary | ICD-10-CM

## 2024-05-10 DIAGNOSIS — K74.60 CIRRHOSIS, NONALCOHOLIC (HCC): ICD-10-CM

## 2024-05-10 DIAGNOSIS — N18.31 STAGE 3A CHRONIC KIDNEY DISEASE (HCC): ICD-10-CM

## 2024-05-10 DIAGNOSIS — F41.1 GENERALIZED ANXIETY DISORDER: ICD-10-CM

## 2024-05-10 PROCEDURE — 99214 OFFICE O/P EST MOD 30 MIN: CPT | Performed by: INTERNAL MEDICINE

## 2024-05-10 PROCEDURE — 1123F ACP DISCUSS/DSCN MKR DOCD: CPT | Performed by: INTERNAL MEDICINE

## 2024-05-10 PROCEDURE — 3075F SYST BP GE 130 - 139MM HG: CPT | Performed by: INTERNAL MEDICINE

## 2024-05-10 PROCEDURE — 3079F DIAST BP 80-89 MM HG: CPT | Performed by: INTERNAL MEDICINE

## 2024-05-10 RX ORDER — METHOCARBAMOL 750 MG/1
750 TABLET, FILM COATED ORAL 3 TIMES DAILY PRN
Qty: 30 TABLET | Refills: 0 | Status: SHIPPED | OUTPATIENT
Start: 2024-05-10

## 2024-05-10 RX ORDER — DULOXETIN HYDROCHLORIDE 60 MG/1
60 CAPSULE, DELAYED RELEASE ORAL DAILY
Qty: 90 CAPSULE | Refills: 1 | Status: SHIPPED | OUTPATIENT
Start: 2024-05-10

## 2024-05-10 RX ORDER — AMLODIPINE BESYLATE 5 MG/1
5 TABLET ORAL DAILY
Qty: 90 TABLET | Refills: 1 | Status: SHIPPED | OUTPATIENT
Start: 2024-05-10

## 2024-05-10 RX ORDER — ATORVASTATIN CALCIUM 20 MG/1
20 TABLET, FILM COATED ORAL DAILY
Qty: 90 TABLET | Refills: 1 | Status: SHIPPED | OUTPATIENT
Start: 2024-05-10

## 2024-05-10 RX ORDER — PANTOPRAZOLE SODIUM 20 MG/1
20 TABLET, DELAYED RELEASE ORAL DAILY
Qty: 90 TABLET | Refills: 1 | Status: SHIPPED | OUTPATIENT
Start: 2024-05-10

## 2024-05-10 ASSESSMENT — ENCOUNTER SYMPTOMS
BLOOD IN STOOL: 0
DIARRHEA: 0
SHORTNESS OF BREATH: 0
CHEST TIGHTNESS: 0
COUGH: 0
VOMITING: 0
ABDOMINAL PAIN: 0
NAUSEA: 0
WHEEZING: 0

## 2024-05-10 NOTE — PROGRESS NOTES
Rachele Eaton (:  1945) is a 78 y.o. female,Established patient, here for evaluation of the following chief complaint(s):  Follow-up      Assessment & Plan        Diagnosis Orders   1. Primary hypertension        2. Generalized anxiety disorder        3. Cirrhosis, nonalcoholic (HCC)        4. Myasthenia gravis (HCC)        5. Stage 3a chronic kidney disease (HCC)            Hypertension.  Stable.  Continue current meds      Generalized anxiety disorder.  Stable.   Continue cymbalta 60 mg daily      GERD.  Stable.    Continue Protonix.     Cirrhosis.  Nonalcoholic steatohepatitis.    Continue lactulose      Chronic anemia and thrombocytopenia  2 to cirrhosis  Also sees a hematologist   Now on iron      Asthma.  Mild intermittent.  Stable.    Continue albuterol inhalers.     Mild aortic stenosis.   Asymptomatic.   Sees cardiology     Myasthenia gravis.  Sees neuro  Stable on mestinon and cellcept       H/o  severe cervical degenerative disc disease.  s/p cervical fusion. Doing well.  Not on NSAIDS anymore   Robaxin prn      Advised Shingrix    Subjective   HPI  She has a history of hypertension which is under good control.  Has generalized anxiety disorder stable on Celexa.  Has GERD which is stable on proton pump inhibitor.  H/o asthma - mild intermittent    Has myasthenia gravis- sees neurologist - stable.  H/o Cervical fusion 2021  H/o ASTORGA- no edema,abd distension     Has mildly elevated ammonia- now on lactulose      C/o pain in multiple joints- knees,hips, low back  Meloxicam doesn't help much    Review of Systems   Constitutional:  Negative for fatigue, fever and unexpected weight change.   Respiratory:  Negative for cough, chest tightness, shortness of breath and wheezing.    Cardiovascular:  Negative for chest pain, palpitations and leg swelling.   Gastrointestinal:  Negative for abdominal pain, blood in stool, diarrhea, nausea and vomiting.   Genitourinary:  Negative for dysuria and

## 2024-05-13 RX ORDER — LACTULOSE 10 G/15ML
SOLUTION ORAL
Qty: 237 ML | Refills: 2 | Status: SHIPPED | OUTPATIENT
Start: 2024-05-13

## (undated) DEVICE — CONMED SCOPE SAVER BITE BLOCK, 20X27 MM: Brand: SCOPE SAVER

## (undated) DEVICE — TOWEL OR BLUEE 16X26IN ST 8 PACK ORB08 16X26ORTWL

## (undated) DEVICE — ENDO CARRY-ON PROCEDURE KIT INCLUDES SUCTION TUBING, LUBRICANT, GAUZE, BIOHAZARD STICKER, TRANSPORT PAD AND INTERCEPT BEDSIDE KIT.: Brand: ENDO CARRY-ON PROCEDURE KIT

## (undated) DEVICE — ALCOHOL RUBBING 16OZ 70% ISO

## (undated) DEVICE — ELECTRODE ECG MONITR FOAM TEAR DROP ADLT RED

## (undated) DEVICE — CHLORAPREP 26ML ORANGE

## (undated) DEVICE — UNIVERSAL BLOCK TRAY: Brand: MEDLINE INDUSTRIES, INC.

## (undated) DEVICE — GAUZE,SPONGE,4"X4",16PLY,STRL,LF,10/TRAY: Brand: MEDLINE

## (undated) DEVICE — Z DISCONTINUED USE 2276105 GOWN PROTCT UNIV CHST W28IN L49IN SL 24IN BLU SPUNBOND FLM

## (undated) DEVICE — STERILE POLYISOPRENE POWDER-FREE SURGICAL GLOVES: Brand: PROTEXIS

## (undated) DEVICE — Device